# Patient Record
Sex: FEMALE | Race: WHITE | NOT HISPANIC OR LATINO | Employment: UNEMPLOYED | URBAN - METROPOLITAN AREA
[De-identification: names, ages, dates, MRNs, and addresses within clinical notes are randomized per-mention and may not be internally consistent; named-entity substitution may affect disease eponyms.]

---

## 2020-01-07 ENCOUNTER — OFFICE VISIT (OUTPATIENT)
Dept: OBGYN CLINIC | Facility: CLINIC | Age: 60
End: 2020-01-07
Payer: COMMERCIAL

## 2020-01-07 ENCOUNTER — APPOINTMENT (OUTPATIENT)
Dept: RADIOLOGY | Facility: AMBULARY SURGERY CENTER | Age: 60
End: 2020-01-07
Attending: ORTHOPAEDIC SURGERY
Payer: COMMERCIAL

## 2020-01-07 VITALS
HEART RATE: 80 BPM | HEIGHT: 66 IN | BODY MASS INDEX: 47.09 KG/M2 | WEIGHT: 293 LBS | SYSTOLIC BLOOD PRESSURE: 158 MMHG | DIASTOLIC BLOOD PRESSURE: 86 MMHG

## 2020-01-07 DIAGNOSIS — M19.172 POST-TRAUMATIC ARTHRITIS OF ANKLE, LEFT: Primary | ICD-10-CM

## 2020-01-07 DIAGNOSIS — M25.572 PAIN, JOINT, ANKLE AND FOOT, LEFT: ICD-10-CM

## 2020-01-07 DIAGNOSIS — M21.542 ANKLE VARUS, ACQUIRED, LEFT: ICD-10-CM

## 2020-01-07 PROCEDURE — 73610 X-RAY EXAM OF ANKLE: CPT

## 2020-01-07 PROCEDURE — 99203 OFFICE O/P NEW LOW 30 MIN: CPT | Performed by: ORTHOPAEDIC SURGERY

## 2020-01-07 RX ORDER — WARFARIN SODIUM 3 MG/1
6 TABLET ORAL DAILY
COMMUNITY
Start: 2019-12-30 | End: 2021-01-05

## 2020-01-07 NOTE — PROGRESS NOTES
KINJAL Olsen  Attending, Orthopaedic Surgery  Foot and [address] [facility] [address] Associates      ORTHOPAEDIC FOOT AND ANKLE CLINIC VISIT     Assessment:     Encounter Diagnoses   Name Primary?  Pain, joint, ankle and foot, left Yes    Post-traumatic arthritis of ankle, left     Ankle varus, acquired, left             Plan:   · The patient verbalized understanding of exam findings and treatment plan  We engaged in the shared decision-making process and treatment options were discussed at length with the patient  Surgical and conservative management discussed today along with risks and benefits  · She has end-stage post-traumatic ankle arthritis with hindfoot varus  · She is a current smoker and her BMI is 48  This puts her at tremendous risk for complication with any surgery  · She has been receiving injections br Dr Franklyn Martínez at [facility] and these are becoming less and less effective  · We ordered her an [place] brace to be worn at all times  · If she is able to quit smoking, she would be a candidate for ankle arthodesis if the bracing fails  · Her BMI precludes her from an ankle replacement  Return in about 3 months (around 4/7/2020)  History of Present Illness:   Chief Complaint:   Chief Complaint   Patient presents with    Left Ankle - Pain     Elise Holliday is a 64 y o  female who is referred by Dr Cindi Blackburn being seen for left ankle pain  Ankle pain has been present for multiple years, pain started after an ankle fracture that was surgically repaired x 20 years ago  Pain is localized at anterior ankle with minimal radiating and described as sharp and severe  Patient denies numbness, tingling or radicular pain  Denies history of neuropathy  Patient does smoke, does not have diabetes and does not take blood thinners  Patient denies family history of anesthesia complications and has not had any complications with anesthesia       Pain/symptom timing:  Worse during the day when active  Pain/symptom context:  Worse with activites and work  Pain/symptom modifying factors:  Rest makes better, activities make worse  Pain/symptom associated signs/symptoms: none    Prior treatment   · NSAIDsYes   · Injections Yes   · Bracing/Orthotics No    · Physical Therapy No     Orthopedic Surgical History:   Previous ankle ORIF 20 years ago    Past Medical, Surgical and Social History:  Past Medical History:  has a past medical history of Cancer ([facility] [address] )  Problem List: does not have a problem list on file  Past Surgical History:  has a past surgical history that includes Ankle surgery and Cardiac surgery  Family History: family history is not on file  Social History:  reports that she has been smoking  She has never used smokeless tobacco  Her alcohol and drug histories are not on file  Current Medications: has a current medication list which includes the following prescription(s): warfarin  Allergies: is allergic to tramadol  Review of Systems:  General- denies fever/chills  HEENT- denies hearing loss or sore throat  Eyes- denies eye pain or visual disturbances, denies red eyes  Respiratory- denies cough or SOB  Cardio- denies chest pain or palpitations  GI- denies abdominal pain  Endocrine- denies urinary frequency  Urinary- denies pain with urination  Musculoskeletal- Negative except noted above  Skin- denies rashes or wounds  Neurological- denies dizziness or headache  Psychiatric- denies anxiety or difficulty concentrating    Physical Exam:   /86 (BP Location: Right arm, Patient Position: Sitting, Cuff Size: Adult)   Pulse 80   Ht 5' 6" (1 676 m)   Wt 136 kg (300 lb)   BMI 48 42 kg/m²   General/Constitutional: No apparent distress: well-nourished and well developed    Eyes: normal ocular motion  Lymphatic: No appreciable lymphadenopathy  Respiratory: Non-labored breathing  Vascular: No edema, swelling or tenderness, except as noted in detailed exam   Integumentary: No impressive skin lesions present, except as noted in detailed exam   Neuro: No ataxia or tremors noted  Psych: Normal mood and affect, oriented to person, place and time  Appropriate affect  Musculoskeletal: Normal, except as noted in detailed exam and in HPI  Examination    Left    Gait Antalgic   Musculoskeletal Tender to palpation at anterior ankle    Skin Normal       Nails Normal    Range of Motion  0 degrees dorsiflexion, 0 degrees plantarflexion  Subtalar motion: minimal    Stability Stable    Muscle Strength 5/5 tibialis anterior  5/5 gastrocnemius-soleus  5/5 posterior tibialis  5/5 peroneal/eversion strength  5/5 EHL  5/5 FHL    Neurologic Normal    Sensation Intact to light touch throughout sural, saphenous, superficial peroneal, deep peroneal and medial/lateral plantar nerve distributions  [place] 5 07 filament (10g) testing deferred  Cardiovascular Brisk capillary refill < 2 seconds,intact DP and PT pulses    Special Tests None      Imaging Studies:   6 views of the left ankle were taken, reviewed and interpreted independently that demonstrate previous medial malleolar screw, end stage post-traumatic ankle arthritis with varus hindfoot  Reviewed by me personally  Franceen Rear Lachman, MD  Foot & Ankle Surgery   Department [address]      I personally performed the service  Franceen Rear Lachman, MD

## 2020-01-07 NOTE — LETTER
January 7, 2020     Referral [address] [facility] [id number]    Patient: Chante Lind   YOB: 1960   Date of Visit: 1/7/2020     Dear Dr Alison Ridley      Thank you for referring Ariel Estevez to me for evaluation  Below are the relevant portions of my assessment and plan of care  If you have questions, please do not hesitate to call me  I look forward to following Izzy Hendricks along with you  Sincerely,        Litzy Shepard MD        CC: Scarlett Wilder MD    Progress Notes:

## 2020-01-07 NOTE — PATIENT INSTRUCTIONS
Today, We recommended a brace/prosthesis for you  [facility] certified pedorthotists make orthotics but not braces or prosthesis  Below are the companies in the area that make these, Please call ahead for an appointment and to ensure the company accepts your insurance  Make sure to bring the prescription given to you in the office today to the company for the brace/prosthesis  [address] [facility] [address] Rizwan Adam  [address] [facility] [address] Josefina Basilio 1 Phone: [phone]  [place] Fax: [phone]  ·   [address]  [address]  [place], [address]  (By Appointment Only)  [facility], [address] [place] [address] Dr Oliver LINDER Phone: [phone]  [place] Fax: [address] Dol[address] d[address] Location  [address]   [address] [facility]  [phone] (fax)    [facility]  [address] [facility], [address] [place] [address],[address]  [place], [facility]  Jason Keita  [phone] (fax)    [facility]  [address] [facility], [address] [facility] [address]  [phone] (fax)    [facility] Location  [address] [address] [address]  [address] [facility]  [phone] (fax)    [facility]  [address] , [address], Kenyetta Balbuena 3  [phone] (fax)

## 2021-01-05 RX ORDER — WARFARIN SODIUM 3 MG/1
6 TABLET ORAL DAILY
COMMUNITY
Start: 2020-12-21 | End: 2021-06-24 | Stop reason: SDUPTHER

## 2021-01-05 RX ORDER — NYSTATIN 100000 [USP'U]/G
POWDER TOPICAL
COMMUNITY
Start: 2020-08-25 | End: 2021-01-08

## 2021-01-08 ENCOUNTER — OFFICE VISIT (OUTPATIENT)
Dept: FAMILY MEDICINE CLINIC | Facility: CLINIC | Age: 61
End: 2021-01-08
Payer: COMMERCIAL

## 2021-01-08 VITALS
BODY MASS INDEX: 47.09 KG/M2 | HEIGHT: 66 IN | RESPIRATION RATE: 18 BRPM | HEART RATE: 95 BPM | WEIGHT: 293 LBS | OXYGEN SATURATION: 95 % | TEMPERATURE: 97 F | DIASTOLIC BLOOD PRESSURE: 80 MMHG | SYSTOLIC BLOOD PRESSURE: 126 MMHG

## 2021-01-08 DIAGNOSIS — Z53.20 MAMMOGRAM DECLINED: ICD-10-CM

## 2021-01-08 DIAGNOSIS — E78.5 HYPERLIPIDEMIA, UNSPECIFIED HYPERLIPIDEMIA TYPE: ICD-10-CM

## 2021-01-08 DIAGNOSIS — Z11.59 NEED FOR HEPATITIS C SCREENING TEST: ICD-10-CM

## 2021-01-08 DIAGNOSIS — E03.9 HYPOTHYROIDISM, UNSPECIFIED TYPE: ICD-10-CM

## 2021-01-08 DIAGNOSIS — Z11.4 ENCOUNTER FOR SCREENING FOR HIV: ICD-10-CM

## 2021-01-08 DIAGNOSIS — Z00.00 ENCOUNTER FOR MEDICAL EXAMINATION TO ESTABLISH CARE: Primary | ICD-10-CM

## 2021-01-08 DIAGNOSIS — E66.01 MORBID OBESITY (HCC): ICD-10-CM

## 2021-01-08 DIAGNOSIS — Z95.2 HISTORY OF MECHANICAL AORTIC VALVE REPLACEMENT: ICD-10-CM

## 2021-01-08 DIAGNOSIS — Z28.21 INFLUENZA VACCINATION DECLINED BY PATIENT: ICD-10-CM

## 2021-01-08 DIAGNOSIS — Z72.0 TOBACCO USE: ICD-10-CM

## 2021-01-08 DIAGNOSIS — R73.9 HYPERGLYCEMIA: ICD-10-CM

## 2021-01-08 DIAGNOSIS — L03.113 CELLULITIS OF RIGHT UPPER EXTREMITY: ICD-10-CM

## 2021-01-08 LAB — SL AMB POCT HEMOGLOBIN AIC: 6.1 (ref ?–6.5)

## 2021-01-08 PROCEDURE — 83036 HEMOGLOBIN GLYCOSYLATED A1C: CPT | Performed by: INTERNAL MEDICINE

## 2021-01-08 PROCEDURE — 99214 OFFICE O/P EST MOD 30 MIN: CPT | Performed by: INTERNAL MEDICINE

## 2021-01-08 PROCEDURE — 3725F SCREEN DEPRESSION PERFORMED: CPT | Performed by: INTERNAL MEDICINE

## 2021-01-08 PROCEDURE — 36416 COLLJ CAPILLARY BLOOD SPEC: CPT | Performed by: INTERNAL MEDICINE

## 2021-01-08 PROCEDURE — 99204 OFFICE O/P NEW MOD 45 MIN: CPT | Performed by: INTERNAL MEDICINE

## 2021-01-08 RX ORDER — MIRTAZAPINE 45 MG/1
45 TABLET, FILM COATED ORAL
COMMUNITY
Start: 2020-12-21 | End: 2021-04-27 | Stop reason: HOSPADM

## 2021-01-08 RX ORDER — CLONAZEPAM 1 MG/1
1 TABLET ORAL DAILY PRN
COMMUNITY
Start: 2020-12-28 | End: 2021-04-27 | Stop reason: HOSPADM

## 2021-01-08 RX ORDER — DOXYCYCLINE HYCLATE 100 MG/1
100 CAPSULE ORAL EVERY 12 HOURS SCHEDULED
Qty: 14 CAPSULE | Refills: 0 | Status: SHIPPED | OUTPATIENT
Start: 2021-01-08 | End: 2021-01-15

## 2021-01-08 RX ORDER — DOXYCYCLINE HYCLATE 100 MG/1
CAPSULE ORAL
COMMUNITY
Start: 2020-11-10 | End: 2021-03-02

## 2021-01-08 RX ORDER — ZIPRASIDONE HYDROCHLORIDE 60 MG/1
60 CAPSULE ORAL DAILY
COMMUNITY
Start: 2020-12-21 | End: 2021-04-27 | Stop reason: HOSPADM

## 2021-01-08 RX ORDER — CHLORHEXIDINE GLUCONATE 4 G/100ML
1 SOLUTION TOPICAL DAILY PRN
Qty: 120 ML | Refills: 0 | Status: SHIPPED | OUTPATIENT
Start: 2021-01-08 | End: 2021-03-02

## 2021-01-08 NOTE — PROGRESS NOTES
BMI Counseling: Body mass index is 53 59 kg/m²  The BMI is above normal  Nutrition recommendations include reducing portion sizes, decreasing overall calorie intake, 3-5 servings of fruits/vegetables daily, reducing fast food intake, consuming healthier snacks and decreasing soda and/or juice intake  Exercise recommendations include exercising 3-5 times per week and joining a gym

## 2021-01-08 NOTE — PATIENT INSTRUCTIONS

## 2021-01-12 ENCOUNTER — TELEPHONE (OUTPATIENT)
Dept: FAMILY MEDICINE CLINIC | Facility: CLINIC | Age: 61
End: 2021-01-12

## 2021-01-12 NOTE — TELEPHONE ENCOUNTER
Just prescribed doxycycline on Friday and she googled and it and it said she should not take with her blood thinner or adjust her blood thinner? Patient ph # [phone]    Pharmacy - [facility] but =she wants to change that pharmacy    She is not happy with [facility] the way they treated her when she asked about being on her blood thinner  So she will let us know what pharmacy to use

## 2021-01-12 NOTE — TELEPHONE ENCOUNTER
As long as she's not on doxycycline really long it shouldn't be a problem-we can change to a different class of antibiotics but she is allergic to penicillins and we are trying to cover a cellulitis-we can just do some extra INR monitoring

## 2021-01-20 ENCOUNTER — TELEPHONE (OUTPATIENT)
Dept: FAMILY MEDICINE CLINIC | Facility: CLINIC | Age: 61
End: 2021-01-20

## 2021-01-20 NOTE — TELEPHONE ENCOUNTER
R hand is on pt is getting worse she states  PT finished antibiotic but did not get the OTC cream you mentioned to her due to not having 15$ available  She would like to know what else should she do

## 2021-01-25 ENCOUNTER — APPOINTMENT (OUTPATIENT)
Dept: LAB | Facility: CLINIC | Age: 61
End: 2021-01-25
Payer: COMMERCIAL

## 2021-01-25 ENCOUNTER — ANTICOAG VISIT (OUTPATIENT)
Dept: CARDIOLOGY CLINIC | Facility: CLINIC | Age: 61
End: 2021-01-25

## 2021-01-25 ENCOUNTER — OFFICE VISIT (OUTPATIENT)
Dept: CARDIOLOGY CLINIC | Facility: CLINIC | Age: 61
End: 2021-01-25
Payer: COMMERCIAL

## 2021-01-25 ENCOUNTER — TELEPHONE (OUTPATIENT)
Dept: CARDIOLOGY CLINIC | Facility: CLINIC | Age: 61
End: 2021-01-25

## 2021-01-25 ENCOUNTER — TRANSCRIBE ORDERS (OUTPATIENT)
Dept: LAB | Facility: CLINIC | Age: 61
End: 2021-01-25

## 2021-01-25 ENCOUNTER — TELEMEDICINE (OUTPATIENT)
Dept: FAMILY MEDICINE CLINIC | Facility: CLINIC | Age: 61
End: 2021-01-25
Payer: COMMERCIAL

## 2021-01-25 VITALS
DIASTOLIC BLOOD PRESSURE: 80 MMHG | WEIGHT: 293 LBS | BODY MASS INDEX: 47.09 KG/M2 | OXYGEN SATURATION: 93 % | SYSTOLIC BLOOD PRESSURE: 130 MMHG | HEIGHT: 66 IN | HEART RATE: 82 BPM

## 2021-01-25 VITALS — WEIGHT: 293 LBS | HEIGHT: 66 IN | BODY MASS INDEX: 47.09 KG/M2

## 2021-01-25 DIAGNOSIS — Z00.00 ENCOUNTER FOR MEDICAL EXAMINATION TO ESTABLISH CARE: ICD-10-CM

## 2021-01-25 DIAGNOSIS — Z95.2 STATUS POST MECHANICAL AORTIC VALVE REPLACEMENT: ICD-10-CM

## 2021-01-25 DIAGNOSIS — R00.2 PALPITATIONS: ICD-10-CM

## 2021-01-25 DIAGNOSIS — L02.92 BOIL: Primary | ICD-10-CM

## 2021-01-25 DIAGNOSIS — R06.02 SOB (SHORTNESS OF BREATH): ICD-10-CM

## 2021-01-25 DIAGNOSIS — Z11.4 ENCOUNTER FOR SCREENING FOR HIV: ICD-10-CM

## 2021-01-25 DIAGNOSIS — G47.33 OSA (OBSTRUCTIVE SLEEP APNEA): ICD-10-CM

## 2021-01-25 DIAGNOSIS — R00.0 TACHYCARDIA: ICD-10-CM

## 2021-01-25 DIAGNOSIS — R06.02 SOB (SHORTNESS OF BREATH): Primary | ICD-10-CM

## 2021-01-25 DIAGNOSIS — E78.00 HIGH CHOLESTEROL: Primary | ICD-10-CM

## 2021-01-25 DIAGNOSIS — E66.01 MORBID OBESITY WITH BMI OF 50.0-59.9, ADULT (HCC): ICD-10-CM

## 2021-01-25 DIAGNOSIS — E78.5 HYPERLIPIDEMIA, UNSPECIFIED HYPERLIPIDEMIA TYPE: ICD-10-CM

## 2021-01-25 DIAGNOSIS — E03.9 HYPOTHYROIDISM, UNSPECIFIED TYPE: ICD-10-CM

## 2021-01-25 DIAGNOSIS — Z95.2 HISTORY OF MECHANICAL AORTIC VALVE REPLACEMENT: ICD-10-CM

## 2021-01-25 LAB
ALBUMIN SERPL BCP-MCNC: 3.4 G/DL (ref 3.5–5)
ALP SERPL-CCNC: 118 U/L (ref 46–116)
ALT SERPL W P-5'-P-CCNC: 38 U/L (ref 12–78)
ANION GAP SERPL CALCULATED.3IONS-SCNC: 8 MMOL/L (ref 4–13)
AST SERPL W P-5'-P-CCNC: 45 U/L (ref 5–45)
BASOPHILS # BLD AUTO: 0.03 THOUSANDS/ΜL (ref 0–0.1)
BASOPHILS NFR BLD AUTO: 1 % (ref 0–1)
BILIRUB SERPL-MCNC: 0.89 MG/DL (ref 0.2–1)
BUN SERPL-MCNC: 14 MG/DL (ref 5–25)
CALCIUM ALBUM COR SERPL-MCNC: 9.2 MG/DL (ref 8.3–10.1)
CALCIUM SERPL-MCNC: 8.7 MG/DL (ref 8.3–10.1)
CHLORIDE SERPL-SCNC: 104 MMOL/L (ref 100–108)
CHOLEST SERPL-MCNC: 215 MG/DL (ref 50–200)
CO2 SERPL-SCNC: 29 MMOL/L (ref 21–32)
CREAT SERPL-MCNC: 0.69 MG/DL (ref 0.6–1.3)
EOSINOPHIL # BLD AUTO: 0.14 THOUSAND/ΜL (ref 0–0.61)
EOSINOPHIL NFR BLD AUTO: 4 % (ref 0–6)
ERYTHROCYTE [DISTWIDTH] IN BLOOD BY AUTOMATED COUNT: 12.5 % (ref 11.6–15.1)
GFR SERPL CREATININE-BSD FRML MDRD: 96 ML/MIN/1.73SQ M
GLUCOSE P FAST SERPL-MCNC: 163 MG/DL (ref 65–99)
HCT VFR BLD AUTO: 46.4 % (ref 34.8–46.1)
HDLC SERPL-MCNC: 59 MG/DL
HGB BLD-MCNC: 14.6 G/DL (ref 11.5–15.4)
IMM GRANULOCYTES # BLD AUTO: 0.01 THOUSAND/UL (ref 0–0.2)
IMM GRANULOCYTES NFR BLD AUTO: 0 % (ref 0–2)
INR PPP: 1.73 (ref 0.84–1.19)
LDLC SERPL CALC-MCNC: 116 MG/DL (ref 0–100)
LYMPHOCYTES # BLD AUTO: 1.05 THOUSANDS/ΜL (ref 0.6–4.47)
LYMPHOCYTES NFR BLD AUTO: 27 % (ref 14–44)
MCH RBC QN AUTO: 30.8 PG (ref 26.8–34.3)
MCHC RBC AUTO-ENTMCNC: 31.5 G/DL (ref 31.4–37.4)
MCV RBC AUTO: 98 FL (ref 82–98)
MONOCYTES # BLD AUTO: 0.31 THOUSAND/ΜL (ref 0.17–1.22)
MONOCYTES NFR BLD AUTO: 8 % (ref 4–12)
NEUTROPHILS # BLD AUTO: 2.4 THOUSANDS/ΜL (ref 1.85–7.62)
NEUTS SEG NFR BLD AUTO: 60 % (ref 43–75)
NRBC BLD AUTO-RTO: 0 /100 WBCS
NT-PROBNP SERPL-MCNC: 293 PG/ML
PLATELET # BLD AUTO: 155 THOUSANDS/UL (ref 149–390)
PMV BLD AUTO: 10.1 FL (ref 8.9–12.7)
POTASSIUM SERPL-SCNC: 4.3 MMOL/L (ref 3.5–5.3)
PROT SERPL-MCNC: 7.7 G/DL (ref 6.4–8.2)
PROTHROMBIN TIME: 20.3 SECONDS (ref 11.6–14.5)
RBC # BLD AUTO: 4.74 MILLION/UL (ref 3.81–5.12)
SODIUM SERPL-SCNC: 141 MMOL/L (ref 136–145)
TRIGL SERPL-MCNC: 198 MG/DL
TSH SERPL DL<=0.05 MIU/L-ACNC: 2.6 UIU/ML (ref 0.36–3.74)
WBC # BLD AUTO: 3.94 THOUSAND/UL (ref 4.31–10.16)

## 2021-01-25 PROCEDURE — 99213 OFFICE O/P EST LOW 20 MIN: CPT | Performed by: INTERNAL MEDICINE

## 2021-01-25 PROCEDURE — 83880 ASSAY OF NATRIURETIC PEPTIDE: CPT

## 2021-01-25 PROCEDURE — 36415 COLL VENOUS BLD VENIPUNCTURE: CPT

## 2021-01-25 PROCEDURE — 80061 LIPID PANEL: CPT

## 2021-01-25 PROCEDURE — 86803 HEPATITIS C AB TEST: CPT

## 2021-01-25 PROCEDURE — 99204 OFFICE O/P NEW MOD 45 MIN: CPT | Performed by: INTERNAL MEDICINE

## 2021-01-25 PROCEDURE — 85025 COMPLETE CBC W/AUTO DIFF WBC: CPT

## 2021-01-25 PROCEDURE — 93000 ELECTROCARDIOGRAM COMPLETE: CPT | Performed by: INTERNAL MEDICINE

## 2021-01-25 PROCEDURE — 3008F BODY MASS INDEX DOCD: CPT | Performed by: INTERNAL MEDICINE

## 2021-01-25 PROCEDURE — 84443 ASSAY THYROID STIM HORMONE: CPT | Performed by: INTERNAL MEDICINE

## 2021-01-25 PROCEDURE — 4004F PT TOBACCO SCREEN RCVD TLK: CPT | Performed by: INTERNAL MEDICINE

## 2021-01-25 PROCEDURE — 80053 COMPREHEN METABOLIC PANEL: CPT

## 2021-01-25 PROCEDURE — 85610 PROTHROMBIN TIME: CPT

## 2021-01-25 PROCEDURE — 87389 HIV-1 AG W/HIV-1&-2 AB AG IA: CPT

## 2021-01-25 RX ORDER — ATORVASTATIN CALCIUM 20 MG/1
20 TABLET, FILM COATED ORAL DAILY
Qty: 30 TABLET | Refills: 5 | Status: SHIPPED | OUTPATIENT
Start: 2021-01-25 | End: 2021-07-14

## 2021-01-25 RX ORDER — FUROSEMIDE 40 MG/1
40 TABLET ORAL DAILY
Qty: 10 TABLET | Refills: 0 | Status: SHIPPED | OUTPATIENT
Start: 2021-01-25 | End: 2021-01-26 | Stop reason: SDUPTHER

## 2021-01-25 NOTE — PROGRESS NOTES
[facility] [address]  [place] [address] [place] [address]  Phone#  [phone]  Fax#  [phone]                                               Cardiology Office [address] [facility], 64 y o  female  YOB: 1960  MRN: [id number] Encounter: [id number]      PCP - Nimco Queen MD    Assessment  1  Shortness of breath  2  S/p #23 On-X mechanical aortic valve replacement  · Echo - 10/9/17 ([facility] cardio note documentation) -  Well-seated mechanical aortic valve  3  Bicuspid aortic valve  4  h/o DVT  5  Morbid obesity, Body mass index is 53 94 kg/m²     6  H/o Ovarian cancer s/p hysterectomy/oop    Plan  Shortness of breath, palpitations  · Shortness of breath is predominantly at night when she lies down history of orthopnea or obesity hypoventilation  · Volume status is difficult to assess considering the body habitus   · She has a trace amount of edema, and no rales on clinical exam  · Check NT-Pro-BNP  · Check diagnostic sleep study  · Trial of lasix 40 mg daily for 5 days  · Check echo  · She is pending blood work today   · May need to consider further cardiac monitor if continues to have palpitations    S/p mechanical aortic valve replacement - 23mm On-X valve  · She has been on Coumadin for the same since her valve replacement, and was being previously monitored with [facility] and  · She recently established with PCP Dr Fab Vasquez, who initiated the Coumadin follow-up for her, but patient states that she would feel much more comfortable if it were monitored by Cardiology  · As a result, we are going to have the INR monitoring switch to the cardiology office with us  · No known h/o fib/stroke --> INR goal 2-3    Bicuspid aortic valve  · She herself reports having history of bicuspid valve, which was noted at the time of surgery in 2013 - unable to find records to confirm same  · Although the valve has already been replaced, she remains at risk of aortic aneurysm and this will need continued monitoring with echo/CT    Morbid obesity, Body mass index is 53 94 kg/m²  · Her eventual goal is to get gastric bypass surgery, but she has been finding it hard to lose any weight   · Encourage increase walking to include at least 30 minutes daily    ECG today -  Results for orders placed or performed in visit on 01/25/21   POCT ECG    Impression     Sinus rhythm, cannot rule out septal infarct, otherwise normal ECG        Orders Placed This Encounter   Procedures    NT-BNP PRO    TSH, 3rd generation with Free T4 reflex    Lipid Panel with Direct LDL reflex    Enrollment for AMB Anticoagulation Monitoring    POCT ECG    Echo complete with contrast if indicated    Diagnostic Sleep Study       Return in about 2 months (around 3/25/2021), or if symptoms worsen or fail to improve  History of Present Illness   51-year-old female, with morbid obesity, comes in as a new patient for establishment of care  She has a history of mechanical aortic valve replacement done in 3/2013 with Dr Bedoya at [facility], for reported bicuspid aortic valve  She did not have any significant CAD at this time, and did not need any bypass  He has been doing well since surgery and has not had any major problems  She does report having some issues with the surgical scar, and it being extended down and extending into left, and needing a plate repair, but has not had any problems with same recently  She subsequently followed up with Cardiology Dr Morin at [facility] and most recently with Dr Polo Yuen at [facility], and now wants to move care to [facility]  She has morbid obesity, and reports a weight gain of about 12 lb for the last 6 months  He states that she has been mostly staying indoors and avoiding any outside activity due to the ongoing pandemic, and has been eating about the same as before  She does report increasing shortness of breath over the last 6 months associated with orthopnea and PND    He additionally reports intermittent palpitations, predominantly occurring at night when she tries to go to sleep lying down  She describes it as an  Uncomfortable sensation      No complains of chest pain    Historical Information   Past Medical History:   Diagnosis Date    Anemia     Anxiety     Aortic valve disorder     Back pain     Chronic pain syndrome     Constipation     Degenerative joint disease involving multiple joints     Depression     DVT (deep venous thrombosis) (HCC)     Bilateral    Edema     Endometrial adenocarcinoma (HCC)     Fibromyalgia     Ganglion of right wrist     GERD (gastroesophageal reflux disease)     Heart murmur     Hematoma     History of mechanical aortic valve replacement     Hypothyroidism (acquired)     Low blood pressure     Malignant neoplasm of corpus uteri, except isthmus (HCC)     Mixed hyperlipidemia     Morbid obesity (HCC)     Obstructive sleep apnea syndrome     Pulmonary embolism (HCC)     Tobacco dependence syndrome     Transient cerebral ischemia     Urinary incontinence     Viral hepatitis C     Vitamin D deficiency      Past Surgical History:   Procedure Laterality Date    ANKLE SURGERY      CARDIAC SURGERY  2015    VALVE REPLACEMENT     SECTION      X3    CHOLECYSTECTOMY      COLONOSCOPY  2019    HYSTERECTOMY  2011    TOTAL     Family History   Problem Relation Age of Onset    Coronary artery disease Mother     Coronary artery disease Father      Current Outpatient Medications on File Prior to Visit   Medication Sig Dispense Refill    warfarin (Jantoven) 3 mg tablet Take 6 mg by mouth daily      chlorhexidine (HIBICLENS) 4 % external liquid Apply 1 application topically daily as needed for wound care (Patient not taking: Reported on 2021) 120 mL 0    clonazePAM (KlonoPIN) 1 mg tablet Take 1 mg by mouth daily as needed      doxycycline hyclate (VIBRAMYCIN) 100 mg capsule TAKE 1 CAPSULE  (100 MG TOTAL) BY MOUTH 2 (TWO) TIMES A DAY FOR 10 DAYS   mirtazapine (REMERON) 45 MG tablet Take 45 mg by mouth daily at bedtime      ziprasidone (GEODON) 60 mg capsule Take 60 mg by mouth daily       No current facility-administered medications on file prior to visit        Allergies   Allergen Reactions    Bactrim [Sulfamethoxazole-Trimethoprim]     Penicillins     Tramadol      Could not function - felt very ill      Social History     Socioeconomic History    Marital status:      Spouse name: None    Number of children: None    Years of education: 15    Highest education level: GED or equivalent   Occupational History    None   Social Needs    Financial resource strain: None    Food insecurity     Worry: None     Inability: None    Transportation needs     Medical: None     Non-medical: None   Tobacco Use    Smoking status: Current Every Day Smoker     Packs/day: 0 25     Years: 40 00     Pack years: 10 00     Types: Cigarettes    Smokeless tobacco: Never Used   Substance and Sexual Activity    Alcohol use: Yes     Frequency: Monthly or less    Drug use: Yes     Types: Marijuana    Sexual activity: None   Lifestyle    Physical activity     Days per week: None     Minutes per session: None    Stress: None   Relationships    Social connections     Talks on phone: None     Gets together: None     Attends Restorationist service: None     Active member of club or organization: None     Attends meetings of clubs or organizations: None     Relationship status: None    Intimate partner violence     Fear of current or ex partner: None     Emotionally abused: None     Physically abused: None     Forced sexual activity: None   Other Topics Concern    None   Social History Narrative    Do you currently or have you served in the [employer]: No    Were you activated, into active duty, as a member of the [employer] or as a Reservist: No    Occupation: unemployed    Education: 15 ged    Marital status:     Exercise level: None    Diet: Regular    General stress level: High    Has smoked since age: 17    Alcohol intake: None    Caffeine intake: Heavy    Chewing tobacco: none    Illicit drugs: none    Guns present in home: Yes    Seat belts used routinely: No    Sunscreen used routinely: No    Smoke alarm in home: Yes    Advance directive: Yes living will    Live alone or with others: with others    Are there stairs in your home: Yes    Pets: Yes 1 dog    Deaf or serious difficulty hearing: No    Blind or serious difficulty seeing: No    Difficulty concentrating, remembering or making decisions: No    Difficulty walking or climbing stairs: Yes    Difficulty dressing or bathing: Yes    Difficulty doing errands alone: Yes    can't put socks on        Review of Systems   All other systems reviewed and are negative  Vitals:  Vitals:    01/25/21 0952   BP: 130/80   BP Location: Left arm   Patient Position: Sitting   Cuff Size: Large   Pulse: 82   SpO2: 93%   Weight: (!) 152 kg (334 lb 3 2 oz)   Height: 5' 6" (1 676 m)     BMI - Body mass index is 53 94 kg/m²  Wt Readings from Last 7 Encounters:   01/25/21 (!) 152 kg (334 lb 3 2 oz)   01/08/21 (!) 151 kg (332 lb)   01/07/20 136 kg (300 lb)       Physical Exam  Vitals signs and nursing note reviewed  Constitutional:       General: She is not in acute distress  Appearance: Normal appearance  She is well-developed  She is obese  She is not ill-appearing  HENT:      Head: Normocephalic and atraumatic  Nose: No congestion  Eyes:      General: No scleral icterus  Conjunctiva/sclera: Conjunctivae normal    Neck:      Musculoskeletal: Neck supple  Vascular: No carotid bruit or JVD  Cardiovascular:      Rate and Rhythm: Normal rate and regular rhythm  Pulses: Normal pulses  Heart sounds: Normal heart sounds  No murmur  No friction rub  No gallop  Pulmonary:      Effort: Pulmonary effort is normal  No respiratory distress  Breath sounds: Normal breath sounds  No rales  Chest:      Chest wall: Deformity present  Comments: Healed sternal scar from prior surgery, extending lower and into left side  Abdominal:      General: Abdomen is protuberant  There is no distension  Palpations: Abdomen is soft  Tenderness: There is no abdominal tenderness  Musculoskeletal:         General: No swelling or tenderness  Right lower leg: Edema present  Left lower leg: Edema present  Comments: trace   Skin:     General: Skin is warm  Neurological:      General: No focal deficit present  Mental Status: She is alert and oriented to person, place, and time  Mental status is at baseline  Psychiatric:         Mood and Affect: Mood normal          Behavior: Behavior normal          Thought Content: Thought content normal          Labs:  CBC: No results found for: WBC, RBC, HGB, HCT, MCV, PLT, RDW    CMP: No results found for: NA, K, CL, CO2, ANIONGAP, BUN, CREATININE, EGFR, GLUCOSE, CALCIUM, AST, ALT, ALKPHOS, PROT, BILITOT    Magnesium:  No results found for: MG    Lipid Profile:   No results found for: CHOL, HDL, TRIG, LDLCALC    Thyroid Studies: No results found for: ZXA4HUDQEKEZ, T3FREE, FREET4, W9GQEUD, J2LLHZI    No components found for: HGA1C    No results found for: WUZ5    Imaging: No results found  Cardiac testing:   No results found for this or any previous visit  No results found for this or any previous visit  No results found for this or any previous visit  No results found for this or any previous visit

## 2021-01-25 NOTE — TELEPHONE ENCOUNTER
----- Message from Amauri Gtz MD sent at 1/25/2021 10:43 AM EST -----  New patient seen by me today at [facility] office  Has a mechanical aortic valve and is on Coumadin and needs to be established with our office  Previously was seeing [facility] cardiology  Please call patient to setup same

## 2021-01-25 NOTE — PROGRESS NOTES
Virtual Regular Visit      Assessment/Plan:    Problem List Items Addressed This Visit     None      Visit Diagnoses     Boil    -  Primary        She wants to try some black salv before doing a surgical referral-told her to call me if it gets much worse       Reason for visit is   Chief Complaint   Patient presents with    Virtual Regular Visit     Google Duo    Hand not any better     right hand    Virtual Regular Visit        Encounter provider Nimco Queen MD    Provider located at [address] [place] [address] [facility] [address] [phone]      Recent Visits  Date Type Provider Dept   01/20/21 Telephone Jr Carrasco MD  [address] recent visits within past 7 days and meeting all other requirements     Today's Visits  Date Type Provider Dept   01/25/21 Telemedicine Jr Carrasco MD [address] today's visits and meeting all other requirements     Future Appointments  No visits were found meeting these conditions  Showing future appointments within next 150 days and meeting all other requirements        The patient was identified by name and date of birth  Ally Valencia was informed that this is a telemedicine visit and that the visit is being conducted through [employer] and patient was informed that this is not a secure, HIPAA-compliant platform  She agrees to proceed     My office door was closed  No one else was in the room  She acknowledged consent and understanding of privacy and security of the video platform  The patient has agreed to participate and understands they can discontinue the visit at any time  Patient is aware this is a billable service  Subjective  Ally Valencia is a 64 y o  female with a right hand skin lesion         Hunter Pruett still has this boil like spot on her right hand even though she's been on 7 days of doxycycline- still bothers her says there might be something in it-she picks at it       Past Medical History:   Diagnosis Date    Anemia     Anxiety     Aortic valve disorder     Back pain     Chronic pain syndrome     Constipation     Degenerative joint disease involving multiple joints     Depression     DVT (deep venous thrombosis) (HCC)     Bilateral    Edema     Endometrial adenocarcinoma (HCC)     Fibromyalgia     Ganglion of right wrist     GERD (gastroesophageal reflux disease)     Heart murmur     Hematoma     History of mechanical aortic valve replacement     Hypothyroidism (acquired)     Low blood pressure     Malignant neoplasm of corpus uteri, except isthmus (HCC)     Mixed hyperlipidemia     Morbid obesity (HCC)     Obstructive sleep apnea syndrome     Pulmonary embolism (HCC)     Tobacco dependence syndrome     Transient cerebral ischemia     Urinary incontinence     Viral hepatitis C     Vitamin D deficiency        Past Surgical History:   Procedure Laterality Date    ANKLE SURGERY      CARDIAC SURGERY  2015    VALVE REPLACEMENT     SECTION      X3    CHOLECYSTECTOMY      COLONOSCOPY  2019    HYSTERECTOMY  2011    TOTAL       Current Outpatient Medications   Medication Sig Dispense Refill    chlorhexidine (HIBICLENS) 4 % external liquid Apply 1 application topically daily as needed for wound care (Patient not taking: Reported on 2021) 120 mL 0    clonazePAM (KlonoPIN) 1 mg tablet Take 1 mg by mouth daily as needed      doxycycline hyclate (VIBRAMYCIN) 100 mg capsule TAKE 1 CAPSULE  (100 MG TOTAL) BY MOUTH 2 (TWO) TIMES A DAY FOR 10 DAYS   furosemide (LASIX) 40 mg tablet Take 1 tablet (40 mg total) by mouth daily 10 tablet 0    mirtazapine (REMERON) 45 MG tablet Take 45 mg by mouth daily at bedtime      warfarin (Jantoven) 3 mg tablet Take 6 mg by mouth daily      ziprasidone (GEODON) 60 mg capsule Take 60 mg by mouth daily       No current facility-administered medications for this visit  Allergies   Allergen Reactions    Bactrim [Sulfamethoxazole-Trimethoprim]     Penicillins     Tramadol      Could not function - felt very ill        Review of Systems   Constitutional: Negative for fatigue and fever  Skin:        Right hand boil       Video Exam    Vitals:    01/25/21 1526   Weight: (!) 152 kg (334 lb)   Height: 5' 6" (1 676 m)       Physical Exam  Constitutional:       Appearance: Normal appearance  HENT:      Head: Normocephalic and atraumatic  Pulmonary:      Effort: Pulmonary effort is normal    Skin:     Comments: Boil on right hand looks less red to me   Neurological:      General: No focal deficit present  Mental Status: She is alert and oriented to person, place, and time  Psychiatric:         Mood and Affect: Mood normal          Behavior: Behavior normal          Thought Content: Thought content normal           I spent 20 minutes directly with the patient during this visit      [facility] [address] [facility] acknowledges that she has consented to an online visit or consultation  She understands that the online visit is based solely on information provided by her, and that, in the absence of a face-to-face physical evaluation by the physician, the diagnosis she receives is both limited and provisional in terms of accuracy and completeness  This is not intended to replace a full medical face-to-face evaluation by the physician  Modesta Pace understands and accepts these terms

## 2021-01-25 NOTE — TELEPHONE ENCOUNTER
According to her chart it states her target was 2 5 to 3 5  do we keep that or do the 2 to 3 target you have in there?

## 2021-01-26 DIAGNOSIS — R00.0 TACHYCARDIA: ICD-10-CM

## 2021-01-26 DIAGNOSIS — R06.02 SOB (SHORTNESS OF BREATH): ICD-10-CM

## 2021-01-26 DIAGNOSIS — R00.2 PALPITATIONS: ICD-10-CM

## 2021-01-26 DIAGNOSIS — E66.01 MORBID OBESITY WITH BMI OF 50.0-59.9, ADULT (HCC): ICD-10-CM

## 2021-01-26 LAB
HCV AB SER QL: ABNORMAL
HIV 1+2 AB+HIV1 P24 AG SERPL QL IA: NORMAL

## 2021-01-26 RX ORDER — FUROSEMIDE 40 MG/1
TABLET ORAL
Qty: 10 TABLET | Refills: 1 | Status: SHIPPED | OUTPATIENT
Start: 2021-01-26 | End: 2021-03-16 | Stop reason: SDUPTHER

## 2021-01-26 NOTE — TELEPHONE ENCOUNTER
----- Message from Erich Dillard MD sent at 1/25/2021 12:21 PM EST -----  Cholesterol levels mildly elevated - suggest initiation of atorvastatin 20 mg daily    NT-Pro-BNP mildly elevated suggestive of possible mild heart failure  Take lasix 40 mg daily for 5 days as prescribed, and then as needed  Call back if still short of breath in 1 week  I spoke with patient about lab results  Her PCP prescribed Atorvastatin 20mg, which she will start today  I gave her instructions on Furosemide dose, and advised her to call the office in 1 week

## 2021-02-24 ENCOUNTER — TRANSCRIBE ORDERS (OUTPATIENT)
Dept: ADMINISTRATIVE | Facility: HOSPITAL | Age: 61
End: 2021-02-24

## 2021-02-24 ENCOUNTER — TELEPHONE (OUTPATIENT)
Dept: FAMILY MEDICINE CLINIC | Facility: CLINIC | Age: 61
End: 2021-02-24

## 2021-02-24 ENCOUNTER — HOSPITAL ENCOUNTER (OUTPATIENT)
Dept: NON INVASIVE DIAGNOSTICS | Facility: HOSPITAL | Age: 61
Discharge: HOME | End: 2021-02-24
Payer: COMMERCIAL

## 2021-02-24 DIAGNOSIS — E66.01 MORBID OBESITY WITH BMI OF 50.0-59.9, ADULT (HCC): ICD-10-CM

## 2021-02-24 DIAGNOSIS — R00.0 TACHYCARDIA: ICD-10-CM

## 2021-02-24 DIAGNOSIS — R00.2 PALPITATIONS: ICD-10-CM

## 2021-02-24 DIAGNOSIS — Z95.2 STATUS POST MECHANICAL AORTIC VALVE REPLACEMENT: Primary | ICD-10-CM

## 2021-02-24 DIAGNOSIS — R06.02 SOB (SHORTNESS OF BREATH): ICD-10-CM

## 2021-02-24 DIAGNOSIS — Z95.2 H/O AORTIC VALVE REPLACEMENT: Primary | ICD-10-CM

## 2021-02-24 PROCEDURE — 93306 TTE W/DOPPLER COMPLETE: CPT | Performed by: INTERNAL MEDICINE

## 2021-02-24 PROCEDURE — 93306 TTE W/DOPPLER COMPLETE: CPT

## 2021-02-24 NOTE — TELEPHONE ENCOUNTER
Nancy Waldron is over at [facility] for her PT INR bloodwork and they need a new script put into epic      Thank you

## 2021-02-25 ENCOUNTER — ANTICOAG VISIT (OUTPATIENT)
Dept: CARDIOLOGY CLINIC | Facility: CLINIC | Age: 61
End: 2021-02-25

## 2021-02-25 ENCOUNTER — APPOINTMENT (OUTPATIENT)
Dept: LAB | Facility: HOSPITAL | Age: 61
End: 2021-02-25
Attending: INTERNAL MEDICINE
Payer: COMMERCIAL

## 2021-02-25 DIAGNOSIS — Z95.2 STATUS POST MECHANICAL AORTIC VALVE REPLACEMENT: ICD-10-CM

## 2021-02-25 LAB
INR PPP: 1.8 (ref 0.9–1.1)
PROTHROMBIN TIME: 19.8 SECONDS (ref 9.5–12.1)

## 2021-02-25 PROCEDURE — 36415 COLL VENOUS BLD VENIPUNCTURE: CPT | Performed by: INTERNAL MEDICINE

## 2021-02-25 PROCEDURE — 85610 PROTHROMBIN TIME: CPT | Performed by: INTERNAL MEDICINE

## 2021-02-26 ENCOUNTER — TELEPHONE (OUTPATIENT)
Dept: SLEEP CENTER | Facility: CLINIC | Age: 61
End: 2021-02-26

## 2021-02-26 NOTE — TELEPHONE ENCOUNTER
----- Message from Tyler Wilkins MD sent at 2/25/2021 10:52 AM EST -----  Approved  ----- Message -----  From: Shy Ramesh  Sent: 2/10/5659   9:00 AM EST  To: Sleep Medicine [facility] Departed Provider    This sleep study needs approval      If approved please sign and return to clerical pool  If denied please include reasons why  Also provide alternative testing if warranted  Please sign and return to clerical pool

## 2021-03-16 ENCOUNTER — HOSPITAL ENCOUNTER (EMERGENCY)
Facility: HOSPITAL | Age: 61
Discharge: HOME | End: 2021-03-16
Attending: EMERGENCY MEDICINE | Admitting: EMERGENCY MEDICINE
Payer: COMMERCIAL

## 2021-03-16 ENCOUNTER — APPOINTMENT (EMERGENCY)
Dept: RADIOLOGY | Facility: HOSPITAL | Age: 61
End: 2021-03-16
Payer: COMMERCIAL

## 2021-03-16 VITALS
WEIGHT: 293 LBS | SYSTOLIC BLOOD PRESSURE: 150 MMHG | DIASTOLIC BLOOD PRESSURE: 80 MMHG | BODY MASS INDEX: 52.78 KG/M2 | RESPIRATION RATE: 20 BRPM | HEART RATE: 88 BPM | TEMPERATURE: 98 F | OXYGEN SATURATION: 95 %

## 2021-03-16 DIAGNOSIS — R79.89 ELEVATED BRAIN NATRIURETIC PEPTIDE (BNP) LEVEL: ICD-10-CM

## 2021-03-16 DIAGNOSIS — R06.02 SOB (SHORTNESS OF BREATH): ICD-10-CM

## 2021-03-16 DIAGNOSIS — U07.1 COVID-19: Primary | ICD-10-CM

## 2021-03-16 DIAGNOSIS — R00.0 TACHYCARDIA: ICD-10-CM

## 2021-03-16 DIAGNOSIS — R00.2 PALPITATIONS: ICD-10-CM

## 2021-03-16 DIAGNOSIS — E66.01 MORBID OBESITY WITH BMI OF 50.0-59.9, ADULT (HCC): ICD-10-CM

## 2021-03-16 LAB
ALBUMIN SERPL BCP-MCNC: 3.4 G/DL (ref 3.4–4.8)
ALP SERPL-CCNC: 82.6 U/L (ref 35–140)
ALT SERPL W P-5'-P-CCNC: 22 U/L (ref 5–54)
ANION GAP SERPL CALCULATED.3IONS-SCNC: 6 MMOL/L (ref 4–13)
APTT PPP: 40 SECONDS (ref 23–31)
AST SERPL W P-5'-P-CCNC: 37 U/L (ref 15–41)
BASOPHILS # BLD AUTO: 0.01 THOUSANDS/ΜL (ref 0–0.1)
BASOPHILS NFR BLD AUTO: 0 % (ref 0–1)
BILIRUB SERPL-MCNC: 0.47 MG/DL (ref 0.3–1.2)
BNP SERPL-MCNC: 143.3 PG/ML (ref 1–100)
BUN SERPL-MCNC: 16 MG/DL (ref 6–20)
CALCIUM ALBUM COR SERPL-MCNC: 9.1 MG/DL (ref 8.3–10.1)
CALCIUM SERPL-MCNC: 8.6 MG/DL (ref 8.4–10.2)
CHLORIDE SERPL-SCNC: 104 MMOL/L (ref 96–108)
CO2 SERPL-SCNC: 28 MMOL/L (ref 22–33)
CREAT SERPL-MCNC: 0.59 MG/DL (ref 0.4–1.1)
EOSINOPHIL # BLD AUTO: 0.1 THOUSAND/ΜL (ref 0–0.61)
EOSINOPHIL NFR BLD AUTO: 4 % (ref 0–6)
ERYTHROCYTE [DISTWIDTH] IN BLOOD BY AUTOMATED COUNT: 12.8 % (ref 11.6–15.1)
FLUAV RNA RESP QL NAA+PROBE: NEGATIVE
FLUBV RNA RESP QL NAA+PROBE: NEGATIVE
GFR SERPL CREATININE-BSD FRML MDRD: 101 ML/MIN/1.73SQ M
GLUCOSE SERPL-MCNC: 331 MG/DL (ref 65–140)
HCT VFR BLD AUTO: 39.8 % (ref 34.8–46.1)
HGB BLD-MCNC: 13 G/DL (ref 11.5–15.4)
IMM GRANULOCYTES # BLD AUTO: 0 THOUSAND/UL (ref 0–0.2)
IMM GRANULOCYTES NFR BLD AUTO: 0 % (ref 0–2)
INR PPP: 2.64 (ref 0.9–1.1)
LYMPHOCYTES # BLD AUTO: 0.77 THOUSANDS/ΜL (ref 0.6–4.47)
LYMPHOCYTES NFR BLD AUTO: 30 % (ref 14–44)
MCH RBC QN AUTO: 30.4 PG (ref 26.8–34.3)
MCHC RBC AUTO-ENTMCNC: 32.7 G/DL (ref 31.4–37.4)
MCV RBC AUTO: 93 FL (ref 82–98)
MONOCYTES # BLD AUTO: 0.24 THOUSAND/ΜL (ref 0.17–1.22)
MONOCYTES NFR BLD AUTO: 9 % (ref 4–12)
NEUTROPHILS # BLD AUTO: 1.46 THOUSANDS/ΜL (ref 1.85–7.62)
NEUTS SEG NFR BLD AUTO: 57 % (ref 43–75)
PLATELET # BLD AUTO: 122 THOUSANDS/UL (ref 149–390)
PMV BLD AUTO: 10.6 FL (ref 8.9–12.7)
POTASSIUM SERPL-SCNC: 4 MMOL/L (ref 3.5–5)
PROT SERPL-MCNC: 6.6 G/DL (ref 6.4–8.3)
PROTHROMBIN TIME: 28.5 SECONDS (ref 9.5–12.1)
RBC # BLD AUTO: 4.28 MILLION/UL (ref 3.81–5.12)
RSV RNA RESP QL NAA+PROBE: NEGATIVE
SARS-COV-2 RNA RESP QL NAA+PROBE: POSITIVE
SODIUM SERPL-SCNC: 138 MMOL/L (ref 133–145)
TROPONIN I SERPL-MCNC: <0.03 NG/ML (ref 0–0.07)
WBC # BLD AUTO: 2.58 THOUSAND/UL (ref 4.31–10.16)

## 2021-03-16 PROCEDURE — 99285 EMERGENCY DEPT VISIT HI MDM: CPT | Performed by: EMERGENCY MEDICINE

## 2021-03-16 PROCEDURE — 93005 ELECTROCARDIOGRAM TRACING: CPT

## 2021-03-16 PROCEDURE — 85730 THROMBOPLASTIN TIME PARTIAL: CPT | Performed by: EMERGENCY MEDICINE

## 2021-03-16 PROCEDURE — 96374 THER/PROPH/DIAG INJ IV PUSH: CPT

## 2021-03-16 PROCEDURE — 0241U HB NFCT DS VIR RESP RNA 4 TRGT: CPT | Performed by: EMERGENCY MEDICINE

## 2021-03-16 PROCEDURE — 71045 X-RAY EXAM CHEST 1 VIEW: CPT

## 2021-03-16 PROCEDURE — 85025 COMPLETE CBC W/AUTO DIFF WBC: CPT | Performed by: EMERGENCY MEDICINE

## 2021-03-16 PROCEDURE — 36415 COLL VENOUS BLD VENIPUNCTURE: CPT | Performed by: EMERGENCY MEDICINE

## 2021-03-16 PROCEDURE — 84484 ASSAY OF TROPONIN QUANT: CPT | Performed by: EMERGENCY MEDICINE

## 2021-03-16 PROCEDURE — 80053 COMPREHEN METABOLIC PANEL: CPT | Performed by: EMERGENCY MEDICINE

## 2021-03-16 PROCEDURE — 83880 ASSAY OF NATRIURETIC PEPTIDE: CPT | Performed by: EMERGENCY MEDICINE

## 2021-03-16 PROCEDURE — 99285 EMERGENCY DEPT VISIT HI MDM: CPT

## 2021-03-16 PROCEDURE — 85610 PROTHROMBIN TIME: CPT | Performed by: EMERGENCY MEDICINE

## 2021-03-16 RX ORDER — MELATONIN
2000 DAILY
Qty: 20 TABLET | Refills: 0 | Status: SHIPPED | OUTPATIENT
Start: 2021-03-16 | End: 2021-09-14 | Stop reason: HOSPADM

## 2021-03-16 RX ORDER — KETOROLAC TROMETHAMINE 30 MG/ML
15 INJECTION, SOLUTION INTRAMUSCULAR; INTRAVENOUS ONCE
Status: COMPLETED | OUTPATIENT
Start: 2021-03-16 | End: 2021-03-16

## 2021-03-16 RX ORDER — MULTIVITAMIN
1 CAPSULE ORAL DAILY
Qty: 10 CAPSULE | Refills: 0 | Status: SHIPPED | OUTPATIENT
Start: 2021-03-16 | End: 2021-03-17

## 2021-03-16 RX ORDER — ACETAMINOPHEN 325 MG/1
975 TABLET ORAL ONCE
Status: COMPLETED | OUTPATIENT
Start: 2021-03-16 | End: 2021-03-16

## 2021-03-16 RX ORDER — MULTIVIT WITH MINERALS/LUTEIN
1000 TABLET ORAL 2 TIMES DAILY
Qty: 20 TABLET | Refills: 0 | Status: SHIPPED | OUTPATIENT
Start: 2021-03-16 | End: 2021-04-27 | Stop reason: HOSPADM

## 2021-03-16 RX ORDER — FUROSEMIDE 10 MG/ML
40 INJECTION INTRAMUSCULAR; INTRAVENOUS ONCE
Status: DISCONTINUED | OUTPATIENT
Start: 2021-03-16 | End: 2021-03-16

## 2021-03-16 RX ORDER — FUROSEMIDE 40 MG/1
TABLET ORAL
Qty: 10 TABLET | Refills: 0 | Status: SHIPPED | OUTPATIENT
Start: 2021-03-16 | End: 2021-06-02 | Stop reason: SDUPTHER

## 2021-03-16 RX ADMIN — KETOROLAC TROMETHAMINE 15 MG: 30 INJECTION, SOLUTION INTRAMUSCULAR at 14:51

## 2021-03-16 RX ADMIN — ACETAMINOPHEN 975 MG: 325 TABLET ORAL at 14:38

## 2021-03-16 NOTE — Clinical Note
Tamara Núñez was seen and treated in our emergency department on 3/16/2021  Diagnosis:     Kayla Dunham    She may return on this date: If you have any questions or concerns, please don't hesitate to call        Lenora Flores DO    ______________________________           _______________          _______________  Hospital Representative                              Date                                Time

## 2021-03-16 NOTE — ED PROVIDER NOTES
History  Chief Complaint   Patient presents with    Shortness of Breath     Pt reports SOB since yesterday, worse today  Pt reports hand numbness for a while, and pt is also dizzy      Patient is a 70-year-old female with history of aortic valve replacement on Coumadin, CHF, DVT, who presents for shortness of breath  Patient says that it started yesterday but got worse today  She says that her shortness of breath is worse with any type of movement and when she lays flat  According to chart review, the patient had a mildly elevated BNP and was started on Lasix for 5 days by her cardiologist   She has not been taking it since then  She had an echocardiogram done on 02/24 which showed grade 2 diastolic dysfunction  She denies any chest pains, fevers, chills  She admits to a chronic cough that is not worse than usual           Prior to Admission Medications   Prescriptions Last Dose Informant Patient Reported? Taking?   atorvastatin (LIPITOR) 20 mg tablet Past Month at Unknown time  No Yes   Sig: Take 1 tablet (20 mg total) by mouth daily   clonazePAM (KlonoPIN) 1 mg tablet Not Taking at Unknown time Self Yes No   Sig: Take 1 mg by mouth daily as needed   furosemide (LASIX) 40 mg tablet Not Taking at Unknown time  No No   Sig: Take 1 tablet daily for 5 days, then 1 tablet daily on an as needed basis  Patient not taking: Reported on 3/16/2021   furosemide (LASIX) 40 mg tablet   No No   Sig: Take 1 tablet daily for 5 days, then 1 tablet daily on an as needed basis     mirtazapine (REMERON) 45 MG tablet Not Taking at Unknown time Self Yes No   Sig: Take 45 mg by mouth daily at bedtime   warfarin (Jantoven) 3 mg tablet 3/15/2021 at Unknown time Self Yes Yes   Sig: Take 6 mg by mouth daily   ziprasidone (GEODON) 60 mg capsule Not Taking at Unknown time Self Yes No   Sig: Take 60 mg by mouth daily      Facility-Administered Medications: None       Past Medical History:   Diagnosis Date    Anemia     Anxiety     Aortic valve disorder     Back pain     Chronic pain syndrome     Constipation     Degenerative joint disease involving multiple joints     Depression     DVT (deep venous thrombosis) (HCC)     Bilateral    Edema     Endometrial adenocarcinoma (HCC)     Fibromyalgia     Ganglion of right wrist     GERD (gastroesophageal reflux disease)     Heart murmur     Hematoma     History of mechanical aortic valve replacement     Hypothyroidism (acquired)     Low blood pressure     Malignant neoplasm of corpus uteri, except isthmus (HCC)     Mixed hyperlipidemia     Morbid obesity (HCC)     Obstructive sleep apnea syndrome     Pulmonary embolism (HCC)     Tobacco dependence syndrome     Transient cerebral ischemia     Urinary incontinence     Viral hepatitis C     Vitamin D deficiency        Past Surgical History:   Procedure Laterality Date    ANKLE SURGERY      CARDIAC SURGERY  2015    VALVE REPLACEMENT     SECTION      X3    CHOLECYSTECTOMY      COLONOSCOPY  2019    HYSTERECTOMY  2011    TOTAL       Family History   Problem Relation Age of Onset    Coronary artery disease Mother     Coronary artery disease Father      I have reviewed and agree with the history as documented  E-Cigarette/Vaping    E-Cigarette Use Never User      E-Cigarette/Vaping Substances    Nicotine No     THC No     CBD No     Flavoring No     Other No     Unknown No      Social History     Tobacco Use    Smoking status: Current Every Day Smoker     Packs/day: 0 25     Years: 40 00     Pack years: 10 00     Types: Cigarettes    Smokeless tobacco: Never Used   Substance Use Topics    Alcohol use: Yes     Frequency: Monthly or less    Drug use: Yes     Types: Marijuana       Review of Systems   Constitutional: Negative for fever and unexpected weight change  HENT: Negative for congestion and sore throat  Eyes: Negative for pain  Respiratory: Positive for shortness of breath  Negative for cough and chest tightness  Cardiovascular: Negative for chest pain  Gastrointestinal: Negative for abdominal distention, abdominal pain, diarrhea and vomiting  Endocrine: Negative for polyuria  Genitourinary: Negative for dysuria, hematuria, pelvic pain and vaginal bleeding  Musculoskeletal: Negative for arthralgias and back pain  Skin: Negative for color change and rash  Neurological: Negative for syncope, weakness and headaches  Physical Exam  Physical Exam  Vitals signs and nursing note reviewed  Constitutional:       General: She is not in acute distress  Appearance: She is well-developed  She is obese  HENT:      Head: Normocephalic and atraumatic  Right Ear: External ear normal       Left Ear: External ear normal       Mouth/Throat:      Pharynx: No oropharyngeal exudate  Eyes:      Conjunctiva/sclera: Conjunctivae normal       Pupils: Pupils are equal, round, and reactive to light  Neck:      Musculoskeletal: Normal range of motion and neck supple  Cardiovascular:      Rate and Rhythm: Normal rate and regular rhythm  Heart sounds: Normal heart sounds  No murmur  No friction rub  No gallop  Pulmonary:      Effort: Pulmonary effort is normal  Tachypnea present  No respiratory distress  Breath sounds: Normal breath sounds  No wheezing or rales  Abdominal:      General: There is no distension  Palpations: Abdomen is soft  Tenderness: There is no abdominal tenderness  There is no guarding  Musculoskeletal: Normal range of motion  General: No tenderness or deformity  Lymphadenopathy:      Cervical: No cervical adenopathy  Skin:     General: Skin is warm and dry  Neurological:      Mental Status: She is alert and oriented to person, place, and time  Cranial Nerves: No cranial nerve deficit  Sensory: No sensory deficit  Motor: No abnormal muscle tone           Vital Signs  ED Triage Vitals [03/16/21 1414] Temperature Pulse Respirations Blood Pressure SpO2   98 °F (36 7 °C) 87 (!) 24 157/83 95 %      Temp Source Heart Rate Source Patient Position - Orthostatic VS BP Location FiO2 (%)   Oral Monitor Lying Right arm --      Pain Score       8           Vitals:    03/16/21 1414 03/16/21 1638   BP: 157/83 150/80   Pulse: 87 88   Patient Position - Orthostatic VS: Lying          Visual Acuity      ED Medications  Medications   ketorolac (TORADOL) injection 15 mg (15 mg Intravenous Given 3/16/21 1451)   acetaminophen (TYLENOL) tablet 975 mg (975 mg Oral Given 3/16/21 1438)       Diagnostic Studies  Results Reviewed     Procedure Component Value Units Date/Time    COVID19, Influenza A/B, RSV PCR, [facility] [[id number]]  (Abnormal) Collected: 03/16/21 1443    Lab Status: Final result Specimen: Nares from Nasopharyngeal Swab Updated: 03/16/21 1614     SARS-CoV-2 Positive     INFLUENZA A PCR Negative     INFLUENZA B PCR Negative     RSV PCR Negative    Narrative: This test has been authorized by FDA under an EUA (Emergency Use Assay) for use by authorized laboratories  Clinical caution and judgement should be used with the interpretation of these results with consideration of the clinical impression and other laboratory testing  Testing reported as "Positive" or "Negative" has been proven to be accurate according to standard laboratory validation requirements  All testing is performed with control materials showing appropriate reactivity at standard intervals      B-Type Natriuretic Peptide ([address]) [[id number]]  (Abnormal) Collected: 03/16/21 1452    Lab Status: Final result Specimen: Blood from Arm, Right Updated: 03/16/21 1531      3 pg/mL     Troponin I [[id number]]  (Normal) Collected: 03/16/21 1452    Lab Status: Final result Specimen: Blood from Arm, Right Updated: 03/16/21 1527     Troponin I <0 03 ng/mL     Comprehensive metabolic panel [[id number]]  (Abnormal) Collected: 03/16/21 1452    Lab Status: Final result Specimen: Blood from Arm, Right Updated: 03/16/21 1525     Sodium 138 mmol/L      Potassium 4 0 mmol/L      Chloride 104 mmol/L      CO2 28 mmol/L      ANION GAP 6 mmol/L      BUN 16 mg/dL      Creatinine 0 59 mg/dL      Glucose 331 mg/dL      Calcium 8 6 mg/dL      Corrected Calcium 9 1 mg/dL      AST 37 U/L      ALT 22 U/L      Alkaline Phosphatase 82 6 U/L      Total Protein 6 6 g/dL      Albumin 3 4 g/dL      Total Bilirubin 0 47 mg/dL      eGFR 101 ml/min/1 73sq m     Narrative:      [place] guidelines for Chronic Kidney Disease (CKD):     Stage 1 with normal or high GFR (GFR > 90 mL/min/1 73 square meters)    Stage 2 Mild CKD (GFR = 60-89 mL/min/1 73 square meters)    Stage 3A Moderate CKD (GFR = 45-59 mL/min/1 73 square meters)    Stage 3B Moderate CKD (GFR = 30-44 mL/min/1 73 square meters)    Stage 4 Severe CKD (GFR = 15-29 mL/min/1 73 square meters)    Stage 5 End Stage CKD (GFR <15 mL/min/1 73 square meters)  Note: GFR calculation is accurate only with a steady state creatinine    Protime-INR [[id number]]  (Abnormal) Collected: 03/16/21 1452    Lab Status: Final result Specimen: Blood from Arm, Right Updated: 03/16/21 1513     Protime 28 5 seconds      INR 2 64    Narrative:      INR Reference Ranges:  No Anticoagulant, Normal:           0 9-1 1  Standard Dose, Oral Anticoagulant:  2 0-3 0  High Dose, Oral Anticoagulant:      2 5-3 5    APTT [[id number]]  (Abnormal) Collected: 03/16/21 1452    Lab Status: Final result Specimen: Blood from Arm, Right Updated: 03/16/21 1513     PTT 40 seconds     CBC and differential [[id number]]  (Abnormal) Collected: 03/16/21 1452    Lab Status: Final result Specimen: Blood from Arm, Right Updated: 03/16/21 1459     WBC 2 58 Thousand/uL      RBC 4 28 Million/uL      Hemoglobin 13 0 g/dL      Hematocrit 39 8 %      MCV 93 fL      MCH 30 4 pg      MCHC 32 7 g/dL      RDW 12 8 %      MPV 10 6 fL      Platelets 022 Thousands/uL      Neutrophils Relative 57 %      Immat GRANS % 0 %      Lymphocytes Relative 30 %      Monocytes Relative 9 %      Eosinophils Relative 4 %      Basophils Relative 0 %      Neutrophils Absolute 1 46 Thousands/µL      Immature Grans Absolute 0 00 Thousand/uL      Lymphocytes Absolute 0 77 Thousands/µL      Monocytes Absolute 0 24 Thousand/µL      Eosinophils Absolute 0 10 Thousand/µL      Basophils Absolute 0 01 Thousands/µL                  XR chest 1 view portable   Final Result by Marilee Haji MD (03/16 8192)      No acute cardiopulmonary disease  Workstation performed: [id number]                    Procedures  Procedures         ED Course  ED Course as of Mar 16 1654   Tue Mar 16, 2021   1609 Spoke with patient's cardiologist Dr Wagner Barnes  He said to give the patient 40 IV Lasix and see if her symptoms improve  If her oxygen levels remained normal and she is feeling better, she can follow up with her PCP tomorrow and follow-up with his office outpatient  If she is still symptomatic, he said to admit for observation  1625 Patient's Covid test positive  Ambulatory pulse Ox 95 %  Patient says that she felt fine walking to the bathroom  MDM  Number of Diagnoses or Management Options  Diagnosis management comments: 80-year-old female with history of aortic valve replacement on Coumadin, CHF, DVT presenting for shortness of breath  Has been progressing since yesterday  Worse with exertion and when lying flat  Is not on Lasix at home  Denies chest pains, increased cough, fevers, chills  Vitals within normal limits except for mild tachypnea  Will obtain cardiac workup, BNP, coags  Will obtain chest x-ray        Disposition  Final diagnoses:   COVID-19   SOB (shortness of breath)   Elevated brain natriuretic peptide (BNP) level     Time reflects when diagnosis was documented in both MDM as applicable and the Disposition within this note     Time User Action Codes Description Comment    3/16/2021  4:31 PM Jaya Hinds Add [U07 1] COVID-19     3/16/2021  4:31 PM Jaya Hinds Add [R06 02] SOB (shortness of breath)     3/16/2021  4:31 PM Jaya Hinds Add [R79 89] Elevated brain natriuretic peptide (BNP) level     3/16/2021  4:32 PM Jaya Hinds Add [R00 0] Tachycardia     3/16/2021  4:32 PM Jaya Hinds Add [E66 01,  Z68 43] Morbid obesity with BMI of 50 0-59 9, adult ([facility] [address] )     3/16/2021  4:32 PM Jaya Hinds Add [R00 2] Palpitations       ED Disposition     ED Disposition Condition Date/Time Comment    Discharge Stable Tue Mar 16, 2021  4:31 PM Claudia Singha discharge to home/self care  Follow-up Information     Follow up With Specialties Details Why [address] [facility] [address], MD Internal Medicine, Pediatrics Schedule an appointment as soon as possible for a visit  For follow up of SOB, follow up of [facility] Mike Kervin [facility] [address]  [facility]      Lidia Rollins MD Cardiology, Multidisciplinary Schedule an appointment as soon as possible for a visit  For follow up of SOB [address] [facility]  [phone]            Discharge Medication List as of 3/16/2021  4:39 PM      START taking these medications    Details   Ascorbic Acid (vitamin C) 1000 MG tablet Take 1 tablet (1,000 mg total) by mouth 2 (two) times a day for 10 days, Starting Tue 3/16/2021, Until Fri 3/26/2021, Normal      cholecalciferol (VITAMIN D3) 1,000 units tablet Take 2 tablets (2,000 Units total) by mouth daily for 10 days, Starting Tue 3/16/2021, Until Fri 3/26/2021, Normal      Multiple Vitamin (multivitamin) capsule Take 1 capsule by mouth daily for 10 days, Starting Tue 3/16/2021, Until Fri 3/26/2021, Normal         CONTINUE these medications which have CHANGED    Details   furosemide (LASIX) 40 mg tablet Take 1 tablet daily for 5 days, then 1 tablet daily on an as needed basis  , Normal         CONTINUE these medications which have NOT CHANGED    Details   atorvastatin (LIPITOR) 20 mg tablet Take 1 tablet (20 mg total) by mouth daily, Starting Mon 1/25/2021, Normal      warfarin (Jantoven) 3 mg tablet Take 6 mg by mouth daily, Starting Mon 12/21/2020, Historical Med      clonazePAM (KlonoPIN) 1 mg tablet Take 1 mg by mouth daily as needed, Starting Mon 12/28/2020, Historical Med      mirtazapine (REMERON) 45 MG tablet Take 45 mg by mouth daily at bedtime, Starting Mon 12/21/2020, Historical Med      ziprasidone (GEODON) 60 mg capsule Take 60 mg by mouth daily, Starting Mon 12/21/2020, Historical Med           No discharge procedures on file      PDMP Review     None          ED Provider  Electronically Signed by           Silverio Mendoza DO  03/16/21 6113

## 2021-03-17 ENCOUNTER — TELEMEDICINE (OUTPATIENT)
Dept: FAMILY MEDICINE CLINIC | Facility: CLINIC | Age: 61
End: 2021-03-17
Payer: COMMERCIAL

## 2021-03-17 VITALS — BODY MASS INDEX: 47.09 KG/M2 | HEIGHT: 66 IN | WEIGHT: 293 LBS

## 2021-03-17 DIAGNOSIS — E66.01 MORBID OBESITY (HCC): ICD-10-CM

## 2021-03-17 DIAGNOSIS — U07.1 TELEHEALTH ENCOUNTER FOR CONFIRMED COVID-19: Primary | ICD-10-CM

## 2021-03-17 LAB
ATRIAL RATE: 71 BPM
P AXIS: 51 DEGREES
PR INTERVAL: 189 MS
QRS AXIS: -13 DEGREES
QRSD INTERVAL: 122 MS
QT INTERVAL: 422 MS
QTC INTERVAL: 462 MS
T WAVE AXIS: 116 DEGREES
VENTRICULAR RATE: 72 BPM

## 2021-03-17 PROCEDURE — 3008F BODY MASS INDEX DOCD: CPT | Performed by: INTERNAL MEDICINE

## 2021-03-17 PROCEDURE — 99214 OFFICE O/P EST MOD 30 MIN: CPT | Performed by: INTERNAL MEDICINE

## 2021-03-17 PROCEDURE — 93010 ELECTROCARDIOGRAM REPORT: CPT | Performed by: INTERNAL MEDICINE

## 2021-03-17 RX ORDER — ALBUTEROL SULFATE 90 UG/1
3 AEROSOL, METERED RESPIRATORY (INHALATION) ONCE AS NEEDED
Status: CANCELLED | OUTPATIENT
Start: 2021-03-18

## 2021-03-17 RX ORDER — SODIUM CHLORIDE 9 MG/ML
20 INJECTION, SOLUTION INTRAVENOUS ONCE
Status: CANCELLED | OUTPATIENT
Start: 2021-03-18

## 2021-03-17 RX ORDER — ASCORBIC ACID 500 MG
TABLET ORAL
COMMUNITY
Start: 2021-03-16 | End: 2021-04-27 | Stop reason: HOSPADM

## 2021-03-17 RX ORDER — ACETAMINOPHEN 325 MG/1
650 TABLET ORAL ONCE AS NEEDED
Status: CANCELLED | OUTPATIENT
Start: 2021-03-18

## 2021-03-17 RX ORDER — DIPHENOXYLATE HYDROCHLORIDE AND ATROPINE SULFATE 2.5; .025 MG/1; MG/1
TABLET ORAL DAILY
COMMUNITY
Start: 2021-03-16 | End: 2021-04-27 | Stop reason: HOSPADM

## 2021-03-17 NOTE — PROGRESS NOTES
COVID-19 Virtual Visit     Assessment/Plan:    Problem List Items Addressed This Visit        Other    Morbid obesity ([facility] )    Telehealth encounter for confirmed COVID-19 - Primary      Other Visit Diagnoses     BMI 50 0-59 9, adult ([facility] )             Disposition:     I recommended self-quarantine for 10 days and to watch for symptoms until 14 days after exposure  If patient were to develop symptoms, they should self isolate and call our office for further guidance  Patient is a candidate for Bamlanivimab  They were counseled in regards to risks, benefits, and side effects of this infusion  Possible side effects of bamlanivimab are: Allergic reactions   Allergic reactions can happen during and after infusion with bamlanivimab which include:    Fever, chills, nausea, headache, shortness of breath, low blood pressure, wheezing, swelling of your lips, face, or throat, rash including hives, itching, muscle aches, and dizziness  The side effects of getting any medicine by vein may include brief pain, bleeding, bruising of the skin, soreness, swelling, and possible infection at the infusion site  These are not all the possible side effects of bamlanivimab  Not a lot of people have been given bamlanivimab  Serious and unexpected side effects may happen  [employer] is still being studied so it is possible that all of the risks are not known at this time  Please note that this drug was approved under the Emergency Use Authorization of the FDA and has not gone through the full, formal FDA approval process    It is possible that bamlanivimab could interfere with your body's own ability to fight off a future infection of SARS-CoV-2  Similarly, bamlanivimab may reduce your bodys immune response to a vaccine for SARS-CoV-2  Specific studies have not been conducted to address these possible risks       Currently there is no data or safety or efficacy of COVID-19 vaccination in persons who received monoclonal antibodies as part of COVID-19 treatment  Treatment should be deferred for at least 90 days to avoid interference of the treatment with vaccine-induced immune responses (this is based on estimated half-life of therapies and evidence suggesting reinfection is uncommon within 90 days of initial infection)  The patient consents to proceed with bamlanivimab infusion  *[email or web address]-[email or web address]-factsheet-hcp  pdf    I have spent 20 minutes directly with the patient  Encounter provider John Valencia MD    Provider located at [address] [place] [id number] [phone]    Recent Visits  No visits were found meeting these conditions  Showing recent visits within past 7 days and meeting all other requirements     Today's Visits  Date Type Provider Dept   03/17/21 Levi Martinez MD [address] today's visits and meeting all other requirements     Future Appointments  No visits were found meeting these conditions  Showing future appointments within next 150 days and meeting all other requirements      This virtual check-in was done via [employer] Du[employer] and patient was informed that this is not a secure, HIPAA-compliant platform  She agrees to proceed  Patient agrees to participate in a virtual check in via telephone or video visit instead of presenting to the office to address urgent/immediate medical needs  Patient is aware this is a billable service  After connecting through [facility], the patient was identified by name and date of birth  Madelin Santa was informed that this was a telemedicine visit and that the exam was being conducted confidentially over secure lines  My office door was closed  No one else was in the room  Madelin Santa acknowledged consent and understanding of privacy and security of the telemedicine visit   I informed the patient that I have reviewed her record in [facility] [address] and presented the opportunity for her to ask any questions regarding the visit today  The patient agreed to participate  Subjective:   Jaya Healy is a 64 y o  female who is concerned about COVID-19  Patient's symptoms include fatigue, malaise, nasal congestion, shortness of breath, myalgias and headache  Patient denies fever, chills, anosmia, loss of taste, cough, nausea and vomiting       Exposure:   Contact with a person who is under investigation (PUI) for or who is positive for COVID-19 within the last 14 days?: No    Hospitalized recently for fever and/or lower respiratory symptoms?: No      Currently a healthcare worker that is involved in direct patient care?: No      Works in a special setting where the risk of COVID-19 transmission may be high? (this may include long-term care, correctional and senior living facilities; homeless shelters; assisted-living facilities and group homes ): No      Resident in a special setting where the risk of COVID-19 transmission may be high? (this may include long-term care, correctional and senior living facilities; homeless shelters; assisted-living facilities and group homes ): No      Lab Results   Component Value Date    SARSCOV2 Positive (A) 03/16/2021     Past Medical History:   Diagnosis Date    Anemia     Anxiety     Aortic valve disorder     Back pain     Chronic pain syndrome     Constipation     Degenerative joint disease involving multiple joints     Depression     DVT (deep venous thrombosis) (HCC)     Bilateral    Edema     Endometrial adenocarcinoma ([address] )     Fibromyalgia     Ganglion of right wrist     GERD (gastroesophageal reflux disease)     Heart murmur     Hematoma     History of mechanical aortic valve replacement     Hypothyroidism (acquired)     Laboratory confirmed diagnosis of COVID-19 03/16/2021    Low blood pressure     Malignant neoplasm of corpus uteri, except isthmus ([address] )     Mixed hyperlipidemia     Morbid obesity ([address] )     Obstructive sleep apnea syndrome     Pulmonary embolism (HCC)     Tobacco dependence syndrome     Transient cerebral ischemia     Urinary incontinence     Viral hepatitis C     Vitamin D deficiency      Past Surgical History:   Procedure Laterality Date    ANKLE SURGERY      CARDIAC SURGERY  2015    VALVE REPLACEMENT     SECTION      X3    CHOLECYSTECTOMY      COLONOSCOPY  2019    HYSTERECTOMY  2011    TOTAL     Current Outpatient Medications   Medication Sig Dispense Refill    Ascorbic Acid (vitamin C) 1000 MG tablet Take 1 tablet (1,000 mg total) by mouth 2 (two) times a day for 10 days 20 tablet 0    atorvastatin (LIPITOR) 20 mg tablet Take 1 tablet (20 mg total) by mouth daily 30 tablet 5    cholecalciferol (VITAMIN D3) 1,000 units tablet Take 2 tablets (2,000 Units total) by mouth daily for 10 days 20 tablet 0    clonazePAM (KlonoPIN) 1 mg tablet Take 1 mg by mouth daily as needed      furosemide (LASIX) 40 mg tablet Take 1 tablet daily for 5 days, then 1 tablet daily on an as needed basis  10 tablet 0    mirtazapine (REMERON) 45 MG tablet Take 45 mg by mouth daily at bedtime      warfarin (Jantoven) 3 mg tablet Take 6 mg by mouth daily      ziprasidone (GEODON) 60 mg capsule Take 60 mg by mouth daily      ascorbic acid (VITAMIN C) 500 MG tablet TAKE 2 TABLET (1,000 MG TOTAL) BY MOUTH 2 (TWO) TIMES A DAY FOR 10 DAYS      multivitamin (THERAGRAN) TABS Take by mouth daily       No current facility-administered medications for this visit  Allergies   Allergen Reactions    Bactrim [Sulfamethoxazole-Trimethoprim]     Penicillins     Tramadol      Could not function - felt very ill        Review of Systems   Constitutional: Positive for fatigue  Negative for chills and fever  HENT: Positive for congestion  Respiratory: Positive for shortness of breath  Negative for cough  Gastrointestinal: Negative for nausea and vomiting     Musculoskeletal: Positive for myalgias  Neurological: Positive for headaches  Objective:    Vitals:    03/17/21 0913   Weight: (!) 148 kg (327 lb)   Height: 5' 6" (1 676 m)       Physical Exam  Constitutional:       Appearance: She is obese  She is ill-appearing  HENT:      Head: Normocephalic  Right Ear: External ear normal       Left Ear: External ear normal    Eyes:      Pupils: Pupils are equal, round, and reactive to light  Pulmonary:      Effort: Pulmonary effort is normal    Musculoskeletal: Normal range of motion  Neurological:      Mental Status: She is alert  Psychiatric:         Mood and Affect: Mood normal          Thought Content: Thought content normal        VIRTUAL VISIT [facility] acknowledges that she has consented to an online visit or consultation  She understands that the online visit is based solely on information provided by her, and that, in the absence of a face-to-face physical evaluation by the physician, the diagnosis she receives is both limited and provisional in terms of accuracy and completeness  This is not intended to replace a full medical face-to-face evaluation by the physician  Lidia Wilson understands and accepts these terms

## 2021-03-18 ENCOUNTER — HOSPITAL ENCOUNTER (OUTPATIENT)
Dept: INFUSION CENTER | Facility: HOSPITAL | Age: 61
Discharge: HOME | End: 2021-03-18
Attending: INTERNAL MEDICINE
Payer: COMMERCIAL

## 2021-03-18 VITALS
OXYGEN SATURATION: 95 % | TEMPERATURE: 97 F | SYSTOLIC BLOOD PRESSURE: 132 MMHG | HEART RATE: 80 BPM | DIASTOLIC BLOOD PRESSURE: 61 MMHG | RESPIRATION RATE: 18 BRPM

## 2021-03-18 DIAGNOSIS — E66.01 MORBID OBESITY (HCC): Primary | ICD-10-CM

## 2021-03-18 DIAGNOSIS — U07.1 TELEHEALTH ENCOUNTER FOR CONFIRMED COVID-19: ICD-10-CM

## 2021-03-18 PROCEDURE — M0239 BAMLANIVIMAB-XXXX INFUSION: HCPCS | Performed by: INTERNAL MEDICINE

## 2021-03-18 RX ORDER — SODIUM CHLORIDE 9 MG/ML
20 INJECTION, SOLUTION INTRAVENOUS ONCE
Status: CANCELLED | OUTPATIENT
Start: 2021-03-18

## 2021-03-18 RX ORDER — ACETAMINOPHEN 325 MG/1
650 TABLET ORAL ONCE AS NEEDED
Status: DISCONTINUED | OUTPATIENT
Start: 2021-03-18 | End: 2021-03-21 | Stop reason: HOSPADM

## 2021-03-18 RX ORDER — ALBUTEROL SULFATE 90 UG/1
3 AEROSOL, METERED RESPIRATORY (INHALATION) ONCE AS NEEDED
Status: DISCONTINUED | OUTPATIENT
Start: 2021-03-18 | End: 2021-03-21 | Stop reason: HOSPADM

## 2021-03-18 RX ORDER — ACETAMINOPHEN 325 MG/1
650 TABLET ORAL ONCE AS NEEDED
Status: CANCELLED | OUTPATIENT
Start: 2021-03-18

## 2021-03-18 RX ORDER — SODIUM CHLORIDE 9 MG/ML
20 INJECTION, SOLUTION INTRAVENOUS ONCE
Status: COMPLETED | OUTPATIENT
Start: 2021-03-18 | End: 2021-03-18

## 2021-03-18 RX ORDER — ALBUTEROL SULFATE 90 UG/1
3 AEROSOL, METERED RESPIRATORY (INHALATION) ONCE AS NEEDED
Status: CANCELLED | OUTPATIENT
Start: 2021-03-18

## 2021-03-18 RX ADMIN — SODIUM CHLORIDE 20 ML/HR: 0.9 INJECTION, SOLUTION INTRAVENOUS at 08:51

## 2021-03-18 RX ADMIN — SODIUM CHLORIDE 700 MG: 9 INJECTION, SOLUTION INTRAVENOUS at 08:51

## 2021-03-18 NOTE — PROGRESS NOTES
Pt arrived on unit for Covid infusion; pt given EUA form and reviewed; pt gives verbal consent to proceed; IV inserted; VSS; will cont to monitor

## 2021-03-18 NOTE — PROGRESS NOTES
Pt tolerated infusion with no adv reactions; monitored for 60 mins; VSS; d/c instructions reviewed with pt who verb understanding; pt taken to entrance in wc

## 2021-03-19 ENCOUNTER — TELEPHONE (OUTPATIENT)
Dept: FAMILY MEDICINE CLINIC | Facility: CLINIC | Age: 61
End: 2021-03-19

## 2021-03-19 NOTE — TELEPHONE ENCOUNTER
Joel December, she was supposed to follow up virtually for getting the Bam infusion for covid and I can address uti then ---just virtual

## 2021-03-19 NOTE — TELEPHONE ENCOUNTER
Pt states she has a UTI and she would like something called in for her so her friend can bring it to her

## 2021-03-22 ENCOUNTER — TELEMEDICINE (OUTPATIENT)
Dept: CARDIOLOGY CLINIC | Facility: CLINIC | Age: 61
End: 2021-03-22
Payer: COMMERCIAL

## 2021-03-22 VITALS — HEIGHT: 66 IN | BODY MASS INDEX: 52.78 KG/M2

## 2021-03-22 DIAGNOSIS — I50.32 CHRONIC DIASTOLIC HEART FAILURE (HCC): Primary | ICD-10-CM

## 2021-03-22 DIAGNOSIS — E66.01 MORBID OBESITY (HCC): ICD-10-CM

## 2021-03-22 DIAGNOSIS — Z95.2 H/O MECHANICAL AORTIC VALVE REPLACEMENT: ICD-10-CM

## 2021-03-22 PROCEDURE — 99214 OFFICE O/P EST MOD 30 MIN: CPT | Performed by: INTERNAL MEDICINE

## 2021-03-22 NOTE — PROGRESS NOTES
Virtual Brief Visit    Assessment/Plan:  1  Chronic HFpEF LVEF 65%, NYHA class II - III stage C- [facility] does not have a scale,  She will purchase one in the near future  She is taking Lasix 40mg EOD and denies LE edema  She does not monitor her HR or BP at home  Maintain a 2 gram daily sodium diet and 1500 ml daily fluid restriction  Check daily weights  If you gained 3 pounds in one day, 5 pounds in one week, or experience worsening shortness of breath or increasing lower leg swelling  Please call the heart failure office at [phone]  [facility] with next INR  2  Sp #23 On-X Mechanical Aortic Valve replacement on Coumadin managed by [facility], Goal INR 2 5 - 3 5, INR done on 3/16/21 2 64  3  Morbid Obestiy BMI 52 78- [facility] will reschedule OP sleep study to R/O sleep apnea  Calorie control,  According to [facility] she will be undergoing gastric bypass in the [place]  I do not see any documentation in her chart to verify this statement    Problem List Items Addressed This Visit     None                Reason for visit is   Chief Complaint   Patient presents with    Virtual Brief Visit        Encounter provider JOSE Kinsey    Provider located at [place] [address]  [address] [place] [id number]    Recent Visits  Date Type Provider Dept   03/17/21 Telemedicine Dio Doll MD [facility] [address] recent visits within past 7 days and meeting all other requirements     Today's Visits  Date Type Provider Dept   03/22/21 [facility], [address] today's visits and meeting all other requirements     Future Appointments  No visits were found meeting these conditions  Showing future appointments within next 150 days and meeting all other requirements        After connecting through telephone, the patient was identified by name and date of birth   Harry Mancini was informed that this is a telemedicine visit and that the visit is being conducted through telephone  My office door was closed  No one else was in the room  She acknowledged consent and understanding of privacy and security of the platform  The patient has agreed to participate and understands she can discontinue the visit at any time  Patient is aware this is a billable service  Subjective    Lizzette Brown is a 64 y o  female who presented to [facility] Emergency room on 3/16/21 with complaints of shortness of breath, some hand numbness and dizziness  She was found to be COVID-19 +   NT pro BNP was elevated to 143 3  Shje was treated with IV Lasix 40mg  Ms Pena Flies was discharged home  Today's office visit was conducted via telephone  Mounikarlene Kanner admits to shortness of breath with exertion, non productive cough, fatigue, and 5 pillow orthopnea  She denies gela LE edema, CP, palpitations, lightheadedness or dizziness  Bolae Kanner is taking the Lasix dose EOD  HPI   Bicuspid AV  #23 On-X Mechanical Aortic Valve replacement on Coumadin   2/24/21 TTE showed LVEF 65%, grade 2 DD, trace MR, trace MR, mechanical AV with normal function  Trace Aortic valve regurgitations    No significant tita valvular leak   Hx of DVT on Coumadin  Morbid Obestiy BMI 52 78  Ovarian Cancer sp Hysterectomy/oop   Past Medical History:   Diagnosis Date    Anemia     Anxiety     Aortic valve disorder     Back pain     Chronic pain syndrome     Constipation     Degenerative joint disease involving multiple joints     Depression     DVT (deep venous thrombosis) (HCC)     Bilateral    Edema     Endometrial adenocarcinoma (HCC)     Fibromyalgia     Ganglion of right wrist     GERD (gastroesophageal reflux disease)     Heart murmur     Hematoma     History of mechanical aortic valve replacement     Hypothyroidism (acquired)     Laboratory confirmed diagnosis of COVID-19 03/16/2021    Low blood pressure     Malignant neoplasm of corpus uteri, except isthmus ([facility] )     Mixed hyperlipidemia     Morbid obesity ([facility] )     Obstructive sleep apnea syndrome     Pulmonary embolism (HCC)     Tobacco dependence syndrome     Transient cerebral ischemia     Urinary incontinence     Viral hepatitis C     Vitamin D deficiency        Past Surgical History:   Procedure Laterality Date    ANKLE SURGERY      CARDIAC SURGERY  2015    VALVE REPLACEMENT     SECTION      X3    CHOLECYSTECTOMY      COLONOSCOPY  2019    HYSTERECTOMY  2011    TOTAL       Current Outpatient Medications   Medication Sig Dispense Refill    Ascorbic Acid (vitamin C) 1000 MG tablet Take 1 tablet (1,000 mg total) by mouth 2 (two) times a day for 10 days 20 tablet 0    ascorbic acid (VITAMIN C) 500 MG tablet TAKE 2 TABLET (1,000 MG TOTAL) BY MOUTH 2 (TWO) TIMES A DAY FOR 10 DAYS      atorvastatin (LIPITOR) 20 mg tablet Take 1 tablet (20 mg total) by mouth daily 30 tablet 5    cholecalciferol (VITAMIN D3) 1,000 units tablet Take 2 tablets (2,000 Units total) by mouth daily for 10 days 20 tablet 0    clonazePAM (KlonoPIN) 1 mg tablet Take 1 mg by mouth daily as needed      furosemide (LASIX) 40 mg tablet Take 1 tablet daily for 5 days, then 1 tablet daily on an as needed basis  10 tablet 0    mirtazapine (REMERON) 45 MG tablet Take 45 mg by mouth daily at bedtime      multivitamin (THERAGRAN) TABS Take by mouth daily      warfarin (Jantoven) 3 mg tablet Take 6 mg by mouth daily      ziprasidone (GEODON) 60 mg capsule Take 60 mg by mouth daily       No current facility-administered medications for this visit  Allergies   Allergen Reactions    Bactrim [Sulfamethoxazole-Trimethoprim]     Penicillins     Tramadol      Could not function - felt very ill        Review of Systems   Constitutional: Positive for fatigue  Respiratory: Positive for cough  All other systems reviewed and are negative        There were no vitals filed for this visit       I spent 20 minutes directly with the patient during this visit    VIRTUAL VISIT [address] [facility] acknowledges that she has consented to an online visit or consultation  She understands that the online visit is based solely on information provided by her, and that, in the absence of a face-to-face physical evaluation by the physician, the diagnosis she receives is both limited and provisional in terms of accuracy and completeness  This is not intended to replace a full medical face-to-face evaluation by the physician  Candice Menjivar understands and accepts these terms

## 2021-03-23 ENCOUNTER — ANTICOAG VISIT (OUTPATIENT)
Dept: CARDIOLOGY CLINIC | Facility: CLINIC | Age: 61
End: 2021-03-23

## 2021-03-23 ENCOUNTER — TELEMEDICINE (OUTPATIENT)
Dept: FAMILY MEDICINE CLINIC | Facility: CLINIC | Age: 61
End: 2021-03-23
Payer: COMMERCIAL

## 2021-03-23 DIAGNOSIS — Z95.2 STATUS POST MECHANICAL AORTIC VALVE REPLACEMENT: ICD-10-CM

## 2021-03-23 DIAGNOSIS — R10.31 RIGHT LOWER QUADRANT ABDOMINAL PAIN: Primary | ICD-10-CM

## 2021-03-23 PROCEDURE — 4004F PT TOBACCO SCREEN RCVD TLK: CPT | Performed by: INTERNAL MEDICINE

## 2021-03-23 PROCEDURE — 99214 OFFICE O/P EST MOD 30 MIN: CPT | Performed by: INTERNAL MEDICINE

## 2021-03-23 RX ORDER — CIPROFLOXACIN 250 MG/1
250 TABLET, FILM COATED ORAL EVERY 12 HOURS SCHEDULED
Qty: 14 TABLET | Refills: 0 | Status: SHIPPED | OUTPATIENT
Start: 2021-03-23 | End: 2021-03-30

## 2021-03-23 NOTE — PROGRESS NOTES
Virtual Regular Visit      Assessment/Plan:  Crystal Simpson with right lower quadrant abdominal pain and flank pain-hx of recent covid and s/p bamlaminivimab infusion last week-  Problem List Items Addressed This Visit     None      Visit Diagnoses     Right lower quadrant abdominal pain    -  Primary    Not sure etiology, pt thinks uti, so will do u/a and culture to see and empirically do cipro-pt is on coumadin, so hematoma possible too-order CT abd/pelvis    Relevant Medications    ciprofloxacin (CIPRO) 250 mg tablet    Other Relevant Orders    Urinalysis with microscopic    Urine culture    CT abdomen pelvis wo contrast               Reason for visit is   Chief Complaint   Patient presents with    Virtual Regular Visit     Google Duo    Urinary Tract Infection    Virtual Regular Visit        Encounter provider Fred Castleman, MD    Provider located at [address] [place] [address] "[employer] [id number]  [phone]      Recent Visits  Date Type Provider Dept   03/19/21 Telephone Claudean Hartigan [facility] [place]    03/17/21 Floridalma Felipe MD [facility] [address] recent visits within past 7 days and meeting all other requirements     Today's Visits  Date Type Provider Dept   03/23/21 Telemedicine Namrata Khan MD [address] today's visits and meeting all other requirements     Future Appointments  No visits were found meeting these conditions  Showing future appointments within next 150 days and meeting all other requirements        The patient was identified by name and date of birth  Nayla Fuentes was informed that this is a telemedicine visit and that the visit is being conducted through [employer] and patient was informed that this is not a secure, HIPAA-compliant platform  She agrees to proceed     My office door was closed  No one else was in the room    She acknowledged consent and understanding of privacy and security of the video platform  The patient has agreed to participate and understands they can discontinue the visit at any time  Patient is aware this is a billable service  Subjective  Luana Perez is a 64 y o  female with right sided abdominal/flank pain         Kandis rGeenwood called in today because she's having right sided pain-has had for 7-10 days now-no fever, n/v/diarrhea/constipation/blood in her stools, no dysuria but urine smells foul-Lauren has a history of AVR and is on coumadin, obesity-also, of recent note, tested positive for covid and last week sent her for bam infusion, which helped her feel better significantly -she thinks she may have a uti-has hx of complete hysterectomy as well       Past Medical History:   Diagnosis Date    Anemia     Anxiety     Aortic valve disorder     Back pain     Chronic pain syndrome     Constipation     Degenerative joint disease involving multiple joints     Depression     DVT (deep venous thrombosis) (HCC)     Bilateral    Edema     Endometrial adenocarcinoma ([address]     Fibromyalgia     Ganglion of right wrist     GERD (gastroesophageal reflux disease)     Heart murmur     Hematoma     History of mechanical aortic valve replacement     Hypothyroidism (acquired)     Laboratory confirmed diagnosis of COVID-19 2021    Low blood pressure     Malignant neoplasm of corpus uteri, except isthmus (HCC)     Mixed hyperlipidemia     Morbid obesity ([address] )     Obstructive sleep apnea syndrome     Pulmonary embolism ([address] )     Tobacco dependence syndrome     Transient cerebral ischemia     Urinary incontinence     Viral hepatitis C     Vitamin D deficiency        Past Surgical History:   Procedure Laterality Date    ANKLE SURGERY      CARDIAC SURGERY  2015    VALVE REPLACEMENT     SECTION      X3    CHOLECYSTECTOMY      COLONOSCOPY  2019    HYSTERECTOMY  2011    TOTAL       Current Outpatient Medications   Medication Sig Dispense Refill    Ascorbic Acid (vitamin C) 1000 MG tablet Take 1 tablet (1,000 mg total) by mouth 2 (two) times a day for 10 days 20 tablet 0    ascorbic acid (VITAMIN C) 500 MG tablet TAKE 2 TABLET (1,000 MG TOTAL) BY MOUTH 2 (TWO) TIMES A DAY FOR 10 DAYS      atorvastatin (LIPITOR) 20 mg tablet Take 1 tablet (20 mg total) by mouth daily 30 tablet 5    cholecalciferol (VITAMIN D3) 1,000 units tablet Take 2 tablets (2,000 Units total) by mouth daily for 10 days 20 tablet 0    ciprofloxacin (CIPRO) 250 mg tablet Take 1 tablet (250 mg total) by mouth every 12 (twelve) hours for 7 days 14 tablet 0    clonazePAM (KlonoPIN) 1 mg tablet Take 1 mg by mouth daily as needed      furosemide (LASIX) 40 mg tablet Take 1 tablet daily for 5 days, then 1 tablet daily on an as needed basis  (Patient taking differently: 40 mg every other day Take 1 tablet daily for 5 days, then 1 tablet daily on an as needed basis  ) 10 tablet 0    mirtazapine (REMERON) 45 MG tablet Take 45 mg by mouth daily at bedtime      multivitamin (THERAGRAN) TABS Take by mouth daily      warfarin (Jantoven) 3 mg tablet Take 6 mg by mouth daily      ziprasidone (GEODON) 60 mg capsule Take 60 mg by mouth daily       No current facility-administered medications for this visit  Allergies   Allergen Reactions    Bactrim [Sulfamethoxazole-Trimethoprim]     Penicillins     Tramadol      Could not function - felt very ill        Review of Systems   Constitutional: Positive for fatigue  Negative for fever  Respiratory: Negative for cough and shortness of breath  Cardiovascular: Negative for chest pain  Gastrointestinal: Positive for abdominal pain  Negative for blood in stool, constipation, diarrhea, nausea and vomiting  Genitourinary: Positive for flank pain  Negative for dysuria, frequency, hematuria and urgency  Neurological: Negative for dizziness  Video Exam    There were no vitals filed for this visit      Physical Exam  Constitutional:       Appearance: She is obese  HENT:      Head: Normocephalic and atraumatic  Nose: Nose normal    Eyes:      General: No scleral icterus  Pupils: Pupils are equal, round, and reactive to light  Neck:      Musculoskeletal: Normal range of motion  Pulmonary:      Effort: Pulmonary effort is normal    Skin:     Coloration: Skin is not jaundiced  Neurological:      General: No focal deficit present  Mental Status: She is alert and oriented to person, place, and time  Psychiatric:         Mood and Affect: Mood normal          Behavior: Behavior normal          Thought Content: Thought content normal           I spent 25 minutes directly with the patient during this visit      [facility]  acknowledges that she has consented to an online visit or consultation  She understands that the online visit is based solely on information provided by her, and that, in the absence of a face-to-face physical evaluation by the physician, the diagnosis she receives is both limited and provisional in terms of accuracy and completeness  This is not intended to replace a full medical face-to-face evaluation by the physician  Rosalio Wall understands and accepts these terms

## 2021-03-26 ENCOUNTER — APPOINTMENT (OUTPATIENT)
Dept: LAB | Facility: HOSPITAL | Age: 61
End: 2021-03-26
Payer: COMMERCIAL

## 2021-03-26 ENCOUNTER — ANTICOAG VISIT (OUTPATIENT)
Dept: CARDIOLOGY CLINIC | Facility: CLINIC | Age: 61
End: 2021-03-26

## 2021-03-26 DIAGNOSIS — A59.9 TRICHOMONAS INFECTION: Primary | ICD-10-CM

## 2021-03-26 DIAGNOSIS — Z95.2 STATUS POST MECHANICAL AORTIC VALVE REPLACEMENT: ICD-10-CM

## 2021-03-26 DIAGNOSIS — R10.31 RIGHT LOWER QUADRANT ABDOMINAL PAIN: ICD-10-CM

## 2021-03-26 LAB
ANION GAP SERPL CALCULATED.3IONS-SCNC: 7 MMOL/L (ref 4–13)
BACTERIA UR QL AUTO: ABNORMAL /HPF
BILIRUB UR QL STRIP: NEGATIVE
BUN SERPL-MCNC: 16 MG/DL (ref 6–20)
CALCIUM SERPL-MCNC: 8.6 MG/DL (ref 8.4–10.2)
CHLORIDE SERPL-SCNC: 102 MMOL/L (ref 96–108)
CLARITY UR: ABNORMAL
CO2 SERPL-SCNC: 30 MMOL/L (ref 22–33)
COLOR UR: YELLOW
CREAT SERPL-MCNC: 0.61 MG/DL (ref 0.4–1.1)
GFR SERPL CREATININE-BSD FRML MDRD: 100 ML/MIN/1.73SQ M
GLUCOSE P FAST SERPL-MCNC: 226 MG/DL (ref 70–105)
GLUCOSE UR STRIP-MCNC: ABNORMAL MG/DL
HGB UR QL STRIP.AUTO: ABNORMAL
KETONES UR STRIP-MCNC: NEGATIVE MG/DL
LEUKOCYTE ESTERASE UR QL STRIP: ABNORMAL
NITRITE UR QL STRIP: NEGATIVE
NON-SQ EPI CELLS URNS QL MICRO: ABNORMAL /HPF
OTHER STN SPEC: ABNORMAL
PH UR STRIP.AUTO: 5.5 [PH]
POTASSIUM SERPL-SCNC: 4.1 MMOL/L (ref 3.5–5)
PROT UR STRIP-MCNC: NEGATIVE MG/DL
RBC #/AREA URNS AUTO: ABNORMAL /HPF
SODIUM SERPL-SCNC: 139 MMOL/L (ref 133–145)
SP GR UR STRIP.AUTO: >=1.03 (ref 1–1.03)
UROBILINOGEN UR QL STRIP.AUTO: 0.2 E.U./DL
WBC #/AREA URNS AUTO: ABNORMAL /HPF

## 2021-03-26 PROCEDURE — 87086 URINE CULTURE/COLONY COUNT: CPT

## 2021-03-26 PROCEDURE — 81001 URINALYSIS AUTO W/SCOPE: CPT | Performed by: INTERNAL MEDICINE

## 2021-03-26 PROCEDURE — 80048 BASIC METABOLIC PNL TOTAL CA: CPT | Performed by: NURSE PRACTITIONER

## 2021-03-26 PROCEDURE — 36415 COLL VENOUS BLD VENIPUNCTURE: CPT | Performed by: NURSE PRACTITIONER

## 2021-03-26 RX ORDER — METRONIDAZOLE 500 MG/1
500 TABLET ORAL EVERY 12 HOURS SCHEDULED
Qty: 14 TABLET | Refills: 0 | Status: SHIPPED | OUTPATIENT
Start: 2021-03-26 | End: 2021-04-02

## 2021-03-26 NOTE — PROGRESS NOTES
I called [facility] and told her about the Trichomonas in her urine and she says the last time she was active was over 8 months ago-not sure where it exactly came from-I am still waiting on urine culture results but with the u/a, told her to start the Cipro I prescribed yesterday-still waiting on the CT abdomen and pelvis that were ordered yesterday-will treat with Flagyl bid for 7-10 days and I advised her not to drink while on Flagyl -will also test urine for gonnorhea and chlamydia and do an HIV test per her request

## 2021-03-28 LAB — BACTERIA UR CULT: NORMAL

## 2021-04-05 ENCOUNTER — TELEPHONE (OUTPATIENT)
Dept: FAMILY MEDICINE CLINIC | Facility: CLINIC | Age: 61
End: 2021-04-05

## 2021-04-09 ENCOUNTER — APPOINTMENT (OUTPATIENT)
Dept: LAB | Facility: HOSPITAL | Age: 61
End: 2021-04-09
Payer: COMMERCIAL

## 2021-04-09 ENCOUNTER — ANTICOAG VISIT (OUTPATIENT)
Dept: CARDIOLOGY CLINIC | Facility: CLINIC | Age: 61
End: 2021-04-09

## 2021-04-09 DIAGNOSIS — A59.9 TRICHOMONAS INFECTION: ICD-10-CM

## 2021-04-09 DIAGNOSIS — Z95.2 STATUS POST MECHANICAL AORTIC VALVE REPLACEMENT: ICD-10-CM

## 2021-04-09 LAB
INR PPP: 3.09 (ref 0.9–1.1)
PROTHROMBIN TIME: 33.1 SECONDS (ref 9.5–12.1)

## 2021-04-09 PROCEDURE — 87389 HIV-1 AG W/HIV-1&-2 AB AG IA: CPT

## 2021-04-09 PROCEDURE — 36415 COLL VENOUS BLD VENIPUNCTURE: CPT

## 2021-04-09 PROCEDURE — 85610 PROTHROMBIN TIME: CPT

## 2021-04-09 RX ORDER — NYSTATIN 100000 [USP'U]/G
POWDER TOPICAL
COMMUNITY
Start: 2021-03-23 | End: 2021-09-29 | Stop reason: SDUPTHER

## 2021-04-10 LAB — HIV 1+2 AB+HIV1 P24 AG SERPL QL IA: NORMAL

## 2021-04-12 ENCOUNTER — OFFICE VISIT (OUTPATIENT)
Dept: FAMILY MEDICINE CLINIC | Facility: CLINIC | Age: 61
End: 2021-04-12
Payer: COMMERCIAL

## 2021-04-12 ENCOUNTER — APPOINTMENT (OUTPATIENT)
Dept: LAB | Facility: HOSPITAL | Age: 61
End: 2021-04-12
Attending: INTERNAL MEDICINE
Payer: COMMERCIAL

## 2021-04-12 ENCOUNTER — TRANSCRIBE ORDERS (OUTPATIENT)
Dept: ADMINISTRATIVE | Facility: HOSPITAL | Age: 61
End: 2021-04-12

## 2021-04-12 VITALS
DIASTOLIC BLOOD PRESSURE: 70 MMHG | WEIGHT: 293 LBS | HEIGHT: 66 IN | HEART RATE: 91 BPM | OXYGEN SATURATION: 96 % | TEMPERATURE: 97.1 F | BODY MASS INDEX: 47.09 KG/M2 | SYSTOLIC BLOOD PRESSURE: 142 MMHG | RESPIRATION RATE: 18 BRPM

## 2021-04-12 DIAGNOSIS — E78.5 HYPERLIPIDEMIA, UNSPECIFIED HYPERLIPIDEMIA TYPE: ICD-10-CM

## 2021-04-12 DIAGNOSIS — R73.9 HYPERGLYCEMIA: ICD-10-CM

## 2021-04-12 DIAGNOSIS — E11.69 DIABETES MELLITUS TYPE 2 IN OBESE (HCC): ICD-10-CM

## 2021-04-12 DIAGNOSIS — Z95.2 STATUS POST MECHANICAL AORTIC VALVE REPLACEMENT: ICD-10-CM

## 2021-04-12 DIAGNOSIS — E66.01 MORBID OBESITY (HCC): ICD-10-CM

## 2021-04-12 DIAGNOSIS — L02.92 BOIL: ICD-10-CM

## 2021-04-12 DIAGNOSIS — Z95.2 HISTORY OF MECHANICAL AORTIC VALVE REPLACEMENT: ICD-10-CM

## 2021-04-12 DIAGNOSIS — Z72.0 TOBACCO USE: ICD-10-CM

## 2021-04-12 DIAGNOSIS — U07.1 COVID-19: ICD-10-CM

## 2021-04-12 DIAGNOSIS — E66.9 DIABETES MELLITUS TYPE 2 IN OBESE (HCC): ICD-10-CM

## 2021-04-12 DIAGNOSIS — R20.0 NUMBNESS OF FINGERS OF BOTH HANDS: Primary | ICD-10-CM

## 2021-04-12 LAB — SL AMB POCT HEMOGLOBIN AIC: 7.5 (ref ?–6.5)

## 2021-04-12 PROCEDURE — 87591 N.GONORRHOEAE DNA AMP PROB: CPT

## 2021-04-12 PROCEDURE — 3008F BODY MASS INDEX DOCD: CPT | Performed by: INTERNAL MEDICINE

## 2021-04-12 PROCEDURE — 3051F HG A1C>EQUAL 7.0%<8.0%: CPT | Performed by: INTERNAL MEDICINE

## 2021-04-12 PROCEDURE — 4004F PT TOBACCO SCREEN RCVD TLK: CPT | Performed by: INTERNAL MEDICINE

## 2021-04-12 PROCEDURE — 83036 HEMOGLOBIN GLYCOSYLATED A1C: CPT | Performed by: INTERNAL MEDICINE

## 2021-04-12 PROCEDURE — 99215 OFFICE O/P EST HI 40 MIN: CPT | Performed by: INTERNAL MEDICINE

## 2021-04-12 PROCEDURE — 36416 COLLJ CAPILLARY BLOOD SPEC: CPT | Performed by: INTERNAL MEDICINE

## 2021-04-12 PROCEDURE — 87491 CHLMYD TRACH DNA AMP PROBE: CPT

## 2021-04-12 RX ORDER — CHLORHEXIDINE GLUCONATE 4 G/100ML
1 SOLUTION TOPICAL 2 TIMES DAILY
Qty: 120 ML | Refills: 3 | Status: SHIPPED | OUTPATIENT
Start: 2021-04-12 | End: 2021-04-27 | Stop reason: HOSPADM

## 2021-04-12 RX ORDER — CLINDAMYCIN HYDROCHLORIDE 300 MG/1
300 CAPSULE ORAL 3 TIMES DAILY
Qty: 30 CAPSULE | Refills: 0 | Status: SHIPPED | OUTPATIENT
Start: 2021-04-12 | End: 2021-04-22

## 2021-04-12 RX ORDER — NICOTINE 21 MG/24HR
1 PATCH, TRANSDERMAL 24 HOURS TRANSDERMAL EVERY 24 HOURS
Qty: 28 PATCH | Refills: 0 | Status: SHIPPED | OUTPATIENT
Start: 2021-04-12 | End: 2021-09-14 | Stop reason: HOSPADM

## 2021-04-12 RX ORDER — NICOTINE 21 MG/24HR
1 PATCH, TRANSDERMAL 24 HOURS TRANSDERMAL EVERY 24 HOURS
Qty: 28 PATCH | Refills: 0 | Status: SHIPPED | OUTPATIENT
Start: 2021-04-12 | End: 2021-04-27 | Stop reason: HOSPADM

## 2021-04-12 NOTE — ASSESSMENT & PLAN NOTE
Lab Results   Component Value Date    HGBA1C 7 5 (A) 04/12/2021   I told Nina Burden she needs to be on med for her diabetes-will start some metformin 500 mg daily and then bid provided she tolerates it-seems to have neuropathy in her feet to a degree

## 2021-04-12 NOTE — PROGRESS NOTES
Assessment/Plan:         Problem List Items Addressed This Visit        Endocrine    Diabetes mellitus type 2 in obese [facility])       Lab Results   Component Value Date    HGBA1C 7 5 (A) 04/12/2021   I told Elyse Franklin she needs to be on med for her diabetes-will start some metformin 500 mg daily and then bid provided she tolerates it-seems to have neuropathy in her feet to a degree         Relevant Medications    metFORMIN (GLUCOPHAGE) 500 mg tablet       Other    History of mechanical aortic valve replacement    Hyperglycemia    Relevant Orders    POCT hemoglobin A1c (Completed)    Hyperlipidemia    Tobacco use    Relevant Medications    nicotine (NICODERM CQ) 21 mg/24 hr TD 24 hr patch    nicotine (NICODERM CQ) 14 mg/24hr TD 24 hr patch    nicotine (NICODERM CQ) 7 mg/24hr TD 24 hr patch    Other Relevant Orders    CT lung screening program    Morbid obesity ([facility] )    Status post mechanical aortic valve replacement    COVID-19     Recovered but seems to still have a lot fatigue that may or may not be covid related           Other Visit Diagnoses     Numbness of fingers of both hands    -  Primary    Suggested wrist splint although neuropatthy is a possibility too    Relevant Orders    POCT hemoglobin A1c (Completed)    BMI 50 0-59 9, adult ([facility] )        Boil        Relevant Medications    clindamycin (CLEOCIN) 300 MG capsule    chlorhexidine (HIBICLENS) 4 % external liquid    Other Relevant Orders    Ambulatory referral to General Surgery            Subjective:      Patient ID: Samm Yuan is a 64 y o  female      Elyse Franklin here for a multitude of issues-hx of DM, morbid obesity, recent trichomonas infection of her urine, mechanical aortic valve on coumadin-complains of leg and foot pain and numbness, numbness of 3 fingers of her right hand-is a smoker and would like to try the patch-smokes 1 1/2 ppd-is amenable to the covid vaccine and got set up for that while here-also showed me some folliculitis like lesions on her belly and the one on her right hand that hurts a lot and feels "solid"      The following portions of the patient's history were reviewed and updated as appropriate:   Past Medical History:  She has a past medical history of Anemia, Anxiety, Aortic valve disorder, Back pain, Chronic pain syndrome, Constipation, Degenerative joint disease involving multiple joints, Depression, D[facility] (deep venous thrombosis) ([facility] ), Edema, Endometrial adenocarcinoma ([facility] ), Fibromyalgia, Ganglion of right wrist, GERD (gastroesophageal reflux disease), Heart murmur, Hematoma, History of mechanical aortic valve replacement, Hypothyroidism (acquired), Laboratory confirmed diagnosis of COVID-19 (2021), Low blood pressure, Malignant neoplasm of corpus uteri, except isthmus ([facility] ), Mixed hyperlipidemia, Morbid obesity ([facility] ), Obstructive sleep apnea syndrome, Pulmonary embolism ([facility] ), Tobacco dependence syndrome, Transient cerebral ischemia, Urinary incontinence, Viral hepatitis C, and Vitamin D deficiency  ,  _______________________________________________________________________  Medical Problems:  does not have any pertinent problems on file ,  _______________________________________________________________________  Past Surgical History:   has a past surgical history that includes Ankle surgery; Cardiac surgery (); Hysterectomy (2011);  section; Cholecystectomy; and Colonoscopy (2019)  ,  _______________________________________________________________________  Family History:  family history includes Coronary artery disease in her father and mother ,  _______________________________________________________________________  Social History:   reports that she has been smoking cigarettes  She has a 10 00 pack-year smoking history  She has never used smokeless tobacco  She reports current alcohol use  She reports current drug use   Drug: Marijuana  ,  _______________________________________________________________________  Allergies:  is allergic to bactrim [sulfamethoxazole-trimethoprim]; penicillins; and tramadol     _______________________________________________________________________  Current Outpatient Medications   Medication Sig Dispense Refill    warfarin ([place]) 3 mg tablet Take 6 mg by mouth daily      Ascorbic Acid (vitamin C) 1000 MG tablet Take 1 tablet (1,000 mg total) by mouth 2 (two) times a day for 10 days 20 tablet 0    ascorbic acid (VITAMIN C) 500 MG tablet TAKE 2 TABLET (1,000 MG TOTAL) BY MOUTH 2 (TWO) TIMES A DAY FOR 10 DAYS      atorvastatin (LIPITOR) 20 mg tablet Take 1 tablet (20 mg total) by mouth daily (Patient not taking: Reported on 4/12/2021) 30 tablet 5    chlorhexidine (HIBICLENS) 4 % external liquid Apply 1 application topically 2 (two) times a day 120 mL 3    cholecalciferol (VITAMIN D3) 1,000 units tablet Take 2 tablets (2,000 Units total) by mouth daily for 10 days (Patient not taking: Reported on 4/12/2021) 20 tablet 0    clindamycin (CLEOCIN) 300 MG capsule Take 1 capsule (300 mg total) by mouth 3 (three) times a day for 10 days 30 capsule 0    clonazePAM (KlonoPIN) 1 mg tablet Take 1 mg by mouth daily as needed      furosemide (LASIX) 40 mg tablet Take 1 tablet daily for 5 days, then 1 tablet daily on an as needed basis   (Patient not taking: Reported on 4/12/2021) 10 tablet 0    metFORMIN (GLUCOPHAGE) 500 mg tablet Take 1 tablet (500 mg total) by mouth 2 (two) times a day with meals 60 tablet 5    mirtazapine (REMERON) 45 MG tablet Take 45 mg by mouth daily at bedtime      multivitamin (THERAGRAN) TABS Take by mouth daily      nicotine (NICODERM CQ) 14 mg/24hr TD 24 hr patch Place 1 patch on the skin every 24 hours 28 patch 0    nicotine (NICODERM CQ) 21 mg/24 hr TD 24 hr patch Place 1 patch on the skin every 24 hours 28 patch 0    nicotine (NICODERM CQ) 7 mg/24hr TD 24 hr patch Place 1 patch on the skin every 24 hours 28 patch 0    nystatin powder APPLY TOPICALLY 2 (TWO) TIMES A DAY AS NEEDED FOR 14 DAYS      ziprasidone (GEODON) 60 mg capsule Take 60 mg by mouth daily       No current facility-administered medications for this visit       _______________________________________________________________________  Review of Systems   Constitutional: Positive for fatigue  HENT: Negative  Respiratory: Negative for choking, chest tightness and shortness of breath  Gastrointestinal: Negative for abdominal distention and diarrhea  Musculoskeletal: Positive for arthralgias and myalgias  Neurological: Positive for numbness  Hematological: Negative  Psychiatric/Behavioral: Negative  Objective:  Vitals:    04/12/21 1347   BP: 142/70   BP Location: Right arm   Patient Position: Sitting   Cuff Size: Large   Pulse: 91   Resp: 18   Temp: (!) 97 1 °F (36 2 °C)   TempSrc: Temporal   SpO2: 96%   Weight: (!) 156 kg (345 lb)   Height: 5' 6" (1 676 m)     Body mass index is 55 68 kg/m²  Physical Exam  Constitutional:       Appearance: She is obese  HENT:      Right Ear: External ear normal       Left Ear: External ear normal       Nose: Nose normal       Mouth/Throat:      Pharynx: Oropharynx is clear  Eyes:      Extraocular Movements: Extraocular movements intact  Pupils: Pupils are equal, round, and reactive to light  Neck:      Musculoskeletal: Normal range of motion  Cardiovascular:      Rate and Rhythm: Normal rate  Pulses: no weak pulses          Dorsalis pedis pulses are 2+ on the right side and 2+ on the left side  Posterior tibial pulses are 2+ on the right side and 2+ on the left side  Heart sounds: Murmur present  Pulmonary:      Effort: Pulmonary effort is normal    Abdominal:      General: Abdomen is flat  Tenderness: There is no abdominal tenderness  Musculoskeletal: Normal range of motion  Right lower leg: Edema present        Left lower leg: Edema present  Feet:      Right foot:      Skin integrity: No ulcer, skin breakdown, erythema, warmth, callus or dry skin  Left foot:      Skin integrity: No ulcer, skin breakdown, erythema, warmth, callus or dry skin  Skin:     General: Skin is warm  Comments: Right hand area spot that is tender   Neurological:      General: No focal deficit present  Mental Status: She is alert and oriented to person, place, and time  Psychiatric:         Mood and Affect: Mood normal          Behavior: Behavior normal          Thought Content: Thought content normal        Patient's shoes and socks removed  Right Foot/Ankle   Right Foot Inspection  Skin Exam: skin normal and skin intact no dry skin, no warmth, no callus, no erythema, no maceration, no abnormal color, no pre-ulcer, no ulcer and no callus                          Toe Exam: ROM and strength within normal limits and swelling  Sensory       Monofilament testing: diminished  Vascular  Capillary refills: < 3 seconds  The right DP pulse is 2+  The right PT pulse is 2+  Left Foot/Ankle  Left Foot Inspection  Skin Exam: skin normal and skin intactno dry skin, no warmth, no erythema, no maceration, normal color, no pre-ulcer, no ulcer and no callus                         Toe Exam: ROM and strength within normal limits and swelling                   Sensory       Monofilament: diminished  Vascular  Capillary refills: < 3 seconds  The left DP pulse is 2+  The left PT pulse is 2+  Assign Risk Category:  No deformity present; Loss of protective sensation;  No weak pulses       Risk: 2

## 2021-04-14 LAB
C TRACH DNA SPEC QL NAA+PROBE: NEGATIVE
N GONORRHOEA DNA SPEC QL NAA+PROBE: NEGATIVE

## 2021-04-21 ENCOUNTER — IMMUNIZATIONS (OUTPATIENT)
Dept: FAMILY MEDICINE CLINIC | Facility: HOSPITAL | Age: 61
End: 2021-04-21

## 2021-04-21 DIAGNOSIS — Z23 ENCOUNTER FOR IMMUNIZATION: Primary | ICD-10-CM

## 2021-04-21 PROCEDURE — 0001A SARS-COV-2 / COVID-19 MRNA VACCINE (PFIZER-BIONTECH) 30 MCG: CPT

## 2021-04-21 PROCEDURE — 91300 SARS-COV-2 / COVID-19 MRNA VACCINE (PFIZER-BIONTECH) 30 MCG: CPT

## 2021-04-23 ENCOUNTER — HOSPITAL ENCOUNTER (EMERGENCY)
Facility: HOSPITAL | Age: 61
End: 2021-04-24
Attending: EMERGENCY MEDICINE | Admitting: EMERGENCY MEDICINE
Payer: COMMERCIAL

## 2021-04-23 DIAGNOSIS — T50.901A OVERDOSE: ICD-10-CM

## 2021-04-23 DIAGNOSIS — R45.89 SUICIDAL BEHAVIOR: Primary | ICD-10-CM

## 2021-04-23 LAB
ALBUMIN SERPL BCP-MCNC: 3.8 G/DL (ref 3.4–4.8)
ALP SERPL-CCNC: 80.8 U/L (ref 35–140)
ALT SERPL W P-5'-P-CCNC: 23 U/L (ref 5–54)
AMMONIA PLAS-SCNC: 47 UMOL/L
AMPHETAMINES SERPL QL SCN: POSITIVE
ANION GAP SERPL CALCULATED.3IONS-SCNC: 5 MMOL/L (ref 4–13)
APAP SERPL-MCNC: <10 UG/ML (ref 10–20)
APTT PPP: 45 SECONDS (ref 23–31)
AST SERPL W P-5'-P-CCNC: 32 U/L (ref 15–41)
ATRIAL RATE: 95 BPM
BACTERIA UR QL AUTO: ABNORMAL /HPF
BARBITURATES UR QL: NEGATIVE
BASOPHILS # BLD AUTO: 0.02 THOUSANDS/ΜL (ref 0–0.1)
BASOPHILS NFR BLD AUTO: 1 % (ref 0–1)
BENZODIAZ UR QL: NEGATIVE
BILIRUB SERPL-MCNC: 0.55 MG/DL (ref 0.3–1.2)
BILIRUB UR QL STRIP: NEGATIVE
BUN SERPL-MCNC: 12 MG/DL (ref 6–20)
CALCIUM SERPL-MCNC: 9.1 MG/DL (ref 8.4–10.2)
CHLORIDE SERPL-SCNC: 102 MMOL/L (ref 96–108)
CLARITY UR: ABNORMAL
CO2 SERPL-SCNC: 30 MMOL/L (ref 22–33)
COCAINE UR QL: NEGATIVE
COLOR UR: YELLOW
CREAT SERPL-MCNC: 0.59 MG/DL (ref 0.4–1.1)
EOSINOPHIL # BLD AUTO: 0.14 THOUSAND/ΜL (ref 0–0.61)
EOSINOPHIL NFR BLD AUTO: 3 % (ref 0–6)
ERYTHROCYTE [DISTWIDTH] IN BLOOD BY AUTOMATED COUNT: 13.1 % (ref 11.6–15.1)
ETHANOL SERPL-MCNC: <10 MG/DL
GFR SERPL CREATININE-BSD FRML MDRD: 101 ML/MIN/1.73SQ M
GLUCOSE SERPL-MCNC: 164 MG/DL (ref 65–140)
GLUCOSE SERPL-MCNC: 193 MG/DL (ref 65–140)
GLUCOSE SERPL-MCNC: 230 MG/DL (ref 65–140)
GLUCOSE SERPL-MCNC: 235 MG/DL (ref 65–140)
GLUCOSE UR STRIP-MCNC: ABNORMAL MG/DL
HCT VFR BLD AUTO: 46.5 % (ref 34.8–46.1)
HGB BLD-MCNC: 14.9 G/DL (ref 11.5–15.4)
HGB UR QL STRIP.AUTO: ABNORMAL
IMM GRANULOCYTES # BLD AUTO: 0 THOUSAND/UL (ref 0–0.2)
IMM GRANULOCYTES NFR BLD AUTO: 0 % (ref 0–2)
INR PPP: 3.18 (ref 0.9–1.1)
KETONES UR STRIP-MCNC: NEGATIVE MG/DL
LACTATE SERPL-SCNC: 1.1 MMOL/L (ref 0–2)
LEUKOCYTE ESTERASE UR QL STRIP: ABNORMAL
LIPASE SERPL-CCNC: 50 U/L (ref 13–60)
LYMPHOCYTES # BLD AUTO: 1.07 THOUSANDS/ΜL (ref 0.6–4.47)
LYMPHOCYTES NFR BLD AUTO: 24 % (ref 14–44)
MCH RBC QN AUTO: 30.2 PG (ref 26.8–34.3)
MCHC RBC AUTO-ENTMCNC: 32 G/DL (ref 31.4–37.4)
MCV RBC AUTO: 94 FL (ref 82–98)
METHADONE UR QL: NEGATIVE
MONOCYTES # BLD AUTO: 0.44 THOUSAND/ΜL (ref 0.17–1.22)
MONOCYTES NFR BLD AUTO: 10 % (ref 4–12)
NEUTROPHILS # BLD AUTO: 2.74 THOUSANDS/ΜL (ref 1.85–7.62)
NEUTS SEG NFR BLD AUTO: 62 % (ref 43–75)
NITRITE UR QL STRIP: NEGATIVE
NON-SQ EPI CELLS URNS QL MICRO: ABNORMAL /HPF
OPIATES UR QL SCN: NEGATIVE
OXYCODONE+OXYMORPHONE UR QL SCN: NEGATIVE
P AXIS: 74 DEGREES
PCP UR QL: NEGATIVE
PH UR STRIP.AUTO: 5 [PH]
PLATELET # BLD AUTO: 161 THOUSANDS/UL (ref 149–390)
PMV BLD AUTO: 9.9 FL (ref 8.9–12.7)
POTASSIUM SERPL-SCNC: 3.9 MMOL/L (ref 3.5–5)
PR INTERVAL: 173 MS
PROT SERPL-MCNC: 7.5 G/DL (ref 6.4–8.3)
PROT UR STRIP-MCNC: NEGATIVE MG/DL
PROTHROMBIN TIME: 34 SECONDS (ref 9.5–12.1)
QRS AXIS: 36 DEGREES
QRSD INTERVAL: 99 MS
QT INTERVAL: 386 MS
QTC INTERVAL: 496 MS
RBC # BLD AUTO: 4.93 MILLION/UL (ref 3.81–5.12)
RBC #/AREA URNS AUTO: ABNORMAL /HPF
SALICYLATES SERPL-MCNC: <2.5 MG/DL (ref 6–30)
SARS-COV-2 RNA RESP QL NAA+PROBE: POSITIVE
SODIUM SERPL-SCNC: 137 MMOL/L (ref 133–145)
SP GR UR STRIP.AUTO: 1.02 (ref 1–1.03)
T WAVE AXIS: 118 DEGREES
THC UR QL: POSITIVE
TROPONIN I SERPL-MCNC: <0.03 NG/ML (ref 0–0.07)
TSH SERPL DL<=0.05 MIU/L-ACNC: 2.24 UIU/ML (ref 0.34–5.6)
UROBILINOGEN UR QL STRIP.AUTO: 0.2 E.U./DL
VENTRICULAR RATE: 99 BPM
WBC # BLD AUTO: 4.41 THOUSAND/UL (ref 4.31–10.16)
WBC #/AREA URNS AUTO: ABNORMAL /HPF

## 2021-04-23 PROCEDURE — 93005 ELECTROCARDIOGRAM TRACING: CPT

## 2021-04-23 PROCEDURE — 99285 EMERGENCY DEPT VISIT HI MDM: CPT

## 2021-04-23 PROCEDURE — 83605 ASSAY OF LACTIC ACID: CPT | Performed by: EMERGENCY MEDICINE

## 2021-04-23 PROCEDURE — 96361 HYDRATE IV INFUSION ADD-ON: CPT

## 2021-04-23 PROCEDURE — 84443 ASSAY THYROID STIM HORMONE: CPT | Performed by: EMERGENCY MEDICINE

## 2021-04-23 PROCEDURE — 36415 COLL VENOUS BLD VENIPUNCTURE: CPT | Performed by: EMERGENCY MEDICINE

## 2021-04-23 PROCEDURE — 80307 DRUG TEST PRSMV CHEM ANLYZR: CPT | Performed by: EMERGENCY MEDICINE

## 2021-04-23 PROCEDURE — 80053 COMPREHEN METABOLIC PANEL: CPT | Performed by: EMERGENCY MEDICINE

## 2021-04-23 PROCEDURE — 80179 DRUG ASSAY SALICYLATE: CPT | Performed by: EMERGENCY MEDICINE

## 2021-04-23 PROCEDURE — 96360 HYDRATION IV INFUSION INIT: CPT

## 2021-04-23 PROCEDURE — 82948 REAGENT STRIP/BLOOD GLUCOSE: CPT

## 2021-04-23 PROCEDURE — 85025 COMPLETE CBC W/AUTO DIFF WBC: CPT | Performed by: EMERGENCY MEDICINE

## 2021-04-23 PROCEDURE — 81001 URINALYSIS AUTO W/SCOPE: CPT | Performed by: EMERGENCY MEDICINE

## 2021-04-23 PROCEDURE — 80143 DRUG ASSAY ACETAMINOPHEN: CPT | Performed by: EMERGENCY MEDICINE

## 2021-04-23 PROCEDURE — 87635 SARS-COV-2 COVID-19 AMP PRB: CPT | Performed by: EMERGENCY MEDICINE

## 2021-04-23 PROCEDURE — 82077 ASSAY SPEC XCP UR&BREATH IA: CPT | Performed by: EMERGENCY MEDICINE

## 2021-04-23 PROCEDURE — 99285 EMERGENCY DEPT VISIT HI MDM: CPT | Performed by: EMERGENCY MEDICINE

## 2021-04-23 PROCEDURE — 85730 THROMBOPLASTIN TIME PARTIAL: CPT | Performed by: EMERGENCY MEDICINE

## 2021-04-23 PROCEDURE — 85610 PROTHROMBIN TIME: CPT | Performed by: EMERGENCY MEDICINE

## 2021-04-23 PROCEDURE — 82140 ASSAY OF AMMONIA: CPT | Performed by: EMERGENCY MEDICINE

## 2021-04-23 PROCEDURE — 99449 NTRPROF PH1/NTRNET/EHR 31/>: CPT | Performed by: EMERGENCY MEDICINE

## 2021-04-23 PROCEDURE — 84484 ASSAY OF TROPONIN QUANT: CPT | Performed by: EMERGENCY MEDICINE

## 2021-04-23 PROCEDURE — 83690 ASSAY OF LIPASE: CPT | Performed by: EMERGENCY MEDICINE

## 2021-04-23 PROCEDURE — 93010 ELECTROCARDIOGRAM REPORT: CPT | Performed by: INTERNAL MEDICINE

## 2021-04-23 RX ORDER — OLANZAPINE 2.5 MG/1
5 TABLET ORAL
Status: CANCELLED | OUTPATIENT
Start: 2021-04-23

## 2021-04-23 RX ORDER — AMOXICILLIN 250 MG
1 CAPSULE ORAL DAILY PRN
Status: CANCELLED | OUTPATIENT
Start: 2021-04-23

## 2021-04-23 RX ORDER — ACETAMINOPHEN 325 MG/1
650 TABLET ORAL EVERY 4 HOURS PRN
Status: CANCELLED | OUTPATIENT
Start: 2021-04-23

## 2021-04-23 RX ORDER — OLANZAPINE 2.5 MG/1
2.5 TABLET ORAL
Status: CANCELLED | OUTPATIENT
Start: 2021-04-23

## 2021-04-23 RX ORDER — HYDROXYZINE HYDROCHLORIDE 25 MG/1
25 TABLET, FILM COATED ORAL
Status: CANCELLED | OUTPATIENT
Start: 2021-04-23

## 2021-04-23 RX ORDER — LORAZEPAM 1 MG/1
1 TABLET ORAL
Status: CANCELLED | OUTPATIENT
Start: 2021-04-23

## 2021-04-23 RX ORDER — LORAZEPAM 2 MG/ML
1 INJECTION INTRAMUSCULAR
Status: CANCELLED | OUTPATIENT
Start: 2021-04-23

## 2021-04-23 RX ORDER — LORAZEPAM 0.5 MG/1
0.5 TABLET ORAL
Status: CANCELLED | OUTPATIENT
Start: 2021-04-23

## 2021-04-23 RX ORDER — LANOLIN ALCOHOL/MO/W.PET/CERES
3 CREAM (GRAM) TOPICAL
Status: CANCELLED | OUTPATIENT
Start: 2021-04-24

## 2021-04-23 RX ORDER — ACETAMINOPHEN 325 MG/1
975 TABLET ORAL EVERY 6 HOURS PRN
Status: CANCELLED | OUTPATIENT
Start: 2021-04-23

## 2021-04-23 RX ORDER — OLANZAPINE 10 MG/1
5 INJECTION, POWDER, LYOPHILIZED, FOR SOLUTION INTRAMUSCULAR
Status: CANCELLED | OUTPATIENT
Start: 2021-04-23

## 2021-04-23 RX ORDER — TRAZODONE HYDROCHLORIDE 50 MG/1
50 TABLET ORAL
Status: CANCELLED | OUTPATIENT
Start: 2021-04-23

## 2021-04-23 RX ADMIN — SODIUM CHLORIDE 1000 ML: 0.9 INJECTION, SOLUTION INTRAVENOUS at 17:19

## 2021-04-23 NOTE — CONSULTS
INTERPROFESSIONAL (PHONE) [facility]  Jt Leslie 64 y o  female MRN: [id number]  Unit/Bed#: ED 05 Encounter: [id number]      Reason for Consult / Principal Problem: overdose  Consults  04/23/21      ASSESSMENT:  64year old female presenting after intentional overdose of boyfriend's medicaitons  1  Pioglitazone overdose  2  Risperidone Overdose  3  Hyperglycemia secondary to DM  4  Hyperammonemia  5  Elevated INR in setting of warfarin use     RECOMMENDATIONS:    Pioglitazone is an oral agent used for the treatment of type 2 diabetes  It is a glitazone or thiazolidinedione(TZD), which decrease hepatic glucose output and increase sensitivity to insulin  The overdose data is limited, but it does not typically result in hypoglycemia, as it does not directly cause the release of insulin  In chronic use, heptatotoxicity has been a major concern for this class of medications, leading to the removal of troglitazone from the market  Pioglitazone has recently been implicated in increased bladder cancer rates and is currently under review  Risperidone is an atypical antipsychotic  The peak effect of action should be 1-2 hours post ingestion  In overdose it can cause hypotension and QTc interval prolongation  QTc prolongation can lead to torsades de points  Extrapyramidal side effects such as torticollis, jaw muscle spasm, oculogyric crisis, rigidity, bradykinesia and pill-rolling tremor can occur even at therapeutic doses  Treatment is supportive  There is not a specific antidote  Hypotension usually responds to IV fluids  If hypotension is refractory to IVF would recommend levophed as a vasopressor agent  Typically, if any effect is going to be seen with either of these medications, it would manifest within 6 hours        We would also recommend obtaining a lactic acid given her own current prescription for metformin, and although the patient claims to have only taken her boyfriend's medication, we cannot assume that this is accurate  Metformin is an oral hypoglycemic agent used in the treatment of diabetes  Therapeutically it decreases gluconeogenesis, fatty acid metabolism, as well as intestinal absorption of glucose  It also increases peripheral glucose uptake and utilization  Metformin does not stimulate insulin release, and is therefore only rarely associated with hypoglycemia  Metformin associated metabolic acidosis with hyperlactatemia is a condition that refers to the ability of metformin to cause a hyperlactatemia  Metformin is renally cleared, therefore, people with a decreased GFR can have elevated blood levels of metformin  Baseline daily metabolic production of lactate is typically cleared by the liver  Metformin inhibits clearance of hepatic lactate, and thus can cause a type B lactic acidosis  In patients with normal renal function, this rarely occurs  Treatment for mild cases is mainly supportive with fluid hydration  If the acidosis is severe enough, sodium bicarbonate and dialysis may be indicated  Patient should also have daily INR over the next 3-4 days at her psychiatric facility or on admission  Summary of recommendations:  - Supportive care as needed  - Obtain lactic acid level   --- If lactate is elevated, will need to repeat to ensure no rise  - 6 hour post ingestion observation period  - Repeat fingerstick at 6 hours    Patient can be cleared from a toxicologic standpoint if labs (lactic acid, glucose) are stable/WNL; mentation is at baseline; VS WNL; and patient is ambulatory  For further questions, please contact the medical  on call via [employer] or throughl the [employer] or [employer]  Please see additional teaching note below:    Hx and PE limited by the dynamics of a phone consultation   I have not personally interviewed or evaluated the patient, but only advised based on the information provided to me  Primary provider is responsible for all clinical decisions  Pertinent history, physical exam and clinical findings and course discussed: Jocelynn Mathis is a 64y o  year old female who presents with intentional overdose of reportedly, her boyfriend's pioglitazone and risperidone (unknown quantities) just prior to arrival in the emergency department  PMH significant for DVT on warfarin, PE, depression, hep C  Patient denies taking any of her own medications  Patient arrives to the ED with normal VS and no specific findings on physical exam       Review of systems and physical exam not performed by me      Historical Information   Past Medical History:   Diagnosis Date    Anemia     Anxiety     Aortic valve disorder     Back pain     Chronic pain syndrome     Constipation     Degenerative joint disease involving multiple joints     Depression     DVT (deep venous thrombosis) (HCC)     Bilateral    Edema     Endometrial adenocarcinoma (HCC)     Fibromyalgia     Ganglion of right wrist     GERD (gastroesophageal reflux disease)     Heart murmur     Hematoma     History of mechanical aortic valve replacement     Hypothyroidism (acquired)     Laboratory confirmed diagnosis of COVID-19 2021    Low blood pressure     Malignant neoplasm of corpus uteri, except isthmus (HCC)     Mixed hyperlipidemia     Morbid obesity (HCC)     Obstructive sleep apnea syndrome     Pulmonary embolism (HCC)     Tobacco dependence syndrome     Transient cerebral ischemia     Urinary incontinence     Viral hepatitis C     Vitamin D deficiency      Past Surgical History:   Procedure Laterality Date    ANKLE SURGERY      CARDIAC SURGERY  2015    VALVE REPLACEMENT     SECTION      X3    CHOLECYSTECTOMY      COLONOSCOPY  2019    HYSTERECTOMY  2011    TOTAL     Social History   Social History     Substance and Sexual Activity   Alcohol Use Yes    Frequency: Monthly or less    Drinks per session: 10 or more     Social History     Substance and Sexual Activity   Drug Use Yes    Types: Marijuana     Social History     Tobacco Use   Smoking Status Current Some Day Smoker    Packs/day: 0 25    Years: 40 00    Pack years: 10 00    Types: Cigarettes   Smokeless Tobacco Never Used     Family History   Problem Relation Age of Onset    Coronary artery disease Mother     Coronary artery disease Father         Prior to Admission medications    Medication Sig Start Date End Date Taking? Authorizing Provider   Ascorbic Acid (vitamin C) 1000 MG tablet Take 1 tablet (1,000 mg total) by mouth 2 (two) times a day for 10 days 3/16/21 3/26/21  Jorge Terry DO   ascorbic acid (VITAMIN C) 500 MG tablet TAKE 2 TABLET (1,000 MG TOTAL) BY MOUTH 2 (TWO) TIMES A DAY FOR 10 DAYS 3/16/21   Historical Provider, MD   atorvastatin (LIPITOR) 20 mg tablet Take 1 tablet (20 mg total) by mouth daily  Patient not taking: Reported on 4/12/2021 1/25/21   Anival Javier MD   chlorhexidine (HIBICLENS) 4 % external liquid Apply 1 application topically 2 (two) times a day  Patient not taking: Reported on 4/23/2021 4/12/21   Anival Javier MD   cholecalciferol (VITAMIN D3) 1,000 units tablet Take 2 tablets (2,000 Units total) by mouth daily for 10 days  Patient not taking: Reported on 4/12/2021 3/16/21 3/26/21  Jorge Terry DO   clindamycin (CLEOCIN) 300 MG capsule Take 1 capsule (300 mg total) by mouth 3 (three) times a day for 10 days 4/12/21 4/22/21  Anival Javier MD   clonazePAM (KlonoPIN) 1 mg tablet Take 1 mg by mouth daily as needed 12/28/20   Historical Provider, MD   furosemide (LASIX) 40 mg tablet Take 1 tablet daily for 5 days, then 1 tablet daily on an as needed basis    Patient not taking: Reported on 4/12/2021 3/16/21   Jorge Terry DO   metFORMIN (GLUCOPHAGE) 500 mg tablet Take 1 tablet (500 mg total) by mouth 2 (two) times a day with meals  Patient not taking: Reported on 4/23/2021 4/12/21 Kalli Underwood MD   mirtazapine (REMERON) 45 MG tablet Take 45 mg by mouth daily at bedtime 12/21/20   Historical Provider, MD   multivitamin [facility]) TABS Take by mouth daily 3/16/21   Historical Provider, MD   nicotine (NICODERM CQ) 14 mg/24hr TD 24 hr patch Place 1 patch on the skin every 24 hours  Patient not taking: Reported on 4/23/2021 4/12/21   Kalli Underwood MD   nicotine (NICODERM CQ) 21 mg/24 hr TD 24 hr patch Place 1 patch on the skin every 24 hours  Patient not taking: Reported on 4/23/2021 4/12/21   Kalli Underwood MD   nicotine (NICODERM CQ) 7 mg/24hr TD 24 hr patch Place 1 patch on the skin every 24 hours  Patient not taking: Reported on 4/23/2021 4/12/21   Kalli Underwood MD   nystatin powder APPLY TOPICALLY 2 (TWO) TIMES A DAY AS NEEDED FOR 14 DAYS 3/23/21   Historical Provider, MD   warfarin Dorthula Hurst) 3 mg tablet Take 6 mg by mouth daily 12/21/20   Historical Provider, MD   ziprasidone (GEODON) 60 mg capsule Take 60 mg by mouth daily 12/21/20   Historical Provider, MD       Current Facility-Administered Medications   Medication Dose Route Frequency    sodium chloride 0 9 % bolus 1,000 mL  1,000 mL Intravenous Once       Allergies   Allergen Reactions    Bactrim [Sulfamethoxazole-Trimethoprim]     Penicillins     Tramadol      Could not function - felt very ill        Objective     No intake or output data in the 24 hours ending 04/23/21 1800    Invasive Devices:   Peripheral IV 04/23/21 Right Antecubital (Active)   Site Assessment WDL 04/23/21 1716   Dressing Type Transparent 04/23/21 1716   Line Status Blood return noted 04/23/21 1716   Dressing Status Intact;Dry;Clean 04/23/21 1716       Vitals   Vitals:    04/23/21 1634   BP: 146/83   Pulse: 94   Resp: 22   Temp: 98 °F (36 7 °C)         EKG, Pathology, and/or Other Studies: I have personally reviewed pertinent reports  Vent   rate 99 BPM ND interval 173 ms QRS duration 99 ms QT/QTc 386/496 ms no fermín or std no terminal r    Lab Results: I have personally reviewed pertinent reports  Labs:    Results from last 7 days   Lab Units 04/23/21  1719   WBC Thousand/uL 4 41   HEMOGLOBIN g/dL 14 9   HEMATOCRIT % 46 5*   PLATELETS Thousands/uL 161   NEUTROS PCT % 62   LYMPHS PCT % 24   MONOS PCT % 10      Results from last 7 days   Lab Units 04/23/21  1719   SODIUM mmol/L 137   POTASSIUM mmol/L 3 9   CHLORIDE mmol/L 102   CO2 mmol/L 30   BUN mg/dL 12   CREATININE mg/dL 0 59   CALCIUM mg/dL 9 1   ALK PHOS U/L 80 8   ALT U/L 23   AST U/L 32      Results from last 7 days   Lab Units 04/23/21  1719   INR  3 18*   PTT seconds 45*         0   Lab Value Date/Time    TROPONINI <0 03 04/23/2021 1719    TROPONINI <0 03 03/16/2021 1452         Results from last 7 days   Lab Units 04/23/21  1719   ACETAMINOPHEN LVL ug/mL <10*   ETHANOL LVL mg/dL <51   SALICYLATE LVL mg/dL <9 0*     Invalid input(s): EXTPREGUR      Imaging Studies: I have personally reviewed pertinent reports  Counseling / Coordination of Care  Total time spent today 35 minutes  This was a phone consultation

## 2021-04-23 NOTE — ED NOTES
[place] ([facility]) sandwich provided per request  1:1 remains at bedside        Adenike Adams RN  04/23/21 8566

## 2021-04-23 NOTE — ED PROVIDER NOTES
History  Chief Complaint   Patient presents with    Psychiatric Evaluation     Patient presents from her psychiatrists office for suicidal ideation, states she began taking a friend's prescription pills (unknown which ones) then decided to stop after taking 8 of them  States she "thinks about killing myself but I'm not really going to do it"     HPI    65 yo F hx of aortic valve replacement on coumadin, states she only takes coumadin from her meds, presents for si  SI HI: si, no hi  Plan: no plan, though patient did just take someone else's pioglitazone and risperidone, 8 pills total  Therapist told patient to go to er  Any particular triggers?: boyfriend and family issues  Hallucinations:  no   Guns at home:  no   drugs:  meth  Alcohol: intermittent   previous hospitalizations: no   previous suicide attempt:  When she was younger   What psych meds does patient take: geodon  Any changes to those meds: no  Taking psych meds regularly: no  Would like to sign self in?: yes    Any Medical complaints? no      Prior to Admission Medications   Prescriptions Last Dose Informant Patient Reported? Taking?    Ascorbic Acid (vitamin C) 1000 MG tablet  Self No No   Sig: Take 1 tablet (1,000 mg total) by mouth 2 (two) times a day for 10 days   ascorbic acid (VITAMIN C) 500 MG tablet Not Taking at Unknown time Self Yes No   Sig: TAKE 2 TABLET (1,000 MG TOTAL) BY MOUTH 2 (TWO) TIMES A DAY FOR 10 DAYS   atorvastatin (LIPITOR) 20 mg tablet Not Taking at Unknown time Self No No   Sig: Take 1 tablet (20 mg total) by mouth daily   Patient not taking: Reported on 4/12/2021   chlorhexidine (HIBICLENS) 4 % external liquid Not Taking at Unknown time  No No   Sig: Apply 1 application topically 2 (two) times a day   Patient not taking: Reported on 4/23/2021   cholecalciferol (VITAMIN D3) 1,000 units tablet  Self No No   Sig: Take 2 tablets (2,000 Units total) by mouth daily for 10 days   Patient not taking: Reported on 4/12/2021 clindamycin (CLEOCIN) 300 MG capsule   No No   Sig: Take 1 capsule (300 mg total) by mouth 3 (three) times a day for 10 days   clonazePAM (KlonoPIN) 1 mg tablet Not Taking at Unknown time Self Yes No   Sig: Take 1 mg by mouth daily as needed   furosemide (LASIX) 40 mg tablet Not Taking at Unknown time Self No No   Sig: Take 1 tablet daily for 5 days, then 1 tablet daily on an as needed basis     Patient not taking: Reported on 4/12/2021   metFORMIN (GLUCOPHAGE) 500 mg tablet Not Taking at Unknown time  No No   Sig: Take 1 tablet (500 mg total) by mouth 2 (two) times a day with meals   Patient not taking: Reported on 4/23/2021   mirtazapine (REMERON) 45 MG tablet Not Taking at Unknown time Self Yes No   Sig: Take 45 mg by mouth daily at bedtime   multivitamin (THERAGRAN) TABS Not Taking at Unknown time Self Yes No   Sig: Take by mouth daily   nicotine (NICODERM CQ) 14 mg/24hr TD 24 hr patch Not Taking at Unknown time  No No   Sig: Place 1 patch on the skin every 24 hours   Patient not taking: Reported on 4/23/2021   nicotine (NICODERM CQ) 21 mg/24 hr TD 24 hr patch Not Taking at Unknown time  No No   Sig: Place 1 patch on the skin every 24 hours   Patient not taking: Reported on 4/23/2021   nicotine (NICODERM CQ) 7 mg/24hr TD 24 hr patch Not Taking at Unknown time  No No   Sig: Place 1 patch on the skin every 24 hours   Patient not taking: Reported on 4/23/2021   nystatin powder Not Taking at Unknown time  Yes No   Sig: APPLY TOPICALLY 2 (TWO) TIMES A DAY AS NEEDED FOR 14 DAYS   warfarin (Jantoven) 3 mg tablet Not Taking at Unknown time Self Yes No   Sig: Take 6 mg by mouth daily   ziprasidone (GEODON) 60 mg capsule Not Taking at Unknown time Self Yes No   Sig: Take 60 mg by mouth daily      Facility-Administered Medications: None       Past Medical History:   Diagnosis Date    Anemia     Anxiety     Aortic valve disorder     Back pain     Chronic pain syndrome     Constipation     Degenerative joint disease involving multiple joints     Depression     DVT (deep venous thrombosis) (HCC)     Bilateral    Edema     Endometrial adenocarcinoma (HCC)     Fibromyalgia     Ganglion of right wrist     GERD (gastroesophageal reflux disease)     Heart murmur     Hematoma     History of mechanical aortic valve replacement     Hypothyroidism (acquired)     Laboratory confirmed diagnosis of COVID-19 2021    Low blood pressure     Malignant neoplasm of corpus uteri, except isthmus (HCC)     Mixed hyperlipidemia     Morbid obesity (HCC)     Obstructive sleep apnea syndrome     Pulmonary embolism (HCC)     Tobacco dependence syndrome     Transient cerebral ischemia     Urinary incontinence     Viral hepatitis C     Vitamin D deficiency        Past Surgical History:   Procedure Laterality Date    ANKLE SURGERY      CARDIAC SURGERY  2015    VALVE REPLACEMENT     SECTION      X3    CHOLECYSTECTOMY      COLONOSCOPY  2019    HYSTERECTOMY  2011    TOTAL       Family History   Problem Relation Age of Onset    Coronary artery disease Mother     Coronary artery disease Father      I have reviewed and agree with the history as documented  E-Cigarette/Vaping    E-Cigarette Use Never User      E-Cigarette/Vaping Substances    Nicotine No     THC No     CBD No     Flavoring No     Other No     Unknown No      Social History     Tobacco Use    Smoking status: Current Some Day Smoker     Packs/day: 0 25     Years: 40 00     Pack years: 10 00     Types: Cigarettes    Smokeless tobacco: Never Used   Substance Use Topics    Alcohol use: Yes     Frequency: Monthly or less     Drinks per session: 10 or more    Drug use: Yes     Types: Marijuana       Review of Systems   Constitutional: Negative for chills, fatigue and fever  HENT: Negative for sore throat  Eyes: Negative for redness and visual disturbance  Respiratory: Negative for cough and shortness of breath  Cardiovascular: Negative for chest pain  Gastrointestinal: Negative for abdominal pain, diarrhea and nausea  Genitourinary: Negative for difficulty urinating, dysuria and pelvic pain  Musculoskeletal: Negative for back pain  Skin: Negative for rash  Neurological: Negative for syncope, weakness and headaches  Psychiatric/Behavioral: Positive for decreased concentration, dysphoric mood, self-injury and suicidal ideas  Negative for hallucinations  All other systems reviewed and are negative  Physical Exam  Physical Exam  Vitals signs and nursing note reviewed  Constitutional:       General: She is not in acute distress  Appearance: She is obese  HENT:      Head: Normocephalic and atraumatic  Eyes:      Conjunctiva/sclera: Conjunctivae normal    Neck:      Musculoskeletal: Normal range of motion  Cardiovascular:      Rate and Rhythm: Normal rate and regular rhythm  Heart sounds: Normal heart sounds  Pulmonary:      Effort: Pulmonary effort is normal  No respiratory distress  Breath sounds: Normal breath sounds  Abdominal:      General: Bowel sounds are normal       Palpations: Abdomen is soft  Tenderness: There is no abdominal tenderness  Musculoskeletal: Normal range of motion  Skin:     General: Skin is warm and dry  Findings: No rash  Neurological:      Mental Status: She is alert and oriented to person, place, and time  Cranial Nerves: No cranial nerve deficit  Sensory: No sensory deficit  Motor: No abnormal muscle tone  Coordination: Coordination normal    Psychiatric:         Thought Content: Thought content includes suicidal ideation  Thought content does not include homicidal ideation  Thought content includes suicidal plan  Thought content does not include homicidal plan           Vital Signs  ED Triage Vitals   Temperature Pulse Respirations Blood Pressure SpO2   04/23/21 1634 04/23/21 1634 04/23/21 1634 04/23/21 1634 04/23/21 1634   98 °F (36 7 °C) 94 22 146/83 97 %      Temp Source Heart Rate Source Patient Position - Orthostatic VS BP Location FiO2 (%)   04/23/21 1951 04/23/21 1951 04/23/21 1951 04/23/21 1951 --   Oral Monitor Lying Right arm       Pain Score       --                  Vitals:    04/23/21 1634 04/23/21 1951   BP: 146/83 140/80   Pulse: 94 87   Patient Position - Orthostatic VS:  Lying         Visual Acuity      ED Medications  Medications   sodium chloride 0 9 % bolus 1,000 mL (0 mL Intravenous Stopped 4/23/21 1953)       Diagnostic Studies  Results Reviewed     Procedure Component Value Units Date/Time    Fingerstick Glucose (POCT) [[id number]]  (Abnormal) Collected: 04/23/21 2152    Lab Status: Final result Updated: 04/23/21 2153     POC Glucose 164 mg/dl     Urine Microscopic [[id number]]  (Abnormal) Collected: 04/23/21 1959    Lab Status: Final result Specimen: Urine, Clean Catch Updated: 04/23/21 2035     RBC, UA 1-2 /hpf      WBC, UA 20-30 /hpf      Epithelial Cells Moderate /hpf      Bacteria, UA Moderate /hpf     Rapid drug screen, urine [[id number]]  (Abnormal) Collected: 04/23/21 1959    Lab Status: Final result Specimen: Urine, Clean Catch Updated: 04/23/21 2027     Amph/Meth UR Positive     Barbiturate Ur Negative     Benzodiazepine Urine Negative     Cocaine Urine Negative     Methadone Urine Negative     Opiate Urine Negative     PCP Ur Negative     THC Urine Positive     Oxycodone Urine Negative    Narrative:      Presumptive report  If requested, specimen will be sent to reference lab for confirmation  FOR MEDICAL PURPOSES ONLY  IF CONFIRMATION NEEDED PLEASE CONTACT THE LAB WITHIN 5 DAYS      Drug Screen Cutoff Levels:  AMPHETAMINE/METHAMPHETAMINES  1000 ng/mL  BARBITURATES     200 ng/mL  BENZODIAZEPINES     200 ng/mL  COCAINE      300 ng/mL  METHADONE      300 ng/mL  OPIATES      300 ng/mL  PHENCYCLIDINE     25 ng/mL  THC       50 ng/mL  OXYCODONE      100 ng/mL    UA (URINE) with reflex to Scope [[id number]]  (Abnormal) Collected: 04/23/21 1959    Lab Status: Final result Specimen: Urine, Clean Catch Updated: 04/23/21 2010     Color, UA Yellow     Clarity, UA Slightly Cloudy     Specific Gravity, UA 1 025     pH, UA 5 0     Leukocytes, UA 2+     Nitrite, UA Negative     Protein, UA Negative mg/dl      Glucose, UA 1+ mg/dl      Ketones, UA Negative mg/dl      Urobilinogen, UA 0 2 E U /dl      Bilirubin, UA Negative     Blood, UA Trace-Intact    Lactic acid, plasma [[id number]]  (Normal) Collected: 04/23/21 1832    Lab Status: Final result Specimen: Blood from Arm, Right Updated: 04/23/21 1852     LACTIC ACID 1 1 mmol/L     Narrative:      Result may be elevated if tourniquet was used during collection  Novel Coronavirus (Covid-19),PCR [facility] - 2 Hour Stat [[id number]]  (Abnormal) Collected: 04/23/21 1727    Lab Status: Final result Specimen: Nares from Nasopharyngeal Swab Updated: 04/23/21 1829     SARS-CoV-2 Positive    Narrative: The specimen collection materials, transport medium, and/or testing methodology utilized in the production of these test results have been proven to be reliable in a limited validation with an abbreviated program under the Emergency Utilization Authorization provided by the FDA  Testing reported as "Presumptive positive" will be confirmed with secondary testing to ensure result accuracy  Clinical caution and judgement should be used with the interpretation of these results with consideration of the clinical impression and other laboratory testing  Testing reported as "Positive" or "Negative" has been proven to be accurate according to standard laboratory validation requirements  All testing is performed with control materials showing appropriate reactivity at standard intervals        Lactic acid, plasma [[id number]]     Lab Status: No result Specimen: Blood     TSH, 3rd generation with Free T4 reflex [[id number]]  (Normal) Collected: 04/23/21 1719    Lab Status: Final result Specimen: Blood from Arm, Right Updated: 04/23/21 1801     TSH 3RD GENERATON 2 242 uIU/mL     Narrative:      Patients undergoing fluorescein dye angiography may retain small amounts of fluorescein in the body for 48-72 hours post procedure  Samples containing fluorescein can produce falsely depressed TSH values  If the patient had this procedure,a specimen should be resubmitted post fluorescein clearance        Fingerstick Glucose (POCT) [[id number]]  (Abnormal) Collected: 04/23/21 1756    Lab Status: Final result Updated: 04/23/21 1759     POC Glucose 193 mg/dl     Troponin I [[id number]]  (Normal) Collected: 04/23/21 1719    Lab Status: Final result Specimen: Blood from Arm, Right Updated: 04/23/21 1748     Troponin I <0 03 ng/mL     Comprehensive metabolic panel [[id number]]  (Abnormal) Collected: 04/23/21 1719    Lab Status: Final result Specimen: Blood from Arm, Right Updated: 04/23/21 1747     Sodium 137 mmol/L      Potassium 3 9 mmol/L      Chloride 102 mmol/L      CO2 30 mmol/L      ANION GAP 5 mmol/L      BUN 12 mg/dL      Creatinine 0 59 mg/dL      Glucose 230 mg/dL      Calcium 9 1 mg/dL      AST 32 U/L      ALT 23 U/L      Alkaline Phosphatase 80 8 U/L      Total Protein 7 5 g/dL      Albumin 3 8 g/dL      Total Bilirubin 0 55 mg/dL      eGFR 101 ml/min/1 73sq m     Narrative:      David guidelines for Chronic Kidney Disease (CKD):     Stage 1 with normal or high GFR (GFR > 90 mL/min/1 73 square meters)    Stage 2 Mild CKD (GFR = 60-89 mL/min/1 73 square meters)    Stage 3A Moderate CKD (GFR = 45-59 mL/min/1 73 square meters)    Stage 3B Moderate CKD (GFR = 30-44 mL/min/1 73 square meters)    Stage 4 Severe CKD (GFR = 15-29 mL/min/1 73 square meters)    Stage 5 End Stage CKD (GFR <15 mL/min/1 73 square meters)  Note: GFR calculation is accurate only with a steady state creatinine    Lipase [[id number]]  (Normal) Collected: 04/23/21 1719    Lab Status: Final result Specimen: Blood from Arm, Right Updated: 04/23/21 1747     Lipase 50 u/L     Salicylate level [[id number]]  (Abnormal) Collected: 04/23/21 1719    Lab Status: Final result Specimen: Blood from Arm, Right Updated: 98/56/79 4890     Salicylate Lvl <5 2 mg/dL     Ethanol [[id number]]  (Normal) Collected: 04/23/21 1719    Lab Status: Final result Specimen: Blood from Arm, Right Updated: 04/23/21 1745     Ethanol Lvl <10 mg/dL     Ammonia [[id number]]  (Abnormal) Collected: 04/23/21 1719    Lab Status: Final result Specimen: Blood from Arm, Right Updated: 04/23/21 1745     Ammonia 47 00 umol/L     Acetaminophen level-If concentration is detectable, please discuss with medical  on call   [[id number]]  (Abnormal) Collected: 04/23/21 1719    Lab Status: Final result Specimen: Blood from Arm, Right Updated: 04/23/21 1745     Acetaminophen Level <10 ug/mL     Protime-INR [[id number]]  (Abnormal) Collected: 04/23/21 1719    Lab Status: Final result Specimen: Blood from Arm, Right Updated: 04/23/21 1745     Protime 34 0 seconds      INR 3 18    Narrative:      INR Reference Ranges:  No Anticoagulant, Normal:           0 9-1 1  Standard Dose, Oral Anticoagulant:  2 0-3 0  High Dose, Oral Anticoagulant:      2 5-3 5    APTT [[id number]]  (Abnormal) Collected: 04/23/21 1719    Lab Status: Final result Specimen: Blood from Arm, Right Updated: 04/23/21 1745     PTT 45 seconds     CBC and differential [[id number]]  (Abnormal) Collected: 04/23/21 1719    Lab Status: Final result Specimen: Blood from Arm, Right Updated: 04/23/21 1725     WBC 4 41 Thousand/uL      RBC 4 93 Million/uL      Hemoglobin 14 9 g/dL      Hematocrit 46 5 %      MCV 94 fL      MCH 30 2 pg      MCHC 32 0 g/dL      RDW 13 1 %      MPV 9 9 fL      Platelets 107 Thousands/uL      Neutrophils Relative 62 %      Immat GRANS % 0 %      Lymphocytes Relative 24 %      Monocytes Relative 10 %      Eosinophils Relative 3 %      Basophils Relative 1 %      Neutrophils Absolute 2 74 Thousands/µL      Immature Grans Absolute 0 00 Thousand/uL      Lymphocytes Absolute 1 07 Thousands/µL      Monocytes Absolute 0 44 Thousand/µL      Eosinophils Absolute 0 14 Thousand/µL      Basophils Absolute 0 02 Thousands/µL     Fingerstick Glucose (POCT) [[id number]]  (Abnormal) Collected: 04/23/21 1633    Lab Status: Final result Updated: 04/23/21 1634     POC Glucose 235 mg/dl                  No orders to display              Procedures  Procedures         ED Course  ED Course as of Apr 24 0715   Fri Apr 23, 2021   1637 EKG: qtc: 496, qrs 36      1649 302 from [place] faxed over per [employer] crisis, will uphold, needs medical clearance first      1727 POC Glucose(!): [facility], observe for 6 hours, repeat fingerstick in 6 hours to ensure stability (though pioglitazone does not cause hypoglycemia)  Patient can then be medically cleared  Check lactate for possible metformin overdose  Reach out to tox if elevated lactic acid  Sat Apr 24, 2021   0701 Medically cleared  Accepted at [facility] heart, 201  MDM    SI  Needs medical clearance  See ed course  Spoke to tox  Will 302 if no 201  Signed out to Dr Nevlile Ramos 6:05pm 4/23/21  Disposition  Final diagnoses:   Suicidal behavior   Overdose     Time reflects when diagnosis was documented in both MDM as applicable and the Disposition within this note     Time User Action Codes Description Comment    4/23/2021 10:54 PM Gena Solis Add [R43 89] Suicidal behavior     4/23/2021 10:54 PM Priti Perez Rankin Overdose       ED Disposition     ED Disposition Condition Date/Time Comment    Transfer to [facility] Apr 24, 2021 12:17 AM Denise Isabel should be transferred out to [facility]  and has been medically cleared           MD Documentation      Most Recent Value   Patient Condition  The patient has been stabilized such that within reasonable medical probability, no material deterioration of the patient condition or the condition of the unborn child(sonia) is likely to result from the transfer   Reason for Transfer  Level of Care needed not available at this facility, No bed available at level of patient's needs   Benefits of Transfer  Other benefits (Include comment)_______________________ [facility]]   Risks of Transfer  Other: (Include comment)__________________________ Charlette Salazar   Accepting Physician  Dr Norma Armstrong [facility]    (Name & Tel number)  Drexel Frankel [phone]   Transported by (Company and Unit #)  [facility]   Sending MD Dr Monique Pace, DO   Provider Certification  The patient is stable for psychiatric transfer because they are medically stable, and is protected from harming him/herself or others during transport, General risk, such as traffic hazards, adverse weather conditions, rough terrain or turbulence, possible failure of equipment (including vehicle or aircraft), or consequences of actions of persons outside the control of the transport personnel      RN Documentation      [address] Name, Tanya Vela    (Name & Tel number)  Drexel Frankel [phone]   Medications Reviewed with Next Provider of Service  Yes   Transport Mode  Ambulance   Transported by [employer] and Unit #)  [facility]   Level of Care  Basic life support   Copies of Medical Records Sent  Transfer form   Patient Belongings Disposition  Sent with patient   Transfer Date  04/24/21      Follow-up Information    None         Patient's Medications   Discharge Prescriptions    No medications on file     No discharge procedures on file      PDMP Review     None          ED Provider  Electronically Signed by           Dayanara Suazo MD  04/24/21 8003

## 2021-04-23 NOTE — ED NOTES
Call from [facility] with [facility], she reported a 302 was petitioned by the patient's daughter, was delegated, and will be faxed to [facility]  Patient reportedly ingested her boyfriend's medications  Had a hx of inpatient admissions, per [facility]

## 2021-04-24 ENCOUNTER — HOSPITAL ENCOUNTER (INPATIENT)
Facility: HOSPITAL | Age: 61
LOS: 3 days | Discharge: HOME | DRG: 753 | End: 2021-04-27
Attending: PSYCHIATRY & NEUROLOGY | Admitting: PSYCHIATRY & NEUROLOGY
Payer: COMMERCIAL

## 2021-04-24 VITALS
HEART RATE: 68 BPM | RESPIRATION RATE: 18 BRPM | TEMPERATURE: 98.1 F | SYSTOLIC BLOOD PRESSURE: 117 MMHG | DIASTOLIC BLOOD PRESSURE: 68 MMHG | OXYGEN SATURATION: 98 %

## 2021-04-24 DIAGNOSIS — U07.1 COVID-19: ICD-10-CM

## 2021-04-24 DIAGNOSIS — R45.89 SUICIDAL BEHAVIOR: ICD-10-CM

## 2021-04-24 DIAGNOSIS — F31.9 BIPOLAR DEPRESSION (HCC): Primary | ICD-10-CM

## 2021-04-24 PROBLEM — I50.30 HEART FAILURE WITH PRESERVED EJECTION FRACTION, BORDERLINE, CLASS II (HCC): Status: ACTIVE | Noted: 2021-04-24

## 2021-04-24 PROBLEM — F33.9 RECURRENT MAJOR DEPRESSIVE DISORDER (HCC): Status: ACTIVE | Noted: 2021-04-24

## 2021-04-24 PROBLEM — Z95.828 GREENFIELD FILTER IN PLACE: Status: ACTIVE | Noted: 2018-11-28

## 2021-04-24 PROBLEM — R79.89 ELEVATED TESTOSTERONE LEVEL IN FEMALE: Status: ACTIVE | Noted: 2019-04-22

## 2021-04-24 PROBLEM — Z00.8 MEDICAL CLEARANCE FOR PSYCHIATRIC ADMISSION: Status: ACTIVE | Noted: 2021-04-24

## 2021-04-24 PROBLEM — Z79.01 ANTICOAGULATED ON COUMADIN: Status: ACTIVE | Noted: 2018-01-29

## 2021-04-24 PROBLEM — R76.8 HEPATITIS C ANTIBODY TEST POSITIVE: Status: ACTIVE | Noted: 2018-01-29

## 2021-04-24 PROBLEM — E66.01 MORBID OBESITY WITH BMI OF 50.0-59.9, ADULT (HCC): Status: ACTIVE | Noted: 2018-08-15

## 2021-04-24 PROBLEM — Z95.2 S/P AORTIC VALVE REPLACEMENT WITH PROSTHETIC VALVE: Status: ACTIVE | Noted: 2018-01-29

## 2021-04-24 PROBLEM — L68.0 HIRSUTISM: Status: ACTIVE | Noted: 2019-04-22

## 2021-04-24 PROBLEM — F17.210 TOBACCO DEPENDENCE DUE TO CIGARETTES: Status: ACTIVE | Noted: 2021-01-08

## 2021-04-24 PROBLEM — F33.3 MDD (MAJOR DEPRESSIVE DISORDER), RECURRENT, SEVERE, WITH PSYCHOSIS (HCC): Status: ACTIVE | Noted: 2021-04-24

## 2021-04-24 PROBLEM — Z85.43 HISTORY OF OVARIAN CANCER: Status: ACTIVE | Noted: 2018-01-29

## 2021-04-24 LAB
BASOPHILS # BLD AUTO: 0 THOUSANDS/ΜL (ref 0–0.1)
BASOPHILS NFR BLD AUTO: 1 % (ref 0–1)
EOSINOPHIL # BLD AUTO: 0.1 THOUSAND/ΜL (ref 0–0.4)
EOSINOPHIL NFR BLD AUTO: 3 % (ref 0–6)
ERYTHROCYTE [DISTWIDTH] IN BLOOD BY AUTOMATED COUNT: 14.1 %
HCT VFR BLD AUTO: 42.9 % (ref 36–46)
HGB BLD-MCNC: 14.3 G/DL (ref 12–16)
LYMPHOCYTES # BLD AUTO: 0.8 THOUSANDS/ΜL (ref 0.5–4)
LYMPHOCYTES NFR BLD AUTO: 20 % (ref 25–45)
MCH RBC QN AUTO: 31.3 PG (ref 26–34)
MCHC RBC AUTO-ENTMCNC: 33.3 G/DL (ref 31–36)
MCV RBC AUTO: 94 FL (ref 80–100)
MONOCYTES # BLD AUTO: 0.4 THOUSAND/ΜL (ref 0.2–0.9)
MONOCYTES NFR BLD AUTO: 9 % (ref 1–10)
NEUTROPHILS # BLD AUTO: 2.8 THOUSANDS/ΜL (ref 1.8–7.8)
NEUTS SEG NFR BLD AUTO: 68 % (ref 45–65)
PLATELET # BLD AUTO: 149 THOUSANDS/UL (ref 150–450)
PMV BLD AUTO: 8.7 FL (ref 8.9–12.7)
RBC # BLD AUTO: 4.56 MILLION/UL (ref 4–5.2)
WBC # BLD AUTO: 4.2 THOUSAND/UL (ref 4.5–11)

## 2021-04-24 PROCEDURE — 85025 COMPLETE CBC W/AUTO DIFF WBC: CPT | Performed by: PSYCHIATRY & NEUROLOGY

## 2021-04-24 PROCEDURE — 99221 1ST HOSP IP/OBS SF/LOW 40: CPT | Performed by: PSYCHIATRY & NEUROLOGY

## 2021-04-24 PROCEDURE — 99252 IP/OBS CONSLTJ NEW/EST SF 35: CPT | Performed by: PHYSICIAN ASSISTANT

## 2021-04-24 RX ORDER — OLANZAPINE 10 MG/1
5 INJECTION, POWDER, LYOPHILIZED, FOR SOLUTION INTRAMUSCULAR
Status: DISCONTINUED | OUTPATIENT
Start: 2021-04-24 | End: 2021-04-27 | Stop reason: HOSPADM

## 2021-04-24 RX ORDER — WARFARIN SODIUM 6 MG/1
6 TABLET ORAL DAILY
Status: DISCONTINUED | OUTPATIENT
Start: 2021-04-24 | End: 2021-04-27 | Stop reason: HOSPADM

## 2021-04-24 RX ORDER — ZINC SULFATE 50(220)MG
220 CAPSULE ORAL DAILY
Status: DISCONTINUED | OUTPATIENT
Start: 2021-04-25 | End: 2021-04-27 | Stop reason: HOSPADM

## 2021-04-24 RX ORDER — OLANZAPINE 5 MG/1
5 TABLET ORAL
Status: DISCONTINUED | OUTPATIENT
Start: 2021-04-24 | End: 2021-04-27 | Stop reason: HOSPADM

## 2021-04-24 RX ORDER — LORAZEPAM 2 MG/ML
1 INJECTION INTRAMUSCULAR
Status: DISCONTINUED | OUTPATIENT
Start: 2021-04-24 | End: 2021-04-27 | Stop reason: HOSPADM

## 2021-04-24 RX ORDER — AMOXICILLIN 250 MG
1 CAPSULE ORAL DAILY PRN
Status: DISCONTINUED | OUTPATIENT
Start: 2021-04-24 | End: 2021-04-27 | Stop reason: HOSPADM

## 2021-04-24 RX ORDER — LORAZEPAM 1 MG/1
1 TABLET ORAL
Status: DISCONTINUED | OUTPATIENT
Start: 2021-04-24 | End: 2021-04-27 | Stop reason: HOSPADM

## 2021-04-24 RX ORDER — HYDROXYZINE HYDROCHLORIDE 25 MG/1
25 TABLET, FILM COATED ORAL
Status: DISCONTINUED | OUTPATIENT
Start: 2021-04-24 | End: 2021-04-27 | Stop reason: HOSPADM

## 2021-04-24 RX ORDER — NICOTINE 21 MG/24HR
14 PATCH, TRANSDERMAL 24 HOURS TRANSDERMAL DAILY
Status: DISCONTINUED | OUTPATIENT
Start: 2021-04-25 | End: 2021-04-27 | Stop reason: HOSPADM

## 2021-04-24 RX ORDER — ACETAMINOPHEN 325 MG/1
650 TABLET ORAL EVERY 4 HOURS PRN
Status: DISCONTINUED | OUTPATIENT
Start: 2021-04-24 | End: 2021-04-27 | Stop reason: HOSPADM

## 2021-04-24 RX ORDER — MELATONIN
2000 DAILY
Status: DISCONTINUED | OUTPATIENT
Start: 2021-04-25 | End: 2021-04-27 | Stop reason: HOSPADM

## 2021-04-24 RX ORDER — ASCORBIC ACID 500 MG
1000 TABLET ORAL DAILY
Status: DISCONTINUED | OUTPATIENT
Start: 2021-04-25 | End: 2021-04-27 | Stop reason: HOSPADM

## 2021-04-24 RX ORDER — OLANZAPINE 2.5 MG/1
2.5 TABLET ORAL
Status: DISCONTINUED | OUTPATIENT
Start: 2021-04-24 | End: 2021-04-27 | Stop reason: HOSPADM

## 2021-04-24 RX ORDER — QUETIAPINE FUMARATE 50 MG/1
50 TABLET, FILM COATED ORAL
Status: DISCONTINUED | OUTPATIENT
Start: 2021-04-24 | End: 2021-04-26

## 2021-04-24 RX ORDER — LANOLIN ALCOHOL/MO/W.PET/CERES
3 CREAM (GRAM) TOPICAL
Status: DISCONTINUED | OUTPATIENT
Start: 2021-04-24 | End: 2021-04-27 | Stop reason: HOSPADM

## 2021-04-24 RX ORDER — LORAZEPAM 0.5 MG/1
0.5 TABLET ORAL
Status: DISCONTINUED | OUTPATIENT
Start: 2021-04-24 | End: 2021-04-27 | Stop reason: HOSPADM

## 2021-04-24 RX ORDER — TRAZODONE HYDROCHLORIDE 50 MG/1
50 TABLET ORAL
Status: DISCONTINUED | OUTPATIENT
Start: 2021-04-24 | End: 2021-04-27 | Stop reason: HOSPADM

## 2021-04-24 RX ORDER — ACETAMINOPHEN 325 MG/1
975 TABLET ORAL EVERY 6 HOURS PRN
Status: DISCONTINUED | OUTPATIENT
Start: 2021-04-24 | End: 2021-04-27 | Stop reason: HOSPADM

## 2021-04-24 RX ADMIN — MELATONIN TAB 3 MG 3 MG: 3 TAB at 21:07

## 2021-04-24 RX ADMIN — QUETIAPINE FUMARATE 50 MG: 50 TABLET ORAL at 21:07

## 2021-04-24 RX ADMIN — WARFARIN SODIUM 6 MG: 6 TABLET ORAL at 14:54

## 2021-04-24 NOTE — ASSESSMENT & PLAN NOTE
Patient seen and examined today, medically clear for admission to [facility]  Currently there are no acute medical needs; consult if acute medical needs arise

## 2021-04-24 NOTE — ED NOTES
Patient sleeping on stretcher with even and unlabored respirations at this time        Shaunna Moore RN  04/24/21 4740

## 2021-04-24 NOTE — ASSESSMENT & PLAN NOTE
Wt Readings from Last 3 Encounters:   04/24/21 (!) 151 kg (332 lb 14 3 oz)   04/12/21 (!) 156 kg (345 lb)   03/17/21 (!) 148 kg (327 lb)     · ECHO 2/24/2021: LVEF 65%; "Features were consistent with a pseudonormal left ventricular filling pattern, with concomitant abnormal relaxation and increased filling pressure (grade 2 diastolic dysfunction)  "  · [facility] cardiology outpatient- recommend 2 g sodium diet, fluid restriction of 1500 mL daily, check daily weights  · Patient states that she takes Lasix 40 mg PRN for peripheral swelling- estimates about weekly administration  · Last use approx  1 week ago; currently denies current need, no edema noted on exam, patient states at baseline swelling    · Order lasix 40 mg daily PRN peripheral edema  · Continue cardiology plan as above  · Diet:  2 g sodium, fluid restriction 1500 mL daily  · Check daily weights  · Continue to monitor patient for signs/symptoms congestive heart failure  · Patient currently at baseline peripheral swelling   · Consider compression stockings

## 2021-04-24 NOTE — ASSESSMENT & PLAN NOTE
· INR on 04/23/21 at 3 19  · Patient follows with cardiology outpatient  · Per telemedicine visit on 03/22/2021- recommend continued Coumadin management with "goal INR of 2 5-3 5"  · Continue warfarin 6mg QD  · Recheck INR on 04/26/21

## 2021-04-24 NOTE — ASSESSMENT & PLAN NOTE
· Patient presented to [facility] on 04/23/2021  · Suicidal ideation  · Overdose attempt with boyfriends pioglitazone and risperidone  · Toxicology consulted during ED eval, recommended supportive care and close monitoring as well as r/o lactic acidosis from potential undisclosed metformin OD  · Lactic 1 1  · Management per psychiatry

## 2021-04-24 NOTE — ED NOTES
Patient is accepted at [address]  Patient is accepted by Dr Andrews Gandhi per [facility] is arranged with [facility]  Transportation is scheduled for TBD   Patient may go to the floor after 0800 4/24/2021        Nurse report is to be called to [phone]  prior to patient transfer

## 2021-04-24 NOTE — ED NOTES
302 was received from Armando Garrett at [facility], [facility]  Pt was read her rights  Rights given to patient  She verbalized understanding  Patient is not yet medically cleared

## 2021-04-24 NOTE — NURSING NOTE
Patient is a 201 voluntary admission coming to this unit from [place] ED  Patient was tearful on arrival, she is currently calm and cooperative  Patient is being admitted for suicidal action of taking large dose of her boyfriend's medication  Patient reported amounts of medication being between 4 and 8 pills of risperidone and pioglitazone  She is inconsistent in her reporting but coherent  She denies all psychiatric symptoms currently other than being, "anxious to leave"  She minimizes SA and states that she knew what she was doing and would've taken a higher dose if she intended to kill herself  She characterizes her family and therapist as overreacting  Patient is reported to have poor sleep which she attributes to being in the hospital, she denies having poor sleep before coming to the hospital  She reports that she has not been taking her medication other than Jantoven  She reports seeing a psychiatrist regularly and attempting suicide as a teenager  Her speech is rapid and pressured which she states is normal for her  Her speech is also somewhat mumbled which she attributes to not having her dentures at the hospital  She is well oriented and coherent, primarily concerned with leaving and secondarily concerned with obtaining her belongings
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
## 2021-04-24 NOTE — CMS CERTIFICATION NOTE
Certification: Based upon physical, mental and social evaluations, I certify that inpatient psychiatric services are medically necessary for this patient for a duration of 3 midnights for the treatment of MDD  Available alternative community resources do not meet the patient's mental health care needs  I further attest that an established written individualized plan of care has been implemented and is outlined in the patient's medical records

## 2021-04-24 NOTE — ASSESSMENT & PLAN NOTE
Patient was diagnosed COVID + on 4/23/2021- likely residual positive from recent diagnosis in March 2021  Patient initially diagnosed 03/16/2021- presented to [facility] ED for shortness of breath  Patient subsequently treated Bamlanivimab infusion  Patient is currently asymptomatic with O2 sats at 93% on RA  Currently following the mild pathway    Labs:  ABO: A +   Troponin: <0 03   CK: 36  CRP: 9 6  Ferritin: pending  D-Dimer: 0 45  Meds  Vitamin D3 at 2,000 IU PO daily x 7 days  Vitamin C at 1g PO Daily x 7 days  Zinc at 220mg PO Daily x 7 days  Followed by Multi-vitamin for 30 days  Anticoagulation with: patient currently on warfarin

## 2021-04-24 NOTE — EMTALA/ACUTE CARE TRANSFER
Formerly [facility], [facility] EMERGENCY DEPARTMENT  [address] [facility] [address]  Dept: [phone]      EMTALA TRANSFER CONSENT    NAME Raven Hood DOB 1960                              MRN [id number]    I have been informed of my rights regarding examination, treatment, and transfer   by Dr Leydi Shelby DO    Benefits: Other benefits (Include comment)_______________________(Inpatient Psychiatry)    Risks: Other: (Include comment)__________________________(Behavioral Health Patient)      Consent for Transfer:  I acknowledge that my medical condition has been evaluated and explained to me by the emergency department physician or other qualified medical person and/or my attending physician, who has recommended that I be transferred to the service of  Accepting Physician: Dr Wojciech Michele at [address], [address] : [address] [place]  The above potential benefits of such transfer, the potential risks associated with such transfer, and the probable risks of not being transferred have been explained to me, and I fully understand them  The doctor has explained that, in my case, the benefits of transfer outweigh the risks  I agree to be transferred  I authorize the performance of emergency medical procedures and treatments upon me in both transit and upon arrival at the receiving facility  Additionally, I authorize the release of any and all medical records to the receiving facility and request they be transported with me, if possible  I understand that the safest mode of transportation during a medical emergency is an ambulance and that the Hospital advocates the use of this mode of transport  Risks of traveling to the receiving facility by car, including absence of medical control, life sustaining equipment, such as oxygen, and medical personnel has been explained to me and I fully understand them      ([address])  [  ] I consent to the stated transfer and to be transported by ambulance/helicopter  [  ]  I consent to the stated transfer, but refuse transportation by ambulance and accept full responsibility for my transportation by car  I understand the risks of non-ambulance transfers and I exonerate the Hospital and its staff from any deterioration in my condition that results from this refusal     X___________________________________________    DATE  21  TIME________  Signature of patient or legally responsible individual signing on patient behalf           RELATIONSHIP TO PATIENT_________________________          Provider Certification    NAME Addison Olivia                                        BAS[place] 1960                              MRN [id number]    A medical screening exam was performed on the above named patient  Based on the examination:    Condition Necessitating Transfer The primary encounter diagnosis was Suicidal behavior  A diagnosis of Overdose was also pertinent to this visit      Patient Condition: The patient has been stabilized such that within reasonable medical probability, no material deterioration of the patient condition or the condition of the unborn child(sonia) is likely to result from the transfer    Reason for Transfer: Level of Care needed not available at this facility, No bed available at level of patient's needs    Transfer Requirements: [address]   · Space available and qualified personnel available for treatment as acknowledged by Ceferino Tamez [phone]  · Agreed to accept transfer and to provide appropriate medical treatment as acknowledged by       Dr Cheryl Rubio  · Appropriate medical records of the examination and treatment of the patient are provided at the time of transfer   [address] _______  · Transfer will be performed by qualified personnel from [facility]  and appropriate transfer equipment as required, including the use of necessary and appropriate life support measures  Provider Certification: I have examined the patient and explained the following risks and benefits of being transferred/refusing transfer to the patient/family:  The patient is stable for psychiatric transfer because they are medically stable, and is protected from harming him/herself or others during transport, General risk, such as traffic hazards, adverse weather conditions, rough terrain or turbulence, possible failure of equipment (including vehicle or aircraft), or consequences of actions of persons outside the control of the transport personnel      Based on these reasonable risks and benefits to the patient and/or the unborn child(sonia), and based upon the information available at the time of the patients examination, I certify that the medical benefits reasonably to be expected from the provision of appropriate medical treatments at another medical facility outweigh the increasing risks, if any, to the individuals medical condition, and in the case of labor to the unborn child, from effecting the transfer      X____________________________________________ DATE 04/24/21        TIME_______      ORIGINAL - SEND TO MEDICAL RECORDS   COPY - SEND WITH PATIENT DURING TRANSFER

## 2021-04-24 NOTE — TREATMENT PLAN
TREATMENT PLAN REVIEW - [address] [facility] 64 y o  1960 female MRN: [id number]    [address] Room / Bed: Arlene Graves [facility]- Encounter: [id number]          Admit Date/Time:  4/24/2021  8:57 AM    Treatment Team: Attending Provider: Ramona Crespo MD; Registered Nurse: Shila Ramirez RN    Diagnosis: Principal Problem:    MDD (major depressive disorder), recurrent, severe, with psychosis ([facility] )      Patient Strengths/Assets: cooperative    Patient Barriers/Limitations: difficulty adapting, limited support system, noncompliant with medication, patient is on an involuntary commitment    Short Term Goals: decrease in depressive symptoms, decrease in suicidal thoughts, ability to stay safe on the unit, improvement in insight, acceptance of psychiatric medications    Long Term Goals: improvement in depression, free of suicidal thoughts, improved insight, acceptance of need for psychiatric medications    Progress Towards Goals: starting psychiatric medications as prescribed    Recommended Treatment: medication management, patient medication education, group therapy, milieu therapy, continued Behavioral Health psychiatric evaluation/assessment process    Treatment Frequency: daily medication monitoring, group and milieu therapy daily, monitoring through interdisciplinary rounds, monitoring through weekly patient care conferences    Expected Discharge Date:   In 5 to 7 days     Discharge Plan: referrals as indicated    Treatment Plan Created/Updated By: Lucila Starr MD

## 2021-04-25 PROBLEM — Z86.711 HISTORY OF PULMONARY EMBOLUS (PE): Status: ACTIVE | Noted: 2021-04-25

## 2021-04-25 PROBLEM — Z86.718 PERSONAL HISTORY OF DVT (DEEP VEIN THROMBOSIS): Status: ACTIVE | Noted: 2021-04-25

## 2021-04-25 LAB
ABO GROUP BLD: NORMAL
CK SERPL-CCNC: 36 U/L (ref 30–135)
CRP SERPL QL: 9.6 MG/L
D DIMER PPP FEU-MCNC: 0.45 UG/ML FEU
FERRITIN SERPL-MCNC: 111 NG/ML (ref 8–388)
RH BLD: POSITIVE

## 2021-04-25 PROCEDURE — 82550 ASSAY OF CK (CPK): CPT | Performed by: PHYSICIAN ASSISTANT

## 2021-04-25 PROCEDURE — 82728 ASSAY OF FERRITIN: CPT | Performed by: PHYSICIAN ASSISTANT

## 2021-04-25 PROCEDURE — 99232 SBSQ HOSP IP/OBS MODERATE 35: CPT | Performed by: NURSE PRACTITIONER

## 2021-04-25 PROCEDURE — 86900 BLOOD TYPING SEROLOGIC ABO: CPT | Performed by: PHYSICIAN ASSISTANT

## 2021-04-25 PROCEDURE — 85379 FIBRIN DEGRADATION QUANT: CPT | Performed by: PHYSICIAN ASSISTANT

## 2021-04-25 PROCEDURE — 86901 BLOOD TYPING SEROLOGIC RH(D): CPT | Performed by: PHYSICIAN ASSISTANT

## 2021-04-25 PROCEDURE — 86140 C-REACTIVE PROTEIN: CPT | Performed by: PHYSICIAN ASSISTANT

## 2021-04-25 RX ORDER — ATORVASTATIN CALCIUM 20 MG/1
20 TABLET, FILM COATED ORAL
Status: DISCONTINUED | OUTPATIENT
Start: 2021-04-25 | End: 2021-04-27 | Stop reason: HOSPADM

## 2021-04-25 RX ORDER — FUROSEMIDE 40 MG/1
40 TABLET ORAL DAILY PRN
Status: DISCONTINUED | OUTPATIENT
Start: 2021-04-25 | End: 2021-04-27 | Stop reason: HOSPADM

## 2021-04-25 RX ADMIN — Medication 14 MG: at 08:45

## 2021-04-25 RX ADMIN — Medication 2000 UNITS: at 08:45

## 2021-04-25 RX ADMIN — QUETIAPINE FUMARATE 50 MG: 50 TABLET ORAL at 21:14

## 2021-04-25 RX ADMIN — ATORVASTATIN CALCIUM 20 MG: 20 TABLET, FILM COATED ORAL at 17:06

## 2021-04-25 RX ADMIN — WARFARIN SODIUM 6 MG: 6 TABLET ORAL at 08:46

## 2021-04-25 RX ADMIN — MELATONIN TAB 3 MG 3 MG: 3 TAB at 21:14

## 2021-04-25 RX ADMIN — OXYCODONE HYDROCHLORIDE AND ACETAMINOPHEN 1000 MG: 500 TABLET ORAL at 08:45

## 2021-04-25 RX ADMIN — ZINC SULFATE 220 MG (50 MG) CAPSULE 220 MG: CAPSULE at 08:45

## 2021-04-25 NOTE — PROGRESS NOTES
Progress Note - [address] [facility] 64 y o  female MRN: [id number]  Unit/Bed#: [facility] 923-72 Encounter: [id number]    Assessment/Plan   Principal Problem:    Bipolar depression ([address] )  Active Problems:    History of mechanical aortic valve replacement    Tobacco dependence due to cigarettes    COVID-19    Diabetes mellitus type 2 in obese [facility])    Medical clearance for psychiatric admission    Heart failure with preserved ejection fraction, borderline, class III (HCC)      Behavior over the last 24 hours:  unchanged  Sleep: decreased, complaints that the bed is uncomfortable  Appetite: normal  Medication side effects: No  ROS: no complaints, no acute changes noted  Nitza Valerio seen on the inpatient unit for follow up and case discussed with treatment team this am   She is lying in bed for interview, states she is tired because the "bed here is uncomfortable", did not sleep well last night, but has no trouble sleeping at home  She states she has no depression, no anxiety, and denies auditory and visual hallucinations  She reports that she got mad, took a couple pills, but knew they "would not hurt" her  She states that she wants d/c back home  She states her daughter lives with her and their relationship is good  She denies any SI or HI at this time  Reports that she was supposed to be taking psychiatric medications at home, but had not been taking them  States she will be medication compliant upon d/c   72 hour was signed at 3:57pm on 4/24  She does not want to rescind the notice at this time      Mental Status Evaluation:  Appearance:  disheveled   Behavior:  cooperative   Speech:  normal pitch and normal volume   Mood:  euthymic   Affect:  mood-congruent   Thought Process:  goal directed and linear   Thought Content:  normal   Perceptual Disturbances: None   Risk Potential: Suicidal Ideations none  Homicidal Ideations none  Potential for Aggression No   Sensorium:  person, place, time/date and situation   Memory:  recent and remote memory grossly intact   Consciousness:  alert and awake    Attention: attention span and concentration were age appropriate   Insight:  limited   Judgment: limited   Gait/Station: Not observed, patient laying in bed  Motor Activity: no abnormal movements     Progress Toward Goals: Progressing    Recommended Treatment: Continue with group therapy, milieu therapy and occupational therapy  Risks, benefits and possible side effects of Medications:   Risks, benefits, and possible side effects of medications explained to patient and patient verbalizes understanding  Medications: all current active meds have been reviewed and continue current psychiatric medications  Labs: I have personally reviewed all pertinent laboratory/tests results     Most Recent Labs:   Lab Results   Component Value Date    WBC 4 20 (L) 04/24/2021    RBC 4 56 04/24/2021    HGB 14 3 04/24/2021    HCT 42 9 04/24/2021     (L) 04/24/2021    RDW 14 1 04/24/2021    NEUTROABS 2 80 04/24/2021    SODIUM 137 04/23/2021    K 3 9 04/23/2021     04/23/2021    CO2 30 04/23/2021    BUN 12 04/23/2021    CREATININE 0 59 04/23/2021    GLUC 230 (H) 04/23/2021    GLUF 226 (H) 03/26/2021    CALCIUM 9 1 04/23/2021    AST 32 04/23/2021    ALT 23 04/23/2021    ALKPHOS 80 8 04/23/2021    TP 7 5 04/23/2021    ALB 3 8 04/23/2021    TBILI 0 55 04/23/2021    CHOLESTEROL 215 (H) 01/25/2021    HDL 59 01/25/2021    TRIG 198 (H) 01/25/2021    LDLCALC 116 (H) 01/25/2021    AMMONIA 47 00 (H) 04/23/2021    [id number] 2 242 04/23/2021    HGBA1C 7 5 (A) 04/12/2021     09/24/2020

## 2021-04-25 NOTE — ASSESSMENT & PLAN NOTE
· Chart review reveals history of hypothyroidism with past prescription of 25 mcg levothyroxine  · Patient denies history of thyroid issues, states she never took levothyroxine  · TSH 4/23/2021: 2 242  · No thyromegaly on exam  · F/u PCP

## 2021-04-25 NOTE — ASSESSMENT & PLAN NOTE
· Lipid panel 1/25/2021: cholesterol 215, , HDL 59,   · Per chart review- cardiology initiated treatment with atorvastatin 20 mg daily  · Patient state she is not currently taking, denies issues with cholesterol  · Continue atorvastatin 20 mg daily  · Encourage f/u with outpatient cardiology to reconcile meds

## 2021-04-25 NOTE — NURSING NOTE
Pt calm, cooperative, adjusting to the unit this evening  Out on the unit but stays on the periphery  Denies SI currently  Compliant with hs medications and is asleep in bed

## 2021-04-25 NOTE — ASSESSMENT & PLAN NOTE
· Previously 2 ppd smoker x 43 yrs, recently trying to cut back- goes through 1 pack over 3 days   · Expresses interest in quitting- encourage cessation, f/u with PCP for ongoing management   · Continue Nicoderm patch daily

## 2021-04-25 NOTE — PLAN OF CARE
Problem: SELF HARM/SUICIDALITY  Goal: Will have no self-injury during hospital stay  Description: INTERVENTIONS:  - Q 15 MINUTES: Routine safety checks  - Q WAKING SHIFT & PRN: Assess risk to determine if routine checks are adequate to maintain patient safety  - Encourage patient to participate actively in care by formulating a plan to combat response to suicidal ideation, identify supports and resources  Outcome: Progressing     Problem: DEPRESSION  Goal: Will be euthymic at discharge  Description: INTERVENTIONS:  - Administer medication as ordered  - Provide emotional support via 1:1 interaction with staff  - Encourage involvement in milieu/groups/activities  - Monitor for social isolation  Outcome: Progressing     Problem: ANXIETY  Goal: Will report anxiety at manageable levels  Description: INTERVENTIONS:  - Administer medication as ordered  - Teach and encourage coping skills  - Provide emotional support  - Assess patient/family for anxiety and ability to cope  Outcome: Progressing

## 2021-04-25 NOTE — ASSESSMENT & PLAN NOTE
Lab Results   Component Value Date    HGBA1C 7 5 (A) 04/12/2021       Recent Labs     04/23/21  1633 04/23/21  1756 04/23/21  2152   POCGLU 235* 193* 164*       Blood Sugar Average: Last 72 hrs:     · Patient recently seen by PCP on 04/12/2021- metformin 500 mg daily initiated at this visit  · Patient reported numbness of fingers of both hands at this visit, possibility of neuropathy considered and metformin initiated for control of diabetes  · Patient states that she does not take metformin  · Monitors BS at home once-twice weekly- reports well-controlled, avg 150s-170s  · Diabetic diet, encourage exercise  · Encourage follow-up with PCP to reconcile medications

## 2021-04-25 NOTE — CONSULTS
[address] 1960, 64 y o  female MRN: [id number]  Unit/Bed#: [facility] 374-70 Encounter: [id number]  Primary Care Provider: Jerald Reynolds MD   Date and time admitted to hospital: 4/24/2021  8:57 AM    Inpatient consult for Medical Clearance for [address] patient  Consult performed by: Shilo Mcwilliams PA-C  Consult ordered by: Cheryl Rubio MD        Medical clearance for psychiatric admission  Assessment & Plan  Patient seen and examined today, medically clear for admission to [facility]  Currently there are no acute medical needs; consult if acute medical needs arise    * Bipolar depression [facility])  Assessment & Plan  · Patient presented to [facility] ED on 04/23/2021  · Suicidal ideation  · Overdose attempt with boyfriends pioglitazone and risperidone  · Toxicology consulted during ED eval, recommended supportive care and close monitoring as well as r/o lactic acidosis from potential undisclosed metformin OD  · Lactic 1 1  · Management per psychiatry    COVID-19  Assessment & Plan  Patient was diagnosed COVID + on 4/23/2021- likely residual positive from recent diagnosis in March 2021  Patient initially diagnosed 03/16/2021- presented to [facility] ED for shortness of breath  Patient subsequently treated Bamlanivimab infusion  Patient is currently asymptomatic with O2 sats at 93% on RA  Currently following the mild pathway    Labs:  ABO: A +   Troponin: <0 03   CK: 36  CRP: 9 6  Ferritin: pending  D-Dimer: 0 45  Meds  Vitamin D3 at 2,000 IU PO daily x 7 days  Vitamin C at 1g PO Daily x 7 days  Zinc at 220mg PO Daily x 7 days  Followed by Multi-vitamin for 30 days  Anticoagulation with: patient currently on warfarin       Diabetes mellitus type 2 in obese [facility])  Assessment & Plan  Lab Results   Component Value Date    HGBA1C 7 5 (A) 04/12/2021       Recent Labs     04/23/21  1633 04/23/21  1756 04/23/21  2152   POCGLU 235* 193* 164*       Blood Sugar Average: Last 72 hrs:     · Patient recently seen by PCP on 04/12/2021- metformin 500 mg daily initiated at this visit  · Patient reported numbness of fingers of both hands at this visit, possibility of neuropathy considered and metformin initiated for control of diabetes  · Patient states that she does not take metformin  · Monitors BS at home once-twice weekly- reports well-controlled, avg 150s-170s  · Diabetic diet, encourage exercise  · Encourage follow-up with PCP to reconcile medications    History of mechanical aortic valve replacement  Assessment & Plan  · INR on 04/23/21 at 3 19  · Patient follows with cardiology outpatient  · Per telemedicine visit on 03/22/2021- recommend continued Coumadin management with "goal INR of 2 5-3 5"  · Continue warfarin 6mg QD  · Recheck INR on 04/26/21    Heart failure with preserved ejection fraction, borderline, class III (HCC)  Assessment & Plan  Wt Readings from Last 3 Encounters:   04/24/21 (!) 151 kg (332 lb 14 3 oz)   04/12/21 (!) 156 kg (345 lb)   03/17/21 (!) 148 kg (327 lb)     · ECHO 2/24/2021: LVEF 65%; "Features were consistent with a pseudonormal left ventricular filling pattern, with concomitant abnormal relaxation and increased filling pressure (grade 2 diastolic dysfunction)  "  · [address] [facility] cardiology outpatient- recommend 2 g sodium diet, fluid restriction of 1500 mL daily, check daily weights  · Patient states that she takes Lasix 40 mg PRN for peripheral swelling- estimates about weekly administration  · Last use approx  1 week ago; currently denies current need, no edema noted on exam, patient states at baseline swelling    · Order lasix 40 mg daily PRN peripheral edema  · Continue cardiology plan as above  · Diet:  2 g sodium, fluid restriction 1500 mL daily  · Check daily weights  · Continue to monitor patient for signs/symptoms congestive heart failure  · Patient currently at baseline peripheral swelling   · Consider compression stockings        History of pulmonary embolus (PE)  Assessment & Plan  · Patient reports history of multiple PEs  · Unable to remember exact date of occurrence   · Currently on [facility] with warfarin, continue    Personal history of DVT (deep vein thrombosis)  Assessment & Plan  · Uncertain of exact date of occurrence  · Reports hx of IVC filter  · Unable to obtain collateral information in chart confirming this upon chart review   · Currently on [facility] with warfarin, continue      Tobacco dependence due to cigarettes  Assessment & Plan  · Previously 2 ppd smoker x 43 yrs, recently trying to cut back- goes through 1 pack over 3 days   · Expresses interest in quitting- encourage cessation, f/u with PCP for ongoing management   · Continue Nicoderm patch daily    Hyperlipidemia  Assessment & Plan  · Lipid panel 1/25/2021: cholesterol 215, , HDL 59,   · Per chart review- cardiology initiated treatment with atorvastatin 20 mg daily  · Patient state she is not currently taking, denies issues with cholesterol  · Continue atorvastatin 20 mg daily  · Encourage f/u with outpatient cardiology to reconcile meds    Hypothyroidism  Assessment & Plan  · Chart review reveals history of hypothyroidism with past prescription of 25 mcg levothyroxine  · Patient denies history of thyroid issues, states she never took levothyroxine  · TSH 4/23/2021: 2 242  · No thyromegaly on exam  · F/u PCP    ECT Clearance:   History of recent seizure or stroke:  no   History of pheochromocytoma:  no   History of active bleeding (Intracranial hemorrhage, aneurysm or AVM):  no   History of metallic implants in the head or neck:  Yes- hx of IVC, unclear if still present    History of increased intracranial pressure with mass effect:  no   Does the patient have a current arrhythmia?  no      Based on above criteria, Patient is not medically cleared for ECT should it be recommended  Suggest cardiology clearance should ECT be recommended      Counseling / Coordination of Care Time: 45 minutes  Greater than 50% of total time spent on patient counseling and coordination of care  Collaboration of Care: Were Recommendations Directly Discussed with Primary Treatment Team? - Yes     History of Present Illness: Please note- patient is a questionable historian, denies documented medical history/ meds    James Mcduffie is a 64 y o  female with PMH significant aortic valve replacement with current anticoagulation via [facility], history of PE/DVT, type 2 diabetes, obesity, recent COVID positive status who is originally admitted to the psychiatry service due to SIs and intentional  We are consulted for medical clearance for admission to [facility] and treatment of underlying psychiatric illness  Originally presented to [facility] ED on 4/23/2021 for eval of SIs and intentional OD with Pioglitazone and risperidone  Toxicology was consulted during ED eval      Patient notes history of aortic valve replacement with current anticoagulation via Coumadin  Also notes history of DVT/multiple PEs  Upon question about heart failure diagnosis, patient denies this diagnosis, stating that her only main heart condition is her aortic valve replacement  Chart review reveals that patient follows with cardiology outpatient, they diagnosed this patient with heart failure with preserved ejection fraction and grade 2 diastolic dysfunction; recommend sodium restricted diet and fluid restriction of 1500 mL  Patient does report that she takes 40 mg of Lasix as needed for fluid retention/leg swelling  She states that she takes this medication approximately once weekly, currently denies the need for this medication, stating that her leg swelling is currently at baseline  Patient currently denies symptoms of shortness of breath, orthopnea, PND, abdominal distension/fullness  Chart review reveals PCP visit 4/12/2021 at which metformin was initially prescribed   Patient states she does not take metformin  She monitors blood sugar once or twice weekly, states sugars are well-controlled, avg 150s-170s  Patient notes she was initially COVID positive in March 2021, subsequently treated with Bam infusion  Currently denies COVID related symptoms including fatigue, fever, SOB, chest pain, loss of sense of taste/smell  Upon further questioning of patient's medical history, who patient denies history of hypertension, hyperlipidemia, hypothyroidism  Patient states that she has never had an issue with her thyroid, denies ever taking levothyroxine  Per chart review, history of hypothyroidism noted, prior treatment with levothyroxine 25 mcg noted in 2019, however patient not taking  Patient also states that she does not have cholesterol issues, denies taking medications for her cholesterol, specifically denies taking Lipitor  Chart review reveals HLD, outpatient cardiology had initiated statin in Jan 2021  Reports tobacco dependence- smokes 2 ppd x 43 yrs, recently cutting back to 1 pack over 3 days  Desires to quit  Reports rare alcohol consumption  Notes frequent marijuana use, states that she is in process of acquiring medical marijuana card through PCP  Review of Systems:    Review of Systems   Constitutional: Negative for appetite change, chills and fever  HENT: Negative for congestion, ear pain, rhinorrhea, sneezing and sore throat  Eyes: Negative for pain and visual disturbance  Respiratory: Negative for cough and shortness of breath  Cardiovascular: Positive for leg swelling (patient reports baseline swelling currently )  Negative for chest pain and palpitations  Gastrointestinal: Negative for abdominal distention, abdominal pain, constipation, diarrhea and vomiting  Genitourinary: Negative for difficulty urinating, dysuria and hematuria  Musculoskeletal: Negative for arthralgias and back pain  Skin: Negative for color change and rash     Neurological: Positive for numbness (2nd and 3rd digit of right hand)  Negative for dizziness, seizures, syncope, weakness, light-headedness and headaches  Psychiatric/Behavioral: The patient is nervous/anxious  All other systems reviewed and are negative  Past Medical and Surgical History:     Past Medical History:   Diagnosis Date    Anemia     Anxiety     Aortic valve disorder     Back pain     Chronic pain syndrome     Constipation     Degenerative joint disease involving multiple joints     Depression     DVT (deep venous thrombosis) (HCC)     Bilateral    Edema     Endometrial adenocarcinoma (HCC)     Fibromyalgia     Ganglion of right wrist     GERD (gastroesophageal reflux disease)     Heart murmur     Hematoma     History of mechanical aortic valve replacement     Hypothyroidism (acquired)     Laboratory confirmed diagnosis of COVID-19 2021    Low blood pressure     Malignant neoplasm of corpus uteri, except isthmus (HCC)     Mixed hyperlipidemia     Morbid obesity (HCC)     Obstructive sleep apnea syndrome     Pulmonary embolism (HCC)     Tobacco dependence syndrome     Transient cerebral ischemia     Urinary incontinence     Viral hepatitis C     Vitamin D deficiency        Past Surgical History:   Procedure Laterality Date    ANKLE SURGERY      CARDIAC SURGERY  2015    VALVE REPLACEMENT     SECTION      X3    CHOLECYSTECTOMY      COLONOSCOPY  2019    HYSTERECTOMY  2011    TOTAL       Meds/Allergies:    PTA meds:   Prior to Admission Medications   Prescriptions Last Dose Informant Patient Reported? Taking?    Ascorbic Acid (vitamin C) 1000 MG tablet  Self No No   Sig: Take 1 tablet (1,000 mg total) by mouth 2 (two) times a day for 10 days   ascorbic acid (VITAMIN C) 500 MG tablet Not Taking at Unknown time Self Yes No   Sig: TAKE 2 TABLET (1,000 MG TOTAL) BY MOUTH 2 (TWO) TIMES A DAY FOR 10 DAYS   atorvastatin (LIPITOR) 20 mg tablet Not Taking at Unknown time Self No No Sig: Take 1 tablet (20 mg total) by mouth daily   Patient not taking: Reported on 4/12/2021   chlorhexidine (HIBICLENS) 4 % external liquid Not Taking at Unknown time  No No   Sig: Apply 1 application topically 2 (two) times a day   Patient not taking: Reported on 4/23/2021   cholecalciferol (VITAMIN D3) 1,000 units tablet  Self No No   Sig: Take 2 tablets (2,000 Units total) by mouth daily for 10 days   Patient not taking: Reported on 4/12/2021   clonazePAM (KlonoPIN) 1 mg tablet Not Taking at Unknown time Self Yes No   Sig: Take 1 mg by mouth daily as needed   furosemide (LASIX) 40 mg tablet Not Taking at Unknown time Self No No   Sig: Take 1 tablet daily for 5 days, then 1 tablet daily on an as needed basis     Patient not taking: Reported on 4/12/2021   metFORMIN (GLUCOPHAGE) 500 mg tablet Not Taking at Unknown time  No No   Sig: Take 1 tablet (500 mg total) by mouth 2 (two) times a day with meals   Patient not taking: Reported on 4/23/2021   mirtazapine (REMERON) 45 MG tablet Not Taking at Unknown time Self Yes No   Sig: Take 45 mg by mouth daily at bedtime   multivitamin (THERAGRAN) TABS Not Taking at Unknown time Self Yes No   Sig: Take by mouth daily   nicotine (NICODERM CQ) 14 mg/24hr TD 24 hr patch Not Taking at Unknown time  No No   Sig: Place 1 patch on the skin every 24 hours   Patient not taking: Reported on 4/23/2021   nicotine (NICODERM CQ) 21 mg/24 hr TD 24 hr patch Not Taking at Unknown time  No No   Sig: Place 1 patch on the skin every 24 hours   Patient not taking: Reported on 4/23/2021   nicotine (NICODERM CQ) 7 mg/24hr TD 24 hr patch Not Taking at Unknown time  No No   Sig: Place 1 patch on the skin every 24 hours   Patient not taking: Reported on 4/23/2021   nystatin powder Not Taking at Unknown time  Yes No   Sig: APPLY TOPICALLY 2 (TWO) TIMES A DAY AS NEEDED FOR 14 DAYS   warfarin (Jantoven) 3 mg tablet Past Week at Unknown time Self Yes Yes   Sig: Take 6 mg by mouth daily   ziprasidone (GEODON) 60 mg capsule Not Taking at Unknown time Self Yes No   Sig: Take 60 mg by mouth daily      Facility-Administered Medications: None       Allergies: Allergies   Allergen Reactions    Bactrim [Sulfamethoxazole-Trimethoprim] Other (See Comments)     Patient is unaware of why this allergy is recorded, denies knowledge of it    Penicillins Other (See Comments)     Patient is unaware of why this allergy is recorded, denies knowledge of it    Tramadol      Could not function - felt very ill        Social History:     Marital Status:     Substance Use History:   Social History     Substance and Sexual Activity   Alcohol Use Yes    Frequency: Monthly or less    Drinks per session: 10 or more     Social History     Tobacco Use   Smoking Status Current Some Day Smoker    Packs/day: 0 25    Years: 40 00    Pack years: 10 00    Types: Cigarettes   Smokeless Tobacco Never Used     Social History     Substance and Sexual Activity   Drug Use Yes    Types: Marijuana       Family History:    Family History   Problem Relation Age of Onset    Coronary artery disease Mother     Coronary artery disease Father        Physical Exam:     Vitals:   Blood Pressure: 120/59 (04/25/21 0706)  Pulse: 78 (04/25/21 0706)  Temperature: 97 5 °F (36 4 °C) (04/25/21 0706)  Temp Source: Temporal (04/25/21 0706)  Respirations: 20 (04/25/21 0706)  Height: 5' 4" (162 6 cm) (04/24/21 1100)  Weight - Scale: (!) 151 kg (332 lb 14 3 oz) (04/24/21 1100)  SpO2: 91 % (04/25/21 0706)    Physical Exam  Vitals signs reviewed  Constitutional:       General: She is not in acute distress  Appearance: Normal appearance  She is obese  She is not ill-appearing or diaphoretic  HENT:      Head: Normocephalic and atraumatic  Nose: Nose normal  No rhinorrhea  Mouth/Throat:      Mouth: Mucous membranes are moist       Pharynx: Oropharynx is clear  Eyes:      General: No scleral icterus       Extraocular Movements: Extraocular movements intact  Conjunctiva/sclera: Conjunctivae normal    Neck:      Musculoskeletal: Normal range of motion  Cardiovascular:      Rate and Rhythm: Normal rate and regular rhythm  Pulses: Normal pulses  Heart sounds: Normal heart sounds  No murmur  No friction rub  No gallop  Pulmonary:      Effort: Pulmonary effort is normal  No respiratory distress  Breath sounds: No wheezing, rhonchi or rales  Abdominal:      General: Abdomen is flat  Bowel sounds are normal  There is no distension  Palpations: Abdomen is soft  Tenderness: There is no abdominal tenderness  There is no guarding  Musculoskeletal: Normal range of motion  General: Swelling (patient notes this is baseline swelling for her) present  Right lower leg: No edema  Left lower leg: No edema  Skin:     General: Skin is warm and dry  Neurological:      General: No focal deficit present  Mental Status: She is alert and oriented to person, place, and time  Mental status is at baseline  Sensory: Sensory deficit present  Comments: Patient reports numbness of Right 2nd/3rd digits   Psychiatric:         Attention and Perception: Attention normal          Mood and Affect: Mood is anxious  Speech: Speech is rapid and pressured  Behavior: Behavior is cooperative  Judgment: Judgment is impulsive and inappropriate  Additional Data:     Lab Results: I have personally reviewed pertinent reports        Results from last 7 days   Lab Units 04/24/21  0944   WBC Thousand/uL 4 20*   HEMOGLOBIN g/dL 14 3   HEMATOCRIT % 42 9   PLATELETS Thousands/uL 149*   NEUTROS PCT % 68*   LYMPHS PCT % 20*   MONOS PCT % 9   EOS PCT % 3     Results from last 7 days   Lab Units 04/23/21  1719   SODIUM mmol/L 137   POTASSIUM mmol/L 3 9   CHLORIDE mmol/L 102   CO2 mmol/L 30   BUN mg/dL 12   CREATININE mg/dL 0 59   ANION GAP mmol/L 5   CALCIUM mg/dL 9 1   ALBUMIN g/dL 3 8   TOTAL BILIRUBIN mg/dL 0 55   ALK PHOS U/L 80 8   ALT U/L 23   AST U/L 32   GLUCOSE RANDOM mg/dL 230*     Results from last 7 days   Lab Units 04/23/21  1719   INR  3 18*     Results from last 7 days   Lab Units 04/23/21  1719   TROPONIN I ng/mL <0 03     Lab Results   Component Value Date/Time    HGBA1C 7 5 (A) 04/12/2021 02:07 PM    HGBA1C 6 1 01/08/2021 02:01 PM    HGBA1C 6 2 (H) 09/24/2020 10:07 AM    HGBA1C 5 4 01/31/2020 09:11 AM     Results from last 7 days   Lab Units 04/23/21  2152 04/23/21  1756 04/23/21  1633   POC GLUCOSE mg/dl 164* 193* 235*       EKG, Pathology, and Other Studies Reviewed on Admission:   · EKG (4/23/2021): Sinus rhythm, motion artifact, nonspecific ST wave changes  QTc- 496   · EKG 03/17/2021:  Sinus rhythm, nonspecific intraventricular conduction delay  Abnormal T-waves, consider ischemia  QTc 462  · ECHO 73/42/5450: "Systolic function was normal  Ejection fraction was estimated to be 65 %  Features were consistent with a pseudonormal left ventricular filling pattern, with concomitant abnormal relaxation and increased filling pressure (grade 2 diastolic dysfunction) "    ** Please Note: This note has been constructed using a voice recognition system   **

## 2021-04-25 NOTE — NURSING NOTE
Patient is calm and cooperative  She has been in her bed for much of the day  She denies symptoms and does not report any needs

## 2021-04-25 NOTE — ASSESSMENT & PLAN NOTE
· Uncertain of exact date of occurrence  · Reports hx of IVC filter  · Unable to obtain collateral information in chart confirming this upon chart review   · Currently on [facility] with warfarin, continue

## 2021-04-25 NOTE — ASSESSMENT & PLAN NOTE
· Patient reports history of multiple PEs  · Unable to remember exact date of occurrence   · Currently on [facility] with warfarin, continue

## 2021-04-26 LAB
INR PPP: 2.18 (ref 0.84–1.19)
PROTHROMBIN TIME: 24.3 SECONDS (ref 11.6–14.5)

## 2021-04-26 PROCEDURE — 85610 PROTHROMBIN TIME: CPT | Performed by: PHYSICIAN ASSISTANT

## 2021-04-26 PROCEDURE — 99232 SBSQ HOSP IP/OBS MODERATE 35: CPT | Performed by: PSYCHIATRY & NEUROLOGY

## 2021-04-26 RX ORDER — TRAZODONE HYDROCHLORIDE 50 MG/1
50 TABLET ORAL
Status: DISCONTINUED | OUTPATIENT
Start: 2021-04-26 | End: 2021-04-27 | Stop reason: HOSPADM

## 2021-04-26 RX ADMIN — ACETAMINOPHEN 650 MG: 325 TABLET, FILM COATED ORAL at 11:24

## 2021-04-26 RX ADMIN — Medication 2000 UNITS: at 08:50

## 2021-04-26 RX ADMIN — OXYCODONE HYDROCHLORIDE AND ACETAMINOPHEN 1000 MG: 500 TABLET ORAL at 08:51

## 2021-04-26 RX ADMIN — Medication 14 MG: at 08:50

## 2021-04-26 RX ADMIN — WARFARIN SODIUM 6 MG: 6 TABLET ORAL at 08:58

## 2021-04-26 RX ADMIN — TRAZODONE HYDROCHLORIDE 50 MG: 50 TABLET ORAL at 21:03

## 2021-04-26 RX ADMIN — ZINC SULFATE 220 MG (50 MG) CAPSULE 220 MG: CAPSULE at 08:50

## 2021-04-26 RX ADMIN — MELATONIN TAB 3 MG 3 MG: 3 TAB at 21:03

## 2021-04-26 RX ADMIN — ATORVASTATIN CALCIUM 20 MG: 20 TABLET, FILM COATED ORAL at 17:14

## 2021-04-26 RX ADMIN — ACETAMINOPHEN 975 MG: 325 TABLET ORAL at 18:47

## 2021-04-26 NOTE — CASE MANAGEMENT
Psychosocial Assessment 1:1 completed with pt in small tv room  Pt was calm, cooperative, pleasant  No ambulatory device  Admission / Details: 201 admission after suicidal gesture (ingesting her boyfriend's meds)     County: [facility] Status: 201  Insurance: [employer]  Rx coverage: yes  Marital Status: , currently in relationship with boyfrienMohamud lazaro  Children: 3 kids  Family: 1 sister, 5 grandchildren  Residence: Private residence  Can return home: Yes  Lives with: Daughter (age 27)  Level of Ed: HS  Work History: Unemployed   Income/Source:  $800/month from [employer]: Does not drive -- gets rides from family and friends  Legal Issues: Denies; remote hx of incarceration for 2-3 months for conspiracy charges  Remote hx of probation for 1 5 years  POA/guardian/rep-payee: pt's sister is her rep-payee; no POA or guardian  Pharmacy: [facility]   Tx HX: Hx of bipolar d/o; IPBH tx over 30 years ago; hx of SA when she was young; denies abuse/trauma  Trauma HX: Denies  D&A HX: drinks socially, smokes marijuana  Medical: PMH of hypothyroidism, hyperlipidemia, DM type 2, hx of DVT and PE, hx of heart failure and mechanical aortic valve replacement   Tobacco: Smokes cigarettes but is currently on the patch and plans on not smoking anymore after d/c   HX: None  Access to firearms: Denies   UDS Results: + for amp/meth and marijuana  PCP: Nakul Chen  Psych: [facility]  Therapist: Mervin Christian at [facility]  ICM/ACT: None  Stressors: Argument with daughter; pt reports that her family does not like her boyfriend  Coping Skills: playing phone games  ROIS Signed: Daughter, boyfriend, PCP, [facility]  Treatment Plan Signed: Yes  IMM Signed: N/A  Upcoming Appointments: OP therapy appt on 4/27 at 2:15pm    Pt signed 72 hr notice on 4/24 and is anticipating d/c tomorrow  Pt reports that she was never suicidal and would never harm herself   She was upset after having an argument with her daughter and she made an impulsive gesture  Pt reports that she new which pills she was taking and knew that they were not dangerous  She was emotional and acting impulsively

## 2021-04-26 NOTE — DISCHARGE INSTR - OTHER ORDERS
You are being discharged to: [facility], [address] [facility] [place]     Triggers you have identified during your hospitalization that led to your admission include: argument with daughter  Coping skills you have identified during your hospitalization include: playing phone games  If you are unable to deal with your distressed mood alone, please contact your outpatient provider at [facility]  If that is not effective and you continue to have suicidal ideation, a distressed mood, or feel like you're in crisis, please contact 911 and go to the nearest emergency center  [facility] Crisis Intervention: [address] [facility] Suicide Prevention Lifeline:  [address] [place] [address] Rd , [address] on Mental Illness ([place]) HELPLINE: 845.544.6141/Website: www keith org  *Substance Abuse and [address] [facility]) [employer], which is a confidential, free, 24-hour-a-day, 365-day-a-year, information service for individuals and family members facing mental health and/or substance use disorders  This service provides referrals to local treatment facilities, support groups, and community-based organizations  Callers can also order free publications and other information  Call 3-167.777.9262/Website: www [facility] gov  *[employer] 2-1-1: This is a toll free, confidential, 24-hour-a-day service which connects you to a community  in your area who can help you find services and resources that are available to you locally and provide critical services that can improve and save lives  Call: 80  /Website: https://Personeta/    What you need to know aboutcoronavirus disease 2019 (COVID-19)     What is coronavirus disease 2019 (COVID-19)? Coronavirus disease 2019 (COVID-19) is a respiratory illness that can spread from person to person  The virus that causes COVID-19 is a novel coronavirus that was first identified during an investigation into an outbreak in [place], [place]    Can people in [place]  get COVID-19? Yes  COVID-19 is spreading from person to person in parts of the [place]  Risk of infection with COVID-19 is higher for people who are close contacts of someone known to have COVID-19, for example healthcare workers, or household members  Other people at higher risk for infection are those who live in or have recently been in an area with ongoing spread of COVID-19  Learn more about places with ongoing spread at   [facility]  html#geographic  Have there been cases of COVID-19 in the [place] ?   Yes  The first case of COVID-19 in the [place] was reported on January 21, 2020  The current count of cases of COVID-19 in the [place] is available on [employer] at [facility]  How does COVID-19 spread? The virus that causes COVID-19 probably emerged from an animal source, but is now spreading from person to person  The virus is thought to spread mainly between people who are in close contact with one another (within about 6 feet) through respiratory droplets produced when an infected person coughs or sneezes  It also may be possible that a person can get COVID-19 by touching a surface or object that has the virus on it and then touching their own mouth, nose, or possibly their eyes, but this is not thought to be the main way the virus spreads  Learn what is known about the spread of newly emerged coronaviruses at [facility]  What are the symptoms of COVID-19? Patients with COVID-19 have had mild to severe respiratory illness with symptoms of   fever   cough   shortness of breath  What are severe complications from this virus? Some patients have pneumonia in both lungs, multi-organ failure and in some cases death  How can I help protect myself?    People can help protect themselves from respiratory illness with everyday preventive actions  Avoid close contact with people who are sick  Avoid touching your eyes, nose, and mouth withunwashed hands  Wash your hands often with soap and water for at least 20 seconds  Use an alcohol-based hand  that contains at least 60% alcohol if soap and water are not available  If you are sick, to keep from spreading respiratory illness to others, you should   Stay home when you are sick  Cover your cough or sneeze with a tissue, then throw the tissue in the trash  Clean and disinfect frequently touched objectsand surfaces  What should I do if I recently traveled from an area with ongoing spread of COVID-19? If you have traveled from an affected area, there may be restrictions on your movements for up to 2 weeks  If you develop symptoms during that period (fever, cough, trouble breathing), seek medical advice  Call the office of your health care provider before you go, and tell them about your travel and your symptoms  They will give you instructions on how to get care without exposing other people to your illness  While sick, avoid contact with people, don't go out and delay any travel to reduce the possibility of spreading illness to others  Is there a treatment? There is no specific antiviral treatment for COVID-19  People with COVID-19 can seek medical care to helprelieve symptoms  For more information: www cdc gov/TVOKI77AI 746341-M 03/03/2020       What to do if you are sick withcoronavirus disease 2019 (COVID-19)     If you are sick with COVID-19 or suspect you are infected with the virus that causes COVID-19, follow the steps below to help prevent the disease from spreading to people in your home and community  Stay home except to get medical care   You should restrict activities outside your home, except for getting medical care  Do not go to work, school, or public areas  Avoid using public transportation, ride-sharing, or taxis    Separate yourself from other people and animals inyour home  People: As much as possible, you should stay in a specific room and away from other people in your home  Also, you should use a separate bathroom, if available  Animals: Do not handle pets or other animals while sick  See COVID-19 and Animals for more information  Call ahead before visiting your doctor   If you have a medical appointment, call the healthcare provider and tell them that you have or may have COVID-19  This will help the healthcare provider's office take steps to keep other people from getting infected or exposed  Wear a facemask  You should wear a facemask when you are around other people (e g , sharing a room or vehicle) or pets and before you enter a healthcare provider's office  If you are not able to wear a facemask (for example, because it causes trouble breathing), then people who live with you should not stay in the same room with you, or they should wear a facemask if they enteryour room  Cover your coughs and sneezes   Cover your mouth and nose with a tissue when you cough or sneeze  Throw used tissues in a lined trash can; immediately wash your hands with soap and water for at least 20 seconds or clean your hands with an alcohol-based hand  that contains at least 60 to 95% alcohol, covering all surfaces of your hands and rubbing them together until they feel dry  Soap and water should be used preferentially if hands are visibly dirty  Avoid sharing personal household items   You should not share dishes, drinking glasses, cups, eating utensils, towels, or bedding with other people or pets in your home  After using these items, they should be washed thoroughly with soap and water  Clean your hands often  Wash your hands often with soap and water for at least 20 seconds   If soap and water are not available, clean your hands with an alcohol-based hand  that contains at least 60% alcohol, covering all surfaces of your hands and rubbing them together until they feel dry  Soap and water should be used preferentially if hands are visibly dirty  Avoid touching your eyes, nose, and mouth with unwashed hands  Clean all "high-touch" surfaces every day  High touch surfaces include counters, tabletops, doorknobs, bathroom fixtures, toilets, phones, keyboards, tablets, and bedside tables  Also, clean any surfaces that may have blood, stool, or body fluids on them  Use a household cleaning spray or wipe, according to the label instructions  Labels contain instructions for safe and effective use of the cleaning product including precautions you should take when applying the product, such as wearing gloves and making sure you have good ventilation during use of the product  Monitor your symptoms  Seek prompt medical attention if your illness is worsening (e g , difficulty breathing)  Before seeking care, call your healthcare provider and tell them that you have, or are being evaluated for, COVID-19  Put on a facemask before you enter the facility  These steps will help the healthcare provider's office to keep other people in the office or waiting room from getting infectedor exposed  Ask your healthcare provider to call the local or [facility] health department  Persons who are placed under active monitoring or facilitated self-monitoring should follow instructions provided by their local health department or occupational health professionals, as appropriate  If you have a medical emergency and need to call 911, notify the dispatch personnel that you have, or are being evaluated for COVID-19  If possible, put on a facemask before emergency medical services arrive  Discontinuing home isolation  Patients with confirmed COVID-19 should remain under home isolation precautions until the risk of secondary transmission to others is thought to be low   The decision to discontinue home isolation precautions should be made on a case-by-case basis, in consultation with healthcare providers and state and local health departments  For more information: www cdc gov/VUTED85IL 988135-G 02/24/2020       Stay home when you are sick,except to get medical care  Wash your hands often with soap and water for at least 20 seconds  Cover your cough or sneeze with a tissue, then throw the tissue in the trash  Clean and disinfect frequently touched objects and surfaces  Avoid touching your eyes, nose, and mouth  STOP THE SPREAD OF GERMS  For more information: www cdc gov/COVID19 Avoid close contact with people who are sick  Help prevent the spread of respiratory diseases like COVID-19

## 2021-04-26 NOTE — PLAN OF CARE
Problem: SELF HARM/SUICIDALITY  Goal: Will have no self-injury during hospital stay  Description: INTERVENTIONS:  - Q 15 MINUTES: Routine safety checks  - Q WAKING SHIFT & PRN: Assess risk to determine if routine checks are adequate to maintain patient safety  - Encourage patient to participate actively in care by formulating a plan to combat response to suicidal ideation, identify supports and resources  Outcome: Progressing     Problem: DEPRESSION  Goal: Will be euthymic at discharge  Description: INTERVENTIONS:  - Administer medication as ordered  - Provide emotional support via 1:1 interaction with staff  - Encourage involvement in milieu/groups/activities  - Monitor for social isolation  Outcome: Progressing     Problem: ANXIETY  Goal: Will report anxiety at manageable levels  Description: INTERVENTIONS:  - Administer medication as ordered  - Teach and encourage coping skills  - Provide emotional support  - Assess patient/family for anxiety and ability to cope  Outcome: Progressing     Problem: DISCHARGE PLANNING  Goal: Discharge to home or other facility with appropriate resources  Description: INTERVENTIONS:  - Identify barriers to discharge w/patient and caregiver  - Arrange for needed discharge resources and transportation as appropriate  - Identify discharge learning needs (meds, wound care, etc )  - Arrange for interpretive services to assist at discharge as needed  - Refer to Case Management Department for coordinating discharge planning if the patient needs post-hospital services based on physician/advanced practitioner order or complex needs related to functional status, cognitive ability, or social support system  Outcome: Progressing

## 2021-04-26 NOTE — TREATMENT TEAM
04/26/21 1500   Service   Service SLIM   Provider Name Carson GARCIA   Multi-disciplinary Rounds   Pending Pathology and Pertinent Tests Most recent lab data reviewed  (reported INR 2 18)

## 2021-04-26 NOTE — ED NOTES
Insurance Authorization for admission:   Phone call placed to [facility]  Phone number: [phone]  Spoke to Joveledvin Jackson  3 days approved  Level of care: Inpatient  Review on 4/26/21  Authorization # [id number]    UR to contact Luis Billy at ext [phone] on 4/26/21 for concurrent review  It was requested UR obtain additional information regarding Patient's drug use and argument leading to the OD

## 2021-04-26 NOTE — NURSING NOTE
Pt calm, cooperative, social on the unit  Denies all symptoms currently and states "I am leaving tomorrow, I am all good"  Compliant with hs medications and is asleep in bed

## 2021-04-26 NOTE — CASE MANAGEMENT
04/26/21 0846   Team Meeting   Meeting Type Daily Rounds   Initial Conference Date 04/26/21   Team Members Present   Team Members Present Physician;Nurse;; ;Occupational Therapist   Physician Team Member Dr Berenice Guerra; [address]  Team Member [facility] Management Team Member Blanca Olson   Social Work Team Member Hunter   OT Team Member Gilda Man   Patient/Family Present   Patient Present No   Patient's Family Present No     Not a 30-day readmission; 201 admission due to SA by OD on boyfriend's meds; signed 72 hr notice on 4/24 -- refuses to rescind, irritable at times, refusing food at times

## 2021-04-26 NOTE — PROGRESS NOTES
04/26/21 1204   Team Meeting   Meeting Type Tx Team Meeting   Initial Conference Date 04/26/21   Team Members Present   Team Members Present Physician;Nurse;   Physician Team Member Dr Heike Velarde Team Member [facility] Management Team Member Katelin   Patient/Family Present   Patient Present Yes   Patient's Family Present No     Treatment goals include: building effective coping skills, refraining from self harm/suicidality, decreasing depression/anxiety, discharge planning

## 2021-04-26 NOTE — CASE MANAGEMENT
[facility] transport request made for d/c tomorrow -- 11am     Spoke with pt's boyfriend, Sonny Chonger [phone] -- he agrees with pt's d/c tomorrow and does not have any concerns with pt returning home with OP services  He is aware that pt will have [facility] transportation home tomorrow  Left  for pt's daughter, Sandra Jacobs [phone], to give d/c notification  [address] Mariscal to make bernarda ept has upcoming OP appts   Nex therapy appt is tomorrow at 2:15pm via phone; next med management appt is May 19th at 1:45pm

## 2021-04-26 NOTE — NURSING NOTE
Pt constricted but pleasant and med-compliant with good appetite and steady gait  VSS  Pt requested/given tylenol po prn for neck pain with some positive effect  Attended group  Denied SI  Monitored for safety and support

## 2021-04-26 NOTE — PROGRESS NOTES
Progress Note - [address] [facility] 64 y o  female MRN: [id number]  Unit/Bed#: Sydney Aguilar 174-45 Encounter: [id number]    The patient was seen for continuing care and reviewed with treatment team   Staff reports the patient has been irritable at times but overall has been calm and cooperative  She remains preoccupied with discharge  She signed a 72 hour notice on 04/24 at 3:57 p m  and has not been agreeable to rescinding it  We will discharge her tomorrow  On approach she is cooperative and although she would prefer to leave today she is agreeable to discharge tomorrow  She denies depression or anxiety  She states she had a fight with her daughter on the day of admission and made a statement that her daughter should find a job to learn how to support herself in case something happens to her  Her daughter took that to mean that she was contemplating suicide but she says she really just meant if something accidental happens to her or when she dies in the future  Patient's therapist called to check in during this fight and ended up calling 911  So the patient impulsively took several pills because she said if the police were already coming she may as well give them a real reason to come  She expressed remorse about being so impulsive because she has 5 grandchildren to live for  She denies any suicidal thoughts currently  Appetite has been good but she does not like the food here  She is having trouble sleeping due to her roommate being up at night  She already has an outpatient therapist who she talks to weekly on Tuesdays at 2:15 p m  And then if she is having a bad week the therapist also calls on Friday      [facility]    Vital signs in last 24 hours:  Temp:  [96 3 °F (35 7 °C)-98 3 °F (36 8 °C)] 96 3 °F (35 7 °C)  HR:  [67-78] 67  Resp:  [18-20] 18  BP: (102-107)/(54-63) 102/54    Mental Status Evaluation:    Appearance disheveled and Good eye contact   Behavior calm and cooperative   Mood euthymic   Speech Normal rate and volume   Affect constricted   Thought Processes Goal directed and coherent   Thought Content Does not verbalize delusional material   Perceptual Disturbances Denies hallucinations and does not appear to be responding to internal stimuli   Risk Potential Suicidal/Homicidal Ideation - No evidence of suicidal or homicidal ideation and Patient does not verbalize suicidal or homicidal ideation  Risk of Violence - No evidence of risk for violence found on assessment  Risk of Self Mutilation - No evidence of risk for self mutilation found on assessment   Sensorium oriented to person, place and time/date   Cognition/Memory recent and remote memory grossly intact   Consciousness alert and awake   Attention/Concentration attention span and concentration are age appropriate   Insight limited   Judgement limited   Muscle Strength and Gait/Station normal muscle strength and normal muscle tone, normal gait/station and normal balance   Motor Activity no abnormal movements       Progress Toward Goals:  Progressing      Recommended Treatment:  Seroquel 50 mg HS was started upon admission but is not helping her sleep and there are no psychotic symptoms to treat  Will discontinue and try trazodone tonight  Continue with pharmacotherapy, group therapy, milieu therapy and occupational therapy    The patient will be maintained on the following medications:  Current Facility-Administered Medications   Medication Dose Route Frequency Provider Last Rate    acetaminophen  650 mg Oral Q4H PRN Debora Georges MD      acetaminophen  650 mg Oral Q4H PRN Debora Georges MD      acetaminophen  975 mg Oral Q6H PRN Debora Georges MD      ascorbic acid  1,000 mg Oral Daily Ivanamaciej Mccabe PA-C      atorvastatin  20 mg Oral Daily With [employer] KathieciVISHAL liao      cholecalciferol  2,000 Units Oral Daily Ivanamaciej Mccabe PA-C      furosemide  40 mg Oral Daily PRN Ivana Mccabe PA-C      hydrOXYzine HCL  25 mg Oral Q6H PRN Max 4/day Sary Gonzalez MD      LORazepam  1 mg Intramuscular Q6H PRN Max 3/day Sary Gonzalez MD      LORazepam  0 5 mg Oral Q6H PRN Max 4/day Sary Gonzalez MD      LORazepam  1 mg Oral Q6H PRN Max 3/day Sary Gonzalez MD      melatonin  3 mg Oral HS Sary Gonzalez MD      nicotine  14 mg Transdermal Daily KRISTEN XieC      OLANZapine  5 mg Intramuscular Q3H PRN Max 3/day Sary Gonzalez MD      OLANZapine  2 5 mg Oral Q4H PRN Max 6/day Sary Gonzalez MD      OLANZapine  5 mg Oral Q4H PRN Max 3/day Sary Gonzalez MD      OLANZapine  5 mg Oral Q3H PRN Max 3/day Sary Gonzalez MD      QUEtiapine  50 mg Oral HS Arsenio Fox MD      senna-docusate sodium  1 tablet Oral Daily PRN Sary Gonzalez MD      traZODone  50 mg Oral HS PRN Sary Gonzalez MD      warfarin  6 mg Oral Daily Arsenio Fox MD      zinc sulfate  220 mg Oral Daily Ivana Mccabe PA-C         [facility] ([facility]

## 2021-04-26 NOTE — DISCHARGE INSTR - APPOINTMENTS
Gerri Mack RN, our [employer], will be calling you after your discharge, on the phone number that you provided  She will be available as an additional support, if needed  If you wish to speak with her, you may contact Swati Andino at [phone]

## 2021-04-27 VITALS
WEIGHT: 293 LBS | RESPIRATION RATE: 18 BRPM | BODY MASS INDEX: 50.02 KG/M2 | TEMPERATURE: 97.1 F | HEIGHT: 64 IN | OXYGEN SATURATION: 98 % | DIASTOLIC BLOOD PRESSURE: 68 MMHG | SYSTOLIC BLOOD PRESSURE: 154 MMHG | HEART RATE: 82 BPM

## 2021-04-27 PROCEDURE — 99238 HOSP IP/OBS DSCHRG MGMT 30/<: CPT | Performed by: PSYCHIATRY & NEUROLOGY

## 2021-04-27 RX ORDER — TRAZODONE HYDROCHLORIDE 50 MG/1
50 TABLET ORAL
Qty: 15 TABLET | Refills: 0 | Status: SHIPPED | OUTPATIENT
Start: 2021-04-27 | End: 2021-07-15

## 2021-04-27 RX ADMIN — Medication 2000 UNITS: at 08:56

## 2021-04-27 RX ADMIN — ZINC SULFATE 220 MG (50 MG) CAPSULE 220 MG: CAPSULE at 08:56

## 2021-04-27 RX ADMIN — Medication 14 MG: at 08:59

## 2021-04-27 RX ADMIN — ACETAMINOPHEN 975 MG: 325 TABLET ORAL at 08:57

## 2021-04-27 RX ADMIN — OXYCODONE HYDROCHLORIDE AND ACETAMINOPHEN 1000 MG: 500 TABLET ORAL at 08:56

## 2021-04-27 RX ADMIN — WARFARIN SODIUM 6 MG: 6 TABLET ORAL at 08:58
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
## 2021-04-27 NOTE — CASE MANAGEMENT
04/27/21 0838   Team Meeting   Meeting Type Daily Rounds   Initial Conference Date 04/27/21   Team Members Present   Team Members Present Physician;Nurse;;   Physician Team Member Dr Ronald Patel; [address]  Team Member [facility] Management Team Member Corrina Cruz   Social Work Team Member Sarah Sole   Patient/Family Present   Patient Present No   Patient's Family Present No     LCD = 4/26, discharge home today at 11am, denies s/s, calm, cooperative, pleasant, med compliant, slept well, attended groups

## 2021-04-27 NOTE — NURSING NOTE
Pt discharged from the unit today  Reviewed discharge instructions, medications which were escribed and aftercare plan  No questions verbalized and in agreement with discharge  Pt denies si/hi and reported no depression and anxiety  All belongings returned  Pt stated, "it was all a mistake that I was here  I am much better"  Pt accompanied to the lobby by PCA where she was picked up by  and transported home

## 2021-04-27 NOTE — PLAN OF CARE
Problem: DISCHARGE PLANNING  Goal: Discharge to home or other facility with appropriate resources  Description: INTERVENTIONS:  - Identify barriers to discharge w/patient and caregiver  - Arrange for needed discharge resources and transportation as appropriate  - Identify discharge learning needs (meds, wound care, etc )  - Arrange for interpretive services to assist at discharge as needed  - Refer to Case Management Department for coordinating discharge planning if the patient needs post-hospital services based on physician/advanced practitioner order or complex needs related to functional status, cognitive ability, or social support system  Outcome: Completed     Discharge planning discussed with pt and pt's boyfriend  VM's left for pt's daughter  OP psych and therapy appts scheduled  Lyft transportation arranged -- 11am  in [facility]  Med scripts should be sent to preferred pharmacy ([place])

## 2021-04-27 NOTE — PLAN OF CARE
Problem: SELF HARM/SUICIDALITY  Goal: Will have no self-injury during hospital stay  Description: INTERVENTIONS:  - Q 15 MINUTES: Routine safety checks  - Q WAKING SHIFT & PRN: Assess risk to determine if routine checks are adequate to maintain patient safety  - Encourage patient to participate actively in care by formulating a plan to combat response to suicidal ideation, identify supports and resources  Outcome: Adequate for Discharge     Problem: DEPRESSION  Goal: Will be euthymic at discharge  Description: INTERVENTIONS:  - Administer medication as ordered  - Provide emotional support via 1:1 interaction with staff  - Encourage involvement in milieu/groups/activities  - Monitor for social isolation  Outcome: Adequate for Discharge     Problem: ANXIETY  Goal: Will report anxiety at manageable levels  Description: INTERVENTIONS:  - Administer medication as ordered  - Teach and encourage coping skills  - Provide emotional support  - Assess patient/family for anxiety and ability to cope  Outcome: Adequate for Discharge     Problem: Ineffective Coping  Goal: Participates in unit activities  Description: Interventions:  - Provide therapeutic environment   - Provide required programming   - Redirect inappropriate behaviors   Outcome: Adequate for Discharge

## 2021-04-27 NOTE — NURSING NOTE
Patient is alert, and visible in the unit  Per Tylenol 975 mg tab administered for generalized pain and was effective  She is happy and ready to be go home in the morning  Med and meal compliant  Denies depression , anxiety, suicidal ideation  She is able to make her  needs known

## 2021-05-03 ENCOUNTER — ANTICOAG VISIT (OUTPATIENT)
Dept: CARDIOLOGY CLINIC | Facility: CLINIC | Age: 61
End: 2021-05-03

## 2021-05-03 DIAGNOSIS — Z95.2 STATUS POST MECHANICAL AORTIC VALVE REPLACEMENT: ICD-10-CM

## 2021-05-12 ENCOUNTER — IMMUNIZATIONS (OUTPATIENT)
Dept: FAMILY MEDICINE CLINIC | Facility: HOSPITAL | Age: 61
End: 2021-05-12

## 2021-05-12 DIAGNOSIS — Z23 ENCOUNTER FOR IMMUNIZATION: Primary | ICD-10-CM

## 2021-05-12 PROCEDURE — 91300 SARS-COV-2 / COVID-19 MRNA VACCINE (PFIZER-BIONTECH) 30 MCG: CPT

## 2021-05-12 PROCEDURE — 0002A SARS-COV-2 / COVID-19 MRNA VACCINE (PFIZER-BIONTECH) 30 MCG: CPT

## 2021-05-18 DIAGNOSIS — F31.9 BIPOLAR DEPRESSION (HCC): ICD-10-CM

## 2021-05-18 RX ORDER — TRAZODONE HYDROCHLORIDE 50 MG/1
50 TABLET ORAL
Qty: 15 TABLET | Refills: 0 | OUTPATIENT
Start: 2021-05-18

## 2021-05-19 ENCOUNTER — DOCUMENTATION (OUTPATIENT)
Dept: FAMILY MEDICINE CLINIC | Facility: CLINIC | Age: 61
End: 2021-05-19

## 2021-05-19 NOTE — PROGRESS NOTES
Can you code pts office visit from 1/8/2021 to a physical please? Because that is what is was and it is coming up that she needs a physical when she already had one

## 2021-05-25 ENCOUNTER — APPOINTMENT (OUTPATIENT)
Dept: LAB | Facility: HOSPITAL | Age: 61
End: 2021-05-25
Payer: COMMERCIAL

## 2021-05-25 ENCOUNTER — ANTICOAG VISIT (OUTPATIENT)
Dept: CARDIOLOGY CLINIC | Facility: CLINIC | Age: 61
End: 2021-05-25

## 2021-05-25 DIAGNOSIS — Z95.2 STATUS POST MECHANICAL AORTIC VALVE REPLACEMENT: ICD-10-CM

## 2021-05-25 RX ORDER — ZIPRASIDONE HYDROCHLORIDE 60 MG/1
60 CAPSULE ORAL DAILY
COMMUNITY
Start: 2021-05-19 | End: 2021-07-15

## 2021-05-25 RX ORDER — MIRTAZAPINE 45 MG/1
45 TABLET, FILM COATED ORAL
COMMUNITY
Start: 2021-05-19 | End: 2021-07-15

## 2021-06-02 DIAGNOSIS — E66.01 MORBID OBESITY WITH BMI OF 50.0-59.9, ADULT (HCC): ICD-10-CM

## 2021-06-02 DIAGNOSIS — R06.02 SOB (SHORTNESS OF BREATH): ICD-10-CM

## 2021-06-02 DIAGNOSIS — R00.0 TACHYCARDIA: ICD-10-CM

## 2021-06-02 DIAGNOSIS — R00.2 PALPITATIONS: ICD-10-CM

## 2021-06-02 RX ORDER — FUROSEMIDE 40 MG/1
TABLET ORAL
Qty: 10 TABLET | Refills: 0 | Status: SHIPPED | OUTPATIENT
Start: 2021-06-02 | End: 2021-09-30 | Stop reason: SDUPTHER

## 2021-06-02 NOTE — TELEPHONE ENCOUNTER
Patient called about swelling in her legs wanting a refill on furosemide  Has an appointment on 6/3/21 to discuss

## 2021-06-23 ENCOUNTER — TELEPHONE (OUTPATIENT)
Dept: FAMILY MEDICINE CLINIC | Facility: CLINIC | Age: 61
End: 2021-06-23

## 2021-06-23 NOTE — TELEPHONE ENCOUNTER
Patient needs a refill on warfarin     [facility]    Also wants to know if she can get something for diarrhea- tried OTC but not helping- only other symptom was she was nauseous the other day and thought she was going to vomit but didn't

## 2021-06-24 ENCOUNTER — APPOINTMENT (EMERGENCY)
Dept: CT IMAGING | Facility: HOSPITAL | Age: 61
End: 2021-06-24
Payer: COMMERCIAL

## 2021-06-24 ENCOUNTER — TELEMEDICINE (OUTPATIENT)
Dept: FAMILY MEDICINE CLINIC | Facility: CLINIC | Age: 61
End: 2021-06-24
Payer: COMMERCIAL

## 2021-06-24 ENCOUNTER — HOSPITAL ENCOUNTER (EMERGENCY)
Facility: HOSPITAL | Age: 61
Discharge: HOME | End: 2021-06-24
Attending: INTERNAL MEDICINE
Payer: COMMERCIAL

## 2021-06-24 VITALS
OXYGEN SATURATION: 97 % | RESPIRATION RATE: 16 BRPM | DIASTOLIC BLOOD PRESSURE: 72 MMHG | SYSTOLIC BLOOD PRESSURE: 131 MMHG | TEMPERATURE: 98.1 F | HEART RATE: 70 BPM

## 2021-06-24 DIAGNOSIS — Z95.2 HISTORY OF MECHANICAL AORTIC VALVE REPLACEMENT: ICD-10-CM

## 2021-06-24 DIAGNOSIS — Z86.718 PERSONAL HISTORY OF DVT (DEEP VEIN THROMBOSIS): Primary | ICD-10-CM

## 2021-06-24 DIAGNOSIS — R19.7 DIARRHEA: Primary | ICD-10-CM

## 2021-06-24 DIAGNOSIS — R19.7 DIARRHEA, UNSPECIFIED TYPE: Primary | ICD-10-CM

## 2021-06-24 LAB
ALBUMIN SERPL BCP-MCNC: 3.4 G/DL (ref 3.4–4.8)
ALP SERPL-CCNC: 71.7 U/L (ref 35–140)
ALT SERPL W P-5'-P-CCNC: 22 U/L (ref 5–54)
ANION GAP SERPL CALCULATED.3IONS-SCNC: 7 MMOL/L (ref 4–13)
AST SERPL W P-5'-P-CCNC: 32 U/L (ref 15–41)
BASOPHILS # BLD AUTO: 0.01 THOUSANDS/ΜL (ref 0–0.1)
BASOPHILS NFR BLD AUTO: 0 % (ref 0–1)
BILIRUB SERPL-MCNC: 0.66 MG/DL (ref 0.3–1.2)
BUN SERPL-MCNC: 14 MG/DL (ref 6–20)
CALCIUM ALBUM COR SERPL-MCNC: 9.2 MG/DL (ref 8.3–10.1)
CALCIUM SERPL-MCNC: 8.7 MG/DL (ref 8.4–10.2)
CHLORIDE SERPL-SCNC: 102 MMOL/L (ref 96–108)
CO2 SERPL-SCNC: 28 MMOL/L (ref 22–33)
CREAT SERPL-MCNC: 0.53 MG/DL (ref 0.4–1.1)
EOSINOPHIL # BLD AUTO: 0.09 THOUSAND/ΜL (ref 0–0.61)
EOSINOPHIL NFR BLD AUTO: 3 % (ref 0–6)
ERYTHROCYTE [DISTWIDTH] IN BLOOD BY AUTOMATED COUNT: 13.2 % (ref 11.6–15.1)
GFR SERPL CREATININE-BSD FRML MDRD: 105 ML/MIN/1.73SQ M
GLUCOSE SERPL-MCNC: 356 MG/DL (ref 65–140)
HCT VFR BLD AUTO: 41.6 % (ref 34.8–46.1)
HGB BLD-MCNC: 13.4 G/DL (ref 11.5–15.4)
IMM GRANULOCYTES # BLD AUTO: 0 THOUSAND/UL (ref 0–0.2)
IMM GRANULOCYTES NFR BLD AUTO: 0 % (ref 0–2)
INR PPP: 1.65 (ref 0.9–1.1)
LIPASE SERPL-CCNC: 71 U/L (ref 13–60)
LYMPHOCYTES # BLD AUTO: 0.82 THOUSANDS/ΜL (ref 0.6–4.47)
LYMPHOCYTES NFR BLD AUTO: 24 % (ref 14–44)
MAGNESIUM SERPL-MCNC: 1.7 MG/DL (ref 1.6–2.6)
MCH RBC QN AUTO: 30.9 PG (ref 26.8–34.3)
MCHC RBC AUTO-ENTMCNC: 32.2 G/DL (ref 31.4–37.4)
MCV RBC AUTO: 96 FL (ref 82–98)
MONOCYTES # BLD AUTO: 0.28 THOUSAND/ΜL (ref 0.17–1.22)
MONOCYTES NFR BLD AUTO: 8 % (ref 4–12)
NEUTROPHILS # BLD AUTO: 2.21 THOUSANDS/ΜL (ref 1.85–7.62)
NEUTS SEG NFR BLD AUTO: 65 % (ref 43–75)
PLATELET # BLD AUTO: 122 THOUSANDS/UL (ref 149–390)
PMV BLD AUTO: 10.7 FL (ref 8.9–12.7)
POTASSIUM SERPL-SCNC: 3.8 MMOL/L (ref 3.5–5)
PROT SERPL-MCNC: 6.5 G/DL (ref 6.4–8.3)
PROTHROMBIN TIME: 18.2 SECONDS (ref 9.5–12.1)
RBC # BLD AUTO: 4.34 MILLION/UL (ref 3.81–5.12)
SODIUM SERPL-SCNC: 137 MMOL/L (ref 133–145)
WBC # BLD AUTO: 3.41 THOUSAND/UL (ref 4.31–10.16)

## 2021-06-24 PROCEDURE — 83735 ASSAY OF MAGNESIUM: CPT | Performed by: INTERNAL MEDICINE

## 2021-06-24 PROCEDURE — 85610 PROTHROMBIN TIME: CPT | Performed by: INTERNAL MEDICINE

## 2021-06-24 PROCEDURE — 96361 HYDRATE IV INFUSION ADD-ON: CPT

## 2021-06-24 PROCEDURE — 4004F PT TOBACCO SCREEN RCVD TLK: CPT | Performed by: INTERNAL MEDICINE

## 2021-06-24 PROCEDURE — 74177 CT ABD & PELVIS W/CONTRAST: CPT

## 2021-06-24 PROCEDURE — G1004 CDSM NDSC: HCPCS

## 2021-06-24 PROCEDURE — 99284 EMERGENCY DEPT VISIT MOD MDM: CPT | Performed by: PHYSICIAN ASSISTANT

## 2021-06-24 PROCEDURE — 96372 THER/PROPH/DIAG INJ SC/IM: CPT

## 2021-06-24 PROCEDURE — 96365 THER/PROPH/DIAG IV INF INIT: CPT

## 2021-06-24 PROCEDURE — 99214 OFFICE O/P EST MOD 30 MIN: CPT | Performed by: INTERNAL MEDICINE

## 2021-06-24 PROCEDURE — 83690 ASSAY OF LIPASE: CPT | Performed by: INTERNAL MEDICINE

## 2021-06-24 PROCEDURE — 99284 EMERGENCY DEPT VISIT MOD MDM: CPT

## 2021-06-24 PROCEDURE — 85025 COMPLETE CBC W/AUTO DIFF WBC: CPT | Performed by: INTERNAL MEDICINE

## 2021-06-24 PROCEDURE — 36415 COLL VENOUS BLD VENIPUNCTURE: CPT

## 2021-06-24 PROCEDURE — 80053 COMPREHEN METABOLIC PANEL: CPT | Performed by: INTERNAL MEDICINE

## 2021-06-24 RX ORDER — WARFARIN SODIUM 3 MG/1
6 TABLET ORAL DAILY
Qty: 60 TABLET | Refills: 5 | Status: SHIPPED | OUTPATIENT
Start: 2021-06-24 | End: 2021-08-04

## 2021-06-24 RX ORDER — LEVOFLOXACIN 5 MG/ML
750 INJECTION, SOLUTION INTRAVENOUS ONCE
Status: DISCONTINUED | OUTPATIENT
Start: 2021-06-24 | End: 2021-06-24

## 2021-06-24 RX ORDER — METRONIDAZOLE 500 MG/1
500 TABLET ORAL EVERY 12 HOURS SCHEDULED
Qty: 14 TABLET | Refills: 0 | Status: SHIPPED | OUTPATIENT
Start: 2021-06-24 | End: 2021-07-01

## 2021-06-24 RX ORDER — DIPHENOXYLATE HYDROCHLORIDE AND ATROPINE SULFATE 2.5; .025 MG/1; MG/1
1 TABLET ORAL 4 TIMES DAILY PRN
Qty: 30 TABLET | Refills: 0 | Status: SHIPPED | OUTPATIENT
Start: 2021-06-24 | End: 2021-07-04

## 2021-06-24 RX ADMIN — METRONIDAZOLE 500 MG: 500 SOLUTION INTRAVENOUS at 16:44

## 2021-06-24 RX ADMIN — SODIUM CHLORIDE 1000 ML: 0.9 INJECTION, SOLUTION INTRAVENOUS at 15:14

## 2021-06-24 RX ADMIN — INSULIN HUMAN 7 UNITS: 100 INJECTION, SOLUTION PARENTERAL at 16:40

## 2021-06-24 RX ADMIN — IOHEXOL 100 ML: 350 INJECTION, SOLUTION INTRAVENOUS at 16:04

## 2021-06-24 NOTE — ASSESSMENT & PLAN NOTE
Many different possibilities here-viral, C diff, diverticulitis, malabsorption, diabetes associated-in light of how she has been feeling I suggested she go over to the ER for IVF with potassium, labs, imaging, and possibly GI consult, will hopefully send stool culture as well

## 2021-06-24 NOTE — PROGRESS NOTES
Virtual Brief Visit    Assessment/Plan:    Problem List Items Addressed This Visit        Other    Diarrhea - Primary     Many different possibilities here-viral, C diff, diverticulitis, malabsorption, diabetes associated-in light of how she has been feeling I suggested she go over to the ER for IVF with potassium, labs, imaging, and possibly GI consult, will hopefully send stool culture as well                     Reason for visit is   Chief Complaint   Patient presents with    Virtual Regular Visit    Diarrhea    Nausea        Encounter provider Yvette Cristobal MD    Provider located at [address] [place] [id number] [phone]    Recent Visits  Date Type Provider Dept   06/23/21 Telephone [address] [place] [address] recent visits within past 7 days and meeting all other requirements  Today's Visits  Date Type Provider Dept   06/24/21 Telemedicine Leobardo Swann MD [facility] [address] today's visits and meeting all other requirements  Future Appointments  No visits were found meeting these conditions  Showing future appointments within next 150 days and meeting all other requirements       After connecting through telephone, the patient was identified by name and date of birth  Samm Yuan was informed that this is a telemedicine visit and that the visit is being conducted through telephone  My office door was closed  No one else was in the room  She acknowledged consent and understanding of privacy and security of the platform  The patient has agreed to participate and understands she can discontinue the visit at any time  Patient is aware this is a billable service  Subjective    Samm Yuan is a 64 y o  female with diarrhea for 15 days    Elyse Caprice with a hx of obesity, DM II, HL, tobacco use, mechanical AVR on coumadin-spoke on the phone with her regarding the diarrhea she's had for 15 days-denies blood in her stool, but says she had a fever last night-nausea but no vomiting-says she feels weak and shaky and has pain in her legs-took a whole package of probiotics but says it didn't help-denies eating anything different, new meds or antibiotic use recently       Past Medical History:   Diagnosis Date    Anemia     Anxiety     Aortic valve disorder     Back pain     Chronic pain syndrome     Constipation     Degenerative joint disease involving multiple joints     Depression     DVT (deep venous thrombosis) (HCC)     Bilateral    Edema     Endometrial adenocarcinoma (HCC)     Fibromyalgia     Ganglion of right wrist     GERD (gastroesophageal reflux disease)     Heart murmur     Hematoma     History of mechanical aortic valve replacement     Hypothyroidism (acquired)     Laboratory confirmed diagnosis of COVID-19 2021    Low blood pressure     Malignant neoplasm of corpus uteri, except isthmus (HCC)     Mixed hyperlipidemia     Morbid obesity (HCC)     Obstructive sleep apnea syndrome     Pulmonary embolism (HCC)     Tobacco dependence syndrome     Transient cerebral ischemia     Urinary incontinence     Viral hepatitis C     Vitamin D deficiency        Past Surgical History:   Procedure Laterality Date    ANKLE SURGERY      CARDIAC SURGERY  2015    VALVE REPLACEMENT     SECTION      X3    CHOLECYSTECTOMY      COLONOSCOPY  2019    HYSTERECTOMY  2011    TOTAL       Current Outpatient Medications   Medication Sig Dispense Refill    furosemide (LASIX) 40 mg tablet Take 1 tablet daily for 5 days, then 1 tablet daily on an as needed basis   10 tablet 0    nystatin powder APPLY TOPICALLY 2 (TWO) TIMES A DAY AS NEEDED FOR 14 DAYS      warfarin (Jantoven) 3 mg tablet Take 2 tablets (6 mg total) by mouth daily 60 tablet 5    ascorbic acid (VITAMIN C) 1000 MG tablet Take 1 tablet (1,000 mg total) by mouth daily for 5 doses (Patient not taking: Reported on 6/24/2021) 5 tablet 0    atorvastatin (LIPITOR) 20 mg tablet Take 1 tablet (20 mg total) by mouth daily (Patient not taking: Reported on 4/12/2021) 30 tablet 5    cholecalciferol (VITAMIN D3) 1,000 units tablet Take 2 tablets (2,000 Units total) by mouth daily for 10 days (Patient not taking: Reported on 4/12/2021) 20 tablet 0    metFORMIN (GLUCOPHAGE) 500 mg tablet       mirtazapine (REMERON) 45 MG tablet Take 45 mg by mouth daily at bedtime      nicotine (NICODERM CQ) 14 mg/24hr TD 24 hr patch Place 1 patch on the skin every 24 hours (Patient not taking: Reported on 4/23/2021) 28 patch 0    traZODone (DESYREL) 50 mg tablet Take 1 tablet (50 mg total) by mouth daily at bedtime as needed for sleep (Patient not taking: Reported on 6/24/2021) 15 tablet 0    ziprasidone (GEODON) 60 mg capsule Take 60 mg by mouth daily Take with food       No current facility-administered medications for this visit  Allergies   Allergen Reactions    Bactrim [Sulfamethoxazole-Trimethoprim] Other (See Comments)     Patient is unaware of why this allergy is recorded, denies knowledge of it    Penicillins Other (See Comments)     Patient is unaware of why this allergy is recorded, denies knowledge of it    Tramadol      Could not function - felt very ill        Review of Systems   Constitutional: Positive for fatigue and fever  Respiratory: Negative for shortness of breath  Cardiovascular: Negative for chest pain and leg swelling  Gastrointestinal: Positive for diarrhea and nausea  Negative for vomiting  Musculoskeletal: Positive for myalgias  Neurological: Positive for weakness  There were no vitals filed for this visit  I spent 20 minutes directly with the patient during this visit    [facility] acknowledges that she has consented to an online visit or consultation   She understands that the online visit is based solely on information provided by her, and that, in the absence of a face-to-face physical evaluation by the physician, the diagnosis she receives is both limited and provisional in terms of accuracy and completeness  This is not intended to replace a full medical face-to-face evaluation by the physician  Jose Aguilar understands and accepts these terms

## 2021-06-24 NOTE — PROGRESS NOTES
Virtual Brief Visit    Assessment/Plan:    Problem List Items Addressed This Visit        Other    Diarrhea - Primary     Unclear in etiology to me-could be viral, C diff, diabetes associated diarrhea, with how weak she's feeling and leg cramps/pain I advised her to ask her sister to take her over to the ER-I told her she likely needs IVF and IV K and that she needs labs, a stool study, and may need GI consult as well as imaging-she is amenable to the plan                     Reason for visit is   Chief Complaint   Patient presents with    Virtual Brief Visit        Encounter provider Chrissy eBrnal MD    Provider located at [address] [place] [address] [facility] [id number] [phone]    Recent Visits  Date Type Provider Dept   06/23/21 Telephone 514 [facility] recent visits within past 7 days and meeting all other requirements  Today's Visits  Date Type Provider Dept   06/24/21 Anabel Vu MD [facility] 26 today's visits and meeting all other requirements  Future Appointments  No visits were found meeting these conditions  Showing future appointments within next 150 days and meeting all other requirements       After connecting through telephone, the patient was identified by name and date of birth  Miriam Stafford was informed that this is a telemedicine visit and that the visit is being conducted through telephone  My office door was closed  No one else was in the room  She acknowledged consent and understanding of privacy and security of the platform  The patient has agreed to participate and understands she can discontinue the visit at any time  Patient is aware this is a billable service  Subjective    Miriam Stafford is a 64 y o  female with diarrhea for past 15 days    Merline Root is a 65 yo female with a hx of DM II, obesity, bipolar depression, hx of mechanical AVR on Coumadin,GERD-called in today on the telephone with 15 days of diarrhea  Says her stool is very runny, no blood in it per say, has to go to the bathroom many times per day, and felt like she had a fever last night  Has some nausea but no vomiting  Says she feels very weak, her legs hurt and are cramping and she took a whole box of probiotics but it didn't help  Slight abdominal pain    Says she isn't on any new meds and that she hasn't eaten anything unusual   Has not been on an antibiotic in the recent past       Past Medical History:   Diagnosis Date    Anemia     Anxiety     Aortic valve disorder     Back pain     Chronic pain syndrome     Constipation     Degenerative joint disease involving multiple joints     Depression     DVT (deep venous thrombosis) (HCC)     Bilateral    Edema     Endometrial adenocarcinoma (HCC)     Fibromyalgia     Ganglion of right wrist     GERD (gastroesophageal reflux disease)     Heart murmur     Hematoma     History of mechanical aortic valve replacement     Hypothyroidism (acquired)     Laboratory confirmed diagnosis of COVID-19 2021    Low blood pressure     Malignant neoplasm of corpus uteri, except isthmus (HCC)     Mixed hyperlipidemia     Morbid obesity (HCC)     Obstructive sleep apnea syndrome     Pulmonary embolism (HCC)     Tobacco dependence syndrome     Transient cerebral ischemia     Urinary incontinence     Viral hepatitis C     Vitamin D deficiency        Past Surgical History:   Procedure Laterality Date    ANKLE SURGERY      CARDIAC SURGERY  2015    VALVE REPLACEMENT     SECTION      X3    CHOLECYSTECTOMY      COLONOSCOPY  2019    HYSTERECTOMY  2011    TOTAL       Current Outpatient Medications   Medication Sig Dispense Refill    ascorbic acid (VITAMIN C) 1000 MG tablet Take 1 tablet (1,000 mg total) by mouth daily for 5 doses (Patient not taking: Reported on 2021) 5 tablet 0    atorvastatin (LIPITOR) 20 mg tablet Take 1 tablet (20 mg total) by mouth daily (Patient not taking: Reported on 4/12/2021) 30 tablet 5    cholecalciferol (VITAMIN D3) 1,000 units tablet Take 2 tablets (2,000 Units total) by mouth daily for 10 days (Patient not taking: Reported on 4/12/2021) 20 tablet 0    furosemide (LASIX) 40 mg tablet Take 1 tablet daily for 5 days, then 1 tablet daily on an as needed basis  10 tablet 0    metFORMIN (GLUCOPHAGE) 500 mg tablet       mirtazapine (REMERON) 45 MG tablet Take 45 mg by mouth daily at bedtime      nicotine (NICODERM CQ) 14 mg/24hr TD 24 hr patch Place 1 patch on the skin every 24 hours (Patient not taking: Reported on 4/23/2021) 28 patch 0    nystatin powder APPLY TOPICALLY 2 (TWO) TIMES A DAY AS NEEDED FOR 14 DAYS      traZODone (DESYREL) 50 mg tablet Take 1 tablet (50 mg total) by mouth daily at bedtime as needed for sleep (Patient not taking: Reported on 6/24/2021) 15 tablet 0    warfarin (Jantoven) 3 mg tablet Take 2 tablets (6 mg total) by mouth daily 60 tablet 5    ziprasidone (GEODON) 60 mg capsule Take 60 mg by mouth daily Take with food       No current facility-administered medications for this visit  Allergies   Allergen Reactions    Bactrim [Sulfamethoxazole-Trimethoprim] Other (See Comments)     Patient is unaware of why this allergy is recorded, denies knowledge of it    Penicillins Other (See Comments)     Patient is unaware of why this allergy is recorded, denies knowledge of it    Tramadol      Could not function - felt very ill        Review of Systems   Constitutional: Positive for fatigue and fever  Respiratory: Negative for cough and shortness of breath  Cardiovascular: Negative for chest pain  Gastrointestinal: Positive for diarrhea and nausea  Negative for abdominal pain, blood in stool, constipation and vomiting  Musculoskeletal: Positive for myalgias  Neurological: Positive for weakness         There were no vitals filed for this visit       I spent 20 minutes directly with the patient during this visit    VIRTUAL VISIT 8080 Briseyda Mcgee acknowledges that she has consented to an online visit or consultation  She understands that the online visit is based solely on information provided by her, and that, in the absence of a face-to-face physical evaluation by the physician, the diagnosis she receives is both limited and provisional in terms of accuracy and completeness  This is not intended to replace a full medical face-to-face evaluation by the physician  Sandro Lawrence understands and accepts these terms

## 2021-06-24 NOTE — ASSESSMENT & PLAN NOTE
Unclear in etiology to me-could be viral, C diff, diabetes associated diarrhea, with how weak she's feeling and leg cramps/pain I advised her to ask her sister to take her over to the ER-I told her she likely needs IVF and IV K and that she needs labs, a stool study, and may need GI consult as well as imaging-she is amenable to the plan

## 2021-06-24 NOTE — ED PROVIDER NOTES
History  Chief Complaint   Patient presents with    Diarrhea     diarrhea x 15 days, generalized weakness  sent by PCP for labs     61-year-old female comes in today complaining of diarrhea for 15 days  She reports profusely watery diarrhea greater than 10 episodes daily  She complains of crampy abdominal pain while she is having the bowel movements, however does not have any active pain now  She has nausea sometimes, but none now  She complains of bilateral thigh cramping  She had a video visit with her PCP who recommended that she come to the hospital for evaluation  She reports that she has been eating and drinking, however every time she eats or drinks something, she immediately has a bowel movement  She denies any blood or mucus in her stool  She denies any suspicious food intake  She reports that she took antibiotics about 2 months ago  Denies any recent travel  She reports that she has not been taking her metformin  She does have a prescription waiting for her at the pharmacy the          Prior to Admission Medications   Prescriptions Last Dose Informant Patient Reported? Taking?   ascorbic acid (VITAMIN C) 1000 MG tablet   No No   Sig: Take 1 tablet (1,000 mg total) by mouth daily for 5 doses   Patient not taking: Reported on 6/24/2021   atorvastatin (LIPITOR) 20 mg tablet Not Taking at Unknown time Self No No   Sig: Take 1 tablet (20 mg total) by mouth daily   Patient not taking: Reported on 4/12/2021   cholecalciferol (VITAMIN D3) 1,000 units tablet  Self No No   Sig: Take 2 tablets (2,000 Units total) by mouth daily for 10 days   Patient not taking: Reported on 4/12/2021   furosemide (LASIX) 40 mg tablet Past Month at Unknown time  No Yes   Sig: Take 1 tablet daily for 5 days, then 1 tablet daily on an as needed basis     metFORMIN (GLUCOPHAGE) 500 mg tablet Not Taking at Unknown time  Yes No   Patient not taking: Reported on 6/24/2021   mirtazapine (REMERON) 45 MG tablet Past Month at Unknown time  Yes Yes   Sig: Take 45 mg by mouth daily at bedtime   nicotine (NICODERM CQ) 14 mg/24hr TD 24 hr patch   No No   Sig: Place 1 patch on the skin every 24 hours   Patient not taking: Reported on 2021   nystatin powder   Yes No   Sig: APPLY TOPICALLY 2 (TWO) TIMES A DAY AS NEEDED FOR 14 DAYS   traZODone (DESYREL) 50 mg tablet Not Taking at Unknown time  No No   Sig: Take 1 tablet (50 mg total) by mouth daily at bedtime as needed for sleep   Patient not taking: Reported on 2021   warfarin (Jantoven) 3 mg tablet 2021 at Unknown time  No Yes   Sig: Take 2 tablets (6 mg total) by mouth daily   Patient taking differently: Take 6 mg by mouth daily 2 5 tablets on Monday   ziprasidone (GEODON) 60 mg capsule Past Week at Unknown time  Yes Yes   Sig: Take 60 mg by mouth daily Take with food      Facility-Administered Medications: None       Past Medical History:   Diagnosis Date    Anemia     Anxiety     Aortic valve disorder     Back pain     Chronic pain syndrome     Constipation     Degenerative joint disease involving multiple joints     Depression     DVT (deep venous thrombosis) (HCC)     Bilateral    Edema     Endometrial adenocarcinoma (HCC)     Fibromyalgia     Ganglion of right wrist     GERD (gastroesophageal reflux disease)     Heart murmur     Hematoma     History of mechanical aortic valve replacement     Hypothyroidism (acquired)     Laboratory confirmed diagnosis of COVID-19 2021    Low blood pressure     Malignant neoplasm of corpus uteri, except isthmus (HCC)     Mixed hyperlipidemia     Morbid obesity (HCC)     Obstructive sleep apnea syndrome     Pulmonary embolism (HCC)     Tobacco dependence syndrome     Transient cerebral ischemia     Urinary incontinence     Viral hepatitis C     Vitamin D deficiency        Past Surgical History:   Procedure Laterality Date    ANKLE SURGERY      CARDIAC SURGERY  2015    VALVE REPLACEMENT     SECTION X3    CHOLECYSTECTOMY      COLONOSCOPY  04/01/2019    HYSTERECTOMY  04/26/2011    TOTAL       Family History   Problem Relation Age of Onset    Coronary artery disease Mother     Coronary artery disease Father      I have reviewed and agree with the history as documented  E-Cigarette/Vaping    E-Cigarette Use Never User      E-Cigarette/Vaping Substances    Nicotine No     THC No     CBD No     Flavoring No     Other No     Unknown No      Social History     Tobacco Use    Smoking status: Current Some Day Smoker     Packs/day: 0 25     Years: 40 00     Pack years: 10 00     Types: Cigarettes    Smokeless tobacco: Never Used   Vaping Use    Vaping Use: Never used   Substance Use Topics    Alcohol use: Yes    Drug use: Yes     Types: Marijuana       Review of Systems   Constitutional: Positive for fatigue  Negative for fever  HENT: Negative for ear pain, rhinorrhea and sore throat  Eyes: Negative for visual disturbance  Respiratory: Negative for cough and shortness of breath  Cardiovascular: Negative for chest pain  Gastrointestinal: Positive for abdominal pain, diarrhea and nausea  Negative for vomiting  Musculoskeletal: Positive for myalgias  Negative for back pain  Skin: Negative for rash  Neurological: Negative for headaches  Psychiatric/Behavioral: Negative for behavioral problems  Physical Exam  Physical Exam  Vitals and nursing note reviewed  Constitutional:       General: She is not in acute distress  Appearance: Normal appearance  She is morbidly obese  She is not ill-appearing or toxic-appearing  HENT:      Head: Normocephalic and atraumatic  Eyes:      Extraocular Movements: Extraocular movements intact  Cardiovascular:      Rate and Rhythm: Normal rate and regular rhythm  Pulmonary:      Effort: Pulmonary effort is normal  No respiratory distress  Breath sounds: Normal breath sounds  Abdominal:      General: Abdomen is flat   Bowel sounds are increased  Palpations: Abdomen is soft  Tenderness: There is no abdominal tenderness  Musculoskeletal:         General: Normal range of motion  Cervical back: Normal range of motion  Skin:     General: Skin is warm and dry  Neurological:      General: No focal deficit present  Mental Status: She is alert     Psychiatric:         Mood and Affect: Mood normal          Behavior: Behavior normal          Vital Signs  ED Triage Vitals   Temperature Pulse Respirations Blood Pressure SpO2   06/24/21 1432 06/24/21 1432 06/24/21 1432 06/24/21 1432 06/24/21 1432   98 1 °F (36 7 °C) 80 18 166/73 97 %      Temp Source Heart Rate Source Patient Position - Orthostatic VS BP Location FiO2 (%)   06/24/21 1432 06/24/21 1432 06/24/21 1432 06/24/21 1432 --   Oral Monitor Sitting Left arm       Pain Score       06/24/21 1722       No Pain           Vitals:    06/24/21 1432 06/24/21 1722   BP: 166/73 131/72   Pulse: 80 70   Patient Position - Orthostatic VS: Sitting Lying         Visual Acuity      ED Medications  Medications   sodium chloride 0 9 % bolus 1,000 mL (0 mL Intravenous Stopped 6/24/21 1723)   iohexol (OMNIPAQUE) 350 MG/ML injection (SINGLE-DOSE) 100 mL (100 mL Intravenous Given 6/24/21 1604)   insulin regular (HumuLIN R,NovoLIN R) injection 7 Units (7 Units Subcutaneous Given 6/24/21 1640)   metroNIDAZOLE (FLAGYL) IVPB (premix) 500 mg 100 mL (0 mg Intravenous Stopped 6/24/21 1759)       Diagnostic Studies  Results Reviewed     Procedure Component Value Units Date/Time    Comprehensive metabolic panel [[id number]]  (Abnormal) Collected: 06/24/21 1511    Lab Status: Final result Specimen: Blood from Arm, Left Updated: 06/24/21 1541     Sodium 137 mmol/L      Potassium 3 8 mmol/L      Chloride 102 mmol/L      CO2 28 mmol/L      ANION GAP 7 mmol/L      BUN 14 mg/dL      Creatinine 0 53 mg/dL      Glucose 356 mg/dL      Calcium 8 7 mg/dL      Corrected Calcium 9 2 mg/dL      AST 32 U/L      ALT 22 U/L Alkaline Phosphatase 71 7 U/L      Total Protein 6 5 g/dL      Albumin 3 4 g/dL      Total Bilirubin 0 66 mg/dL      eGFR 105 ml/min/1 73sq m     Narrative:      National Kidney Disease Foundation guidelines for Chronic Kidney Disease (CKD):     Stage 1 with normal or high GFR (GFR > 90 mL/min/1 73 square meters)    Stage 2 Mild CKD (GFR = 60-89 mL/min/1 73 square meters)    Stage 3A Moderate CKD (GFR = 45-59 mL/min/1 73 square meters)    Stage 3B Moderate CKD (GFR = 30-44 mL/min/1 73 square meters)    Stage 4 Severe CKD (GFR = 15-29 mL/min/1 73 square meters)    Stage 5 End Stage CKD (GFR <15 mL/min/1 73 square meters)  Note: GFR calculation is accurate only with a steady state creatinine    Lipase [[id number]]  (Abnormal) Collected: 06/24/21 1511    Lab Status: Final result Specimen: Blood from Arm, Left Updated: 06/24/21 1541     Lipase 71 u/L     Magnesium [[id number]]  (Normal) Collected: 06/24/21 1511    Lab Status: Final result Specimen: Blood from Arm, Left Updated: 06/24/21 1541     Magnesium 1 7 mg/dL     Protime-INR [[id number]]  (Abnormal) Collected: 06/24/21 1513    Lab Status: Final result Specimen: Blood from Arm, Left Updated: 06/24/21 1534     Protime 18 2 seconds      INR 1 65    Narrative:      INR Reference Ranges:  No Anticoagulant, Normal:           0 9-1 1  Standard Dose, Oral Anticoagulant:  2 0-3 0  High Dose, Oral Anticoagulant:      2 5-3 5    CBC and differential [[id number]]  (Abnormal) Collected: 06/24/21 1511    Lab Status: Final result Specimen: Blood from Arm, Left Updated: 06/24/21 1527     WBC 3 41 Thousand/uL      RBC 4 34 Million/uL      Hemoglobin 13 4 g/dL      Hematocrit 41 6 %      MCV 96 fL      MCH 30 9 pg      MCHC 32 2 g/dL      RDW 13 2 %      MPV 10 7 fL      Platelets 234 Thousands/uL      Neutrophils Relative 65 %      Immat GRANS % 0 %      Lymphocytes Relative 24 %      Monocytes Relative 8 %      Eosinophils Relative 3 %      Basophils Relative 0 % Neutrophils Absolute 2 21 Thousands/µL      Immature Grans Absolute 0 00 Thousand/uL      Lymphocytes Absolute 0 82 Thousands/µL      Monocytes Absolute 0 28 Thousand/µL      Eosinophils Absolute 0 09 Thousand/µL      Basophils Absolute 0 01 Thousands/µL     Stool Enteric Bacterial Panel by PCR [[id number]]     Lab Status: No result Specimen: Stool     Fecal leukocytes [[id number]]     Lab Status: No result Specimen: Stool     Clostridium difficile toxin by PCR with EIA [[id number]]     Lab Status: No result Specimen: Stool from Per Rectum                  CT abdomen pelvis with contrast   Final Result by Wander Ashby MD (06/24 1635)      Mild hepatomegaly with hepatic steatosis and lobulated contour suggesting cirrhosis  Workstation performed: [id number]                    Procedures  Procedures         ED Course  ED Course as of Jun 24 2056   Thu Jun 24, 2021   1540 Same as 3 months ago   Platelet Count(!): 416                                           MDM  Number of Diagnoses or Management Options  Diarrhea  Diagnosis management comments: Case was discussed with Dr Wu Fairly prior to patient discharge  Patient was unable to provide a stool sample in her nearly 4 hours of being in the emergency department despite eating and drinking  Patient was sent with outpatient prescriptions for stool studies  Patient was given prescription for Lomotil and Flagyl to cover for infectious colitis including C diff  Although upon reviewing her chart it does not look like she has had antibiotics since 3/19/21 and was prescribed Cipro at that time  There were no acute findings on her CT  She was aware of her cirrhotic liver  I encouraged the patient to take her metformin as her most recent A1c was 7 5 and it previously was 6 2  Portions of the record may have been created with voice recognition software   Occasional wrong word or "sound a like" substitutions may have occurred due to the inherent limitations of voice recognition software  Read the chart carefully and recognize, using context, where substitutions have occurred  Disposition  Final diagnoses:   Diarrhea     Time reflects when diagnosis was documented in both MDM as applicable and the Disposition within this note     Time User Action Codes Description Comment    6/24/2021  5:34 PM [address] [facility] [R19 7] Diarrhea       ED Disposition     ED Disposition Condition Date/Time Comment    Discharge Stable Thu Jun 24, 2021  5:34 PM Samm Yuan discharge to home/self care  Follow-up Information     Follow up With Specialties Details Why [address] [facility] [address], [place] Internal Medicine, Pediatrics Call in 1 day  [address]  [address] [facility] [address]  [phone]            Discharge Medication List as of 6/24/2021  5:41 PM      START taking these medications    Details   diphenoxylate-atropine (LOMOTIL) 2 5-0 025 mg per tablet Take 1 tablet by mouth 4 (four) times a day as needed for diarrhea for up to 10 days, Starting Thu 6/24/2021, Until Sun 7/4/2021 at 2359, Normal      metroNIDAZOLE (FLAGYL) 500 mg tablet Take 1 tablet (500 mg total) by mouth every 12 (twelve) hours for 7 days, Starting Thu 6/24/2021, Until Thu 7/1/2021, Normal         CONTINUE these medications which have NOT CHANGED    Details   furosemide (LASIX) 40 mg tablet Take 1 tablet daily for 5 days, then 1 tablet daily on an as needed basis  , Normal      mirtazapine (REMERON) 45 MG tablet Take 45 mg by mouth daily at bedtime, Starting Wed 5/19/2021, Historical Med      warfarin (Jantoven) 3 mg tablet Take 2 tablets (6 mg total) by mouth daily, Starting Thu 6/24/2021, Until Sat 7/24/2021, Normal      ziprasidone (GEODON) 60 mg capsule Take 60 mg by mouth daily Take with food, Starting Wed 5/19/2021, Historical Med      ascorbic acid (VITAMIN C) 1000 MG tablet Take 1 tablet (1,000 mg total) by mouth daily for 5 doses, Starting Tue 4/27/2021, Until Sun 5/2/2021, Normal      atorvastatin (LIPITOR) 20 mg tablet Take 1 tablet (20 mg total) by mouth daily, Starting Mon 1/25/2021, Normal      cholecalciferol (VITAMIN D3) 1,000 units tablet Take 2 tablets (2,000 Units total) by mouth daily for 10 days, Starting Tue 3/16/2021, Until Fri 3/26/2021, Normal      metFORMIN (GLUCOPHAGE) 500 mg tablet Starting Tue 5/18/2021, Historical Med      nicotine (NICODERM CQ) 14 mg/24hr TD 24 hr patch Place 1 patch on the skin every 24 hours, Starting Mon 4/12/2021, Normal      nystatin powder APPLY TOPICALLY 2 (TWO) TIMES A DAY AS NEEDED FOR 14 DAYS, Historical Med      traZODone (DESYREL) 50 mg tablet Take 1 tablet (50 mg total) by mouth daily at bedtime as needed for sleep, Starting Tue 4/27/2021, Normal           Outpatient Discharge Orders   Stool Enteric Bacterial Panel by PCR   Standing Status: Future Standing Exp  Date: 06/24/22     Clostridium difficile toxin by PCR with EIA   Standing Status: Future Standing Exp  Date: 06/24/22     FECAL LEUKOCYTES   Standing Status: Future Standing Exp   Date: 06/24/22       PDMP Review     None          ED Provider  Electronically Signed by           Reza Roman PA-C  06/24/21 2057

## 2021-07-09 ENCOUNTER — HOSPITAL ENCOUNTER (EMERGENCY)
Facility: HOSPITAL | Age: 61
Discharge: HOME | End: 2021-07-09
Payer: COMMERCIAL

## 2021-07-09 VITALS
WEIGHT: 293 LBS | TEMPERATURE: 97.8 F | DIASTOLIC BLOOD PRESSURE: 60 MMHG | HEART RATE: 75 BPM | OXYGEN SATURATION: 95 % | RESPIRATION RATE: 18 BRPM | BODY MASS INDEX: 50.02 KG/M2 | SYSTOLIC BLOOD PRESSURE: 129 MMHG | HEIGHT: 64 IN

## 2021-07-09 DIAGNOSIS — R19.7 DIARRHEA, UNSPECIFIED TYPE: Primary | ICD-10-CM

## 2021-07-09 DIAGNOSIS — E11.65 HYPERGLYCEMIA DUE TO DIABETES MELLITUS (HCC): ICD-10-CM

## 2021-07-09 LAB
ALBUMIN SERPL BCP-MCNC: 3.5 G/DL (ref 3.4–4.8)
ALP SERPL-CCNC: 68.9 U/L (ref 35–140)
ALT SERPL W P-5'-P-CCNC: 18 U/L (ref 5–54)
ANION GAP SERPL CALCULATED.3IONS-SCNC: 7 MMOL/L (ref 4–13)
AST SERPL W P-5'-P-CCNC: 31 U/L (ref 15–41)
BASOPHILS # BLD AUTO: 0.01 THOUSANDS/ΜL (ref 0–0.1)
BASOPHILS NFR BLD AUTO: 0 % (ref 0–1)
BILIRUB SERPL-MCNC: 0.87 MG/DL (ref 0.3–1.2)
BILIRUB UR QL STRIP: NEGATIVE
BUN SERPL-MCNC: 13 MG/DL (ref 6–20)
CALCIUM SERPL-MCNC: 8.5 MG/DL (ref 8.4–10.2)
CHLORIDE SERPL-SCNC: 100 MMOL/L (ref 96–108)
CLARITY UR: CLEAR
CO2 SERPL-SCNC: 31 MMOL/L (ref 22–33)
COLOR UR: YELLOW
CREAT SERPL-MCNC: 0.57 MG/DL (ref 0.4–1.1)
EOSINOPHIL # BLD AUTO: 0.09 THOUSAND/ΜL (ref 0–0.61)
EOSINOPHIL NFR BLD AUTO: 3 % (ref 0–6)
ERYTHROCYTE [DISTWIDTH] IN BLOOD BY AUTOMATED COUNT: 13.1 % (ref 11.6–15.1)
GFR SERPL CREATININE-BSD FRML MDRD: 103 ML/MIN/1.73SQ M
GLUCOSE SERPL-MCNC: 338 MG/DL (ref 65–140)
GLUCOSE UR STRIP-MCNC: ABNORMAL MG/DL
HCT VFR BLD AUTO: 41.2 % (ref 34.8–46.1)
HGB BLD-MCNC: 13.2 G/DL (ref 11.5–15.4)
HGB UR QL STRIP.AUTO: NEGATIVE
IMM GRANULOCYTES # BLD AUTO: 0 THOUSAND/UL (ref 0–0.2)
IMM GRANULOCYTES NFR BLD AUTO: 0 % (ref 0–2)
KETONES UR STRIP-MCNC: NEGATIVE MG/DL
LEUKOCYTE ESTERASE UR QL STRIP: NEGATIVE
LIPASE SERPL-CCNC: 58 U/L (ref 13–60)
LYMPHOCYTES # BLD AUTO: 0.66 THOUSANDS/ΜL (ref 0.6–4.47)
LYMPHOCYTES NFR BLD AUTO: 23 % (ref 14–44)
MCH RBC QN AUTO: 31 PG (ref 26.8–34.3)
MCHC RBC AUTO-ENTMCNC: 32 G/DL (ref 31.4–37.4)
MCV RBC AUTO: 97 FL (ref 82–98)
MONOCYTES # BLD AUTO: 0.17 THOUSAND/ΜL (ref 0.17–1.22)
MONOCYTES NFR BLD AUTO: 6 % (ref 4–12)
NEUTROPHILS # BLD AUTO: 1.97 THOUSANDS/ΜL (ref 1.85–7.62)
NEUTS SEG NFR BLD AUTO: 68 % (ref 43–75)
NITRITE UR QL STRIP: NEGATIVE
PH UR STRIP.AUTO: 7 [PH]
PLATELET # BLD AUTO: 133 THOUSANDS/UL (ref 149–390)
PMV BLD AUTO: 10.9 FL (ref 8.9–12.7)
POTASSIUM SERPL-SCNC: 3.6 MMOL/L (ref 3.5–5)
PROT SERPL-MCNC: 6.8 G/DL (ref 6.4–8.3)
PROT UR STRIP-MCNC: NEGATIVE MG/DL
RBC # BLD AUTO: 4.26 MILLION/UL (ref 3.81–5.12)
SODIUM SERPL-SCNC: 138 MMOL/L (ref 133–145)
SP GR UR STRIP.AUTO: 1.01 (ref 1–1.03)
TROPONIN I SERPL-MCNC: <0.03 NG/ML (ref 0–0.07)
UROBILINOGEN UR QL STRIP.AUTO: >=8 E.U./DL
WBC # BLD AUTO: 2.9 THOUSAND/UL (ref 4.31–10.16)

## 2021-07-09 PROCEDURE — 87505 NFCT AGENT DETECTION GI: CPT

## 2021-07-09 PROCEDURE — 83690 ASSAY OF LIPASE: CPT

## 2021-07-09 PROCEDURE — 96361 HYDRATE IV INFUSION ADD-ON: CPT

## 2021-07-09 PROCEDURE — 96374 THER/PROPH/DIAG INJ IV PUSH: CPT

## 2021-07-09 PROCEDURE — 89055 LEUKOCYTE ASSESSMENT FECAL: CPT

## 2021-07-09 PROCEDURE — 85025 COMPLETE CBC W/AUTO DIFF WBC: CPT

## 2021-07-09 PROCEDURE — 99284 EMERGENCY DEPT VISIT MOD MDM: CPT

## 2021-07-09 PROCEDURE — 96375 TX/PRO/DX INJ NEW DRUG ADDON: CPT

## 2021-07-09 PROCEDURE — 80053 COMPREHEN METABOLIC PANEL: CPT

## 2021-07-09 PROCEDURE — 99285 EMERGENCY DEPT VISIT HI MDM: CPT

## 2021-07-09 PROCEDURE — 81003 URINALYSIS AUTO W/O SCOPE: CPT

## 2021-07-09 PROCEDURE — 84484 ASSAY OF TROPONIN QUANT: CPT

## 2021-07-09 PROCEDURE — 36415 COLL VENOUS BLD VENIPUNCTURE: CPT

## 2021-07-09 RX ORDER — DICYCLOMINE HCL 20 MG
20 TABLET ORAL 2 TIMES DAILY
Qty: 20 TABLET | Refills: 0 | Status: SHIPPED | OUTPATIENT
Start: 2021-07-09 | End: 2021-07-09 | Stop reason: SDUPTHER

## 2021-07-09 RX ORDER — SODIUM CHLORIDE 9 MG/ML
1000 INJECTION, SOLUTION INTRAVENOUS CONTINUOUS
Status: DISCONTINUED | OUTPATIENT
Start: 2021-07-09 | End: 2021-07-09 | Stop reason: HOSPADM

## 2021-07-09 RX ORDER — DICYCLOMINE HCL 20 MG
20 TABLET ORAL 2 TIMES DAILY
Qty: 20 TABLET | Refills: 0 | Status: SHIPPED | OUTPATIENT
Start: 2021-07-09 | End: 2021-08-10 | Stop reason: SDUPTHER

## 2021-07-09 RX ORDER — SODIUM CHLORIDE, SODIUM LACTATE, POTASSIUM CHLORIDE, CALCIUM CHLORIDE 600; 310; 30; 20 MG/100ML; MG/100ML; MG/100ML; MG/100ML
500 INJECTION, SOLUTION INTRAVENOUS ONCE
Status: COMPLETED | OUTPATIENT
Start: 2021-07-09 | End: 2021-07-09

## 2021-07-09 RX ORDER — ONDANSETRON 2 MG/ML
4 INJECTION INTRAMUSCULAR; INTRAVENOUS ONCE
Status: COMPLETED | OUTPATIENT
Start: 2021-07-09 | End: 2021-07-09

## 2021-07-09 RX ORDER — DICYCLOMINE HCL 20 MG
20 TABLET ORAL ONCE
Status: COMPLETED | OUTPATIENT
Start: 2021-07-09 | End: 2021-07-09

## 2021-07-09 RX ADMIN — SODIUM CHLORIDE, SODIUM LACTATE, POTASSIUM CHLORIDE, AND CALCIUM CHLORIDE 500 ML: .6; .31; .03; .02 INJECTION, SOLUTION INTRAVENOUS at 15:32

## 2021-07-09 RX ADMIN — ONDANSETRON 4 MG: 2 INJECTION INTRAMUSCULAR; INTRAVENOUS at 15:34

## 2021-07-09 RX ADMIN — DICYCLOMINE HYDROCHLORIDE 20 MG: 20 TABLET ORAL at 15:38

## 2021-07-09 RX ADMIN — MORPHINE SULFATE 2 MG: 2 INJECTION, SOLUTION INTRAMUSCULAR; INTRAVENOUS at 15:34

## 2021-07-09 RX ADMIN — SODIUM CHLORIDE 1000 ML/HR: 0.9 INJECTION, SOLUTION INTRAVENOUS at 14:02

## 2021-07-09 NOTE — DISCHARGE INSTRUCTIONS
Continue current medications and use Bentyl as needed for cramps  Recommend Januvia be added for management of glucose and rx given for this

## 2021-07-09 NOTE — ED NOTES
C o 9/10 pain lower back and legs    Dr Lo Vitale made aware     Bard Joseph, RN  07/09/21 2926
Patient stated she needed to use the bathroom but could not walk to rest room, bedside commode brought to bedside     Jesus Prather  07/09/21 7247
HEADACHE

## 2021-07-09 NOTE — ED PROVIDER NOTES
History  Chief Complaint   Patient presents with    Diarrhea     diarrhea for 1 month and hasn't peed since yesterday and had a fever she states  "And now my legs really hurt bad"     61-year-old female history of morbid obesity pulmonary embolism elevated cholesterol adult onset diabetes presents secondary to diarrhea for the past 3-4 weeks  Patient states she gets has multiple episodes of loose bowel daily  Patient denies any blood in her stools  Patient denies any nausea vomiting  Patient denies vomiting up any blood  Patient is awake and alert in mild distress  Patient states that she was seen and evaluated for the same thing approximately 2 weeks ago here was given prescription for medication for abdominal cramps given a prescription to bring back stool specimens which she never did  Patient however did bring back stool specimens with her today which she produced prior to arrival   Patient is awake alert mild distress secondary to her symptoms  Prior to Admission Medications   Prescriptions Last Dose Informant Patient Reported? Taking?   ascorbic acid (VITAMIN C) 1000 MG tablet   No No   Sig: Take 1 tablet (1,000 mg total) by mouth daily for 5 doses   Patient not taking: Reported on 6/24/2021   atorvastatin (LIPITOR) 20 mg tablet  Self No No   Sig: Take 1 tablet (20 mg total) by mouth daily   Patient not taking: Reported on 4/12/2021   cholecalciferol (VITAMIN D3) 1,000 units tablet  Self No No   Sig: Take 2 tablets (2,000 Units total) by mouth daily for 10 days   Patient not taking: Reported on 4/12/2021   furosemide (LASIX) 40 mg tablet   No No   Sig: Take 1 tablet daily for 5 days, then 1 tablet daily on an as needed basis     metFORMIN (GLUCOPHAGE) 500 mg tablet 7/8/2021 at Unknown time  Yes Yes   mirtazapine (REMERON) 45 MG tablet   Yes No   Sig: Take 45 mg by mouth daily at bedtime   nicotine (NICODERM CQ) 14 mg/24hr TD 24 hr patch   No No   Sig: Place 1 patch on the skin every 24 hours Patient not taking: Reported on 2021   nystatin powder   Yes No   Sig: APPLY TOPICALLY 2 (TWO) TIMES A DAY AS NEEDED FOR 14 DAYS   traZODone (DESYREL) 50 mg tablet   No No   Sig: Take 1 tablet (50 mg total) by mouth daily at bedtime as needed for sleep   Patient not taking: Reported on 2021   warfarin (Jantoven) 3 mg tablet   No No   Sig: Take 2 tablets (6 mg total) by mouth daily   Patient taking differently: Take 6 mg by mouth daily 2 5 tablets on Monday   ziprasidone (GEODON) 60 mg capsule 2021 at Unknown time  Yes Yes   Sig: Take 60 mg by mouth daily Take with food      Facility-Administered Medications: None       Past Medical History:   Diagnosis Date    Anemia     Anxiety     Aortic valve disorder     Back pain     Chronic pain syndrome     Constipation     Degenerative joint disease involving multiple joints     Depression     DVT (deep venous thrombosis) (HCC)     Bilateral    Edema     Endometrial adenocarcinoma (HCC)     Fibromyalgia     Ganglion of right wrist     GERD (gastroesophageal reflux disease)     Heart murmur     Hematoma     History of mechanical aortic valve replacement     Hypothyroidism (acquired)     Laboratory confirmed diagnosis of COVID-19 2021    Low blood pressure     Malignant neoplasm of corpus uteri, except isthmus (HCC)     Mixed hyperlipidemia     Morbid obesity (HCC)     Obstructive sleep apnea syndrome     Pulmonary embolism (HCC)     Tobacco dependence syndrome     Transient cerebral ischemia     Urinary incontinence     Viral hepatitis C     Vitamin D deficiency        Past Surgical History:   Procedure Laterality Date    ANKLE SURGERY      CARDIAC SURGERY  2015    VALVE REPLACEMENT     SECTION      X3    CHOLECYSTECTOMY      COLONOSCOPY  2019    HYSTERECTOMY  2011    TOTAL       Family History   Problem Relation Age of Onset    Coronary artery disease Mother     Coronary artery disease Father I have reviewed and agree with the history as documented  E-Cigarette/Vaping    E-Cigarette Use Never User      E-Cigarette/Vaping Substances    Nicotine No     THC No     CBD No     Flavoring No     Other No     Unknown No      Social History     Tobacco Use    Smoking status: Current Some Day Smoker     Packs/day: 0 50     Years: 40 00     Pack years: 20 00     Types: Cigarettes    Smokeless tobacco: Never Used   Vaping Use    Vaping Use: Never used   Substance Use Topics    Alcohol use: Yes     Comment: once in awhile    Drug use: Yes     Types: Marijuana       Review of Systems   Constitutional: Negative for chills and fever  HENT: Negative for congestion  Eyes: Negative for visual disturbance  Respiratory: Negative for shortness of breath  Cardiovascular: Negative for chest pain  Gastrointestinal: Positive for abdominal pain and diarrhea  Endocrine: Negative for cold intolerance  Genitourinary: Negative for frequency  Musculoskeletal: Negative for gait problem  Skin: Negative for rash  Neurological: Positive for weakness  Negative for dizziness  Psychiatric/Behavioral: Negative for behavioral problems and confusion  Physical Exam  Physical Exam  Vitals and nursing note reviewed  Constitutional:       Appearance: She is well-developed  HENT:      Head: Normocephalic and atraumatic  Right Ear: Tympanic membrane normal       Left Ear: Tympanic membrane normal       Mouth/Throat:      Mouth: Mucous membranes are dry  Eyes:      Conjunctiva/sclera: Conjunctivae normal       Pupils: Pupils are equal, round, and reactive to light  Cardiovascular:      Rate and Rhythm: Normal rate and regular rhythm  Heart sounds: Normal heart sounds  Pulmonary:      Effort: Pulmonary effort is normal       Breath sounds: Normal breath sounds  Abdominal:      General: Bowel sounds are normal       Palpations: Abdomen is soft        Comments: Obese no localizing tenderness nondistended   Musculoskeletal:         General: Normal range of motion  Cervical back: Normal range of motion and neck supple  Skin:     General: Skin is warm and dry  Capillary Refill: Capillary refill takes less than 2 seconds  Neurological:      Mental Status: She is alert and oriented to person, place, and time     Psychiatric:         Behavior: Behavior normal          Vital Signs  ED Triage Vitals   Temperature Pulse Respirations Blood Pressure SpO2   07/09/21 1323 07/09/21 1323 07/09/21 1323 07/09/21 1323 07/09/21 1323   97 8 °F (36 6 °C) 78 20 150/73 94 %      Temp Source Heart Rate Source Patient Position - Orthostatic VS BP Location FiO2 (%)   07/09/21 1323 07/09/21 1500 07/09/21 1323 07/09/21 1323 --   Oral Monitor Lying Left arm       Pain Score       07/09/21 1323       Worst Possible Pain           Vitals:    07/09/21 1323 07/09/21 1500   BP: 150/73 129/60   Pulse: 78 75   Patient Position - Orthostatic VS: Lying Lying         Visual Acuity      ED Medications  Medications   sodium chloride 0 9 % infusion (0 mL/hr Intravenous Stopped 7/9/21 1539)   lactated ringers infusion 500 mL (500 mL Intravenous New Bag 7/9/21 1532)   morphine injection 2 mg (2 mg Intravenous Given 7/9/21 1534)   ondansetron (ZOFRAN) injection 4 mg (4 mg Intravenous Given 7/9/21 1534)   dicyclomine (BENTYL) tablet 20 mg (20 mg Oral Given 7/9/21 1538)       Diagnostic Studies  Results Reviewed     Procedure Component Value Units Date/Time    UA w Reflex to Microscopic w Reflex to Culture [[id number]]  (Abnormal) Collected: 07/09/21 1449    Lab Status: Final result Specimen: Urine, Clean Catch Updated: 07/09/21 1453     Color, UA Yellow     Clarity, UA Clear     Specific Gravity, UA 1 015     pH, UA 7 0     Leukocytes, UA Negative     Nitrite, UA Negative     Protein, UA Negative mg/dl      Glucose, UA 3+ mg/dl      Ketones, UA Negative mg/dl      Urobilinogen, UA >=8 0 E U /dl      Bilirubin, UA Negative Blood, UA Negative    Troponin I [[id number]]  (Normal) Collected: 07/09/21 1359    Lab Status: Final result Specimen: Blood from Arm, Right Updated: 07/09/21 1432     Troponin I <0 03 ng/mL     Comprehensive metabolic panel [[id number]]  (Abnormal) Collected: 07/09/21 1359    Lab Status: Final result Specimen: Blood from Arm, Right Updated: 07/09/21 1429     Sodium 138 mmol/L      Potassium 3 6 mmol/L      Chloride 100 mmol/L      CO2 31 mmol/L      ANION GAP 7 mmol/L      BUN 13 mg/dL      Creatinine 0 57 mg/dL      Glucose 338 mg/dL      Calcium 8 5 mg/dL      AST 31 U/L      ALT 18 U/L      Alkaline Phosphatase 68 9 U/L      Total Protein 6 8 g/dL      Albumin 3 5 g/dL      Total Bilirubin 0 87 mg/dL      eGFR 103 ml/min/1 73sq m     Narrative:      [place] guidelines for Chronic Kidney Disease (CKD):     Stage 1 with normal or high GFR (GFR > 90 mL/min/1 73 square meters)    Stage 2 Mild CKD (GFR = 60-89 mL/min/1 73 square meters)    Stage 3A Moderate CKD (GFR = 45-59 mL/min/1 73 square meters)    Stage 3B Moderate CKD (GFR = 30-44 mL/min/1 73 square meters)    Stage 4 Severe CKD (GFR = 15-29 mL/min/1 73 square meters)    Stage 5 End Stage CKD (GFR <15 mL/min/1 73 square meters)  Note: GFR calculation is accurate only with a steady state creatinine    Lipase [[id number]]  (Normal) Collected: 07/09/21 1359    Lab Status: Final result Specimen: Blood from Arm, Right Updated: 07/09/21 1429     Lipase 58 u/L     Stool Enteric Bacterial Panel by PCR [[id number]] Collected: 07/09/21 1408    Lab Status: In process Specimen: Stool from Rectum Updated: 07/09/21 1412    Fecal leukocytes [[id number]] Collected: 07/09/21 1408    Lab Status:  In process Specimen: Stool from Rectum Updated: 07/09/21 1412    CBC and differential [[id number]]  (Abnormal) Collected: 07/09/21 1359    Lab Status: Final result Specimen: Blood from Arm, Right Updated: 07/09/21 1407     WBC 2 90 Thousand/uL      RBC 4 26 Million/uL      Hemoglobin 13 2 g/dL      Hematocrit 41 2 %      MCV 97 fL      MCH 31 0 pg      MCHC 32 0 g/dL      RDW 13 1 %      MPV 10 9 fL      Platelets 269 Thousands/uL      Neutrophils Relative 68 %      Immat GRANS % 0 %      Lymphocytes Relative 23 %      Monocytes Relative 6 %      Eosinophils Relative 3 %      Basophils Relative 0 %      Neutrophils Absolute 1 97 Thousands/µL      Immature Grans Absolute 0 00 Thousand/uL      Lymphocytes Absolute 0 66 Thousands/µL      Monocytes Absolute 0 17 Thousand/µL      Eosinophils Absolute 0 09 Thousand/µL      Basophils Absolute 0 01 Thousands/µL                  No orders to display              Procedures  Procedures         ED Course                                           MDM  Number of Diagnoses or Management Options  Diagnosis management comments: Patient was monitored emergency department labs demonstrated really no significant findings other than she was hyperglycemic  Patient states she does take metformin twice a day  She is not take any other medications with this however  She is awake and alert complaining of nonspecific diffuse extremity pain  Her electrolytes demonstrated no significant findings normal potassium levels  Patient did produce stool specimens and these were sent off for further evaluation  Impression is diarrhea uncertain etiology which has become chronic in nature  Recommend follow-up with primary care physician this week for re-evaluation likely need a referral to a gastroenterologist within the next week or so  Patient be placed on Bentyl as needed for abdominal cramps breath recommended continued stay hydrated with balanced fluids        Disposition  Final diagnoses:   Diarrhea, unspecified type   Hyperglycemia due to diabetes mellitus ([facility] [address] )     Time reflects when diagnosis was documented in both MDM as applicable and the Disposition within this note     Time User Action Codes Description Comment    7/9/2021  4:34 PM Radha Wang Add [R19 7] Diarrhea, unspecified type     7/9/2021  4:34 PM Radha Wang Add [E11 65] Hyperglycemia due to diabetes mellitus [facility])       ED Disposition     ED Disposition Condition Date/Time Comment    Discharge Stable Fri Jul 9, 2021  4:34 PM Charisma Da Silva discharge to home/self care  Follow-up Information     Follow up With Specialties Details Why Contact Marysol Hong MD Internal Medicine, Pediatrics Schedule an appointment as soon as possible for a visit in 1 week  [address]  [phone] [phone]  [phone]            Patient's Medications   Discharge Prescriptions    DICYCLOMINE (BENTYL) 20 MG TABLET    Take 1 tablet (20 mg total) by mouth 2 (two) times a day       Start Date: 7/9/2021  End Date: --       Order Dose: 20 mg       Quantity: 20 tablet    Refills: 0    SITAGLIPTIN (JANUVIA) 50 MG TABLET    Take 1 tablet (50 mg total) by mouth daily       Start Date: 7/9/2021  End Date: --       Order Dose: 50 mg       Quantity: 30 tablet    Refills: 0     No discharge procedures on file      PDMP Review     None          ED Provider  Electronically Signed by           Yana Thompson MD  07/09/21 2022

## 2021-07-10 ENCOUNTER — APPOINTMENT (EMERGENCY)
Dept: CT IMAGING | Facility: HOSPITAL | Age: 61
End: 2021-07-10
Payer: COMMERCIAL

## 2021-07-10 ENCOUNTER — HOSPITAL ENCOUNTER (OUTPATIENT)
Facility: HOSPITAL | Age: 61
Setting detail: OBSERVATION
Discharge: HOME | End: 2021-07-11
Admitting: INTERNAL MEDICINE
Payer: COMMERCIAL

## 2021-07-10 ENCOUNTER — APPOINTMENT (EMERGENCY)
Dept: RADIOLOGY | Facility: HOSPITAL | Age: 61
End: 2021-07-10
Payer: COMMERCIAL

## 2021-07-10 DIAGNOSIS — M54.9 BACK PAIN: ICD-10-CM

## 2021-07-10 DIAGNOSIS — R06.00 DYSPNEA, UNSPECIFIED TYPE: Primary | ICD-10-CM

## 2021-07-10 DIAGNOSIS — F31.9 BIPOLAR DEPRESSION (HCC): ICD-10-CM

## 2021-07-10 DIAGNOSIS — Z95.2 STATUS POST MECHANICAL AORTIC VALVE REPLACEMENT: ICD-10-CM

## 2021-07-10 LAB
ALBUMIN SERPL BCP-MCNC: 3.5 G/DL (ref 3.4–4.8)
ALP SERPL-CCNC: 72.6 U/L (ref 35–140)
ALT SERPL W P-5'-P-CCNC: 19 U/L (ref 5–54)
ANION GAP SERPL CALCULATED.3IONS-SCNC: 7 MMOL/L (ref 4–13)
AST SERPL W P-5'-P-CCNC: 29 U/L (ref 15–41)
BASOPHILS # BLD AUTO: 0.01 THOUSANDS/ΜL (ref 0–0.1)
BASOPHILS NFR BLD AUTO: 0 % (ref 0–1)
BILIRUB SERPL-MCNC: 0.65 MG/DL (ref 0.3–1.2)
BUN SERPL-MCNC: 13 MG/DL (ref 6–20)
CALCIUM SERPL-MCNC: 8.6 MG/DL (ref 8.4–10.2)
CAMPYLOBACTER DNA SPEC NAA+PROBE: NORMAL
CHLORIDE SERPL-SCNC: 102 MMOL/L (ref 96–108)
CO2 SERPL-SCNC: 29 MMOL/L (ref 22–33)
CREAT SERPL-MCNC: 0.64 MG/DL (ref 0.4–1.1)
D DIMER PPP FEU-MCNC: 0.63 MG/L FEU (ref 0.19–0.49)
EOSINOPHIL # BLD AUTO: 0.09 THOUSAND/ΜL (ref 0–0.61)
EOSINOPHIL NFR BLD AUTO: 3 % (ref 0–6)
ERYTHROCYTE [DISTWIDTH] IN BLOOD BY AUTOMATED COUNT: 13.2 % (ref 11.6–15.1)
GFR SERPL CREATININE-BSD FRML MDRD: 99 ML/MIN/1.73SQ M
GLUCOSE SERPL-MCNC: 118 MG/DL (ref 65–140)
GLUCOSE SERPL-MCNC: 149 MG/DL (ref 65–140)
GLUCOSE SERPL-MCNC: 210 MG/DL (ref 65–140)
GLUCOSE SERPL-MCNC: 294 MG/DL (ref 65–140)
HCT VFR BLD AUTO: 39.8 % (ref 34.8–46.1)
HGB BLD-MCNC: 12.8 G/DL (ref 11.5–15.4)
IMM GRANULOCYTES # BLD AUTO: 0 THOUSAND/UL (ref 0–0.2)
IMM GRANULOCYTES NFR BLD AUTO: 0 % (ref 0–2)
INR PPP: 1.91 (ref 0.9–1.1)
LIPASE SERPL-CCNC: 54 U/L (ref 13–60)
LYMPHOCYTES # BLD AUTO: 0.62 THOUSANDS/ΜL (ref 0.6–4.47)
LYMPHOCYTES NFR BLD AUTO: 18 % (ref 14–44)
MCH RBC QN AUTO: 31.4 PG (ref 26.8–34.3)
MCHC RBC AUTO-ENTMCNC: 32.2 G/DL (ref 31.4–37.4)
MCV RBC AUTO: 98 FL (ref 82–98)
MONOCYTES # BLD AUTO: 0.25 THOUSAND/ΜL (ref 0.17–1.22)
MONOCYTES NFR BLD AUTO: 7 % (ref 4–12)
NEUTROPHILS # BLD AUTO: 2.48 THOUSANDS/ΜL (ref 1.85–7.62)
NEUTS SEG NFR BLD AUTO: 72 % (ref 43–75)
PLATELET # BLD AUTO: 130 THOUSANDS/UL (ref 149–390)
PMV BLD AUTO: 10.4 FL (ref 8.9–12.7)
POTASSIUM SERPL-SCNC: 4 MMOL/L (ref 3.5–5)
PROT SERPL-MCNC: 6.8 G/DL (ref 6.4–8.3)
PROTHROMBIN TIME: 20.9 SECONDS (ref 9.5–12.1)
RBC # BLD AUTO: 4.08 MILLION/UL (ref 3.81–5.12)
SALMONELLA DNA SPEC QL NAA+PROBE: NORMAL
SHIGA TOXIN STX GENE SPEC NAA+PROBE: NORMAL
SHIGELLA DNA SPEC QL NAA+PROBE: NORMAL
SODIUM SERPL-SCNC: 138 MMOL/L (ref 133–145)
TROPONIN I SERPL-MCNC: <0.03 NG/ML (ref 0–0.07)
WBC # BLD AUTO: 3.45 THOUSAND/UL (ref 4.31–10.16)
WBC SPEC QL GRAM STN: ABNORMAL

## 2021-07-10 PROCEDURE — 36415 COLL VENOUS BLD VENIPUNCTURE: CPT

## 2021-07-10 PROCEDURE — 85025 COMPLETE CBC W/AUTO DIFF WBC: CPT

## 2021-07-10 PROCEDURE — 96374 THER/PROPH/DIAG INJ IV PUSH: CPT

## 2021-07-10 PROCEDURE — 85610 PROTHROMBIN TIME: CPT

## 2021-07-10 PROCEDURE — G1004 CDSM NDSC: HCPCS

## 2021-07-10 PROCEDURE — 96375 TX/PRO/DX INJ NEW DRUG ADDON: CPT

## 2021-07-10 PROCEDURE — 99220 PR INITIAL OBSERVATION CARE/DAY 70 MINUTES: CPT | Performed by: INTERNAL MEDICINE

## 2021-07-10 PROCEDURE — 84484 ASSAY OF TROPONIN QUANT: CPT | Performed by: INTERNAL MEDICINE

## 2021-07-10 PROCEDURE — 96361 HYDRATE IV INFUSION ADD-ON: CPT

## 2021-07-10 PROCEDURE — 99285 EMERGENCY DEPT VISIT HI MDM: CPT

## 2021-07-10 PROCEDURE — 93005 ELECTROCARDIOGRAM TRACING: CPT

## 2021-07-10 PROCEDURE — 71275 CT ANGIOGRAPHY CHEST: CPT

## 2021-07-10 PROCEDURE — 84484 ASSAY OF TROPONIN QUANT: CPT

## 2021-07-10 PROCEDURE — 80053 COMPREHEN METABOLIC PANEL: CPT

## 2021-07-10 PROCEDURE — 85379 FIBRIN DEGRADATION QUANT: CPT

## 2021-07-10 PROCEDURE — 71045 X-RAY EXAM CHEST 1 VIEW: CPT

## 2021-07-10 PROCEDURE — 83690 ASSAY OF LIPASE: CPT

## 2021-07-10 PROCEDURE — 82948 REAGENT STRIP/BLOOD GLUCOSE: CPT

## 2021-07-10 RX ORDER — SODIUM CHLORIDE 9 MG/ML
500 INJECTION, SOLUTION INTRAVENOUS ONCE
Status: COMPLETED | OUTPATIENT
Start: 2021-07-10 | End: 2021-07-10

## 2021-07-10 RX ORDER — MIRTAZAPINE 15 MG/1
45 TABLET, FILM COATED ORAL
Status: DISCONTINUED | OUTPATIENT
Start: 2021-07-10 | End: 2021-07-10

## 2021-07-10 RX ORDER — FUROSEMIDE 40 MG/1
40 TABLET ORAL DAILY
Status: DISCONTINUED | OUTPATIENT
Start: 2021-07-10 | End: 2021-07-11 | Stop reason: HOSPADM

## 2021-07-10 RX ORDER — HYDROMORPHONE HCL/PF 1 MG/ML
0.5 SYRINGE (ML) INJECTION ONCE
Status: COMPLETED | OUTPATIENT
Start: 2021-07-10 | End: 2021-07-10

## 2021-07-10 RX ORDER — OXYCODONE HYDROCHLORIDE AND ACETAMINOPHEN 5; 325 MG/1; MG/1
1 TABLET ORAL EVERY 6 HOURS PRN
Status: DISCONTINUED | OUTPATIENT
Start: 2021-07-10 | End: 2021-07-11 | Stop reason: HOSPADM

## 2021-07-10 RX ORDER — NYSTATIN 100000 [USP'U]/G
POWDER TOPICAL 2 TIMES DAILY
Status: DISCONTINUED | OUTPATIENT
Start: 2021-07-10 | End: 2021-07-11 | Stop reason: HOSPADM

## 2021-07-10 RX ORDER — ONDANSETRON 2 MG/ML
4 INJECTION INTRAMUSCULAR; INTRAVENOUS ONCE
Status: COMPLETED | OUTPATIENT
Start: 2021-07-10 | End: 2021-07-10

## 2021-07-10 RX ORDER — ZIPRASIDONE HYDROCHLORIDE 20 MG/1
60 CAPSULE ORAL DAILY
Status: DISCONTINUED | OUTPATIENT
Start: 2021-07-10 | End: 2021-07-10

## 2021-07-10 RX ORDER — ATORVASTATIN CALCIUM 20 MG/1
20 TABLET, FILM COATED ORAL DAILY
Status: DISCONTINUED | OUTPATIENT
Start: 2021-07-10 | End: 2021-07-11 | Stop reason: HOSPADM

## 2021-07-10 RX ORDER — TRAZODONE HYDROCHLORIDE 50 MG/1
50 TABLET ORAL
Status: DISCONTINUED | OUTPATIENT
Start: 2021-07-10 | End: 2021-07-11 | Stop reason: HOSPADM

## 2021-07-10 RX ADMIN — IOHEXOL 100 ML: 350 INJECTION, SOLUTION INTRAVENOUS at 12:50

## 2021-07-10 RX ADMIN — INSULIN LISPRO 2 UNITS: 100 INJECTION, SOLUTION INTRAVENOUS; SUBCUTANEOUS at 15:17

## 2021-07-10 RX ADMIN — FUROSEMIDE 40 MG: 40 TABLET ORAL at 14:32

## 2021-07-10 RX ADMIN — NYSTATIN: 100000 POWDER TOPICAL at 18:39

## 2021-07-10 RX ADMIN — ONDANSETRON 4 MG: 2 INJECTION INTRAMUSCULAR; INTRAVENOUS at 11:18

## 2021-07-10 RX ADMIN — TRAZODONE HYDROCHLORIDE 50 MG: 50 TABLET ORAL at 23:49

## 2021-07-10 RX ADMIN — WARFARIN SODIUM 6 MG: 5 TABLET ORAL at 20:31

## 2021-07-10 RX ADMIN — OXYCODONE HYDROCHLORIDE AND ACETAMINOPHEN 1 TABLET: 5; 325 TABLET ORAL at 23:49

## 2021-07-10 RX ADMIN — OXYCODONE HYDROCHLORIDE AND ACETAMINOPHEN 1 TABLET: 5; 325 TABLET ORAL at 15:57

## 2021-07-10 RX ADMIN — HYDROMORPHONE HYDROCHLORIDE 0.5 MG: 1 INJECTION, SOLUTION INTRAMUSCULAR; INTRAVENOUS; SUBCUTANEOUS at 11:20

## 2021-07-10 RX ADMIN — ATORVASTATIN CALCIUM 20 MG: 20 TABLET, FILM COATED ORAL at 14:32

## 2021-07-10 RX ADMIN — SODIUM CHLORIDE 500 ML/HR: 0.9 INJECTION, SOLUTION INTRAVENOUS at 11:18

## 2021-07-10 NOTE — H&P
[address] 1960, 64 y o  female MRN: [id number]  Unit/Bed#: -01 Encounter: [id number]  Primary Care Provider: Kela Fleischer, MD   Date and time admitted to hospital: 7/10/2021 10:45 AM    * Back pain  Assessment & Plan  Patient has severe back pain in the right side close to the costovertebral angle  She complains of pain in her legs  She says she has had weakness in her legs as well  Likely related to fibromyalgia  Continue morphine p r n  Patient likely short of breath because of anxiety  Currently she is saturating well on room air and is calm  CTA was done and was negative  D-dimer slightly elevated 0 63        History of mechanical aortic valve replacement  Assessment & Plan  History of aortic valve replacement  Takes warfarin 6 mg daily  Monitor INR    Diabetes mellitus type 2 in obese [facility])  Assessment & Plan  Lab Results   Component Value Date    HGBA1C 7 5 (A) 04/12/2021       No results for input(s): POCGLU in the last 72 hours  Home oral medications discontinued  On sliding scale insulin  Hemoglobin A1c in April was 7 5    Blood Sugar Average: Last 72 hrs: Morbid obesity with BMI of 50 0-59 9, adult [facility])  Assessment & Plan  Patient counseled on weight loss    Bipolar depression [facility])  Assessment & Plan  Psychiatry consulted  Patient is not taking psych medications    VTE Prophylaxis: Warfarin (Coumadin)  / sequential compression device   Code Status: DNR/DNI  POLST: There is no POLST form on file for this patient (pre-hospital)  Discussion with family:  None    Anticipated Length of Stay:  Patient will be admitted on an Observation basis with an anticipated length of stay of  less than 2 midnights  Justification for Hospital Stay:  Severe back pain with shortness of breath    Total Time for Visit, including Counseling / Coordination of Care: 60 minutes    Greater than 50% of this total time spent on direct patient counseling and coordination of care  Chief Complaint:   Back pain    History of Present Illness:    Súal Betancourt is a 64 y o  female   Past medical history adult onset diabetes, fibromyalgia, chronic pain syndrome, anxiety, GERD, DVT on Coumadin, history of mechanical aortic valve replacement, bipolar depression presented due to low back pain and shortness of breath  Patient was evaluated in the ED 1 day prior to admission complaining of chronic diarrhea for for over a month  She low longer has diarrhea  Currently she states she has severe back pain on the right side  She complains that her legs are not working and that she has shooting pain in her legs  Due to the pain she is anxious and has difficulty breathing  Breathing worsens with exertion  Oxygen saturation is % on room air  She has had difficulty walking for the past couple of days  Labs show INR 1 9, D-dimer 0 63 glucose elevated 294    CTA shows no pulmonary embolism, cirrhotic morphology of the liver with probable steatosis    Review of Systems:    Review of Systems   Constitutional: Negative  Eyes: Negative  Respiratory: Negative  Cardiovascular: Negative  Gastrointestinal: Negative  Endocrine: Negative  Genitourinary: Negative  Musculoskeletal: Negative  Allergic/Immunologic: Negative  Neurological: Negative  Psychiatric/Behavioral: Negative          Past Medical and Surgical History:     Past Medical History:   Diagnosis Date    Anemia     Anxiety     Aortic valve disorder     Back pain     Chronic pain syndrome     Constipation     Degenerative joint disease involving multiple joints     Depression     DVT (deep venous thrombosis) (HCC)     Bilateral    Edema     Endometrial adenocarcinoma (HCC)     Fibromyalgia     Ganglion of right wrist     GERD (gastroesophageal reflux disease)     Heart murmur     Hematoma     History of mechanical aortic valve replacement     Hypothyroidism (acquired)     Laboratory confirmed diagnosis of COVID-19 2021    Low blood pressure     Malignant neoplasm of corpus uteri, except isthmus (HCC)     Mixed hyperlipidemia     Morbid obesity (HCC)     Obstructive sleep apnea syndrome     Pulmonary embolism (HCC)     Tobacco dependence syndrome     Transient cerebral ischemia     Urinary incontinence     Viral hepatitis C     Vitamin D deficiency        Past Surgical History:   Procedure Laterality Date    ANKLE SURGERY      CARDIAC SURGERY  2015    VALVE REPLACEMENT     SECTION      X3    CHOLECYSTECTOMY      COLONOSCOPY  2019    HYSTERECTOMY  2011    TOTAL       Meds/Allergies:    Prior to Admission medications    Medication Sig Start Date End Date Taking? Authorizing Provider   dicyclomine (BENTYL) 20 mg tablet Take 1 tablet (20 mg total) by mouth 2 (two) times a day 21  Yes Arabella Vasquez MD   furosemide (LASIX) 40 mg tablet Take 1 tablet daily for 5 days, then 1 tablet daily on an as needed basis   21  Yes Veto Tapia MD   metFORMIN (GLUCOPHAGE) 500 mg tablet  21  Yes Historical Provider, MD   nystatin powder APPLY TOPICALLY 2 (TWO) TIMES A DAY AS NEEDED FOR 14 DAYS 3/23/21  Yes Historical Provider, MD   ascorbic acid (VITAMIN C) 1000 MG tablet Take 1 tablet (1,000 mg total) by mouth daily for 5 doses  Patient not taking: Reported on 2021  JOSE Robert   atorvastatin (LIPITOR) 20 mg tablet Take 1 tablet (20 mg total) by mouth daily  Patient not taking: Reported on 2021   Veto Tapia MD   cholecalciferol (VITAMIN D3) 1,000 units tablet Take 2 tablets (2,000 Units total) by mouth daily for 10 days  Patient not taking: Reported on 2021 3/16/21 3/26/21  Taqueria Flatness, DO   mirtazapine (REMERON) 45 MG tablet Take 45 mg by mouth daily at bedtime  Patient not taking: Reported on 7/10/2021 5/19/21   Historical Provider, MD   nicotine (NICODERM CQ) 14 mg/24hr TD 24 hr patch Place 1 patch on the skin every 24 hours  Patient not taking: Reported on 4/23/2021 4/12/21   Maria Isabel Castaneda MD   sitaGLIPtin (JANUVIA) 50 mg tablet Take 1 tablet (50 mg total) by mouth daily  Patient not taking: Reported on 7/10/2021 7/9/21   Bryce Blount MD   traZODone (DESYREL) 50 mg tablet Take 1 tablet (50 mg total) by mouth daily at bedtime as needed for sleep  Patient not taking: Reported on 6/24/2021 4/27/21   Kittie Peabody, CRNP   warfarin Kaleigh Summerssmith) 3 mg tablet Take 2 tablets (6 mg total) by mouth daily  Patient taking differently: Take 6 mg by mouth daily 2 5 tablets on Monday 6/24/21 7/24/21  Maria Isabel Castaneda MD   ziprasidone (GEODON) 60 mg capsule Take 60 mg by mouth daily Take with food  Patient not taking: Reported on 7/10/2021 5/19/21   Historical Provider, MD     I have reviewed home medications with patient personally  Allergies:    Allergies   Allergen Reactions    Bactrim [Sulfamethoxazole-Trimethoprim] Other (See Comments)     Patient is unaware of why this allergy is recorded, denies knowledge of it    Penicillins Other (See Comments)     Patient is unaware of why this allergy is recorded, denies knowledge of it    Tramadol      Could not function - felt very ill        Social History:     Marital Status:    Occupation: NA  Patient Pre-hospital Living Situation: At home  Patient Pre-hospital Level of Mobility: Ambulates well  Patient Pre-hospital Diet Restrictions: Diabetic diet  Substance Use History:   Social History     Substance and Sexual Activity   Alcohol Use Yes    Comment: once in awhile     Social History     Tobacco Use   Smoking Status Current Some Day Smoker    Packs/day: 0 50    Years: 40 00    Pack years: 20 00    Types: Cigarettes   Smokeless Tobacco Never Used     Social History     Substance and Sexual Activity   Drug Use Yes    Types: Marijuana       Family History:    non-contributory    Physical Exam:     Vitals:   Blood Pressure: 162/84 (07/10/21 1336)  Pulse: 73 (07/10/21 1336)  Temperature: (!) 97 4 °F (36 3 °C) (07/10/21 1336)  Temp Source: Tympanic (07/10/21 1336)  Respirations: 20 (07/10/21 1336)  Height: 5' 6" (167 6 cm) (07/10/21 1336)  Weight - Scale: (!) 162 kg (357 lb 2 3 oz) (07/10/21 1336)  SpO2: 96 % (07/10/21 1336)    Physical Exam  Constitutional:       Appearance: She is obese  HENT:      Head: Normocephalic and atraumatic  Mouth/Throat:      Mouth: Mucous membranes are moist    Eyes:      Pupils: Pupils are equal, round, and reactive to light  Cardiovascular:      Rate and Rhythm: Normal rate and regular rhythm  Pulses: Normal pulses  Pulmonary:      Comments: Mild wheeze  Saturating well on room air  Abdominal:      General: Abdomen is flat  Bowel sounds are normal       Palpations: Abdomen is soft  Musculoskeletal:         General: Normal range of motion  Cervical back: Normal range of motion  Right lower leg: No edema  Left lower leg: No edema  Skin:     General: Skin is warm  Neurological:      Mental Status: She is alert and oriented to person, place, and time  Mental status is at baseline  Psychiatric:      Comments: Anxious             Additional Data:     Lab Results: I have personally reviewed pertinent reports        Results from last 7 days   Lab Units 07/10/21  1117   WBC Thousand/uL 3 45*   HEMOGLOBIN g/dL 12 8   HEMATOCRIT % 39 8   PLATELETS Thousands/uL 130*   NEUTROS PCT % 72   LYMPHS PCT % 18   MONOS PCT % 7   EOS PCT % 3     Results from last 7 days   Lab Units 07/10/21  1117   SODIUM mmol/L 138   POTASSIUM mmol/L 4 0   CHLORIDE mmol/L 102   CO2 mmol/L 29   BUN mg/dL 13   CREATININE mg/dL 0 64   ANION GAP mmol/L 7   CALCIUM mg/dL 8 6   ALBUMIN g/dL 3 5   TOTAL BILIRUBIN mg/dL 0 65   ALK PHOS U/L 72 6   ALT U/L 19   AST U/L 29   GLUCOSE RANDOM mg/dL 294*     Results from last 7 days   Lab Units 07/10/21  1117   INR  1 91*                   Imaging: I have personally reviewed pertinent reports  CTA ED chest PE Study   Final Result by Hannah Alvarado MD (07/10 9553)      No pulmonary embolism  No active pulmonary disease  Cirrhotic morphology of the liver with probable steatosis  Workstation performed: [id number]         XR chest 1 view portable   ED Interpretation by Fior Tucker MD (07/10 6811)   CXR, NO acute infiltrate or effusion          EKG, Pathology, and Other Studies Reviewed on Admission:   · EKG:  Normal sinus rhythm    Allscripts / Epic Records Reviewed: Yes     ** Please Note: This note has been constructed using a voice recognition system   **

## 2021-07-10 NOTE — ASSESSMENT & PLAN NOTE
Patient has severe back pain in the right side close to the costovertebral angle  She complains of pain in her legs  She says she has had weakness in her legs as well  Likely related to fibromyalgia  Continue morphine p r n    Patient likely short of breath because of anxiety  Currently she is saturating well on room air and is calm  CTA was done and was negative  D-dimer slightly elevated 0 63

## 2021-07-10 NOTE — ED PROVIDER NOTES
History  Chief Complaint   Patient presents with    Back Pain     Right lower backl pain, work up with it this morning, reports pain takes her breath away and pain spasms  62-year-old female known history of adult onset diabetes fibromyalgia chronic pain syndrome anxiety disorder GERD DVT on Coumadin brought in this morning secondary to difficulty breathing secondary to low back pain  Patient was evaluated here in this emergency department yesterday complaining of chronic diarrhea for over a month lab work demonstrated no significant findings like she lytes were all within normal limits yesterday patient was hydrated given medication for pain control patient was discharged to follow-up with her primary care doctor yesterday  Patient states she had some inability a comfortable night long secondary to ongoing pain  Patient is told her did sister that she could not catch her breath this morning because the pain was so bad her sister brought her to the emergency department  Patient arrives clutching the low right low back area stating she is having severe pain and spasm  Patient states the pain so bad she can not breathe  On arrival however pulse ox is at % on room air patient was very anxious and upset  She was able to weightbear however secondary to the pain took her some time to get from the wheelchair to the bed  Patient denies any trauma since yesterday  Patient denies any vomiting patient denies any chest pain patient is is a smoker but she denies any significant wheezing today  Prior to Admission Medications   Prescriptions Last Dose Informant Patient Reported?  Taking?   ascorbic acid (VITAMIN C) 1000 MG tablet   No No   Sig: Take 1 tablet (1,000 mg total) by mouth daily for 5 doses   Patient not taking: Reported on 6/24/2021   atorvastatin (LIPITOR) 20 mg tablet  Self No No   Sig: Take 1 tablet (20 mg total) by mouth daily   Patient not taking: Reported on 4/12/2021   cholecalciferol (VITAMIN D3) 1,000 units tablet  Self No No   Sig: Take 2 tablets (2,000 Units total) by mouth daily for 10 days   Patient not taking: Reported on 4/12/2021   dicyclomine (BENTYL) 20 mg tablet   No No   Sig: Take 1 tablet (20 mg total) by mouth 2 (two) times a day   furosemide (LASIX) 40 mg tablet   No No   Sig: Take 1 tablet daily for 5 days, then 1 tablet daily on an as needed basis     metFORMIN (GLUCOPHAGE) 500 mg tablet   Yes No   mirtazapine (REMERON) 45 MG tablet   Yes No   Sig: Take 45 mg by mouth daily at bedtime   nicotine (NICODERM CQ) 14 mg/24hr TD 24 hr patch   No No   Sig: Place 1 patch on the skin every 24 hours   Patient not taking: Reported on 4/23/2021   nystatin powder   Yes No   Sig: APPLY TOPICALLY 2 (TWO) TIMES A DAY AS NEEDED FOR 14 DAYS   sitaGLIPtin (JANUVIA) 50 mg tablet   No No   Sig: Take 1 tablet (50 mg total) by mouth daily   traZODone (DESYREL) 50 mg tablet   No No   Sig: Take 1 tablet (50 mg total) by mouth daily at bedtime as needed for sleep   Patient not taking: Reported on 6/24/2021   warfarin (Jantoven) 3 mg tablet   No No   Sig: Take 2 tablets (6 mg total) by mouth daily   Patient taking differently: Take 6 mg by mouth daily 2 5 tablets on Monday   ziprasidone (GEODON) 60 mg capsule   Yes No   Sig: Take 60 mg by mouth daily Take with food      Facility-Administered Medications: None       Past Medical History:   Diagnosis Date    Anemia     Anxiety     Aortic valve disorder     Back pain     Chronic pain syndrome     Constipation     Degenerative joint disease involving multiple joints     Depression     DVT (deep venous thrombosis) (HCC)     Bilateral    Edema     Endometrial adenocarcinoma (HCC)     Fibromyalgia     Ganglion of right wrist     GERD (gastroesophageal reflux disease)     Heart murmur     Hematoma     History of mechanical aortic valve replacement     Hypothyroidism (acquired)     Laboratory confirmed diagnosis of COVID-19 03/16/2021    Low blood pressure     Malignant neoplasm of corpus uteri, except isthmus (HCC)     Mixed hyperlipidemia     Morbid obesity (HCC)     Obstructive sleep apnea syndrome     Pulmonary embolism (HCC)     Tobacco dependence syndrome     Transient cerebral ischemia     Urinary incontinence     Viral hepatitis C     Vitamin D deficiency        Past Surgical History:   Procedure Laterality Date    ANKLE SURGERY      CARDIAC SURGERY  2015    VALVE REPLACEMENT     SECTION      X3    CHOLECYSTECTOMY      COLONOSCOPY  2019    HYSTERECTOMY  2011    TOTAL       Family History   Problem Relation Age of Onset    Coronary artery disease Mother     Coronary artery disease Father      I have reviewed and agree with the history as documented  E-Cigarette/Vaping    E-Cigarette Use Never User      E-Cigarette/Vaping Substances    Nicotine No     THC No     CBD No     Flavoring No     Other No     Unknown No      Social History     Tobacco Use    Smoking status: Current Some Day Smoker     Packs/day: 0 50     Years: 40 00     Pack years: 20 00     Types: Cigarettes    Smokeless tobacco: Never Used   Vaping Use    Vaping Use: Never used   Substance Use Topics    Alcohol use: Yes     Comment: once in awhile    Drug use: Yes     Types: Marijuana       Review of Systems   Constitutional: Negative for chills and fever  HENT: Negative for congestion  Eyes: Negative for visual disturbance  Respiratory: Negative for shortness of breath  Cardiovascular: Negative for chest pain  Gastrointestinal: Positive for diarrhea  Negative for abdominal pain  Endocrine: Negative for cold intolerance  Genitourinary: Negative for frequency  Musculoskeletal: Positive for back pain and myalgias  Negative for gait problem  Skin: Negative for rash  Neurological: Positive for weakness  Negative for dizziness  Psychiatric/Behavioral: Negative for behavioral problems and confusion         Physical Exam  Physical Exam  Vitals and nursing note reviewed  Constitutional:       Appearance: She is well-developed  HENT:      Head: Normocephalic and atraumatic  Nose: Nose normal       Mouth/Throat:      Mouth: Mucous membranes are dry  Eyes:      Conjunctiva/sclera: Conjunctivae normal       Pupils: Pupils are equal, round, and reactive to light  Cardiovascular:      Rate and Rhythm: Normal rate and regular rhythm  Heart sounds: Normal heart sounds  Pulmonary:      Effort: Pulmonary effort is normal       Breath sounds: Normal breath sounds  Comments: Patient with few scattered expiratory wheezes  Abdominal:      General: Bowel sounds are normal       Palpations: Abdomen is soft  Musculoskeletal:         General: Normal range of motion  Cervical back: Normal range of motion and neck supple  Comments: Patient with paraspinous tenderness lumbar mostly left side   Skin:     General: Skin is warm and dry  Capillary Refill: Capillary refill takes less than 2 seconds  Neurological:      Mental Status: She is alert and oriented to person, place, and time     Psychiatric:         Behavior: Behavior normal          Vital Signs  ED Triage Vitals   Temperature Pulse Respirations Blood Pressure SpO2   07/10/21 1047 07/10/21 1047 07/10/21 1047 07/10/21 1047 07/10/21 1047   98 °F (36 7 °C) 75 22 169/58 98 %      Temp Source Heart Rate Source Patient Position - Orthostatic VS BP Location FiO2 (%)   07/10/21 1047 07/10/21 1047 -- 07/10/21 1047 --   Oral Monitor  Left arm       Pain Score       07/10/21 1118       6           Vitals:    07/10/21 1047 07/10/21 1142   BP: 169/58 149/62   Pulse: 75 69         Visual Acuity      ED Medications  Medications   sodium chloride 0 9 % infusion (500 mL/hr Intravenous New Bag 7/10/21 1118)   HYDROmorphone (DILAUDID) injection 0 5 mg (0 5 mg Intravenous Given 7/10/21 1120)   ondansetron (ZOFRAN) injection 4 mg (4 mg Intravenous Given 7/10/21 1118) iohexol (OMNIPAQUE) 350 MG/ML injection (SINGLE-DOSE) 100 mL (100 mL Intravenous Given 7/10/21 1250)       Diagnostic Studies  Results Reviewed     Procedure Component Value Units Date/Time    Protime-INR [[id number]]  (Abnormal) Collected: 07/10/21 1117    Lab Status: Final result Specimen: Blood from Arm, Left Updated: 07/10/21 1240     Protime 20 9 seconds      INR 1 91    Narrative:      INR Reference Ranges:  No Anticoagulant, Normal:           0 9-1 1  Standard Dose, Oral Anticoagulant:  2 0-3 0  High Dose, Oral Anticoagulant:      2 5-3 5    D-dimer, quantitative [[id number]]  (Abnormal) Collected: 07/10/21 1117    Lab Status: Final result Specimen: Blood from Arm, Left Updated: 07/10/21 1146     D-Dimer, Quant  0 63 mg/L FEU     Troponin I [[id number]]  (Normal) Collected: 07/10/21 1117    Lab Status: Final result Specimen: Blood from Arm, Left Updated: 07/10/21 1144     Troponin I <0 03 ng/mL     Comprehensive metabolic panel [[id number]]  (Abnormal) Collected: 07/10/21 1117    Lab Status: Final result Specimen: Blood from Arm, Left Updated: 07/10/21 1142     Sodium 138 mmol/L      Potassium 4 0 mmol/L      Chloride 102 mmol/L      CO2 29 mmol/L      ANION GAP 7 mmol/L      BUN 13 mg/dL      Creatinine 0 64 mg/dL      Glucose 294 mg/dL      Calcium 8 6 mg/dL      AST 29 U/L      ALT 19 U/L      Alkaline Phosphatase 72 6 U/L      Total Protein 6 8 g/dL      Albumin 3 5 g/dL      Total Bilirubin 0 65 mg/dL      eGFR 99 ml/min/1 73sq m     Narrative:      David guidelines for Chronic Kidney Disease (CKD):     Stage 1 with normal or high GFR (GFR > 90 mL/min/1 73 square meters)    Stage 2 Mild CKD (GFR = 60-89 mL/min/1 73 square meters)    Stage 3A Moderate CKD (GFR = 45-59 mL/min/1 73 square meters)    Stage 3B Moderate CKD (GFR = 30-44 mL/min/1 73 square meters)    Stage 4 Severe CKD (GFR = 15-29 mL/min/1 73 square meters)    Stage 5 End Stage CKD (GFR <15 mL/min/1 73 square meters)  Note: GFR calculation is accurate only with a steady state creatinine    Lipase [[id number]]  (Normal) Collected: 07/10/21 1117    Lab Status: Final result Specimen: Blood from Arm, Left Updated: 07/10/21 1142     Lipase 54 u/L     CBC and differential [[id number]]  (Abnormal) Collected: 07/10/21 1117    Lab Status: Final result Specimen: Blood Updated: 07/10/21 1129     WBC 3 45 Thousand/uL      RBC 4 08 Million/uL      Hemoglobin 12 8 g/dL      Hematocrit 39 8 %      MCV 98 fL      MCH 31 4 pg      MCHC 32 2 g/dL      RDW 13 2 %      MPV 10 4 fL      Platelets 081 Thousands/uL      Neutrophils Relative 72 %      Immat GRANS % 0 %      Lymphocytes Relative 18 %      Monocytes Relative 7 %      Eosinophils Relative 3 %      Basophils Relative 0 %      Neutrophils Absolute 2 48 Thousands/µL      Immature Grans Absolute 0 00 Thousand/uL      Lymphocytes Absolute 0 62 Thousands/µL      Monocytes Absolute 0 25 Thousand/µL      Eosinophils Absolute 0 09 Thousand/µL      Basophils Absolute 0 01 Thousands/µL                  XR chest 1 view portable   ED Interpretation by Cristina Mora MD (07/10 1147)   CXR, NO acute infiltrate or effusion      CTA ED chest PE Study    (Results Pending)              Procedures  Procedures         ED Course                                           MDM  Number of Diagnoses or Management Options  Dyspnea, unspecified type  Diagnosis management comments: Patient was monitored in the emergency department she did respond to 2 L nasal cannula oxygen IV was established normal saline 500 cc boluses given patient's lab work demonstrated no significant findings troponin not elevated D-dimer slightly elevated for this and her symptoms patient had a CTA of the chest on EKG demonstrated no acute findings chest x-ray demonstrated no acute findings    Plan will be to admit for observation cardiac workup secondary to his significant dyspnea on exertion patient with multiple cardiac risk factors      Disposition  Final diagnoses:   Dyspnea, unspecified type     Time reflects when diagnosis was documented in both MDM as applicable and the Disposition within this note     Time User Action Codes Description Comment    7/10/2021 12:52 PM Gema Noel Add [R06 00] Dyspnea, unspecified type       ED Disposition     ED Disposition Condition Date/Time Comment    Admit Stable Sat Jul 10, 2021 12:52 PM Case was discussed with Hospitalist and the patient's admission status was agreed to be Admission Status: observation status to the service of Dr Abelardo Fernández  Follow-up Information    None         Patient's Medications   Discharge Prescriptions    No medications on file     No discharge procedures on file      PDMP Review     None          ED Provider  Electronically Signed by           Francis Dempsey MD  07/10/21 6344

## 2021-07-10 NOTE — ASSESSMENT & PLAN NOTE
Lab Results   Component Value Date    HGBA1C 7 5 (A) 04/12/2021       No results for input(s): POCGLU in the last 72 hours     Home oral medications discontinued  On sliding scale insulin  Hemoglobin A1c in April was 7 5    Blood Sugar Average: Last 72 hrs:

## 2021-07-11 VITALS
DIASTOLIC BLOOD PRESSURE: 69 MMHG | RESPIRATION RATE: 19 BRPM | OXYGEN SATURATION: 92 % | SYSTOLIC BLOOD PRESSURE: 118 MMHG | TEMPERATURE: 98.4 F | WEIGHT: 293 LBS | BODY MASS INDEX: 47.09 KG/M2 | HEIGHT: 66 IN | HEART RATE: 71 BPM

## 2021-07-11 LAB
ANION GAP SERPL CALCULATED.3IONS-SCNC: 5 MMOL/L (ref 4–13)
BUN SERPL-MCNC: 14 MG/DL (ref 6–20)
CALCIUM SERPL-MCNC: 8.4 MG/DL (ref 8.4–10.2)
CHLORIDE SERPL-SCNC: 102 MMOL/L (ref 96–108)
CO2 SERPL-SCNC: 33 MMOL/L (ref 22–33)
CREAT SERPL-MCNC: 0.62 MG/DL (ref 0.4–1.1)
ERYTHROCYTE [DISTWIDTH] IN BLOOD BY AUTOMATED COUNT: 13.3 % (ref 11.6–15.1)
GFR SERPL CREATININE-BSD FRML MDRD: 100 ML/MIN/1.73SQ M
GLUCOSE P FAST SERPL-MCNC: 149 MG/DL (ref 70–105)
GLUCOSE SERPL-MCNC: 149 MG/DL (ref 65–140)
GLUCOSE SERPL-MCNC: 149 MG/DL (ref 65–140)
GLUCOSE SERPL-MCNC: 176 MG/DL (ref 65–140)
HCT VFR BLD AUTO: 40.4 % (ref 34.8–46.1)
HGB BLD-MCNC: 13 G/DL (ref 11.5–15.4)
INR PPP: 1.88 (ref 0.9–1.1)
MCH RBC QN AUTO: 31.4 PG (ref 26.8–34.3)
MCHC RBC AUTO-ENTMCNC: 32.2 G/DL (ref 31.4–37.4)
MCV RBC AUTO: 98 FL (ref 82–98)
PLATELET # BLD AUTO: 132 THOUSANDS/UL (ref 149–390)
PMV BLD AUTO: 10.9 FL (ref 8.9–12.7)
POTASSIUM SERPL-SCNC: 3.6 MMOL/L (ref 3.5–5)
PROTHROMBIN TIME: 20.6 SECONDS (ref 9.5–12.1)
RBC # BLD AUTO: 4.14 MILLION/UL (ref 3.81–5.12)
SODIUM SERPL-SCNC: 140 MMOL/L (ref 133–145)
TSH SERPL DL<=0.05 MIU/L-ACNC: 3.1 UIU/ML (ref 0.34–5.6)
WBC # BLD AUTO: 3.07 THOUSAND/UL (ref 4.31–10.16)

## 2021-07-11 PROCEDURE — 82948 REAGENT STRIP/BLOOD GLUCOSE: CPT

## 2021-07-11 PROCEDURE — 99217 PR OBSERVATION CARE DISCHARGE MANAGEMENT: CPT | Performed by: INTERNAL MEDICINE

## 2021-07-11 PROCEDURE — 85610 PROTHROMBIN TIME: CPT | Performed by: INTERNAL MEDICINE

## 2021-07-11 PROCEDURE — 80048 BASIC METABOLIC PNL TOTAL CA: CPT | Performed by: INTERNAL MEDICINE

## 2021-07-11 PROCEDURE — 85027 COMPLETE CBC AUTOMATED: CPT | Performed by: INTERNAL MEDICINE

## 2021-07-11 PROCEDURE — 84443 ASSAY THYROID STIM HORMONE: CPT | Performed by: INTERNAL MEDICINE

## 2021-07-11 RX ORDER — MIRTAZAPINE 15 MG/1
45 TABLET, FILM COATED ORAL
Status: DISCONTINUED | OUTPATIENT
Start: 2021-07-11 | End: 2021-07-11 | Stop reason: HOSPADM

## 2021-07-11 RX ORDER — ZIPRASIDONE HYDROCHLORIDE 20 MG/1
20 CAPSULE ORAL 2 TIMES DAILY WITH MEALS
Status: DISCONTINUED | OUTPATIENT
Start: 2021-07-11 | End: 2021-07-11 | Stop reason: HOSPADM

## 2021-07-11 RX ORDER — ACETAMINOPHEN AND CODEINE PHOSPHATE 300; 30 MG/1; MG/1
1 TABLET ORAL EVERY 6 HOURS PRN
Qty: 30 TABLET | Refills: 0 | Status: SHIPPED | OUTPATIENT
Start: 2021-07-11 | End: 2021-07-26

## 2021-07-11 RX ADMIN — ATORVASTATIN CALCIUM 20 MG: 20 TABLET, FILM COATED ORAL at 09:34

## 2021-07-11 RX ADMIN — NYSTATIN: 100000 POWDER TOPICAL at 09:52

## 2021-07-11 RX ADMIN — OXYCODONE HYDROCHLORIDE AND ACETAMINOPHEN 1 TABLET: 5; 325 TABLET ORAL at 09:41

## 2021-07-11 RX ADMIN — INSULIN LISPRO 1 UNITS: 100 INJECTION, SOLUTION INTRAVENOUS; SUBCUTANEOUS at 12:02

## 2021-07-11 NOTE — NURSING NOTE
Patient rounding done patient sound asleep not in distress ,pain med and sleeping med held until requested

## 2021-07-11 NOTE — ASSESSMENT & PLAN NOTE
Patient has severe back pain in the right side close to the costovertebral angle  She complains of pain in her legs  She says she has had weakness in her legs as well  Likely related to fibromyalgia  Continue morphine p r n  Patient likely short of breath because of anxiety  Currently she is saturating well on room air and is calm  CTA was done and was negative  D-dimer slightly elevated 0 63  Troponins were trended which are negative  EKG shows no acute ST-T changes

## 2021-07-11 NOTE — ASSESSMENT & PLAN NOTE
Lab Results   Component Value Date    HGBA1C 7 5 (A) 04/12/2021       Recent Labs     07/10/21  1606 07/10/21  2046 07/11/21  0715 07/11/21  1125   POCGLU 210* 118 149* 176*      Home oral medications discontinued  On sliding scale insulin  Hemoglobin A1c in April was 7 5    Blood Sugar Average: Last 72 hrs:  (P) 160 4

## 2021-07-11 NOTE — CONSULTS
Consultation - Shanna Delvalle 64 y o  female MRN: [id number]    Unit/Bed#: -01 Encounter: [id number]      Identifying Data:  64years old white female is admitted at [facility] on July 10, 2021 with complaining of back pain and shortness of breath psychiatric consultation is asked for bipolar depression  Patient examined spoke with the nurse history physical medications labs reviewed and noted  Patient's drug and alcohol level was not done this time but patient admits smoking marijuana off and on but denies doing any drugs patient's last drug screen on April 23, 2021 shows that patient's drug screen was positive with amphetamine and THC patient does have a history of drug abuse but she denies heroin abuse she denies IV drug abuse she admits smoking marijuana off and on at present time  Patient is a poor historian and poor compliance with her medications when I asked her whether she is taking her psychotropic medications regularly she reports that''I forget''after the discussion I offered her to order her medications Remeron and Geodon she agreed to take them while she is here  Patient is advised to comply with her medication and comply with her follow-ups treatment with her psychiatrist and weekly therapy with the therapist at [facility] this is the clinic that she follow-up for her psychiatric treatment  Social history patient is living with the daughter she has 3 grownup children she smokes cigarette she drinks alcohol occasionally but she has a history of alcohol abuse with at least 1 rehab many years ago she never drove  Patient is smoking pot and history of abusing other drugs as described above but she denies IV drug abuse denies heroin abuse she had 9th grade education and obtained GED currently she is disabled  Allergy penicillin Bactrim tramadol    Diagnosis bipolar disorder alcohol and drug abuse as described above anxiety noncompliance of the medications insomnia      Anemia aortic wall disorder back pain chronic pain syndrome constipation DJD DVT endometrial adenocarcinoma fibromyalgia ganglion of right wrist GERD heart murmur history of mechanical aortic wall replacement hypothyroidism uterine cancer High cholesterol morbid obesity sleep apnea history of pulmonary embolism TIA viral hepatitis C vitamin-D deficiency ankle surgery cardiac surgery valve replacement  x3 cholecystectomy colonoscopy total hysterectomy      Chief Complaints:  Bipolar disorder and drug abuse    Family History:  Dad  Family History   Problem Relation Age of Onset    Coronary artery disease Mother     Coronary artery disease Father        Legal History: In  she was in [facility] 1 time for selling drugs currently she is not on probation    Mental Status Exam:  64years old white female is alert awake talkative oriented x3 cooperative but poor historian memory intact ryan labile easily irritated can be demanding and needy somatic complaints poor sleep with multiple medical problems  Patient denies auditory or visual hallucinations  Patient denies suicidal or homicidal ideation  Guarded paranoid no delusion elucidated poor concentration  Insight and judgments are adequate  History of Present Illness     HPI: Gilma Taylor is a 64y o  year old female who presents with back pain and shortness of breath    Consults      Historical Information   Past Psychiatric History:  Gives a long psych history of bipolar depression with at least 4 psychiatric admissions and 3 suicide attempts in the past many years ago recently she has no suicide attempts and she does not feel suicidal   Patient has been tried on different psychotropic medications over the years currently she goes to local mental health clinic [address] [place] [address] to see a psychiatrist and therapist and she is taking her medications her home medication list shows that patient is on Remeron 45 mg HS Geodon 60 mg daily and trazodone 50 mg HS p r n   But she has been noncompliance with the treatment when I asked her she said''I forget''patient gives an extensive history of alcohol drug abuse with at least 1 rehab in the past she never drove she had no DUI she denies IV drug abuse he denies heroin abuse but she has tried different drugs and currently she reports smoking pot  She has a history of being in skilled nursing 1 time for selling drugs in  currently she is not on probation    Past Medical History:   Diagnosis Date    Anemia     Anxiety     Aortic valve disorder     Back pain     Chronic pain syndrome     Constipation     Degenerative joint disease involving multiple joints     Depression     DVT (deep venous thrombosis) (HCC)     Bilateral    Edema     Endometrial adenocarcinoma (HCC)     Fibromyalgia     Ganglion of right wrist     GERD (gastroesophageal reflux disease)     Heart murmur     Hematoma     History of mechanical aortic valve replacement     Hypothyroidism (acquired)     Laboratory confirmed diagnosis of COVID-19 2021    Low blood pressure     Malignant neoplasm of corpus uteri, except isthmus (HCC)     Mixed hyperlipidemia     Morbid obesity (HCC)     Obstructive sleep apnea syndrome     Pulmonary embolism (HCC)     Tobacco dependence syndrome     Transient cerebral ischemia     Urinary incontinence     Viral hepatitis C     Vitamin D deficiency      Past Surgical History:   Procedure Laterality Date    ANKLE SURGERY      CARDIAC SURGERY  2015    VALVE REPLACEMENT     SECTION      X3    CHOLECYSTECTOMY      COLONOSCOPY  2019    HYSTERECTOMY  2011    TOTAL     Social History   Social History     Substance and Sexual Activity   Alcohol Use Yes    Comment: once in awhile     Social History     Substance and Sexual Activity   Drug Use Yes    Types: Marijuana     Social History     Tobacco Use   Smoking Status Current Some Day Smoker    Packs/day: 0 50    Years: 40 00    Pack years: 20 00  Types: Cigarettes   Smokeless Tobacco Never Used       Meds/Allergies   current meds:   Current Facility-Administered Medications   Medication Dose Route Frequency    atorvastatin (LIPITOR) tablet 20 mg  20 mg Oral Daily    furosemide (LASIX) tablet 40 mg  40 mg Oral Daily    insulin lispro (HumaLOG) 100 units/mL subcutaneous injection 1-5 Units  1-5 Units Subcutaneous TID AC    insulin lispro (HumaLOG) 100 units/mL subcutaneous injection 1-5 Units  1-5 Units Subcutaneous HS    nystatin (MYCOSTATIN) powder   Topical BID    oxyCODONE-acetaminophen (PERCOCET) 5-325 mg per tablet 1 tablet  1 tablet Oral Q6H PRN    traZODone (DESYREL) tablet 50 mg  50 mg Oral HS PRN    warfarin (COUMADIN) tablet 6 mg  6 mg Oral Daily    and PTA meds:    Medications Prior to Admission   Medication    dicyclomine (BENTYL) 20 mg tablet    furosemide (LASIX) 40 mg tablet    metFORMIN (GLUCOPHAGE) 500 mg tablet    nystatin powder    ascorbic acid (VITAMIN C) 1000 MG tablet    atorvastatin (LIPITOR) 20 mg tablet    cholecalciferol (VITAMIN D3) 1,000 units tablet    mirtazapine (REMERON) 45 MG tablet    nicotine (NICODERM CQ) 14 mg/24hr TD 24 hr patch    sitaGLIPtin (JANUVIA) 50 mg tablet    traZODone (DESYREL) 50 mg tablet    warfarin (Jantoven) 3 mg tablet    ziprasidone (GEODON) 60 mg capsule     Allergies   Allergen Reactions    Bactrim [Sulfamethoxazole-Trimethoprim] Other (See Comments)     Patient is unaware of why this allergy is recorded, denies knowledge of it    Penicillins Other (See Comments)     Patient is unaware of why this allergy is recorded, denies knowledge of it    Tramadol      Could not function - felt very ill        Objective   Vitals: Blood pressure 107/71, pulse 72, temperature 98 4 °F (36 9 °C), temperature source Tympanic, resp  rate 20, height 5' 6" (1 676 m), weight (!) 162 kg (357 lb 2 3 oz), SpO2 92 %        Routine Lab Results:   Admission on 07/10/2021   Component Date Value Ref Range Status    WBC 07/10/2021 3 45* 4 31 - 10 16 Thousand/uL Final    RBC 07/10/2021 4 08  3 81 - 5 12 Million/uL Final    Hemoglobin 07/10/2021 12 8  11 5 - 15 4 g/dL Final    Hematocrit 07/10/2021 39 8  34 8 - 46 1 % Final    MCV 07/10/2021 98  82 - 98 fL Final    MCH 07/10/2021 31 4  26 8 - 34 3 pg Final    MCHC 07/10/2021 32 2  31 4 - 37 4 g/dL Final    RDW 07/10/2021 13 2  11 6 - 15 1 % Final    MPV 07/10/2021 10 4  8 9 - 12 7 fL Final    Platelets 55/20/2746 130* 149 - 390 Thousands/uL Final    Neutrophils Relative 07/10/2021 72  43 - 75 % Final    Immat GRANS % 07/10/2021 0  0 - 2 % Final    Lymphocytes Relative 07/10/2021 18  14 - 44 % Final    Monocytes Relative 07/10/2021 7  4 - 12 % Final    Eosinophils Relative 07/10/2021 3  0 - 6 % Final    Basophils Relative 07/10/2021 0  0 - 1 % Final    Neutrophils Absolute 07/10/2021 2 48  1 85 - 7 62 Thousands/µL Final    Immature Grans Absolute 07/10/2021 0 00  0 00 - 0 20 Thousand/uL Final    Lymphocytes Absolute 07/10/2021 0 62  0 60 - 4 47 Thousands/µL Final    Monocytes Absolute 07/10/2021 0 25  0 17 - 1 22 Thousand/µL Final    Eosinophils Absolute 07/10/2021 0 09  0 00 - 0 61 Thousand/µL Final    Basophils Absolute 07/10/2021 0 01  0 00 - 0 10 Thousands/µL Final    Sodium 07/10/2021 138  133 - 145 mmol/L Final    Potassium 07/10/2021 4 0  3 5 - 5 0 mmol/L Final    Chloride 07/10/2021 102  96 - 108 mmol/L Final    CO2 07/10/2021 29  22 - 33 mmol/L Final    ANION GAP 07/10/2021 7  4 - 13 mmol/L Final    BUN 07/10/2021 13  6 - 20 mg/dL Final    Creatinine 07/10/2021 0 64  0 40 - 1 10 mg/dL Final    Standardized to IDMS reference method    Glucose 07/10/2021 294* 65 - 140 mg/dL Final    If the patient is fasting, the ADA then defines impaired fasting glucose as > 100 mg/dL and diabetes as > or equal to 123 mg/dL    Specimen collection should occur prior to Sulfasalazine administration due to the potential for falsely depressed results  Specimen collection should occur prior to Sulfapyridine administration due to the potential for falsely elevated results   Calcium 07/10/2021 8 6  8 4 - 10 2 mg/dL Final    AST 07/10/2021 29  15 - 41 U/L Final    Specimen collection should occur prior to Sulfasalazine administration due to the potential for falsely depressed results   ALT 07/10/2021 19  5 - 54 U/L Final    Specimen collection should occur prior to Sulfasalazine administration due to the potential for falsely depressed results   Alkaline Phosphatase 07/10/2021 72 6  35 - 140 U/L Final    Total Protein 07/10/2021 6 8  6 4 - 8 3 g/dL Final    Albumin 07/10/2021 3 5  3 4 - 4 8 g/dL Final    Total Bilirubin 07/10/2021 0 65  0 30 - 1 20 mg/dL Final    eGFR 07/10/2021 99  ml/min/1 73sq m Final    D-Dimer, Quant  07/10/2021 0 63* 0 19 - 0 49 mg/L FEU Final    Reference and upper limits to exclude DVT and PE are the same  Do not use to exclude if clinical symptoms are present   Lipase 07/10/2021 54  13 - 60 u/L Final    Troponin I 07/10/2021 <0 03  0 00 - 0 07 ng/mL Final    [employer] Chemistry analyzer 99% cutoff is > 0 03ng/mL    o cTnl 99% cutoff is useful only when applied to patients in the clinical setting of myocardial ischemia  o cTnl 99% cutoff should be interpreted in the context of clinical history, ECG findings and possibly cardiac imaging to establish correct diagnosis  o cTnl 99% cutoff may be suggestive but clearly not indicative of a coronary event without the clinical setting of myocardial ischemia      Protime 07/10/2021 20 9* 9 5 - 12 1 seconds Final    INR 07/10/2021 1 91* 0 90 - 1 10 Final    Troponin I 07/10/2021 <0 03  0 00 - 0 07 ng/mL Final    [employer] Chemistry analyzer 99% cutoff is > 0 03ng/mL    o cTnl 99% cutoff is useful only when applied to patients in the clinical setting of myocardial ischemia  o cTnl 99% cutoff should be interpreted in the context of clinical history, ECG findings and possibly cardiac imaging to establish correct diagnosis  o cTnl 99% cutoff may be suggestive but clearly not indicative of a coronary event without the clinical setting of myocardial ischemia   POC Glucose 07/10/2021 149* 65 - 140 mg/dl Final    Troponin I 07/10/2021 <0 03  0 00 - 0 07 ng/mL Final    "[employer]'s Chemistry analyzer 99% cutoff is > 0 03ng/mL    o cTnl 99% cutoff is useful only when applied to patients in the clinical setting of myocardial ischemia  o cTnl 99% cutoff should be interpreted in the context of clinical history, ECG findings and possibly cardiac imaging to establish correct diagnosis  o cTnl 99% cutoff may be suggestive but clearly not indicative of a coronary event without the clinical setting of myocardial ischemia   POC Glucose 07/10/2021 210* 65 - 140 mg/dl Final    POC Glucose 07/10/2021 118  65 - 140 mg/dl Final    Sodium 07/11/2021 140  133 - 145 mmol/L Final    Potassium 07/11/2021 3 6  3 5 - 5 0 mmol/L Final    Chloride 07/11/2021 102  96 - 108 mmol/L Final    CO2 07/11/2021 33  22 - 33 mmol/L Final    ANION GAP 07/11/2021 5  4 - 13 mmol/L Final    BUN 07/11/2021 14  6 - 20 mg/dL Final    Creatinine 07/11/2021 0 62  0 40 - 1 10 mg/dL Final    Standardized to IDMS reference method    Glucose 07/11/2021 149* 65 - 140 mg/dL Final    If the patient is fasting, the ADA then defines impaired fasting glucose as > 100 mg/dL and diabetes as > or equal to 123 mg/dL  Specimen collection should occur prior to Sulfasalazine administration due to the potential for falsely depressed results  Specimen collection should occur prior to Sulfapyridine administration due to the potential for falsely elevated results   Glucose, Fasting 07/11/2021 149* 70 - 105 mg/dL Final    Specimen collection should occur prior to Sulfasalazine administration due to the potential for falsely depressed results   Specimen collection should occur prior to Sulfapyridine administration due to the potential for falsely elevated results   Calcium 07/11/2021 8 4  8 4 - 10 2 mg/dL Final    eGFR 07/11/2021 100  ml/min/1 73sq m Final    WBC 07/11/2021 3 07* 4 31 - 10 16 Thousand/uL Final    RBC 07/11/2021 4 14  3 81 - 5 12 Million/uL Final    Hemoglobin 07/11/2021 13 0  11 5 - 15 4 g/dL Final    Hematocrit 07/11/2021 40 4  34 8 - 46 1 % Final    MCV 07/11/2021 98  82 - 98 fL Final    MCH 07/11/2021 31 4  26 8 - 34 3 pg Final    MCHC 07/11/2021 32 2  31 4 - 37 4 g/dL Final    RDW 07/11/2021 13 3  11 6 - 15 1 % Final    Platelets 50/53/6034 132* 149 - 390 Thousands/uL Final    MPV 07/11/2021 10 9  8 9 - 12 7 fL Final    TSH 3RD GENERATON 07/11/2021 3 105  0 340 - 5 600 uIU/mL Final    The recommended reference ranges for TSH during pregnancy are as follows:   First trimester   0 05-3 7   Second trimester  0 31-4 35   Third trimester   0 41-5 18    Note: Normal ranges may not apply to patients who are transgender, non-binary, or whose legal sex, sex at birth, and gender identity differ   Protime 07/11/2021 20 6* 9 5 - 12 1 seconds Final    INR 07/11/2021 1 88* 0 90 - 1 10 Final    POC Glucose 07/11/2021 149* 65 - 140 mg/dl Final         Diagnosis:  Bipolar disorder mixed type  Anxiety  Insomnia  Drugs and alcohol abuse  Noncompliance of the treatment medications    Plan:  Geodon 60 mg daily  Remeron 45 mg HS  Continue trazodone 50 mg HS p r n  Patient agreed to take her psychotropic medications when she is in the hospital   Psychotherapy  Psychiatric follow-up at local mental health clinic [address] [place] [address] with her psychiatrist and therapist after the discharge  Discussed with the medical attending and nurse  I will follow up during the hospital stay      Mina Esposito MD

## 2021-07-11 NOTE — DISCHARGE SUMMARY
Matti U  68   Discharge- Lona Chino 1960, 64 y o  female MRN: [id number]  Unit/Bed#: -01 Encounter: [id number]  Primary Care Provider: Mayo Zavala MD   Date and time admitted to hospital: 7/10/2021 10:45 AM    * Back pain  Assessment & Plan  Patient has severe back pain in the right side close to the costovertebral angle  She complains of pain in her legs  She says she has had weakness in her legs as well  Likely related to fibromyalgia  Continue morphine p r n  Patient likely short of breath because of anxiety  Currently she is saturating well on room air and is calm  CTA was done and was negative  D-dimer slightly elevated 0 63  Troponins were trended which are negative  EKG shows no acute ST-T changes          Bipolar depression ([facility] [address] )  Assessment & Plan  Psychiatry consulted  Patient is not taking psych medications    Morbid obesity with BMI of 50 0-59 9, adult [facility])  Assessment & Plan  Patient counseled on weight loss    Diabetes mellitus type 2 in obese [facility])  Assessment & Plan  Lab Results   Component Value Date    HGBA1C 7 5 (A) 04/12/2021       Recent Labs     07/10/21  1606 07/10/21  2046 07/11/21  0715 07/11/21  1125   POCGLU 210* 118 149* 176*      Home oral medications discontinued  On sliding scale insulin  Hemoglobin A1c in April was 7 5    Blood Sugar Average: Last 72 hrs:  (P) 160 4    History of mechanical aortic valve replacement  Assessment & Plan  History of aortic valve replacement  Takes warfarin 6 mg daily  Monitor INR            Medical Problems     Resolved Problems  Date Reviewed: 7/10/2021    None                Admission Date:   Admission Orders (From admission, onward)     Ordered        07/10/21 1253  Place in Observation  Once                     Admitting Diagnosis: Back pain [M54 9]  Dyspnea, unspecified type [R06 00]    HPI: Lona Chino is a 64 y o  female   Past medical history adult onset diabetes, fibromyalgia, chronic pain syndrome, anxiety, GERD, DVT on Coumadin, history of mechanical aortic valve replacement, bipolar depression presented due to low back pain and shortness of breath  Patient was evaluated in the ED 1 day prior to admission complaining of chronic diarrhea for for over a month  She low longer has diarrhea  Currently she states she has severe back pain on the right side  She complains that her legs are not working and that she has shooting pain in her legs  Due to the pain she is anxious and has difficulty breathing  Breathing worsens with exertion  Oxygen saturation is % on room air  She has had difficulty walking for the past couple of days      Labs show INR 1 9, D-dimer 0 63 glucose elevated 294     CTA shows no pulmonary embolism, cirrhotic morphology of the liver with probable steatosis       Procedures Performed: No orders of the defined types were placed in this encounter  Summary of Hospital Course:   Patient has multiple comorbidities with morbid obesity BMI more than 57, fibromyalgia, history of DVT  and history of mechanical aortic valve replacement on Coumadin presented increasing pain and shortness of breath on exertion  Patient also stated that back pain has been increasing shooting up to the shoulder and also to the chest wall  Patient stated that she had pain with deep breathing and movement  Patient denies of any fever chills or cough  Patient a CT of the chest showed no evidence of PE and she is continue to take on Coumadin  Patient had EKG which showed no acute ST-T changes and troponins were trended which were negative  Patient has been having ambulatory issues due to back pain but however has not had any difficulty to walk to the bathroom or in the hallway  Patient currently denies of any chest pain    Patient has tried Tylenol with codeine which has helped in the past and will give a script for pain medication and needs follow-up with pain management for back pain and physical therapy  Patient also advised about weeks weight loss and lifestyle modification and patient stated that she wants to have bariatric surgery and she is following as outpatient  HEENT-PERRLA, moist oral mucosa  Neck-supple, no JVD elevation   Respiratory-equal air entry bilaterally, no rales or rhonchi  Cardiovascular system-S1, S2 heard, no murmur or gallops or rubs  Abdomen-soft, nontender, no guarding or rigidity, bowel sounds heard  Extremities-no pedal edema  Peripheral pulses palpable  Musculoskeletal-no contractures  Central nervous system-no acute focal neurological deficit ,no sensory or motor deficit noted  Skin-no rash noted          Significant Findings, Care, Treatment and Services Provided:  CT of the chest showed no evidence of PE    Complications: nil    Condition at Discharge: good         Discharge instructions/Information to patient and family:   See after visit summary for information provided to patient and family  Provisions for Follow-Up Care:  See after visit summary for information related to follow-up care and any pertinent home health orders  PCP: Alka Maloney MD    Disposition: Home    Planned Readmission: No    Discharge Statement   I spent 45 minutes discharging the patient  This time was spent on the day of discharge  I had direct contact with the patient on the day of discharge  Additional documentation is required if more than 30 minutes were spent on discharge  Discharge Medications:  See after visit summary for reconciled discharge medications provided to patient and family

## 2021-07-12 ENCOUNTER — TELEPHONE (OUTPATIENT)
Dept: FAMILY MEDICINE CLINIC | Facility: CLINIC | Age: 61
End: 2021-07-12

## 2021-07-12 ENCOUNTER — ANTICOAG VISIT (OUTPATIENT)
Dept: CARDIOLOGY CLINIC | Facility: CLINIC | Age: 61
End: 2021-07-12

## 2021-07-12 DIAGNOSIS — R19.7 DIARRHEA, UNSPECIFIED TYPE: Primary | ICD-10-CM

## 2021-07-12 DIAGNOSIS — R19.8 GI PROBLEM: ICD-10-CM

## 2021-07-12 DIAGNOSIS — K59.00 CONSTIPATION, UNSPECIFIED CONSTIPATION TYPE: ICD-10-CM

## 2021-07-12 DIAGNOSIS — Z95.2 STATUS POST MECHANICAL AORTIC VALVE REPLACEMENT: Primary | ICD-10-CM

## 2021-07-12 LAB
ATRIAL RATE: 75 BPM
P AXIS: 53 DEGREES
PR INTERVAL: 167 MS
QRS AXIS: 43 DEGREES
QRSD INTERVAL: 104 MS
QT INTERVAL: 420 MS
QTC INTERVAL: 466 MS
T WAVE AXIS: 95 DEGREES
VENTRICULAR RATE: 74 BPM

## 2021-07-12 PROCEDURE — 93010 ELECTROCARDIOGRAM REPORT: CPT | Performed by: INTERNAL MEDICINE

## 2021-07-12 NOTE — TELEPHONE ENCOUNTER
Patient has an appointment scheduled with you on the 20th    She was in the hospital   She would like you to call her, she had diarrhea for over a month, it has stopped and she is now constipated

## 2021-07-12 NOTE — TELEPHONE ENCOUNTER
Sounds like irritable bowel to me or something of that nature-if she wants to see GI we can refer her

## 2021-07-12 NOTE — TELEPHONE ENCOUNTER
Pt has an appt with you back in June regarding this reason as well  I put in referral to GI at this point

## 2021-07-14 DIAGNOSIS — E11.9 TYPE 2 DIABETES MELLITUS WITHOUT COMPLICATION, WITHOUT LONG-TERM CURRENT USE OF INSULIN (HCC): ICD-10-CM

## 2021-07-14 DIAGNOSIS — E66.9 DIABETES MELLITUS TYPE 2 IN OBESE (HCC): Primary | ICD-10-CM

## 2021-07-14 DIAGNOSIS — E11.69 DIABETES MELLITUS TYPE 2 IN OBESE (HCC): Primary | ICD-10-CM

## 2021-07-14 DIAGNOSIS — E78.00 HIGH CHOLESTEROL: ICD-10-CM

## 2021-07-14 RX ORDER — ATORVASTATIN CALCIUM 20 MG/1
20 TABLET, FILM COATED ORAL DAILY
Qty: 30 TABLET | Refills: 5 | Status: SHIPPED | OUTPATIENT
Start: 2021-07-14 | End: 2021-07-15

## 2021-07-14 NOTE — TELEPHONE ENCOUNTER
Patient wants to know if you would like her to continue the Januvia that they put her on in the hospital   If so she will need a prior auth

## 2021-07-14 NOTE — TELEPHONE ENCOUNTER
Well, she's already on metformin-her A1c done recently was 7 5%-I would make sure she's taking the metformin and make sure it's maximized before adding another med not covered by insurance-a lot of it is diet too

## 2021-07-15 ENCOUNTER — TRANSITIONAL CARE MANAGEMENT (OUTPATIENT)
Dept: FAMILY MEDICINE CLINIC | Facility: CLINIC | Age: 61
End: 2021-07-15

## 2021-07-15 ENCOUNTER — TELEPHONE (OUTPATIENT)
Dept: FAMILY MEDICINE CLINIC | Facility: CLINIC | Age: 61
End: 2021-07-15

## 2021-07-15 ENCOUNTER — OFFICE VISIT (OUTPATIENT)
Dept: FAMILY MEDICINE CLINIC | Facility: CLINIC | Age: 61
End: 2021-07-15
Payer: COMMERCIAL

## 2021-07-15 VITALS
RESPIRATION RATE: 16 BRPM | HEART RATE: 76 BPM | TEMPERATURE: 97.3 F | HEIGHT: 66 IN | BODY MASS INDEX: 47.09 KG/M2 | OXYGEN SATURATION: 96 % | SYSTOLIC BLOOD PRESSURE: 150 MMHG | DIASTOLIC BLOOD PRESSURE: 78 MMHG | WEIGHT: 293 LBS

## 2021-07-15 DIAGNOSIS — M47.814 OSTEOARTHRITIS OF THORACIC SPINE, UNSPECIFIED SPINAL OSTEOARTHRITIS COMPLICATION STATUS: ICD-10-CM

## 2021-07-15 DIAGNOSIS — G89.29 CHRONIC BILATERAL LOW BACK PAIN WITH SCIATICA, SCIATICA LATERALITY UNSPECIFIED: ICD-10-CM

## 2021-07-15 DIAGNOSIS — M79.7 FIBROMYALGIA: ICD-10-CM

## 2021-07-15 DIAGNOSIS — Z95.2 S/P AORTIC VALVE REPLACEMENT WITH PROSTHETIC VALVE: ICD-10-CM

## 2021-07-15 DIAGNOSIS — E11.69 DIABETES MELLITUS TYPE 2 IN OBESE (HCC): ICD-10-CM

## 2021-07-15 DIAGNOSIS — E78.00 HIGH CHOLESTEROL: ICD-10-CM

## 2021-07-15 DIAGNOSIS — F17.210 TOBACCO DEPENDENCE DUE TO CIGARETTES: ICD-10-CM

## 2021-07-15 DIAGNOSIS — E66.01 MORBID OBESITY (HCC): ICD-10-CM

## 2021-07-15 DIAGNOSIS — E11.9 TYPE 2 DIABETES MELLITUS WITHOUT COMPLICATION, WITHOUT LONG-TERM CURRENT USE OF INSULIN (HCC): Primary | ICD-10-CM

## 2021-07-15 DIAGNOSIS — Z76.89 ENCOUNTER FOR SUPPORT AND COORDINATION OF TRANSITION OF CARE: Primary | ICD-10-CM

## 2021-07-15 DIAGNOSIS — E66.9 DIABETES MELLITUS TYPE 2 IN OBESE (HCC): ICD-10-CM

## 2021-07-15 DIAGNOSIS — E66.9 DIABETES MELLITUS TYPE 2 IN OBESE (HCC): Primary | ICD-10-CM

## 2021-07-15 DIAGNOSIS — R19.7 DIARRHEA, UNSPECIFIED TYPE: ICD-10-CM

## 2021-07-15 DIAGNOSIS — M54.40 CHRONIC BILATERAL LOW BACK PAIN WITH SCIATICA, SCIATICA LATERALITY UNSPECIFIED: ICD-10-CM

## 2021-07-15 DIAGNOSIS — Z95.2 HISTORY OF MECHANICAL AORTIC VALVE REPLACEMENT: ICD-10-CM

## 2021-07-15 DIAGNOSIS — E11.69 DIABETES MELLITUS TYPE 2 IN OBESE (HCC): Primary | ICD-10-CM

## 2021-07-15 PROBLEM — R03.0 ELEVATED BLOOD PRESSURE READING: Status: ACTIVE | Noted: 2021-07-15

## 2021-07-15 LAB — SL AMB POCT HEMOGLOBIN AIC: 8.3 (ref ?–6.5)

## 2021-07-15 PROCEDURE — 99496 TRANSJ CARE MGMT HIGH F2F 7D: CPT | Performed by: INTERNAL MEDICINE

## 2021-07-15 PROCEDURE — 83036 HEMOGLOBIN GLYCOSYLATED A1C: CPT | Performed by: INTERNAL MEDICINE

## 2021-07-15 PROCEDURE — 3008F BODY MASS INDEX DOCD: CPT | Performed by: INTERNAL MEDICINE

## 2021-07-15 PROCEDURE — 3052F HG A1C>EQUAL 8.0%<EQUAL 9.0%: CPT | Performed by: INTERNAL MEDICINE

## 2021-07-15 RX ORDER — METFORMIN HYDROCHLORIDE 1000 MG/1
TABLET, FILM COATED, EXTENDED RELEASE ORAL
Qty: 30 TABLET | Refills: 3 | Status: SHIPPED | OUTPATIENT
Start: 2021-07-15 | End: 2021-07-20

## 2021-07-15 RX ORDER — BLOOD SUGAR DIAGNOSTIC
STRIP MISCELLANEOUS
Qty: 100 EACH | Refills: 5 | Status: SHIPPED | OUTPATIENT
Start: 2021-07-15 | End: 2021-09-29 | Stop reason: SDUPTHER

## 2021-07-15 RX ORDER — ATORVASTATIN CALCIUM 40 MG/1
40 TABLET, FILM COATED ORAL DAILY
Qty: 30 TABLET | Refills: 3 | Status: SHIPPED | OUTPATIENT
Start: 2021-07-15 | End: 2021-09-14 | Stop reason: HOSPADM

## 2021-07-15 RX ORDER — CYCLOBENZAPRINE HCL 10 MG
10 TABLET ORAL 3 TIMES DAILY PRN
Qty: 90 TABLET | Refills: 3 | Status: SHIPPED | OUTPATIENT
Start: 2021-07-15 | End: 2021-08-10 | Stop reason: SDUPTHER

## 2021-07-15 RX ORDER — BLOOD-GLUCOSE METER
EACH MISCELLANEOUS
Qty: 1 KIT | Refills: 1 | Status: SHIPPED | OUTPATIENT
Start: 2021-07-15 | End: 2022-03-07 | Stop reason: SDUPTHER

## 2021-07-15 RX ORDER — ISOPROPYL ALCOHOL 0.7 ML/ML
SWAB TOPICAL
Qty: 100 EACH | Refills: 6 | Status: SHIPPED | OUTPATIENT
Start: 2021-07-15 | End: 2022-02-02 | Stop reason: SDUPTHER

## 2021-07-15 RX ORDER — DULOXETIN HYDROCHLORIDE 30 MG/1
30 CAPSULE, DELAYED RELEASE ORAL DAILY
Qty: 30 CAPSULE | Refills: 5 | Status: SHIPPED | OUTPATIENT
Start: 2021-07-15 | End: 2021-08-10 | Stop reason: SDUPTHER

## 2021-07-15 RX ORDER — LANCING DEVICE
EACH MISCELLANEOUS
Qty: 100 EACH | Refills: 5 | Status: SHIPPED | OUTPATIENT
Start: 2021-07-15 | End: 2021-09-29 | Stop reason: SDUPTHER

## 2021-07-15 NOTE — ASSESSMENT & PLAN NOTE
Miguel Angel says the bp is elevated because of back pain but I think we may end up using an ACE for her-counseled on diet, low sodium, exercise, weight loss

## 2021-07-15 NOTE — ASSESSMENT & PLAN NOTE
She definitely has features of fibromyalgia-she does have positive trigger points-she is amenable trying some Cymbalta, which may also work for her chronic low back pain

## 2021-07-15 NOTE — ASSESSMENT & PLAN NOTE
Will go ahead and refer to orthopedics as I think some of her pain is coming from DDD in the back-will likely need further imaging-she would like to try a muscle relaxer and it's possible cymbalta will help her

## 2021-07-15 NOTE — TELEPHONE ENCOUNTER
Needs prior auth on Metformin 1000mg Long Lasting  In the mean time should she take her old Metformin 500mg?

## 2021-07-15 NOTE — PROGRESS NOTES
TCM Call (since 6/14/2021)     Date and time call was made  7/15/2021 11:24 AM    Hospital care reviewed  Records reviewed    Patient was hospitialized at  [address]        Date of Admission  07/10/21    Date of discharge  07/11/21    Diagnosis  BACK PAIN    Disposition  Home    Were the patients medications reviewed and updated  Yes    Current Symptoms  Back pain - right side; Loose Stools; Middle abdominal pain    Loose stool severity  Moderate    Middle abdominal pain severity  Moderate    Back pain, right side, severity  Severe; Moderate    Back pain, right side, onset  Ongoing      TCM Call (since 6/14/2021)     Post hospital issues  None    Should patient be enrolled in anticoag monitoring? Yes    Scheduled for follow up?   Yes    Patients specialists  Cardiologist    Cardiologist name  Chris Burt MD    Cardiologist contact #  [phone]    Referrals needed  NO     Did you obtain your prescribed medications  -- (Comment)  SOME OF THEM    Do you need help managing your prescriptions or medications  No    Is transportation to your appointment needed  No    I have advised the patient to call PCP with any new or worsening symptoms  Ishan Brady, [address]; Spouse or Significiant other    Support System  Partner; Family    The type of support provided  Emotional; Physical    Do you have social support  Yes, as much as I need    Are you recieving any outpatient services  No    Are you recieving home care services  No    Are you using any community resources  No    Current waiver services  No    Have you fallen in the last 12 months  No    Interperter language line needed  No    Counseling  Patient        Assessment/Plan:         Problem List Items Addressed This Visit        Endocrine    Diabetes mellitus type 2 in obese ([address] )    Relevant Medications    metFORMIN (GLUMETZA) 1000 MG (MOD) 24 hr tablet    Other Relevant Orders    POCT hemoglobin A1c (Completed) Cardiovascular and Mediastinum    S/P aortic valve replacement with prosthetic valve       Other    History of mechanical aortic valve replacement    Tobacco dependence due to cigarettes    Morbid obesity (HCC)    Diarrhea     It's possible that this may be related to the metformin she's on-she is amenable to changing the metformin to ER and I talked with her extensively about diet, and exercise-keto, etc         Back pain     Will go ahead and refer to orthopedics as I think some of her pain is coming from DDD in the back-will likely need further imaging-she would like to try a muscle relaxer and it's possible cymbalta will help her         Relevant Medications    cyclobenzaprine (FLEXERIL) 10 mg tablet    DULoxetine (CYMBALTA) 30 mg delayed release capsule    Fibromyalgia     She definitely has features of fibromyalgia-she does have positive trigger points-she is amenable trying some Cymbalta, which may also work for her chronic low back pain           Other Visit Diagnoses     Encounter for support and coordination of transition of care    -  Primary    Osteoarthritis of thoracic spine, unspecified spinal osteoarthritis complication status        Relevant Orders    Ambulatory referral to Orthopedic Surgery    High cholesterol        Relevant Medications    atorvastatin (LIPITOR) 40 mg tablet            Subjective:      Patient ID: Saúl Betancourt is a 64 y o  female      Lauren Artist here for TCM s/p hospitalization for back pain, trouble breathing, and diarrhea-she has a hx of DM II, morbid obesity, COPD and tobacco use, s/p mechanical AVR on Coumadin, hx of DVT, chronic low back pain, anxiety, hx of alcohol abuse in the past and hx of psychiatric hospitalizations, ALESIA, hx of Covid -says she's having trouble with constant diarrhea, especially with taking the metformin she's on-her A1c today went up from 7 5 to 8 3 -she is amenable to being switched to metformin ER-the hospital also mentioned fibromyalgia to her, and today she does test positive for multiple tender trigger point sites-her bp is elevated today, although she says it's because she has constant chronic back pain- I told her I thought a lower dose ACE would be a good idea but not sure how amenable she is to it-not taking any of her psychiatric meds currently      The following portions of the patient's history were reviewed and updated as appropriate:   Past Medical History:  She has a past medical history of Anemia, Anxiety, Aortic valve disorder, Back pain, Chronic pain syndrome, Constipation, Degenerative arthritis of thoracic spine, Degenerative joint disease involving multiple joints, Depression, DVT (deep venous thrombosis) ([facility] ), Edema, Endometrial adenocarcinoma ([facility] [facility] ), Fibromyalgia, Ganglion of right wrist, GERD (gastroesophageal reflux disease), Heart murmur, Hematoma, History of mechanical aortic valve replacement, Hypothyroidism (acquired), Laboratory confirmed diagnosis of COVID-19 (2021), Low blood pressure, Malignant neoplasm of corpus uteri, except isthmus ([facility] [address] ), Mixed hyperlipidemia, Morbid obesity ([facility] ), Obstructive sleep apnea syndrome, Pulmonary embolism ([facility] ), Tobacco dependence syndrome, Transient cerebral ischemia, Urinary incontinence, Viral hepatitis C, and Vitamin D deficiency  ,  _______________________________________________________________________  Medical Problems:  does not have any pertinent problems on file ,  _______________________________________________________________________  Past Surgical History:   has a past surgical history that includes Ankle surgery; Cardiac surgery (); Hysterectomy (2011);  section; Cholecystectomy; and Colonoscopy (2019)  ,  _______________________________________________________________________  Family History:  family history includes Coronary artery disease in her father and mother ,  _______________________________________________________________________  Social History:   reports that she has been smoking cigarettes  She has a 20 00 pack-year smoking history  She has never used smokeless tobacco  She reports current alcohol use  She reports current drug use  Drug: Marijuana  ,  _______________________________________________________________________  Allergies:  is allergic to bactrim [sulfamethoxazole-trimethoprim], penicillins, and tramadol     _______________________________________________________________________  Current Outpatient Medications   Medication Sig Dispense Refill    acetaminophen-codeine (TYLENOL #3) 300-30 mg per tablet Take 1 tablet by mouth every 6 (six) hours as needed for moderate pain for up to 15 days 30 tablet 0    atorvastatin (LIPITOR) 40 mg tablet Take 1 tablet (40 mg total) by mouth daily 30 tablet 3    furosemide (LASIX) 40 mg tablet Take 1 tablet daily for 5 days, then 1 tablet daily on an as needed basis   10 tablet 0    nystatin powder APPLY TOPICALLY 2 (TWO) TIMES A DAY AS NEEDED FOR 14 DAYS      warfarin (Jantoven) 3 mg tablet Take 2 tablets (6 mg total) by mouth daily (Patient taking differently: Take 6 mg by mouth daily 2 5 tablets on Monday) 60 tablet 5    ascorbic acid (VITAMIN C) 1000 MG tablet Take 1 tablet (1,000 mg total) by mouth daily for 5 doses (Patient not taking: Reported on 7/15/2021) 5 tablet 0    cholecalciferol (VITAMIN D3) 1,000 units tablet Take 2 tablets (2,000 Units total) by mouth daily for 10 days (Patient not taking: Reported on 7/15/2021) 20 tablet 0    cyclobenzaprine (FLEXERIL) 10 mg tablet Take 1 tablet (10 mg total) by mouth 3 (three) times a day as needed for muscle spasms 90 tablet 3    dicyclomine (BENTYL) 20 mg tablet Take 1 tablet (20 mg total) by mouth 2 (two) times a day (Patient not taking: Reported on 7/15/2021) 20 tablet 0    DULoxetine (CYMBALTA) 30 mg delayed release capsule Take 1 capsule (30 mg total) by mouth daily 30 capsule 5    metFORMIN (GLUMETZA) 1000 MG (MOD) 24 hr tablet Take 1 tablet daily with morning meal 30 tablet 3    nicotine (NICODERM CQ) 14 mg/24hr TD 24 hr patch Place 1 patch on the skin every 24 hours (Patient not taking: Reported on 7/15/2021) 28 patch 0    sitaGLIPtin (JANUVIA) 50 mg tablet Take 1 tablet (50 mg total) by mouth daily (Patient not taking: Reported on 7/15/2021) 30 tablet 0     No current facility-administered medications for this visit      _______________________________________________________________________  Review of Systems   Constitutional: Positive for fatigue  HENT: Negative for congestion, sinus pressure and sinus pain  Respiratory: Negative for cough and shortness of breath  Cardiovascular: Negative  Gastrointestinal: Positive for diarrhea  Musculoskeletal: Positive for back pain  Hematological: Negative for adenopathy  Does not bruise/bleed easily  Objective:  Vitals:    07/15/21 1101 07/15/21 1148   BP: 140/72 150/78   BP Location: Left arm    Patient Position: Sitting    Cuff Size: Adult    Pulse: 76    Resp: 16    Temp: (!) 97 3 °F (36 3 °C)    TempSrc: Temporal    SpO2: 96%    Weight: (!) 162 kg (357 lb)    Height: 5' 6" (1 676 m)      Body mass index is 57 62 kg/m²  Physical Exam  Vitals reviewed  Constitutional:       Appearance: She is obese  HENT:      Head: Normocephalic and atraumatic  Mouth/Throat:      Mouth: Mucous membranes are moist    Eyes:      Extraocular Movements: Extraocular movements intact  Cardiovascular:      Rate and Rhythm: Normal rate and regular rhythm  Pulmonary:      Effort: Pulmonary effort is normal       Breath sounds: Normal breath sounds  Abdominal:      General: Abdomen is protuberant  Palpations: Abdomen is soft  There is no fluid wave  Tenderness: There is no abdominal tenderness  Skin:     Comments: Large bruising on right arm   Neurological:      General: No focal deficit present  Mental Status: She is alert     Psychiatric:         Mood and Affect: Mood normal          Behavior: Behavior normal

## 2021-07-15 NOTE — ASSESSMENT & PLAN NOTE
It's possible that this may be related to the metformin she's on-she is amenable to changing the metformin to ER and I talked with her extensively about diet, and exercise-keto, etc

## 2021-07-20 ENCOUNTER — DOCUMENTATION (OUTPATIENT)
Dept: FAMILY MEDICINE CLINIC | Facility: CLINIC | Age: 61
End: 2021-07-20

## 2021-07-20 DIAGNOSIS — E66.9 DIABETES MELLITUS TYPE 2 IN OBESE (HCC): Primary | ICD-10-CM

## 2021-07-20 DIAGNOSIS — E11.69 DIABETES MELLITUS TYPE 2 IN OBESE (HCC): Primary | ICD-10-CM

## 2021-07-20 DIAGNOSIS — E11.65 HYPERGLYCEMIA DUE TO DIABETES MELLITUS (HCC): ICD-10-CM

## 2021-07-20 NOTE — PROGRESS NOTES
Metformin ER 1000 Prior Auth was Denied by pts insurance  What do you want to change her to? Rangel is on [employer] [place] preferred med list  Let me know what you want to change her too and I will look to see if it will be covered or not

## 2021-07-20 NOTE — PROGRESS NOTES
That's a shame-we can order the Januvia 100 mg daily and I don't know if she'd tolerate 1 metformin a day with the largest meal or not but we could try it

## 2021-07-20 NOTE — PROGRESS NOTES
Now [facility] Zuhair Buchanan was rejected by insurance   Covered alternatives are GlipiZIDE ER, GlipiZIDE, Glimepiride, MetFORMIN HCl   MetFORMIN HCl ER, GlipiZIDE-MetFORMIN HCl, Jardiance

## 2021-07-22 ENCOUNTER — DOCUMENTATION (OUTPATIENT)
Dept: FAMILY MEDICINE CLINIC | Facility: CLINIC | Age: 61
End: 2021-07-22

## 2021-07-22 DIAGNOSIS — E11.9 TYPE 2 DIABETES MELLITUS WITHOUT COMPLICATION, WITHOUT LONG-TERM CURRENT USE OF INSULIN (HCC): Primary | ICD-10-CM

## 2021-07-23 ENCOUNTER — TELEPHONE (OUTPATIENT)
Dept: FAMILY MEDICINE CLINIC | Facility: CLINIC | Age: 61
End: 2021-07-23

## 2021-07-23 ENCOUNTER — TELEPHONE (OUTPATIENT)
Dept: GASTROENTEROLOGY | Facility: CLINIC | Age: 61
End: 2021-07-23

## 2021-07-23 NOTE — TELEPHONE ENCOUNTER
Patient see GI on 07/28 but the diarrhea is so bad it's just pouring out of her  She wants to know if there is something she can do until she get seen

## 2021-07-23 NOTE — TELEPHONE ENCOUNTER
Agree with recommendation since we have not seen her before we have no prior history would recommend she follow-up with her primary care physician until we can evaluate her in office    Thank you

## 2021-07-23 NOTE — TELEPHONE ENCOUNTER
Left message for patient that there is no appointments prior to 7/28 and would advised doing what the nurse instructed her to do by seeing her pcp

## 2021-07-23 NOTE — TELEPHONE ENCOUNTER
Patient called  She is a new patient to us  She has an appt on 7/28/2021  She states she is having diarrhea and she is incontinent  I told her since she is new to us she should follow up with pcp until her appt  Please call patient to see if we can get her a sooner appt   Thank you

## 2021-07-23 NOTE — TELEPHONE ENCOUNTER
I spoke with Deann Sheridan and she says she's been having severe diarrhea despite being off the metformin several days now-she says the diarrhea is so bad it pours out of her on to the floor and she has been having to wear a pull up-told her we need to do another stool specimen/C diff-she will go to the lab tomorrow and  stuff needed for that-she is trying to stay hydrated-will go ahead and rx a probiotic and some lomotil-I told Deann Sheridan I wasn't completely comfortable with an anti diarrheal agent but she says it's so bad she's making messes in her kitchen and her daughter has to clean them up-so will go ahead and rx-she has an appointment upcoming with GI on July 28th

## 2021-07-28 ENCOUNTER — OFFICE VISIT (OUTPATIENT)
Dept: GASTROENTEROLOGY | Facility: CLINIC | Age: 61
End: 2021-07-28
Payer: COMMERCIAL

## 2021-07-28 VITALS
HEART RATE: 87 BPM | SYSTOLIC BLOOD PRESSURE: 156 MMHG | BODY MASS INDEX: 47.09 KG/M2 | HEIGHT: 66 IN | WEIGHT: 293 LBS | DIASTOLIC BLOOD PRESSURE: 86 MMHG

## 2021-07-28 DIAGNOSIS — R16.0 HEPATOMEGALY: ICD-10-CM

## 2021-07-28 DIAGNOSIS — Z79.01 ANTICOAGULATED ON COUMADIN: ICD-10-CM

## 2021-07-28 DIAGNOSIS — E66.01 MORBID OBESITY WITH BMI OF 50.0-59.9, ADULT (HCC): ICD-10-CM

## 2021-07-28 DIAGNOSIS — K76.0 FATTY LIVER: ICD-10-CM

## 2021-07-28 DIAGNOSIS — R15.2 INCONTINENCE OF FECES WITH FECAL URGENCY: ICD-10-CM

## 2021-07-28 DIAGNOSIS — Z95.2 S/P AORTIC VALVE REPLACEMENT WITH PROSTHETIC VALVE: ICD-10-CM

## 2021-07-28 DIAGNOSIS — R19.7 DIARRHEA, UNSPECIFIED TYPE: Primary | ICD-10-CM

## 2021-07-28 DIAGNOSIS — R15.9 INCONTINENCE OF FECES WITH FECAL URGENCY: ICD-10-CM

## 2021-07-28 PROCEDURE — 1111F DSCHRG MED/CURRENT MED MERGE: CPT | Performed by: INTERNAL MEDICINE

## 2021-07-28 PROCEDURE — 3008F BODY MASS INDEX DOCD: CPT | Performed by: INTERNAL MEDICINE

## 2021-07-28 PROCEDURE — 99204 OFFICE O/P NEW MOD 45 MIN: CPT | Performed by: INTERNAL MEDICINE

## 2021-07-28 PROCEDURE — 4004F PT TOBACCO SCREEN RCVD TLK: CPT | Performed by: INTERNAL MEDICINE

## 2021-07-28 RX ORDER — MIRTAZAPINE 45 MG/1
45 TABLET, FILM COATED ORAL
COMMUNITY
Start: 2021-07-28

## 2021-07-28 RX ORDER — METRONIDAZOLE 500 MG/1
500 TABLET ORAL EVERY 8 HOURS SCHEDULED
Qty: 42 TABLET | Refills: 0 | Status: SHIPPED | OUTPATIENT
Start: 2021-07-28 | End: 2021-07-28

## 2021-07-28 RX ORDER — ZIPRASIDONE HYDROCHLORIDE 60 MG/1
60 CAPSULE ORAL 2 TIMES DAILY WITH MEALS
COMMUNITY
Start: 2021-07-28

## 2021-07-28 NOTE — PROGRESS NOTES
[address] [facility] - Outpatient Consultation  Donnell Torres 63 y o  female MRN: [id number]  Encounter: [id number]          ASSESSMENT AND PLAN:      1  S/P aortic valve replacement with prosthetic valve    2  Anticoagulated on Coumadin    3  Morbid obesity with BMI of 50 0-59 9, adult ([facility] [address] )    4  Diarrhea, unspecified type  -     Clostridium difficile toxin by PCR; Future    5  Incontinence of feces with fecal urgency  -     Clostridium difficile toxin by PCR; Future    6  Fatty liver    7  Hepatomegaly      History of chronic diarrhea, change in bowel habits with fecal urgency incontinence, though she is feeling somewhat better today  In the past stool WBC was positive, culture negative but I could not find any C diff results  Advised to have C diff done, if C diff stool is positive will treat with oral vancomycin  If negative, then may need colonoscopy  She will call me after stool studies  In the meanwhile lactose-free low-fat diet discussed  CT imaging showed hepatomegaly and fatty liver  Consequences and advanced liver disease discussed  Multiple other medical problems including prostatic aortic valve, on Coumadin discussed  ______________________________________________________________________    HPI:    Marion Otero came in for evaluation of her diarrhea  She has 5-8 bowel movements a day with urgency to watery stools and fecal incontinence  She denies any blood in stools, sometimes loses control while walking  She has had this for 6-8 weeks, does not recall any travels antibiotics new medications before onset of the diarrhea  Before then she used to be generally constipated  She may have had a colonoscopy done 5 6 years ago those records not available and she does not remember the details  There is no change in diet  Drink some coffee soda on and fried foods, denies dysphagia coughing choking spells, trying to watch her diet and await    Denies any chest pains shortness of breath fever chills rash, gives history of fibromyalgia, lives with her daughter  Denies any history of liver disease  Diet medications more than 10 pertinent systems were reviewed      REVIEW OF SYSTEMS:    CONSTITUTIONAL: Denies any fever, chills, rigors, and weight loss  HEENT: No earache or tinnitus  CARDIOVASCULAR: No chest pain or palpitations  RESPIRATORY: Denies any cough, hemoptysis, shortness of breath or dyspnea on exertion  GASTROINTESTINAL: As noted in the History of Present Illness  GENITOURINARY: Denies any hematuria or dysuria  NEUROLOGIC: No dizziness or vertigo  MUSCULOSKELETAL: Denies any joint swellings  SKIN: Denies skin rashes or itching  ENDOCRINE: Denies excessive thirst  Denies intolerance to heat or cold  PSYCHOSOCIAL: Denies depression or anxiety  Denies any recent memory loss         Historical Information   Past Medical History:   Diagnosis Date    Anemia     Anxiety     Aortic valve disorder     Back pain     Chronic pain syndrome     Cirrhosis (HCC)     Constipation     Degenerative arthritis of thoracic spine     Degenerative joint disease involving multiple joints     Depression     DVT (deep venous thrombosis) (HCC)     Bilateral    Edema     Endometrial adenocarcinoma (HCC)     Fibromyalgia     Ganglion of right wrist     GERD (gastroesophageal reflux disease)     Heart murmur     Hematoma     History of mechanical aortic valve replacement     Hypothyroidism (acquired)     Laboratory confirmed diagnosis of COVID-19 03/16/2021    Low blood pressure     Malignant neoplasm of corpus uteri, except isthmus (HCC)     Mixed hyperlipidemia     Morbid obesity (HCC)     Obstructive sleep apnea syndrome     Pulmonary embolism (HCC)     Tobacco dependence syndrome     Transient cerebral ischemia     Urinary incontinence     Viral hepatitis C     Vitamin D deficiency      Past Surgical History:   Procedure Laterality Date    ANKLE SURGERY      CARDIAC SURGERY  2015    VALVE REPLACEMENT     SECTION      X3    CHOLECYSTECTOMY      COLONOSCOPY  2019    COLONOSCOPY      HYSTERECTOMY  2011    TOTAL     Social History   Social History     Substance and Sexual Activity   Alcohol Use Yes    Comment: once in awhile     Social History     Substance and Sexual Activity   Drug Use Yes    Types: Marijuana     Social History     Tobacco Use   Smoking Status Current Some Day Smoker    Packs/day: 0 50    Years: 40 00    Pack years: 20 00    Types: Cigarettes   Smokeless Tobacco Never Used     Family History   Problem Relation Age of Onset    Coronary artery disease Mother     Coronary artery disease Father     Cirrhosis Father        Meds/Allergies       Current Outpatient Medications:     Alcohol Swabs (Pharmacist Choice Alcohol) PADS    Blood Glucose Monitoring Suppl (OneTouch Verio IQ System) w/Device KIT    cyclobenzaprine (FLEXERIL) 10 mg tablet    dicyclomine (BENTYL) 20 mg tablet    DULoxetine (CYMBALTA) 30 mg delayed release capsule    furosemide (LASIX) 40 mg tablet    mirtazapine (REMERON) 45 MG tablet    nystatin powder    OneTouch Verio test strip  [facility]  Pharmacist Choice Lancets MISC    saccharomyces boulardii (FLORASTOR) 250 mg capsule    sitaGLIPtin (Januvia) 100 mg tablet    warfarin (Jantoven) 3 mg tablet    ziprasidone (GEODON) 60 mg capsule    ascorbic acid (VITAMIN C) 1000 MG tablet    atorvastatin (LIPITOR) 40 mg tablet    cholecalciferol (VITAMIN D3) 1,000 units tablet    diphenoxylate-atropine (LOMOTIL) 2 5-0 025 mg per tablet    nicotine (NICODERM CQ) 14 mg/24hr TD 24 hr patch    Allergies   Allergen Reactions    Bactrim [Sulfamethoxazole-Trimethoprim] Other (See Comments)     Patient is unaware of why this allergy is recorded, denies knowledge of it    Penicillins Hives     Patient is unaware of why this allergy is recorded, denies knowledge of it    Tramadol GI Intolerance and Vomiting     Could not function - felt very ill            Objective     Blood pressure 156/86, pulse 87, height 5' 6" (1 676 m), weight (!) 158 kg (348 lb)  Body mass index is 56 17 kg/m²  PHYSICAL EXAM:      General Appearance:   Alert, cooperative, no distress   HEENT:   Normocephalic, atraumatic, anicteric  Neck:  Supple, symmetrical, trachea midline   Lungs:   Clear to auscultation bilaterally; no rales, rhonchi or wheezing; respirations unlabored    Heart[de-identified]   Regular rate and rhythm; no murmur  Abdomen:   Soft, non-tender, non-distended; normal bowel sounds; no masses, no organomegaly    Genitalia:   Deferred    Rectal:   Deferred    Extremities:  No cyanosis, clubbing or edema    Skin:  No jaundice, rashes, or lesions    Lymph nodes:  No palpable cervical lymphadenopathy        Lab Results:   No visits with results within 1 Day(s) from this visit  Latest known visit with results is:   Office Visit on 07/15/2021   Component Date Value    Hemoglobin A1C 07/15/2021 8 3*         Radiology Results:   XR chest 1 view portable    Result Date: 7/11/2021  Narrative: CHEST INDICATION:   chest pain  COMPARISON:  3/16/2021 EXAM PERFORMED/VIEWS:  XR CHEST PORTABLE Single view FINDINGS: Sternal wires again evident Cardiomediastinal silhouette appears unremarkable  The lungs are clear  No pneumothorax or pleural effusion  Osseous structures appear within normal limits for patient age  Impression: No acute cardiopulmonary disease  Stable exam Workstation performed: [id number]     CTA ED chest PE Study    Result Date: 7/10/2021  Narrative: CTA - CHEST WITH IV CONTRAST - PULMONARY ANGIOGRAM INDICATION:   PE suspected, intermediate prob, positive D-dimer rule out PE  COMPARISON: CTA chest dated 1/23/2019  TECHNIQUE: CTA examination of the chest was performed using angiographic technique according to a protocol specifically tailored to evaluate for pulmonary embolism    Axial, sagittal, and coronal 2D reformatted images were created from the source data and  submitted for interpretation  In addition, coronal 3D MIP postprocessing was performed on the acquisition scanner  Radiation dose length product (DLP) for this visit:  957 mGy-cm   This examination, like all CT scans performed in the [facility], was performed utilizing techniques to minimize radiation dose exposure, including the use of iterative reconstruction and automated exposure control  IV Contrast:  100 mL of iohexol (OMNIPAQUE)  FINDINGS: PULMONARY ARTERIAL TREE:  No pulmonary embolus is seen  LUNGS:  Lungs are clear  There is no tracheal or endobronchial lesion  PLEURA:  Unremarkable  HEART/GREAT VESSELS:  Normal cardiac size  No pericardial effusion  Status post AVR  Coronary artery calcifications  Normal caliber and course of the great vessels  MEDIASTINUM AND GUICHO:  Unremarkable  CHEST WALL AND LOWER NECK:   Median sternotomy  VISUALIZED STRUCTURES IN THE UPPER ABDOMEN:  Cholecystectomy  Suboptimal evaluation of upper abdominal viscera due to motion artifacts  Within the limitations, there appears to be hepatic steatosis with nodular surface contour, suggestive of cirrhosis  Findings are similar to prior CT abdomen from June 24, 2021  OSSEOUS STRUCTURES:  No acute fracture or destructive osseous lesion  Degenerative changes of thoracic spine  Impression: No pulmonary embolism  No active pulmonary disease  Cirrhotic morphology of the liver with probable steatosis   Workstation performed: [id number]

## 2021-08-03 DIAGNOSIS — Z86.718 PERSONAL HISTORY OF DVT (DEEP VEIN THROMBOSIS): ICD-10-CM

## 2021-08-03 DIAGNOSIS — Z95.2 HISTORY OF MECHANICAL AORTIC VALVE REPLACEMENT: ICD-10-CM

## 2021-08-04 RX ORDER — WARFARIN SODIUM 3 MG/1
6 TABLET ORAL DAILY
Qty: 30 TABLET | Refills: 5 | Status: SHIPPED | OUTPATIENT
Start: 2021-08-04 | End: 2021-08-10 | Stop reason: SDUPTHER

## 2021-08-09 ENCOUNTER — APPOINTMENT (OUTPATIENT)
Dept: LAB | Facility: HOSPITAL | Age: 61
End: 2021-08-09
Payer: COMMERCIAL

## 2021-08-09 ENCOUNTER — ANTICOAG VISIT (OUTPATIENT)
Dept: CARDIOLOGY CLINIC | Facility: CLINIC | Age: 61
End: 2021-08-09

## 2021-08-09 DIAGNOSIS — R15.2 INCONTINENCE OF FECES WITH FECAL URGENCY: ICD-10-CM

## 2021-08-09 DIAGNOSIS — R19.7 DIARRHEA, UNSPECIFIED TYPE: ICD-10-CM

## 2021-08-09 DIAGNOSIS — R19.7 DIARRHEA: ICD-10-CM

## 2021-08-09 DIAGNOSIS — Z95.2 STATUS POST MECHANICAL AORTIC VALVE REPLACEMENT: Primary | ICD-10-CM

## 2021-08-09 DIAGNOSIS — R15.9 INCONTINENCE OF FECES WITH FECAL URGENCY: ICD-10-CM

## 2021-08-09 LAB
ALBUMIN SERPL BCP-MCNC: 3.7 G/DL (ref 3.4–4.8)
ALP SERPL-CCNC: 82.3 U/L (ref 35–140)
ALT SERPL W P-5'-P-CCNC: 21 U/L (ref 5–54)
ANION GAP SERPL CALCULATED.3IONS-SCNC: 5 MMOL/L (ref 4–13)
AST SERPL W P-5'-P-CCNC: 34 U/L (ref 15–41)
BASOPHILS # BLD AUTO: 0.01 THOUSANDS/ΜL (ref 0–0.1)
BASOPHILS NFR BLD AUTO: 0 % (ref 0–1)
BILIRUB SERPL-MCNC: 0.89 MG/DL (ref 0.3–1.2)
BUN SERPL-MCNC: 14 MG/DL (ref 6–20)
CALCIUM SERPL-MCNC: 8.5 MG/DL (ref 8.4–10.2)
CHLORIDE SERPL-SCNC: 105 MMOL/L (ref 96–108)
CO2 SERPL-SCNC: 28 MMOL/L (ref 22–33)
CREAT SERPL-MCNC: 0.56 MG/DL (ref 0.4–1.1)
EOSINOPHIL # BLD AUTO: 0.08 THOUSAND/ΜL (ref 0–0.61)
EOSINOPHIL NFR BLD AUTO: 3 % (ref 0–6)
ERYTHROCYTE [DISTWIDTH] IN BLOOD BY AUTOMATED COUNT: 12.8 % (ref 11.6–15.1)
GFR SERPL CREATININE-BSD FRML MDRD: 102 ML/MIN/1.73SQ M
GLUCOSE SERPL-MCNC: 260 MG/DL (ref 65–140)
HCT VFR BLD AUTO: 42.4 % (ref 34.8–46.1)
HGB BLD-MCNC: 14.2 G/DL (ref 11.5–15.4)
IMM GRANULOCYTES # BLD AUTO: 0.01 THOUSAND/UL (ref 0–0.2)
IMM GRANULOCYTES NFR BLD AUTO: 0 % (ref 0–2)
LYMPHOCYTES # BLD AUTO: 0.64 THOUSANDS/ΜL (ref 0.6–4.47)
LYMPHOCYTES NFR BLD AUTO: 25 % (ref 14–44)
MCH RBC QN AUTO: 31.6 PG (ref 26.8–34.3)
MCHC RBC AUTO-ENTMCNC: 33.5 G/DL (ref 31.4–37.4)
MCV RBC AUTO: 94 FL (ref 82–98)
MONOCYTES # BLD AUTO: 0.2 THOUSAND/ΜL (ref 0.17–1.22)
MONOCYTES NFR BLD AUTO: 8 % (ref 4–12)
NEUTROPHILS # BLD AUTO: 1.67 THOUSANDS/ΜL (ref 1.85–7.62)
NEUTS SEG NFR BLD AUTO: 64 % (ref 43–75)
PLATELET # BLD AUTO: 135 THOUSANDS/UL (ref 149–390)
PMV BLD AUTO: 10.5 FL (ref 8.9–12.7)
POTASSIUM SERPL-SCNC: 3.8 MMOL/L (ref 3.5–5)
PROT SERPL-MCNC: 7.2 G/DL (ref 6.4–8.3)
RBC # BLD AUTO: 4.49 MILLION/UL (ref 3.81–5.12)
SODIUM SERPL-SCNC: 138 MMOL/L (ref 133–145)
WBC # BLD AUTO: 2.61 THOUSAND/UL (ref 4.31–10.16)

## 2021-08-09 PROCEDURE — 85025 COMPLETE CBC W/AUTO DIFF WBC: CPT

## 2021-08-09 PROCEDURE — 87505 NFCT AGENT DETECTION GI: CPT

## 2021-08-09 PROCEDURE — 80053 COMPREHEN METABOLIC PANEL: CPT

## 2021-08-10 ENCOUNTER — TELEPHONE (OUTPATIENT)
Dept: FAMILY MEDICINE CLINIC | Facility: CLINIC | Age: 61
End: 2021-08-10

## 2021-08-10 DIAGNOSIS — Z95.2 HISTORY OF MECHANICAL AORTIC VALVE REPLACEMENT: ICD-10-CM

## 2021-08-10 DIAGNOSIS — Z86.718 PERSONAL HISTORY OF DVT (DEEP VEIN THROMBOSIS): ICD-10-CM

## 2021-08-10 DIAGNOSIS — M54.40 CHRONIC BILATERAL LOW BACK PAIN WITH SCIATICA, SCIATICA LATERALITY UNSPECIFIED: ICD-10-CM

## 2021-08-10 DIAGNOSIS — G89.29 CHRONIC BILATERAL LOW BACK PAIN WITH SCIATICA, SCIATICA LATERALITY UNSPECIFIED: ICD-10-CM

## 2021-08-10 DIAGNOSIS — R19.7 DIARRHEA, UNSPECIFIED TYPE: ICD-10-CM

## 2021-08-10 DIAGNOSIS — E11.9 TYPE 2 DIABETES MELLITUS WITHOUT COMPLICATION, WITHOUT LONG-TERM CURRENT USE OF INSULIN (HCC): ICD-10-CM

## 2021-08-10 LAB
CAMPYLOBACTER DNA SPEC NAA+PROBE: NORMAL
SALMONELLA DNA SPEC QL NAA+PROBE: NORMAL
SHIGA TOXIN STX GENE SPEC NAA+PROBE: NORMAL
SHIGELLA DNA SPEC QL NAA+PROBE: NORMAL

## 2021-08-10 RX ORDER — DIPHENOXYLATE HYDROCHLORIDE AND ATROPINE SULFATE 2.5; .025 MG/1; MG/1
1 TABLET ORAL 4 TIMES DAILY PRN
Qty: 30 TABLET | Refills: 0 | Status: SHIPPED | OUTPATIENT
Start: 2021-08-10 | End: 2021-09-17 | Stop reason: SDUPTHER

## 2021-08-10 RX ORDER — DICYCLOMINE HCL 20 MG
20 TABLET ORAL 2 TIMES DAILY
Qty: 60 TABLET | Refills: 3 | Status: SHIPPED | OUTPATIENT
Start: 2021-08-10 | End: 2021-09-17 | Stop reason: SDUPTHER

## 2021-08-10 RX ORDER — SACCHAROMYCES BOULARDII 250 MG
250 CAPSULE ORAL 2 TIMES DAILY
Qty: 30 CAPSULE | Refills: 0 | Status: SHIPPED | OUTPATIENT
Start: 2021-08-10 | End: 2022-02-10

## 2021-08-10 RX ORDER — CYCLOBENZAPRINE HCL 10 MG
10 TABLET ORAL 3 TIMES DAILY PRN
Qty: 90 TABLET | Refills: 3 | Status: SHIPPED | OUTPATIENT
Start: 2021-08-10 | End: 2021-09-29

## 2021-08-10 RX ORDER — WARFARIN SODIUM 3 MG/1
6 TABLET ORAL DAILY
Qty: 30 TABLET | Refills: 5 | Status: SHIPPED | OUTPATIENT
Start: 2021-08-10 | End: 2021-08-11

## 2021-08-10 RX ORDER — DULOXETIN HYDROCHLORIDE 30 MG/1
30 CAPSULE, DELAYED RELEASE ORAL DAILY
Qty: 30 CAPSULE | Refills: 5 | Status: SHIPPED | OUTPATIENT
Start: 2021-08-10 | End: 2021-09-29

## 2021-08-10 NOTE — TELEPHONE ENCOUNTER
I'm not sure exactly what Arianna Delvalle is talking about-her stool cutlures were negative so far and i'm still waiting on the c diff-I'm not 100% sure what is giving her diarrhea-

## 2021-08-10 NOTE — TELEPHONE ENCOUNTER
Patient called back wanting her medication refilled at the specialty pharmacy  She stated something like they don't want to give her her meds?   Also she wanted to know what her blood work results are and if Dr Tian Shafer would be helping her deal with her diarrhea or if she should go back to her bariatric surgeon

## 2021-08-10 NOTE — TELEPHONE ENCOUNTER
She's on Januvia currently and her sugar was high at 260 so I messaged yesterday about her needing to watch diet-I'd consider a once a week GLP1 but they have GI side effects too-may need to see Gastroenterology

## 2021-08-11 ENCOUNTER — TELEMEDICINE (OUTPATIENT)
Dept: FAMILY MEDICINE CLINIC | Facility: CLINIC | Age: 61
End: 2021-08-11
Payer: COMMERCIAL

## 2021-08-11 ENCOUNTER — APPOINTMENT (OUTPATIENT)
Dept: LAB | Facility: HOSPITAL | Age: 61
End: 2021-08-11
Attending: INTERNAL MEDICINE
Payer: COMMERCIAL

## 2021-08-11 VITALS — HEIGHT: 66 IN | BODY MASS INDEX: 47.09 KG/M2 | WEIGHT: 293 LBS

## 2021-08-11 DIAGNOSIS — K59.1 FUNCTIONAL DIARRHEA: ICD-10-CM

## 2021-08-11 DIAGNOSIS — Z95.2 HISTORY OF MECHANICAL AORTIC VALVE REPLACEMENT: ICD-10-CM

## 2021-08-11 DIAGNOSIS — R19.7 DIARRHEA, UNSPECIFIED TYPE: ICD-10-CM

## 2021-08-11 DIAGNOSIS — E11.69 DIABETES MELLITUS TYPE 2 IN OBESE (HCC): ICD-10-CM

## 2021-08-11 DIAGNOSIS — E11.9 TYPE 2 DIABETES MELLITUS WITHOUT COMPLICATION, WITHOUT LONG-TERM CURRENT USE OF INSULIN (HCC): Primary | ICD-10-CM

## 2021-08-11 DIAGNOSIS — Z86.718 PERSONAL HISTORY OF DVT (DEEP VEIN THROMBOSIS): ICD-10-CM

## 2021-08-11 DIAGNOSIS — E66.9 DIABETES MELLITUS TYPE 2 IN OBESE (HCC): ICD-10-CM

## 2021-08-11 DIAGNOSIS — Z92.29 COVID-19 VACCINE SERIES COMPLETED: ICD-10-CM

## 2021-08-11 PROCEDURE — 3008F BODY MASS INDEX DOCD: CPT | Performed by: INTERNAL MEDICINE

## 2021-08-11 PROCEDURE — 87493 C DIFF AMPLIFIED PROBE: CPT

## 2021-08-11 PROCEDURE — 4004F PT TOBACCO SCREEN RCVD TLK: CPT | Performed by: INTERNAL MEDICINE

## 2021-08-11 PROCEDURE — 99214 OFFICE O/P EST MOD 30 MIN: CPT | Performed by: INTERNAL MEDICINE

## 2021-08-11 RX ORDER — WARFARIN SODIUM 3 MG/1
6 TABLET ORAL DAILY
Qty: 30 TABLET | Refills: 5 | Status: SHIPPED | OUTPATIENT
Start: 2021-08-11 | End: 2021-08-31 | Stop reason: SDUPTHER

## 2021-08-11 RX ORDER — DULAGLUTIDE 0.75 MG/.5ML
0.75 INJECTION, SOLUTION SUBCUTANEOUS WEEKLY
Qty: 2 ML | Refills: 3 | Status: SHIPPED | OUTPATIENT
Start: 2021-08-11 | End: 2021-08-31 | Stop reason: SDUPTHER

## 2021-08-11 NOTE — ASSESSMENT & PLAN NOTE
Lab Results   Component Value Date    HGBA1C 8 3 (A) 07/15/2021   We had a long discussion today about Lauren's diet and trying to increase protein, decrease carbohydrates, drink lots of water-for now will continue [place] [facility] and Yary and will try to add Trulicity

## 2021-08-11 NOTE — PROGRESS NOTES
Virtual Regular Visit    Verification of patient location:    Patient is located in the following state in which I hold an active license [place]      Assessment/Plan:    Problem List Items Addressed This Visit        Endocrine    Diabetes mellitus type 2 in obese [facility])       Lab Results   Component Value Date    HGBA1C 8 3 (A) 07/15/2021   We had a long discussion today about Lauren's diet and trying to increase protein, decrease carbohydrates, drink lots of water-for now will continue [place] and [place] and will try to add Trulicity         Relevant Medications    Dulaglutide (Trulicity) 3 81 IM/8 2QZ SOPN      Other Visit Diagnoses     Type 2 diabetes mellitus without complication, without long-term current use of insulin ([facility] [address] )    -  Primary    Relevant Medications    Dulaglutide (Trulicity) 1 81 CL/8 3BT SOPN    Other Relevant Orders    Ambulatory referral to Diabetic Education    COVID-19 vaccine series completed        BMI 50 0-59 9, adult ([facility] [address] )                   Reason for visit is   Chief Complaint   Patient presents with    Virtual Regular Visit    Diabetes Type 2     F/U - wants to discss if she needs to see endocrinolgy     Virtual Brief Visit        Encounter provider Sangeeta Ascencio MD    Provider located at [address] [place] [id number] [phone]      Recent Visits  Date Type Provider Dept   08/10/21 Telephone [place]   Showing recent visits within past 7 days and meeting all other requirements  Today's Visits  Date Type Provider Dept   08/11/21 Telemedicine Patel Shelton MD [address] [address] today's visits and meeting all other requirements  Future Appointments  No visits were found meeting these conditions  Showing future appointments within next 150 days and meeting all other requirements       The patient was identified by name and date of birth   Becca Brandon was informed that this is a telemedicine visit and that the visit is being conducted through telephone  My office door was closed  No one else was in the room  She acknowledged consent and understanding of privacy and security of the video platform  The patient has agreed to participate and understands they can discontinue the visit at any time  Patient is aware this is a billable service  Subjective  Donnell Torres is a 63 y o  female with diarrhea and elevated blood sugars         I spoke with Marion Otero on the phone regarding her sugar being elevated and we spoke about her ongoing diarrhea-we thought that the diarrhea was being caused/contributed to by the metformin she was on so we took her off of that, tried to get her on metformin XR which was NOT covered by her insurance and ultimately put her on Januvia, a DPP4-she is still having diarrhea-saw GI with Dr Nurys Mcneal and he suggested waiting to see C diff stool results (which she just turned in today) and if negative a colonoscopy-she had labwork done recently showing her sugar was 260 and she says it's always "high" when she checks it at home-I am going to try to initiate a GLP-1 like Trulicity with her-did go over risks, benefits, and side effects with her-can probably d/c the DPP4 at some point in the future       Past Medical History:   Diagnosis Date    Anemia     Anxiety     Aortic valve disorder     Back pain     Chronic pain syndrome     Cirrhosis ([address] )     Constipation     Degenerative arthritis of thoracic spine     Degenerative joint disease involving multiple joints     Depression     DVT (deep venous thrombosis) (HCC)     Bilateral    Edema     Endometrial adenocarcinoma ([facility] [address] )     Fibromyalgia     Ganglion of right wrist     GERD (gastroesophageal reflux disease)     Heart murmur     Hematoma     History of mechanical aortic valve replacement     Hypothyroidism (acquired)     Laboratory confirmed diagnosis of COVID-19 03/16/2021    Low blood pressure  Malignant neoplasm of corpus uteri, except isthmus (HCC)     Mixed hyperlipidemia     Morbid obesity ([address] )     Obstructive sleep apnea syndrome     Pulmonary embolism (HCC)     Tobacco dependence syndrome     Transient cerebral ischemia     Urinary incontinence     Viral hepatitis C     Vitamin D deficiency        Past Surgical History:   Procedure Laterality Date    ANKLE SURGERY      CARDIAC SURGERY  2015    VALVE REPLACEMENT     SECTION      X3    CHOLECYSTECTOMY      COLONOSCOPY  2019    COLONOSCOPY      HYSTERECTOMY  2011    TOTAL       Current Outpatient Medications   Medication Sig Dispense Refill    cyclobenzaprine (FLEXERIL) 10 mg tablet Take 1 tablet (10 mg total) by mouth 3 (three) times a day as needed for muscle spasms 90 tablet 3    dicyclomine (BENTYL) 20 mg tablet Take 1 tablet (20 mg total) by mouth 2 (two) times a day 60 tablet 3    diphenoxylate-atropine (LOMOTIL) 2 5-0 025 mg per tablet Take 1 tablet by mouth 4 (four) times a day as needed for diarrhea 30 tablet 0    DULoxetine (CYMBALTA) 30 mg delayed release capsule Take 1 capsule (30 mg total) by mouth daily 30 capsule 5    furosemide (LASIX) 40 mg tablet Take 1 tablet daily for 5 days, then 1 tablet daily on an as needed basis   10 tablet 0    Jantoven 3 MG tablet Take 2 tablets (6 mg total) by mouth daily 2 5 tablets on Monday 30 tablet 5    mirtazapine (REMERON) 45 MG tablet       nystatin powder APPLY TOPICALLY 2 (TWO) TIMES A DAY AS NEEDED FOR 14 DAYS      saccharomyces boulardii (FLORASTOR) 250 mg capsule Take 1 capsule (250 mg total) by mouth 2 (two) times a day 30 capsule 0    sitaGLIPtin (Januvia) 100 mg tablet Take 1 tablet (100 mg total) by mouth daily 30 tablet 5    ziprasidone (GEODON) 60 mg capsule       Alcohol Swabs (Pharmacist Choice Alcohol) PADS USE 3 TIMES A  each 6    atorvastatin (LIPITOR) 40 mg tablet Take 1 tablet (40 mg total) by mouth daily (Patient not taking: Reported on 7/28/2021) 30 tablet 3    Blood Glucose Monitoring Suppl (OneTouch Verio IQ System) w/Device KIT USE 3 TIMES A DAY 1 kit 1    cholecalciferol (VITAMIN D3) 1,000 units tablet Take 2 tablets (2,000 Units total) by mouth daily for 10 days (Patient not taking: Reported on 7/15/2021) 20 tablet 0    Dulaglutide (Trulicity) 9 73 [place]/9 1BI SOPN Inject 0 5 mL (0 75 mg total) under the skin once a week 2 mL 3    nicotine (NICODERM CQ) 14 mg/24hr TD 24 hr patch Place 1 patch on the skin every 24 hours (Patient not taking: Reported on 7/15/2021) 28 patch 0    OneTouch Verio test strip USE 3 TIMES A  each 5    Pharmacist Choice Lancets MISC USE 3 TIMES A  each 5     No current facility-administered medications for this visit  Allergies   Allergen Reactions    Bactrim [Sulfamethoxazole-Trimethoprim] Other (See Comments)     Patient is unaware of why this allergy is recorded, denies knowledge of it    Penicillins Hives     Patient is unaware of why this allergy is recorded, denies knowledge of it    Tramadol GI Intolerance and Vomiting     Could not function - felt very ill        Review of Systems   HENT: Negative  Respiratory: Negative  Cardiovascular: Negative  Gastrointestinal: Positive for diarrhea  Negative for blood in stool, nausea and rectal pain  Neurological: Negative  Video Exam    Vitals:    08/11/21 1454   Weight: (!) 158 kg (348 lb)   Height: 5' 6" (1 676 m)       Physical Exam  Constitutional:       Appearance: She is obese  Pulmonary:      Effort: Pulmonary effort is normal    Neurological:      General: No focal deficit present  Mental Status: She is alert and oriented to person, place, and time  Psychiatric:         Mood and Affect: Mood normal          Behavior: Behavior normal          Thought Content:  Thought content normal          Judgment: Judgment normal           I spent 20 minutes directly with the patient during this visit    VIRTUAL VISIT 24 [facility] verbally agrees to participate in [employer]  Pt is aware that [employer] could be limited without vital signs or the ability to perform a full hands-on physical [place] understands she or the provider may request at any time to terminate the video visit and request the patient to seek care or treatment in person    BMI Counseling: Body mass index is 56 17 kg/m²  The BMI is above normal  Nutrition recommendations include reducing portion sizes, decreasing overall calorie intake, 3-5 servings of fruits/vegetables daily, reducing fast food intake, consuming healthier snacks, decreasing soda and/or juice intake, moderation in carbohydrate intake, increasing intake of lean protein, reducing intake of saturated fat and trans fat and reducing intake of cholesterol

## 2021-08-12 LAB — C DIFF TOX B TCDB STL QL NAA+PROBE: NEGATIVE

## 2021-08-13 ENCOUNTER — TELEPHONE (OUTPATIENT)
Dept: FAMILY MEDICINE CLINIC | Facility: CLINIC | Age: 61
End: 2021-08-13

## 2021-08-16 ENCOUNTER — DOCUMENTATION (OUTPATIENT)
Dept: FAMILY MEDICINE CLINIC | Facility: CLINIC | Age: 61
End: 2021-08-16

## 2021-08-16 ENCOUNTER — TELEPHONE (OUTPATIENT)
Dept: GASTROENTEROLOGY | Facility: CLINIC | Age: 61
End: 2021-08-16

## 2021-08-17 LAB — INR PPP: 2 (ref 0.84–1.19)

## 2021-08-26 ENCOUNTER — TELEPHONE (OUTPATIENT)
Dept: GASTROENTEROLOGY | Facility: CLINIC | Age: 61
End: 2021-08-26

## 2021-08-26 DIAGNOSIS — R19.7 DIARRHEA, UNSPECIFIED TYPE: Primary | ICD-10-CM

## 2021-08-26 RX ORDER — CHOLESTYRAMINE 4 G/9G
1 POWDER, FOR SUSPENSION ORAL 2 TIMES DAILY WITH MEALS
Qty: 60 PACKET | Refills: 1 | Status: SHIPPED | OUTPATIENT
Start: 2021-08-26 | End: 2021-10-20

## 2021-08-26 NOTE — TELEPHONE ENCOUNTER
Spoke to patient on phone  Patient continues to have diarrhea t reports this has been ongoing for the last 3 months  Patient is moving her bowels 3-4 times daily which is associated with incontinence  Patient reports stool were vary in consistency from watery to loose  Patient did have stool studies done which were negative  Patient also had a CT of the abdomen which showed hepatomegaly and hepatic steatosis but does not mention any signs of colitis  Patient reports prior removal of gallbladder  She is currently taking Bentyl 20 mg 2 times a day, Lomotil 4 times in a day as needed for diarrhea with no relief of symptoms  I started Questran 4 g b I d  with meals  Patient did have an aortic valve replacement and is on Coumadin  Patient most likely needs a colonoscopy will forward this message to Dr Pauline Edge to see if he would like her to have a colonoscopy scheduled, will need clearance to hold coumadin prior to colonoscopy  Patient has follow-up appointment scheduled with Jhony Farris on 09/15

## 2021-08-26 NOTE — TELEPHONE ENCOUNTER
Please schedule patient for colonoscopy with Dr Cary Quinones   She will need to obtain clearance to hold her Coumadin  prior to colonoscopy   Thank you

## 2021-08-26 NOTE — TELEPHONE ENCOUNTER
Pt called to state she is going on 3 months of diarrhea  Saw Dr Ash end of July  Stool/c-diff negative  Scheduled with ML on 9/15/21  Please call to discuss treatment for now    Thank you

## 2021-08-27 NOTE — TELEPHONE ENCOUNTER
[address] is your first availability at [facility]  Her Insurance and BMI dictate her going there  Is this OK?

## 2021-08-31 ENCOUNTER — PREP FOR PROCEDURE (OUTPATIENT)
Dept: GASTROENTEROLOGY | Facility: CLINIC | Age: 61
End: 2021-08-31

## 2021-08-31 ENCOUNTER — TELEPHONE (OUTPATIENT)
Dept: FAMILY MEDICINE CLINIC | Facility: CLINIC | Age: 61
End: 2021-08-31

## 2021-08-31 ENCOUNTER — ANTICOAG VISIT (OUTPATIENT)
Dept: CARDIOLOGY CLINIC | Facility: CLINIC | Age: 61
End: 2021-08-31

## 2021-08-31 DIAGNOSIS — Z95.2 HISTORY OF MECHANICAL AORTIC VALVE REPLACEMENT: ICD-10-CM

## 2021-08-31 DIAGNOSIS — E66.9 DIABETES MELLITUS TYPE 2 IN OBESE (HCC): ICD-10-CM

## 2021-08-31 DIAGNOSIS — E11.69 DIABETES MELLITUS TYPE 2 IN OBESE (HCC): ICD-10-CM

## 2021-08-31 DIAGNOSIS — Z86.718 PERSONAL HISTORY OF DVT (DEEP VEIN THROMBOSIS): ICD-10-CM

## 2021-08-31 DIAGNOSIS — Z95.2 STATUS POST MECHANICAL AORTIC VALVE REPLACEMENT: Primary | ICD-10-CM

## 2021-08-31 DIAGNOSIS — K59.1 FUNCTIONAL DIARRHEA: Primary | ICD-10-CM

## 2021-08-31 DIAGNOSIS — R15.9 INCONTINENCE OF FECES WITH FECAL URGENCY: ICD-10-CM

## 2021-08-31 DIAGNOSIS — R15.2 INCONTINENCE OF FECES WITH FECAL URGENCY: ICD-10-CM

## 2021-08-31 RX ORDER — FLASH GLUCOSE SENSOR
KIT MISCELLANEOUS
Qty: 2 EACH | Refills: 5 | Status: SHIPPED | OUTPATIENT
Start: 2021-08-31 | End: 2021-09-01

## 2021-08-31 RX ORDER — DULAGLUTIDE 0.75 MG/.5ML
0.75 INJECTION, SOLUTION SUBCUTANEOUS WEEKLY
Qty: 2 ML | Refills: 5 | Status: SHIPPED | OUTPATIENT
Start: 2021-08-31 | End: 2021-09-29

## 2021-08-31 RX ORDER — FLASH GLUCOSE SCANNING READER
EACH MISCELLANEOUS
Qty: 2 EACH | Refills: 5 | Status: SHIPPED | OUTPATIENT
Start: 2021-08-31 | End: 2021-09-01

## 2021-08-31 RX ORDER — WARFARIN SODIUM 3 MG/1
6 TABLET ORAL DAILY
Qty: 30 TABLET | Refills: 5 | Status: SHIPPED | OUTPATIENT
Start: 2021-08-31 | End: 2021-12-13 | Stop reason: SDUPTHER

## 2021-08-31 RX ORDER — PEN NEEDLE, DIABETIC 30 GX3/16"
NEEDLE, DISPOSABLE MISCELLANEOUS WEEKLY
Qty: 100 EACH | Refills: 5 | Status: SHIPPED | OUTPATIENT
Start: 2021-08-31 | End: 2021-10-18

## 2021-08-31 RX ORDER — ALBUTEROL SULFATE 90 UG/1
POWDER, METERED RESPIRATORY (INHALATION)
COMMUNITY
Start: 2021-08-18

## 2021-08-31 NOTE — TELEPHONE ENCOUNTER
Patient called stating that she did not receive her Trulicity, jantoven or the scripts to check her sugars that she requested 2 weeks ag0  She hasn't been taking her meds for about that long    Please advise

## 2021-09-01 DIAGNOSIS — E11.9 TYPE 2 DIABETES MELLITUS WITHOUT COMPLICATION, WITHOUT LONG-TERM CURRENT USE OF INSULIN (HCC): ICD-10-CM

## 2021-09-01 DIAGNOSIS — E11.69 DIABETES MELLITUS TYPE 2 IN OBESE (HCC): Primary | ICD-10-CM

## 2021-09-01 DIAGNOSIS — E66.9 DIABETES MELLITUS TYPE 2 IN OBESE (HCC): Primary | ICD-10-CM

## 2021-09-01 RX ORDER — FLASH GLUCOSE SCANNING READER
EACH MISCELLANEOUS
Qty: 2 EACH | Refills: 5 | Status: SHIPPED | OUTPATIENT
Start: 2021-09-01 | End: 2021-09-27 | Stop reason: SDUPTHER

## 2021-09-01 RX ORDER — FLASH GLUCOSE SENSOR
KIT MISCELLANEOUS
Qty: 2 EACH | Refills: 5 | Status: SHIPPED | OUTPATIENT
Start: 2021-09-01 | End: 2021-09-27 | Stop reason: SDUPTHER

## 2021-09-09 ENCOUNTER — TELEPHONE (OUTPATIENT)
Dept: CT IMAGING | Facility: HOSPITAL | Age: 61
End: 2021-09-09

## 2021-09-09 ENCOUNTER — HOSPITAL ENCOUNTER (INPATIENT)
Facility: HOSPITAL | Age: 61
LOS: 5 days | Discharge: HOME | DRG: 241 | End: 2021-09-14
Attending: EMERGENCY MEDICINE | Admitting: INTERNAL MEDICINE
Payer: COMMERCIAL

## 2021-09-09 ENCOUNTER — APPOINTMENT (EMERGENCY)
Dept: CT IMAGING | Facility: HOSPITAL | Age: 61
DRG: 241 | End: 2021-09-09
Payer: COMMERCIAL

## 2021-09-09 DIAGNOSIS — Z86.718 PERSONAL HISTORY OF DVT (DEEP VEIN THROMBOSIS): ICD-10-CM

## 2021-09-09 DIAGNOSIS — R19.7 DIARRHEA, UNSPECIFIED TYPE: ICD-10-CM

## 2021-09-09 DIAGNOSIS — Z95.2 S/P AORTIC VALVE REPLACEMENT WITH PROSTHETIC VALVE: ICD-10-CM

## 2021-09-09 DIAGNOSIS — K70.30 ALCOHOLIC CIRRHOSIS, UNSPECIFIED WHETHER ASCITES PRESENT (HCC): ICD-10-CM

## 2021-09-09 DIAGNOSIS — R10.9 ABDOMINAL PAIN: ICD-10-CM

## 2021-09-09 DIAGNOSIS — R79.1 SUBTHERAPEUTIC INTERNATIONAL NORMALIZED RATIO (INR): ICD-10-CM

## 2021-09-09 DIAGNOSIS — K85.90 PANCREATITIS: Primary | ICD-10-CM

## 2021-09-09 DIAGNOSIS — K59.1 FUNCTIONAL DIARRHEA: ICD-10-CM

## 2021-09-09 DIAGNOSIS — Z86.711 HISTORY OF PULMONARY EMBOLUS (PE): ICD-10-CM

## 2021-09-09 PROBLEM — F10.10 ALCOHOL ABUSE: Status: ACTIVE | Noted: 2021-09-09

## 2021-09-09 PROBLEM — D72.819 LEUKOPENIA: Status: ACTIVE | Noted: 2021-09-09

## 2021-09-09 LAB
ALBUMIN SERPL BCP-MCNC: 2.9 G/DL (ref 3.5–5)
ALP SERPL-CCNC: 103 U/L (ref 46–116)
ALT SERPL W P-5'-P-CCNC: 32 U/L (ref 12–78)
ANION GAP SERPL CALCULATED.3IONS-SCNC: 8 MMOL/L (ref 4–13)
AST SERPL W P-5'-P-CCNC: 61 U/L (ref 5–45)
BASOPHILS # BLD AUTO: 0.01 THOUSANDS/ΜL (ref 0–0.1)
BASOPHILS NFR BLD AUTO: 0 % (ref 0–1)
BILIRUB SERPL-MCNC: 0.54 MG/DL (ref 0.2–1)
BUN SERPL-MCNC: 17 MG/DL (ref 5–25)
CALCIUM ALBUM COR SERPL-MCNC: 9.1 MG/DL (ref 8.3–10.1)
CALCIUM SERPL-MCNC: 8.2 MG/DL (ref 8.3–10.1)
CHLORIDE SERPL-SCNC: 105 MMOL/L (ref 100–108)
CHOLEST SERPL-MCNC: 172 MG/DL (ref 50–200)
CO2 SERPL-SCNC: 22 MMOL/L (ref 21–32)
CREAT SERPL-MCNC: 0.54 MG/DL (ref 0.6–1.3)
EOSINOPHIL # BLD AUTO: 0.08 THOUSAND/ΜL (ref 0–0.61)
EOSINOPHIL NFR BLD AUTO: 3 % (ref 0–6)
ERYTHROCYTE [DISTWIDTH] IN BLOOD BY AUTOMATED COUNT: 13.2 % (ref 11.6–15.1)
GFR SERPL CREATININE-BSD FRML MDRD: 104 ML/MIN/1.73SQ M
GLUCOSE SERPL-MCNC: 120 MG/DL (ref 65–140)
GLUCOSE SERPL-MCNC: 223 MG/DL (ref 65–140)
HCT VFR BLD AUTO: 43.2 % (ref 34.8–46.1)
HDLC SERPL-MCNC: 42 MG/DL
HGB BLD-MCNC: 13.7 G/DL (ref 11.5–15.4)
IMM GRANULOCYTES # BLD AUTO: 0.01 THOUSAND/UL (ref 0–0.2)
IMM GRANULOCYTES NFR BLD AUTO: 0 % (ref 0–2)
INR PPP: 1.76 (ref 0.84–1.19)
LDLC SERPL CALC-MCNC: 78 MG/DL (ref 0–100)
LIPASE SERPL-CCNC: 1010 U/L (ref 73–393)
LYMPHOCYTES # BLD AUTO: 0.72 THOUSANDS/ΜL (ref 0.6–4.47)
LYMPHOCYTES NFR BLD AUTO: 23 % (ref 14–44)
MCH RBC QN AUTO: 31.1 PG (ref 26.8–34.3)
MCHC RBC AUTO-ENTMCNC: 31.7 G/DL (ref 31.4–37.4)
MCV RBC AUTO: 98 FL (ref 82–98)
MONOCYTES # BLD AUTO: 0.23 THOUSAND/ΜL (ref 0.17–1.22)
MONOCYTES NFR BLD AUTO: 7 % (ref 4–12)
NEUTROPHILS # BLD AUTO: 2.09 THOUSANDS/ΜL (ref 1.85–7.62)
NEUTS SEG NFR BLD AUTO: 67 % (ref 43–75)
NRBC BLD AUTO-RTO: 0 /100 WBCS
PLATELET # BLD AUTO: 132 THOUSANDS/UL (ref 149–390)
PMV BLD AUTO: 10.2 FL (ref 8.9–12.7)
POTASSIUM SERPL-SCNC: 4.5 MMOL/L (ref 3.5–5.3)
PROT SERPL-MCNC: 7.4 G/DL (ref 6.4–8.2)
PROTHROMBIN TIME: 20.3 SECONDS (ref 11.6–14.5)
RBC # BLD AUTO: 4.4 MILLION/UL (ref 3.81–5.12)
SODIUM SERPL-SCNC: 135 MMOL/L (ref 136–145)
TRIGL SERPL-MCNC: 260 MG/DL
WBC # BLD AUTO: 3.14 THOUSAND/UL (ref 4.31–10.16)

## 2021-09-09 PROCEDURE — 96361 HYDRATE IV INFUSION ADD-ON: CPT

## 2021-09-09 PROCEDURE — 85025 COMPLETE CBC W/AUTO DIFF WBC: CPT | Performed by: EMERGENCY MEDICINE

## 2021-09-09 PROCEDURE — 85610 PROTHROMBIN TIME: CPT | Performed by: PHYSICIAN ASSISTANT

## 2021-09-09 PROCEDURE — 82948 REAGENT STRIP/BLOOD GLUCOSE: CPT

## 2021-09-09 PROCEDURE — 83690 ASSAY OF LIPASE: CPT | Performed by: EMERGENCY MEDICINE

## 2021-09-09 PROCEDURE — 96374 THER/PROPH/DIAG INJ IV PUSH: CPT

## 2021-09-09 PROCEDURE — 99285 EMERGENCY DEPT VISIT HI MDM: CPT

## 2021-09-09 PROCEDURE — 36415 COLL VENOUS BLD VENIPUNCTURE: CPT

## 2021-09-09 PROCEDURE — 74177 CT ABD & PELVIS W/CONTRAST: CPT

## 2021-09-09 PROCEDURE — 80053 COMPREHEN METABOLIC PANEL: CPT | Performed by: EMERGENCY MEDICINE

## 2021-09-09 PROCEDURE — 99285 EMERGENCY DEPT VISIT HI MDM: CPT | Performed by: PHYSICIAN ASSISTANT

## 2021-09-09 PROCEDURE — 99223 1ST HOSP IP/OBS HIGH 75: CPT | Performed by: PHYSICIAN ASSISTANT

## 2021-09-09 PROCEDURE — 80061 LIPID PANEL: CPT | Performed by: NURSE PRACTITIONER

## 2021-09-09 RX ORDER — CHOLESTYRAMINE LIGHT 4 G/5.7G
4 POWDER, FOR SUSPENSION ORAL 2 TIMES DAILY
Status: DISCONTINUED | OUTPATIENT
Start: 2021-09-10 | End: 2021-09-14 | Stop reason: HOSPADM

## 2021-09-09 RX ORDER — ACETAMINOPHEN 325 MG/1
650 TABLET ORAL EVERY 6 HOURS PRN
Status: DISCONTINUED | OUTPATIENT
Start: 2021-09-09 | End: 2021-09-14 | Stop reason: HOSPADM

## 2021-09-09 RX ORDER — LANOLIN ALCOHOL/MO/W.PET/CERES
100 CREAM (GRAM) TOPICAL DAILY
Status: DISCONTINUED | OUTPATIENT
Start: 2021-09-10 | End: 2021-09-14 | Stop reason: HOSPADM

## 2021-09-09 RX ORDER — KETOROLAC TROMETHAMINE 30 MG/ML
15 INJECTION, SOLUTION INTRAMUSCULAR; INTRAVENOUS ONCE
Status: COMPLETED | OUTPATIENT
Start: 2021-09-09 | End: 2021-09-09

## 2021-09-09 RX ORDER — SODIUM CHLORIDE, SODIUM LACTATE, POTASSIUM CHLORIDE, CALCIUM CHLORIDE 600; 310; 30; 20 MG/100ML; MG/100ML; MG/100ML; MG/100ML
100 INJECTION, SOLUTION INTRAVENOUS CONTINUOUS
Status: DISCONTINUED | OUTPATIENT
Start: 2021-09-09 | End: 2021-09-14

## 2021-09-09 RX ORDER — HYDROMORPHONE HCL/PF 1 MG/ML
0.2 SYRINGE (ML) INJECTION EVERY 4 HOURS PRN
Status: DISCONTINUED | OUTPATIENT
Start: 2021-09-09 | End: 2021-09-12

## 2021-09-09 RX ORDER — DICYCLOMINE HCL 20 MG
20 TABLET ORAL 2 TIMES DAILY
Status: DISCONTINUED | OUTPATIENT
Start: 2021-09-10 | End: 2021-09-14 | Stop reason: HOSPADM

## 2021-09-09 RX ORDER — SODIUM CHLORIDE, SODIUM LACTATE, POTASSIUM CHLORIDE, CALCIUM CHLORIDE 600; 310; 30; 20 MG/100ML; MG/100ML; MG/100ML; MG/100ML
125 INJECTION, SOLUTION INTRAVENOUS CONTINUOUS
Status: CANCELLED | OUTPATIENT
Start: 2021-09-09

## 2021-09-09 RX ORDER — WARFARIN SODIUM 2.5 MG/1
7.5 TABLET ORAL
Status: DISCONTINUED | OUTPATIENT
Start: 2021-09-09 | End: 2021-09-10

## 2021-09-09 RX ORDER — FOLIC ACID 1 MG/1
1 TABLET ORAL DAILY
Status: DISCONTINUED | OUTPATIENT
Start: 2021-09-10 | End: 2021-09-14 | Stop reason: HOSPADM

## 2021-09-09 RX ORDER — WARFARIN SODIUM 6 MG/1
6 TABLET ORAL
Status: DISCONTINUED | OUTPATIENT
Start: 2021-09-11 | End: 2021-09-10

## 2021-09-09 RX ORDER — HYDROMORPHONE HCL/PF 1 MG/ML
0.5 SYRINGE (ML) INJECTION EVERY 4 HOURS PRN
Status: DISCONTINUED | OUTPATIENT
Start: 2021-09-09 | End: 2021-09-12

## 2021-09-09 RX ORDER — HYDROMORPHONE HCL/PF 1 MG/ML
0.2 SYRINGE (ML) INJECTION EVERY 6 HOURS PRN
Status: DISCONTINUED | OUTPATIENT
Start: 2021-09-09 | End: 2021-09-09 | Stop reason: SDUPTHER

## 2021-09-09 RX ORDER — DULOXETIN HYDROCHLORIDE 30 MG/1
30 CAPSULE, DELAYED RELEASE ORAL DAILY
Status: DISCONTINUED | OUTPATIENT
Start: 2021-09-10 | End: 2021-09-14 | Stop reason: HOSPADM

## 2021-09-09 RX ADMIN — IOHEXOL 100 ML: 350 INJECTION, SOLUTION INTRAVENOUS at 19:53

## 2021-09-09 RX ADMIN — KETOROLAC TROMETHAMINE 15 MG: 30 INJECTION, SOLUTION INTRAMUSCULAR at 18:21

## 2021-09-09 RX ADMIN — SODIUM CHLORIDE, SODIUM LACTATE, POTASSIUM CHLORIDE, AND CALCIUM CHLORIDE 100 ML/HR: .6; .31; .03; .02 INJECTION, SOLUTION INTRAVENOUS at 22:00

## 2021-09-09 RX ADMIN — SODIUM CHLORIDE 1000 ML: 0.9 INJECTION, SOLUTION INTRAVENOUS at 18:21

## 2021-09-09 RX ADMIN — HYDROMORPHONE HYDROCHLORIDE 0.5 MG: 1 INJECTION, SOLUTION INTRAMUSCULAR; INTRAVENOUS; SUBCUTANEOUS at 22:00

## 2021-09-09 NOTE — ED PROVIDER NOTES
History  Chief Complaint   Patient presents with    Diarrhea     pt c/o diarrhea for 3 months, progressively getting worse and noticed "the other day it was nothing but blood" recent c-diff test was negative, pt also states "I stand up and it just comes out"     Patient is a 64 y/o female with a PMHx of aortic valve replacement, recurrent DVT with a filter and on Coumadin, ovarian cancer (s/p complete hysterectomy), HTN, HLD, DM2, cirrhosis, fibromyalgia and GERD, presenting to the ED for evaluation of diarrhea x3 months  Patient reports multiple episodes of watery diarrhea almost daily, 3 of which have had some bright red blood  She has had multiple stool studies that have all been negative  No recent antibiotic use or recent travel  She has had intermittent abdominal pain with the diarrhea, but says it has been worsening over the past week  Pain is primarily in RUQ and epigastric region  She does endorse a history of alcohol abuse "years ago" but now only drinks occasionally; last alcohol use was 3 days ago  She denies fevers, chills, nausea, vomiting, chest pain, SOB or urinary symptoms  Prior to Admission Medications   Prescriptions Last Dose Informant Patient Reported? Taking?    Alcohol Swabs (Pharmacist Choice Alcohol) PADS  Self No No   Sig: USE 3 TIMES A DAY   Blood Glucose Monitoring Suppl (OneTouch Verio IQ System) w/Device KIT  Self No No   Sig: USE 3 TIMES A DAY   Continuous Blood Gluc  (FreeStyle Lizy 2 Hooper) ROXANNE   No No   Sig: USE AS DIRECTED   Continuous Blood Gluc Sensor (FreeStyle Lizy 2 Sensor) MISC   No No   Sig: USE AS DIRECTED   DULoxetine (CYMBALTA) 30 mg delayed release capsule   No Yes   Sig: Take 1 capsule (30 mg total) by mouth daily   Dulaglutide (Trulicity) 5 96 JR/1 4SS SOPN   No Yes   Sig: Inject 0 5 mL (0 75 mg total) under the skin once a week   Insulin Pen Needle (Pen Needles) 31G X 5 MM MISC   No Yes   Sig: Use once a week   Jantoven 3 MG tablet   No Yes Sig: Take 2 tablets (6 mg total) by mouth daily 2 5 tablets on Monday   OneTouch Verio test strip  Self No Yes   Sig: USE 3 TIMES A DAY   Pharmacist Choice Lancets MISC  Self No Yes   Sig: USE 3 TIMES A DAY   ProAir RespiClick 540 (90 Base) MCG/ACT AEPB   Yes Yes   Sig: INHALE 1 PUFF EVERY 4 (FOUR) HOURS AS NEEDED (WHEEZING OR SHORTNESS OF BREATH)  atorvastatin (LIPITOR) 40 mg tablet  Self No No   Sig: Take 1 tablet (40 mg total) by mouth daily   Patient not taking: Reported on 7/28/2021   cholecalciferol (VITAMIN D3) 1,000 units tablet  Self No No   Sig: Take 2 tablets (2,000 Units total) by mouth daily for 10 days   Patient not taking: Reported on 7/15/2021   cholestyramine (QUESTRAN) 4 g packet   No No   Sig: Take 1 packet (4 g total) by mouth 2 (two) times a day with meals   cyclobenzaprine (FLEXERIL) 10 mg tablet   No No   Sig: Take 1 tablet (10 mg total) by mouth 3 (three) times a day as needed for muscle spasms   dicyclomine (BENTYL) 20 mg tablet   No Yes   Sig: Take 1 tablet (20 mg total) by mouth 2 (two) times a day   diphenoxylate-atropine (LOMOTIL) 2 5-0 025 mg per tablet   No Yes   Sig: Take 1 tablet by mouth 4 (four) times a day as needed for diarrhea   furosemide (LASIX) 40 mg tablet  Self No Yes   Sig: Take 1 tablet daily for 5 days, then 1 tablet daily on an as needed basis     mirtazapine (REMERON) 45 MG tablet  Self Yes Yes   nicotine (NICODERM CQ) 14 mg/24hr TD 24 hr patch Not Taking at Unknown time Self No No   Sig: Place 1 patch on the skin every 24 hours   Patient not taking: Reported on 7/15/2021   nystatin powder  Self Yes Yes   Sig: APPLY TOPICALLY 2 (TWO) TIMES A DAY AS NEEDED FOR 14 DAYS   saccharomyces boulardii (FLORASTOR) 250 mg capsule   No Yes   Sig: Take 1 capsule (250 mg total) by mouth 2 (two) times a day   sitaGLIPtin (Januvia) 100 mg tablet   No Yes   Sig: Take 1 tablet (100 mg total) by mouth daily   ziprasidone (GEODON) 60 mg capsule  Self Yes Yes      Facility-Administered Medications: None       Past Medical History:   Diagnosis Date    Anemia     Anxiety     Aortic valve disorder     Back pain     Chronic pain syndrome     Cirrhosis (HCC)     Constipation     Degenerative arthritis of thoracic spine     Degenerative joint disease involving multiple joints     Depression     DVT (deep venous thrombosis) (HCC)     Bilateral    Edema     Endometrial adenocarcinoma (HCC)     Fibromyalgia     Ganglion of right wrist     GERD (gastroesophageal reflux disease)     Heart murmur     Hematoma     History of mechanical aortic valve replacement     Hypothyroidism (acquired)     Laboratory confirmed diagnosis of COVID-19 2021    Low blood pressure     Malignant neoplasm of corpus uteri, except isthmus (HCC)     Mixed hyperlipidemia     Morbid obesity (HCC)     Obstructive sleep apnea syndrome     Pulmonary embolism (HCC)     Tobacco dependence syndrome     Transient cerebral ischemia     Urinary incontinence     Viral hepatitis C     Vitamin D deficiency        Past Surgical History:   Procedure Laterality Date    ANKLE SURGERY      CARDIAC SURGERY  2015    VALVE REPLACEMENT     SECTION      X3    CHOLECYSTECTOMY      COLONOSCOPY  2019    COLONOSCOPY      HYSTERECTOMY  2011    TOTAL       Family History   Problem Relation Age of Onset    Coronary artery disease Mother     Coronary artery disease Father     Cirrhosis Father      I have reviewed and agree with the history as documented      E-Cigarette/Vaping    E-Cigarette Use Never User      E-Cigarette/Vaping Substances    Nicotine No     THC No     CBD No     Flavoring No     Other No     Unknown No      Social History     Tobacco Use    Smoking status: Current Some Day Smoker     Packs/day: 0 50     Years: 40 00     Pack years: 20 00     Types: Cigarettes    Smokeless tobacco: Never Used   Vaping Use    Vaping Use: Never used   Substance Use Topics    Alcohol use: Yes     Comment: once in awhile    Drug use: Yes     Types: Marijuana       Review of Systems   Constitutional: Negative for appetite change, chills, fatigue and fever  HENT: Negative for congestion, rhinorrhea, sinus pressure, sinus pain and sore throat  Eyes: Negative for photophobia and visual disturbance  Respiratory: Negative for cough, shortness of breath and wheezing  Cardiovascular: Negative for chest pain, palpitations and leg swelling  Gastrointestinal: Positive for abdominal pain, blood in stool and diarrhea  Negative for constipation, nausea and vomiting  Genitourinary: Negative for difficulty urinating, dysuria, flank pain, frequency, hematuria and urgency  Musculoskeletal: Negative for arthralgias, back pain, joint swelling, myalgias and neck pain  Skin: Negative for color change, pallor and rash  Neurological: Negative for dizziness, syncope, weakness, light-headedness and headaches  All other systems reviewed and are negative  Physical Exam  Physical Exam  Vitals and nursing note reviewed  Constitutional:       General: She is awake  Appearance: Normal appearance  She is morbidly obese  She is not toxic-appearing or diaphoretic  HENT:      Head: Normocephalic and atraumatic  Right Ear: External ear normal       Left Ear: External ear normal       Nose: Nose normal       Mouth/Throat:      Lips: Pink  Mouth: Mucous membranes are moist    Eyes:      General: Lids are normal  No scleral icterus  Conjunctiva/sclera: Conjunctivae normal       Pupils: Pupils are equal, round, and reactive to light  Cardiovascular:      Rate and Rhythm: Normal rate and regular rhythm  Pulses: Normal pulses  Radial pulses are 2+ on the right side and 2+ on the left side  Heart sounds: Normal heart sounds, S1 normal and S2 normal    Pulmonary:      Effort: Pulmonary effort is normal  No accessory muscle usage  Breath sounds: Normal breath sounds   No stridor  No decreased breath sounds, wheezing, rhonchi or rales  Abdominal:      General: Abdomen is flat  Bowel sounds are normal  There is no distension  Palpations: Abdomen is soft  Tenderness: There is abdominal tenderness in the right upper quadrant and epigastric area  There is no right CVA tenderness, left CVA tenderness, guarding or rebound  Musculoskeletal:      Cervical back: Full passive range of motion without pain and neck supple  No signs of trauma  No pain with movement  Right lower leg: No edema  Left lower leg: No edema  Lymphadenopathy:      Cervical: No cervical adenopathy  Skin:     General: Skin is warm and dry  Capillary Refill: Capillary refill takes less than 2 seconds  Coloration: Skin is not cyanotic, jaundiced or pale  Neurological:      Mental Status: She is alert and oriented to person, place, and time  GCS: GCS eye subscore is 4  GCS verbal subscore is 5  GCS motor subscore is 6  Gait: Gait normal    Psychiatric:         Mood and Affect: Mood normal          Speech: Speech normal          Behavior: Behavior is cooperative           Vital Signs  ED Triage Vitals   Temperature Pulse Respirations Blood Pressure SpO2   09/09/21 1459 09/09/21 1459 09/09/21 1459 09/09/21 1459 09/09/21 1459   98 3 °F (36 8 °C) 80 16 146/72 95 %      Temp Source Heart Rate Source Patient Position - Orthostatic VS BP Location FiO2 (%)   09/09/21 1459 09/09/21 1459 09/09/21 1459 09/09/21 1459 --   Oral Monitor Sitting Left arm       Pain Score       09/09/21 1909       No Pain           Vitals:    09/09/21 1459 09/09/21 2006 09/09/21 2343   BP: 146/72 131/63 139/66   Pulse: 80 74 79   Patient Position - Orthostatic VS: Sitting Lying          Visual Acuity  Visual Acuity      Most Recent Value   L Pupil Size (mm)  3   R Pupil Size (mm)  3   L Pupil Shape  Round   R Pupil Shape  Round          ED Medications  Medications   sodium chloride 0 9 % bolus 1,000 mL (0 mL Intravenous Hold 9/9/21 2101)   lactated ringers infusion (100 mL/hr Intravenous New Bag 9/9/21 2200)   HYDROmorphone (DILAUDID) injection 0 5 mg (0 5 mg Intravenous Given 9/9/21 2200)   HYDROmorphone (DILAUDID) injection 0 2 mg (has no administration in time range)   HYDROmorphone (DILAUDID) injection 0 2 mg (has no administration in time range)   DULoxetine (CYMBALTA) delayed release capsule 30 mg (has no administration in time range)   dicyclomine (BENTYL) tablet 20 mg (has no administration in time range)   cholestyramine sugar free (QUESTRAN LIGHT) packet 4 g (has no administration in time range)   warfarin (COUMADIN) tablet 6 mg (has no administration in time range)   warfarin (COUMADIN) tablet 7 5 mg (7 5 mg Oral Not Given 9/10/21 0014)   acetaminophen (TYLENOL) tablet 650 mg (has no administration in time range)   insulin lispro (HumaLOG) 100 units/mL subcutaneous injection 1-5 Units (1 Units Subcutaneous Not Given 9/10/21 0015)   thiamine tablet 100 mg (has no administration in time range)   folic acid (FOLVITE) tablet 1 mg (has no administration in time range)   multivitamin-minerals (CENTRUM) tablet 1 tablet (has no administration in time range)   heparin (porcine) 25,000 units in 0 45% NaCl 250 mL infusion (premix) (18 Units/kg/hr × 125 kg (Order-Specific) Intravenous New Bag 9/10/21 0057)   heparin (porcine) injection 10,000 Units (has no administration in time range)   heparin (porcine) injection 5,000 Units (has no administration in time range)   sodium chloride 0 9 % bolus 1,000 mL (0 mL Intravenous Stopped 9/9/21 2132)   ketorolac (TORADOL) injection 15 mg (15 mg Intravenous Given 9/9/21 1821)   iohexol (OMNIPAQUE) 350 MG/ML injection (SINGLE-DOSE) 100 mL (100 mL Intravenous Given 9/9/21 1953)   heparin (porcine) injection 10,000 Units (10,000 Units Intravenous Given 9/10/21 0058)       Diagnostic Studies  Results Reviewed     Procedure Component Value Units Date/Time    Ethanol [[id number]] (Normal) Collected: 09/10/21 0014    Lab Status: Final result Specimen: Blood from Arm, Right Updated: 09/10/21 0102     Ethanol Lvl <3 mg/dL     APTT [[id number]]  (Abnormal) Collected: 09/10/21 0010    Lab Status: Final result Specimen: Blood from Arm, Right Updated: 09/10/21 0051     PTT 46 seconds     Protime-INR [[id number]]  (Abnormal) Collected: 09/10/21 0010    Lab Status: Final result Specimen: Blood from Arm, Right Updated: 09/10/21 0051     Protime 21 8 seconds      INR 1 93    CBC [[id number]]  (Abnormal) Collected: 09/10/21 0014    Lab Status: Final result Specimen: Blood from Arm, Right Updated: 09/10/21 0027     WBC 4 08 Thousand/uL      RBC 4 10 Million/uL      Hemoglobin 12 9 g/dL      Hematocrit 39 9 %      MCV 97 fL      MCH 31 5 pg      MCHC 32 3 g/dL      RDW 13 1 %      Platelets 092 Thousands/uL      MPV 10 2 fL     APTT [[id number]]     Lab Status: No result Specimen: Blood     Fingerstick Glucose (POCT) [[id number]]  (Normal) Collected: 09/09/21 2345    Lab Status: Final result Updated: 09/09/21 2346     POC Glucose 120 mg/dl     Lipid Panel with Direct LDL reflex [[id number]]  (Abnormal) Collected: 09/09/21 1501    Lab Status: Final result Specimen: Blood from Arm, Right Updated: 09/09/21 2259     Cholesterol 172 mg/dL      Triglycerides 260 mg/dL      HDL, Direct 42 mg/dL      LDL Calculated 78 mg/dL     Clostridium difficile toxin by PCR with EIA [[id number]]     Lab Status: No result Specimen: Stool     Stool Enteric Bacterial Panel by PCR [[id number]]     Lab Status: No result Specimen: Stool     Protime-INR [[id number]]  (Abnormal) Collected: 09/09/21 1501    Lab Status: Final result Specimen: Blood from Arm, Right Updated: 09/09/21 2120     Protime 20 3 seconds      INR 1 76    UA w Reflex to Microscopic w Reflex to Culture [[id number]]     Lab Status: No result Specimen: Urine, Clean Catch     [place] draw [[id number]] Collected: 09/09/21 1501    Lab Status: Final result Specimen: Blood from [place], Right Updated: 09/09/21 1700    Narrative: The following orders were created for panel order [place] draw  Procedure                               Abnormality         Status                     ---------                               -----------         ------                     Alexa Gabriele Top on [facility][[id number]]                           Final result               Gold top on [facility][[id number]]                                 Final result                 Please view results for these tests on the individual orders      CBC and differential [[id number]]  (Abnormal) Collected: 09/09/21 1501    Lab Status: Final result Specimen: Blood from Arm, Right Updated: 09/09/21 1634     WBC 3 14 Thousand/uL      RBC 4 40 Million/uL      Hemoglobin 13 7 g/dL      Hematocrit 43 2 %      MCV 98 fL      MCH 31 1 pg      MCHC 31 7 g/dL      RDW 13 2 %      MPV 10 2 fL      Platelets 850 Thousands/uL      nRBC 0 /100 WBCs      Neutrophils Relative 67 %      Immat GRANS % 0 %      Lymphocytes Relative 23 %      Monocytes Relative 7 %      Eosinophils Relative 3 %      Basophils Relative 0 %      Neutrophils Absolute 2 09 Thousands/µL      Immature Grans Absolute 0 01 Thousand/uL      Lymphocytes Absolute 0 72 Thousands/µL      Monocytes Absolute 0 23 Thousand/µL      Eosinophils Absolute 0 08 Thousand/µL      Basophils Absolute 0 01 Thousands/µL     Lipase [[id number]]  (Abnormal) Collected: 09/09/21 1501    Lab Status: Final result Specimen: Blood from Arm, Right Updated: 09/09/21 1604     Lipase 1,010 u/L     Comprehensive metabolic panel [[id number]]  (Abnormal) Collected: 09/09/21 1501    Lab Status: Final result Specimen: Blood from Arm, Right Updated: 09/09/21 1558     Sodium 135 mmol/L      Potassium 4 5 mmol/L      Chloride 105 mmol/L      CO2 22 mmol/L      ANION GAP 8 mmol/L      BUN 17 mg/dL      Creatinine 0 54 mg/dL      Glucose 223 mg/dL      Calcium 8 2 mg/dL      Corrected Calcium 9 1 mg/dL      AST 61 U/L      ALT 32 U/L      Alkaline Phosphatase 103 U/L      Total Protein 7 4 g/dL      Albumin 2 9 g/dL      Total Bilirubin 0 54 mg/dL      eGFR 104 ml/min/1 73sq m     Narrative:      [place] guidelines for Chronic Kidney Disease (CKD):     Stage 1 with normal or high GFR (GFR > 90 mL/min/1 73 square meters)    Stage 2 Mild CKD (GFR = 60-89 mL/min/1 73 square meters)    Stage 3A Moderate CKD (GFR = 45-59 mL/min/1 73 square meters)    Stage 3B Moderate CKD (GFR = 30-44 mL/min/1 73 square meters)    Stage 4 Severe CKD (GFR = 15-29 mL/min/1 73 square meters)    Stage 5 End Stage CKD (GFR <15 mL/min/1 73 square meters)  Note: GFR calculation is accurate only with a steady state creatinine                 CT abdomen pelvis with contrast   Final Result by Edel Albrecht MD (09/09 2015)      1  No acute inflammatory process in the abdomen or pelvis  2   Fluid-filled distal colon  Correlation for diarrhea illness recommended  3   Hepatic steatosis with lobulated liver contour suggesting early cirrhosis  4   Tiny focus of gas in the urinary bladder  Correlation for recent instrumentation or UTI recommended  Workstation performed: [id number]                    Procedures  Procedures         ED Course  ED Course as of Sep 10 0109   Thu Sep 09, 2021   1756 Lipase(!): 1,010                             SBIRT 20yo+      Most Recent Value   SBIRT (23 yo +)   In order to provide better care to our patients, we are screening all of our patients for alcohol and drug use  Would it be okay to ask you these screening questions?   Unable to answer at this time Filed at: 09/09/2021 1637                    MDM  Number of Diagnoses or Management Options  Abdominal pain  Diarrhea, unspecified type  Pancreatitis  Diagnosis management comments: Patient is a 62 y/o female with a PMHx of aortic valve replacement, recurrent DVT with a filter and on Coumadin, ovarian cancer (s/p complete hysterectomy), HTN, HLD, DM2, cirrhosis, fibromyalgia and GERD, presenting to the ED for evaluation of diarrhea x3 months  Patient's lipase >1,000  No CT evidence of pancreatitis; however, patient has upper/epigstric abd pain and in combination with elevated lipase, meets criteria for pancreatitis  Her lipase was normal 2 months prior; likely unrelated to diarrhea  Patient does not feel comfortable with discharge; discussed with SLIM and patient admitted admitted to medicine  The management plan was discussed in detail with the patient at bedside and all questions were answered  Amount and/or Complexity of Data Reviewed  Clinical lab tests: ordered and reviewed  Tests in the radiology section of CPT®: ordered and reviewed    Patient Progress  Patient progress: stable      Disposition  Final diagnoses:   Pancreatitis   Diarrhea, unspecified type   Abdominal pain     Time reflects when diagnosis was documented in both MDM as applicable and the Disposition within this note     Time User Action Codes Description Comment    9/9/2021  8:45 PM Wander Berrios [K85 [id number]] Pancreatitis     9/9/2021  8:45 PM Tammy Gray Add [R19 7] Diarrhea, unspecified type     9/9/2021  8:45 PM Vinh Goodman Add [R10 9] Abdominal pain       ED Disposition     ED Disposition Condition Date/Time Comment    Admit Stable Thu Sep 9, 2021  8:58 PM Case was discussed with CONOR and the patient's admission status was agreed to be Admission Status: inpatient status to the service of Dr Mario Boogie  Follow-up Information    None         Patient's Medications   Discharge Prescriptions    No medications on file     No discharge procedures on file      PDMP Review     None          ED Provider  Electronically Signed by           Asael Hughes PA-C  09/10/21 0109

## 2021-09-10 ENCOUNTER — APPOINTMENT (INPATIENT)
Dept: ULTRASOUND IMAGING | Facility: HOSPITAL | Age: 61
DRG: 241 | End: 2021-09-10
Payer: COMMERCIAL

## 2021-09-10 PROBLEM — R10.10 PAIN OF UPPER ABDOMEN: Status: ACTIVE | Noted: 2021-09-09

## 2021-09-10 LAB
AFP-TM SERPL-MCNC: 3.4 NG/ML (ref 0.5–8)
ANION GAP SERPL CALCULATED.3IONS-SCNC: 9 MMOL/L (ref 4–13)
APTT PPP: 101 SECONDS (ref 23–37)
APTT PPP: 46 SECONDS (ref 23–37)
APTT PPP: >210 SECONDS (ref 23–37)
BUN SERPL-MCNC: 15 MG/DL (ref 5–25)
CALCIUM SERPL-MCNC: 8.1 MG/DL (ref 8.3–10.1)
CHLORIDE SERPL-SCNC: 109 MMOL/L (ref 100–108)
CO2 SERPL-SCNC: 26 MMOL/L (ref 21–32)
CREAT SERPL-MCNC: 0.62 MG/DL (ref 0.6–1.3)
ERYTHROCYTE [DISTWIDTH] IN BLOOD BY AUTOMATED COUNT: 13.1 % (ref 11.6–15.1)
ERYTHROCYTE [DISTWIDTH] IN BLOOD BY AUTOMATED COUNT: 13.2 % (ref 11.6–15.1)
ETHANOL SERPL-MCNC: <3 MG/DL (ref 0–3)
GFR SERPL CREATININE-BSD FRML MDRD: 99 ML/MIN/1.73SQ M
GLUCOSE SERPL-MCNC: 105 MG/DL (ref 65–140)
GLUCOSE SERPL-MCNC: 121 MG/DL (ref 65–140)
GLUCOSE SERPL-MCNC: 124 MG/DL (ref 65–140)
GLUCOSE SERPL-MCNC: 124 MG/DL (ref 65–140)
GLUCOSE SERPL-MCNC: 98 MG/DL (ref 65–140)
HCT VFR BLD AUTO: 39.5 % (ref 34.8–46.1)
HCT VFR BLD AUTO: 39.9 % (ref 34.8–46.1)
HGB BLD-MCNC: 12.7 G/DL (ref 11.5–15.4)
HGB BLD-MCNC: 12.9 G/DL (ref 11.5–15.4)
INR PPP: 1.93 (ref 0.84–1.19)
MCH RBC QN AUTO: 31.4 PG (ref 26.8–34.3)
MCH RBC QN AUTO: 31.5 PG (ref 26.8–34.3)
MCHC RBC AUTO-ENTMCNC: 32.2 G/DL (ref 31.4–37.4)
MCHC RBC AUTO-ENTMCNC: 32.3 G/DL (ref 31.4–37.4)
MCV RBC AUTO: 97 FL (ref 82–98)
MCV RBC AUTO: 98 FL (ref 82–98)
PLATELET # BLD AUTO: 110 THOUSANDS/UL (ref 149–390)
PLATELET # BLD AUTO: 122 THOUSANDS/UL (ref 149–390)
PMV BLD AUTO: 10 FL (ref 8.9–12.7)
PMV BLD AUTO: 10.2 FL (ref 8.9–12.7)
POTASSIUM SERPL-SCNC: 3 MMOL/L (ref 3.5–5.3)
PROTHROMBIN TIME: 21.8 SECONDS (ref 11.6–14.5)
RBC # BLD AUTO: 4.05 MILLION/UL (ref 3.81–5.12)
RBC # BLD AUTO: 4.1 MILLION/UL (ref 3.81–5.12)
SODIUM SERPL-SCNC: 144 MMOL/L (ref 136–145)
WBC # BLD AUTO: 3.24 THOUSAND/UL (ref 4.31–10.16)
WBC # BLD AUTO: 4.08 THOUSAND/UL (ref 4.31–10.16)

## 2021-09-10 PROCEDURE — 87505 NFCT AGENT DETECTION GI: CPT | Performed by: PHYSICIAN ASSISTANT

## 2021-09-10 PROCEDURE — 99232 SBSQ HOSP IP/OBS MODERATE 35: CPT | Performed by: INTERNAL MEDICINE

## 2021-09-10 PROCEDURE — 99223 1ST HOSP IP/OBS HIGH 75: CPT | Performed by: INTERNAL MEDICINE

## 2021-09-10 PROCEDURE — 85730 THROMBOPLASTIN TIME PARTIAL: CPT | Performed by: INTERNAL MEDICINE

## 2021-09-10 PROCEDURE — 85730 THROMBOPLASTIN TIME PARTIAL: CPT | Performed by: PHYSICIAN ASSISTANT

## 2021-09-10 PROCEDURE — 87329 GIARDIA AG IA: CPT | Performed by: PHYSICIAN ASSISTANT

## 2021-09-10 PROCEDURE — 82077 ASSAY SPEC XCP UR&BREATH IA: CPT | Performed by: PHYSICIAN ASSISTANT

## 2021-09-10 PROCEDURE — 80048 BASIC METABOLIC PNL TOTAL CA: CPT | Performed by: PHYSICIAN ASSISTANT

## 2021-09-10 PROCEDURE — 85027 COMPLETE CBC AUTOMATED: CPT | Performed by: PHYSICIAN ASSISTANT

## 2021-09-10 PROCEDURE — 36415 COLL VENOUS BLD VENIPUNCTURE: CPT | Performed by: PHYSICIAN ASSISTANT

## 2021-09-10 PROCEDURE — 87493 C DIFF AMPLIFIED PROBE: CPT | Performed by: PHYSICIAN ASSISTANT

## 2021-09-10 PROCEDURE — 83993 ASSAY FOR CALPROTECTIN FECAL: CPT | Performed by: PHYSICIAN ASSISTANT

## 2021-09-10 PROCEDURE — 85610 PROTHROMBIN TIME: CPT | Performed by: PHYSICIAN ASSISTANT

## 2021-09-10 PROCEDURE — 82948 REAGENT STRIP/BLOOD GLUCOSE: CPT

## 2021-09-10 PROCEDURE — 82105 ALPHA-FETOPROTEIN SERUM: CPT | Performed by: PHYSICIAN ASSISTANT

## 2021-09-10 PROCEDURE — 76705 ECHO EXAM OF ABDOMEN: CPT

## 2021-09-10 RX ORDER — HEPARIN SODIUM 1000 [USP'U]/ML
10000 INJECTION, SOLUTION INTRAVENOUS; SUBCUTANEOUS ONCE
Status: COMPLETED | OUTPATIENT
Start: 2021-09-10 | End: 2021-09-10

## 2021-09-10 RX ORDER — POTASSIUM CHLORIDE 20 MEQ/1
40 TABLET, EXTENDED RELEASE ORAL EVERY 4 HOURS
Status: COMPLETED | OUTPATIENT
Start: 2021-09-10 | End: 2021-09-10

## 2021-09-10 RX ORDER — HEPARIN SODIUM 1000 [USP'U]/ML
10000 INJECTION, SOLUTION INTRAVENOUS; SUBCUTANEOUS
Status: DISCONTINUED | OUTPATIENT
Start: 2021-09-10 | End: 2021-09-14

## 2021-09-10 RX ORDER — HEPARIN SODIUM 10000 [USP'U]/100ML
3-30 INJECTION, SOLUTION INTRAVENOUS
Status: DISCONTINUED | OUTPATIENT
Start: 2021-09-10 | End: 2021-09-12

## 2021-09-10 RX ORDER — HEPARIN SODIUM 1000 [USP'U]/ML
5000 INJECTION, SOLUTION INTRAVENOUS; SUBCUTANEOUS
Status: DISCONTINUED | OUTPATIENT
Start: 2021-09-10 | End: 2021-09-14

## 2021-09-10 RX ADMIN — CHOLESTYRAMINE 4 G: 4 POWDER, FOR SUSPENSION ORAL at 18:28

## 2021-09-10 RX ADMIN — HEPARIN SODIUM 10000 UNITS: 1000 INJECTION INTRAVENOUS; SUBCUTANEOUS at 00:58

## 2021-09-10 RX ADMIN — DULOXETINE HYDROCHLORIDE 30 MG: 30 CAPSULE, DELAYED RELEASE ORAL at 10:16

## 2021-09-10 RX ADMIN — THIAMINE HCL TAB 100 MG 100 MG: 100 TAB at 10:16

## 2021-09-10 RX ADMIN — MULTIPLE VITAMINS W/ MINERALS TAB 1 TABLET: TAB ORAL at 10:18

## 2021-09-10 RX ADMIN — POTASSIUM CHLORIDE 40 MEQ: 1500 TABLET, EXTENDED RELEASE ORAL at 13:56

## 2021-09-10 RX ADMIN — POTASSIUM CHLORIDE 40 MEQ: 1500 TABLET, EXTENDED RELEASE ORAL at 10:18

## 2021-09-10 RX ADMIN — HEPARIN SODIUM 15 UNITS/KG/HR: 10000 INJECTION, SOLUTION INTRAVENOUS at 13:58

## 2021-09-10 RX ADMIN — DICYCLOMINE HYDROCHLORIDE 20 MG: 20 TABLET ORAL at 10:17

## 2021-09-10 RX ADMIN — FOLIC ACID 1 MG: 1 TABLET ORAL at 10:17

## 2021-09-10 RX ADMIN — HEPARIN SODIUM 18 UNITS/KG/HR: 10000 INJECTION, SOLUTION INTRAVENOUS at 00:57

## 2021-09-10 RX ADMIN — DICYCLOMINE HYDROCHLORIDE 20 MG: 20 TABLET ORAL at 18:28

## 2021-09-10 NOTE — ASSESSMENT & PLAN NOTE
· Noted with cirrhosis seen on CT scan  · Patient states that she drink upwards of 10 beers daily back in her youth  · She states that she still drinks now, however this is intermittent  · States that she recently drink about 3 beers 2-3 days ago  · [facility] protocol  · Check ethanol level

## 2021-09-10 NOTE — ASSESSMENT & PLAN NOTE
Wt Readings from Last 3 Encounters:   08/11/21 (!) 158 kg (348 lb)   07/28/21 (!) 158 kg (348 lb)   07/15/21 (!) 162 kg (357 lb)    Appears compensated at this time   Last echocardiogram from February 4888: "LV systolic function was normal   Ejection fraction was estimated to be 65%  There was no regional wall motion abnormalities  Features were consistent with a pseudonormal left ventricular filling pattern, with concomitant abnormal relaxation increased filling pressure, G2DD  Aortic valve; a mechanical prosthesis was present "   Monitor intake and output, daily weights   Low sodium diet and fluid restriction   Continue home medications   Hold diuretics until p o   Intake improves

## 2021-09-10 NOTE — ASSESSMENT & PLAN NOTE
· Present on admission, INR of 1 76 on admission  · Patient is on 6 mg of warfarin every day except Monday and Friday for which she takes 7 5 mg   ·  INR goal at 2 5-3 5  · Repeat PT/INR daily

## 2021-09-10 NOTE — ASSESSMENT & PLAN NOTE
· Present on admission, lipase of 1010  · CT abdomen pelvis: "No acute inflammatory process in the abdomen or pelvis  Fluid-filled distal colon  Correlation for diarrhea illness recommended  Hepatic steatosis with lobulated liver contour suggesting early cirrhosis  Tiny focus of gas in the urinary bladder "  · Etiology:  Hypertriglyceridemia induced versus medication induced related to diuretics versus ETOH induced  · Obtain lipid panel to rule out hypertriglyceridemia past worse  · IV hydration  · Pain control  · She is complaining of some mild right upper quadrant/epigastric abdominal pain, so give lipase elevation will treat for pancreatitis  · Patient is also stating that food is causing her diarrhea, so NPO will help diarrheal symptoms

## 2021-09-10 NOTE — ASSESSMENT & PLAN NOTE
Wt Readings from Last 3 Encounters:   08/11/21 (!) 158 kg (348 lb)   07/28/21 (!) 158 kg (348 lb)   07/15/21 (!) 162 kg (357 lb)    Appears compensated at this time   Last echocardiogram from February 6341: "LV systolic function was normal   Ejection fraction was estimated to be 65%  There was no regional wall motion abnormalities  Features were consistent with a pseudonormal left ventricular filling pattern, with concomitant abnormal relaxation increased filling pressure, G2DD  Aortic valve; a mechanical prosthesis was present "   Monitor intake and output, daily weights   Low sodium diet and fluid restriction   Continue home medications   Hold home p o  diuretic of 40 mg furosemide in the setting of acute pancreatitis

## 2021-09-10 NOTE — ASSESSMENT & PLAN NOTE
· Present on admission, lipase of 1010  · CT abdomen pelvis: "No acute inflammatory process in the abdomen or pelvis  Fluid-filled distal colon  Correlation for diarrhea illness recommended  Hepatic steatosis with lobulated liver contour suggesting early cirrhosis  Tiny focus of gas in the urinary bladder "  · Etiology:  Unclear, normal-appearing pancreas on imaging  · Triglycerides slightly elevated to 260  · IV hydration  · Pain control    · Clear liquid diet  · Case was discussed with Gastroenterology-continue supportive care and plan for EGD and colonoscopy

## 2021-09-10 NOTE — ED NOTES
Patient attempted to provide a stool sample  Unfortunately it was contaminated with urine  Lab was called to see if this was acceptable and they will not take a stool sample contaminated with urine  Will encourage patient to try and provide a clean stool and urine sample          Swapnil [employer], RN  09/10/21 4871

## 2021-09-10 NOTE — ASSESSMENT & PLAN NOTE
· Presentation:  Patient reports ongoing, progressively worsening diarrhea for the past 3 months  Patient reports multiple, watery diarrheal episodes daily  Patient reports several days ago she had nothing but blood for 3 episodes  She reports during those episodes bright, red blood  Patient denies recent antibiotic use or recent travel  She last had stool studies performed as an outpatient on 08/11/2021  Patient has ongoing workup with GI as an outpatient  Of note, patient has a colonoscopy scheduled for 10/27/2021 with Dr Juarez Hicks   · f/u C diff, stool enteric bacterial panel which patient has had negative previously  · IV hydration  Monitor volume status closely in the setting of G2DD  · Nursing to complete stool record at bedside  · Monitor electrolytes closely  · Continue Bentyl and Florastor    · Hold all anti diarrheal is a until infectious diarrhea ruled out  · Due for colonoscopy in October -gastroenterology consulted, plan for EGD colonoscopy while inpatient

## 2021-09-10 NOTE — H&P
Esa Stallings  [birth year]  1960, 63 y o  female MRN: [id number]  Unit/Bed#: ED 25 Encounter: [id number]  Primary Care Provider: Lay Rosales MD   Date and time admitted to hospital: 9/9/2021  4:25 PM    * Pancreatitis  Assessment & Plan  · Present on admission, lipase of 1010  · CT abdomen pelvis: "No acute inflammatory process in the abdomen or pelvis  Fluid-filled distal colon  Correlation for diarrhea illness recommended  Hepatic steatosis with lobulated liver contour suggesting early cirrhosis  Tiny focus of gas in the urinary bladder "  · Etiology:  Hypertriglyceridemia induced versus medication induced related to diuretics versus ETOH induced  · Obtain lipid panel to rule out hypertriglyceridemia past worse  · IV hydration  · Pain control  · She is complaining of some mild right upper quadrant/epigastric abdominal pain, so give lipase elevation will treat for pancreatitis  · Patient is also stating that food is causing her diarrhea, so NPO will help diarrheal symptoms  Diarrhea  Assessment & Plan  · Presentation:  Patient reports ongoing, progressively worsening diarrhea for the past 3 months  Patient reports multiple, watery diarrheal episodes daily  Patient reports several days ago she had nothing but blood for 3 episodes  She reports during those episodes bright, red blood  Patient denies recent antibiotic use or recent travel  She last had stool studies performed as an outpatient on 08/11/2021  Patient has ongoing workup with GI as an outpatient  Of note, patient has a colonoscopy scheduled for 10/27/2021 with Dr Brii Kamara   · f/u C diff, stool enteric bacterial panel which patient has had negative previously  · IV hydration  Monitor volume status closely in the setting of G2DD  · Nursing to complete stool record at bedside  · Monitor electrolytes closely  · Continue Bentyl and Florastor    · Hold all anti diarrheal is a until infectious diarrhea ruled out  · Due for colonoscopy in October   · Can likely follow up outpatient for this if workup negative    Subtherapeutic international normalized ratio (INR)  Assessment & Plan  · Present on admission, INR of 1 76 on admission  · Patient is on 6 mg of warfarin every day except Monday and Friday for which she takes 7 5 mg   · Bridge with VTE warfarin until INR therapeutic   · Repeat PT/INR daily  History of mechanical aortic valve replacement  Assessment & Plan  · History of aortic valve replacement  · Warfarin 6 mg daily except warfarin 7 5 mg on Mondays  · PT INR pending  · Monitor PT INR daily  Hyperlipidemia  Assessment & Plan  · Patient previously on Lipitor 40 mg daily  Patient reports not taking statin since July  · Obtain lipid panel  Heart failure with preserved ejection fraction, borderline, class III (HCC)  Assessment & Plan  Wt Readings from Last 3 Encounters:   08/11/21 (!) 158 kg (348 lb)   07/28/21 (!) 158 kg (348 lb)   07/15/21 (!) 162 kg (357 lb)    Appears compensated at this time   Last echocardiogram from February 7825: "LV systolic function was normal   Ejection fraction was estimated to be 65%  There was no regional wall motion abnormalities  Features were consistent with a pseudonormal left ventricular filling pattern, with concomitant abnormal relaxation increased filling pressure, G2DD  Aortic valve; a mechanical prosthesis was present "   Monitor intake and output, daily weights   Low sodium diet and fluid restriction   Continue home medications   Hold home p o  diuretic of 40 mg furosemide in the setting of acute pancreatitis  Hepatitis C antibody test positive  Assessment & Plan  · History of viral Hepatitis C     Leukopenia  Assessment & Plan  · Chronic  Unclear etiology per chart review  · Present on admission, WBC 3 14; currently at baseline      Bipolar depression ([facility] [address] )  Assessment & Plan  · She has not been taking her home medications been off on for quite some time  · Will hold off on restarting all of her anti psych medications at this time    Morbid obesity with BMI of 50 0-59 9, adult [facility])  Assessment & Plan  · Encouraged lifestyle modifications  Alcohol abuse  Assessment & Plan  · Noted with cirrhosis seen on CT scan  · Patient states that she drink upwards of 10 beers daily back in her youth  · She states that she still drinks now, however this is intermittent  · States that she recently drink about 3 beers 2-3 days ago  · CIWA protocol  · Check ethanol level  Tobacco dependence due to cigarettes  Assessment & Plan  · Encouraged smoking cessation  · Nicotine patch  VTE Pharmacologic Prophylaxis: VTE Score: 4 Moderate Risk (Score 3-4) - Pharmacological DVT Prophylaxis Ordered: warfarin (Coumadin)  Code Status: Prior fullcode   Discussion with family: Patient declined call to   Anticipated Length of Stay: Patient will be admitted on an inpatient basis with an anticipated length of stay of greater than 2 midnights secondary to pancreatitis   Total Time for Visit, including Counseling / Coordination of Care: 70 minutes Greater than 50% of this total time spent on direct patient counseling and coordination of care  Chief Complaint: diarrhea but I have been having abdominal pain     History of Present Illness:  Chasity Lechuga is a 63 y o  female with a PMH of cirrhosis, aortic valve replacement, tobacco dependence, hep C, obesity, fibromyalgia who presents with diarrhea over the past 4 months with incidental lipase elevation  Patient states that for the past 4 months she has been having significant amounts of watery diarrhea that have been intermittently bloody but not been bloody for the past couple of weeks  She states that she has followed up outpatient for her diarrhea and has undergone C diff testing as well as stool enteric pathogens all of which has been negative    She was supposed to follow-up with colonoscopy October but stated that she was unable to wait that long she was having copious amounts of diarrhea  This prompted her to come into the emergency department  She has also been experiencing some right upper quadrant pain which is radiating into were back  She does have known history of cirrhosis secondary to alcohol abuse in her youth but states that she has not used alcohol in the past 3 days  She did have noted lipase elevation at greater than 1000 which is above her baseline of normal lipase  Review of Systems:  Review of Systems   Constitutional: Negative for chills, fatigue, fever and unexpected weight change  Respiratory: Negative for cough, chest tightness and shortness of breath  Cardiovascular: Negative for chest pain and palpitations  Gastrointestinal: Positive for abdominal distention, abdominal pain, blood in stool, diarrhea and nausea  Negative for vomiting  Genitourinary: Negative for decreased urine volume, dysuria, frequency and urgency  Musculoskeletal: Negative for arthralgias  Neurological: Negative for dizziness, syncope, light-headedness and headaches  All other systems reviewed and are negative        Past Medical and Surgical History:   Past Medical History:   Diagnosis Date    Anemia     Anxiety     Aortic valve disorder     Back pain     Chronic pain syndrome     Cirrhosis (HCC)     Constipation     Degenerative arthritis of thoracic spine     Degenerative joint disease involving multiple joints     Depression     DVT (deep venous thrombosis) (HCC)     Bilateral    Edema     Endometrial adenocarcinoma (HCC)     Fibromyalgia     Ganglion of right wrist     GERD (gastroesophageal reflux disease)     Heart murmur     Hematoma     History of mechanical aortic valve replacement     Hypothyroidism (acquired)     Laboratory confirmed diagnosis of COVID-19 03/16/2021    Low blood pressure     Malignant neoplasm of corpus uteri, except isthmus ([facility] )     Mixed hyperlipidemia     Morbid obesity ([facility] )     Obstructive sleep apnea syndrome     Pulmonary embolism (HCC)     Tobacco dependence syndrome     Transient cerebral ischemia     Urinary incontinence     Viral hepatitis C     Vitamin D deficiency        Past Surgical History:   Procedure Laterality Date    ANKLE SURGERY      CARDIAC SURGERY  2015    VALVE REPLACEMENT     SECTION      X3    CHOLECYSTECTOMY      COLONOSCOPY  2019    COLONOSCOPY      HYSTERECTOMY  2011    TOTAL       Meds/Allergies:  Prior to Admission medications    Medication Sig Start Date End Date Taking? Authorizing Provider   dicyclomine (BENTYL) 20 mg tablet Take 1 tablet (20 mg total) by mouth 2 (two) times a day 8/10/21 9/9/21 Yes Lis Mcgee MD   diphenoxylate-atropine (LOMOTIL) 2 5-0 025 mg per tablet Take 1 tablet by mouth 4 (four) times a day as needed for diarrhea 8/10/21  Yes Lis Mcgee MD   Dulaglutide (Trulicity) 6 14 / 4IG SOPN Inject 0 5 mL (0 75 mg total) under the skin once a week 21  Yes Lis Mcgee MD   DULoxetine (CYMBALTA) 30 mg delayed release capsule Take 1 capsule (30 mg total) by mouth daily 8/10/21  Yes Lis Mcgee MD   furosemide (LASIX) 40 mg tablet Take 1 tablet daily for 5 days, then 1 tablet daily on an as needed basis   21  Yes Lis Mcgee MD   Insulin Pen Needle (Pen Needles) 31G X 5 MM MISC Use once a week 21  Yes Lis Mcgee MD   Jantoven 3 MG tablet Take 2 tablets (6 mg total) by mouth daily 2 5 tablets on 21  Yes Lis Mcgee MD   mirtazapine (REMERON) 45 MG tablet  21  Yes Historical Provider, MD   nystatin powder APPLY TOPICALLY 2 (TWO) TIMES A DAY AS NEEDED FOR 14 DAYS 3/23/21  Yes Historical Provider, MD   OneTouch Verio test strip USE 3 TIMES A DAY 7/15/21  Yes Lsi Mcgee MD   Pharmacist Choice Lancets MISC USE 3 TIMES A DAY 7/15/21  Yes Lis Mcgee MD   ProAir RespiClick 858 (09 Base) MCG/ACT AEPB INHALE 1 PUFF EVERY 4 (FOUR) HOURS AS NEEDED (WHEEZING OR SHORTNESS OF BREATH)  8/18/21  Yes Historical Provider, MD   saccharomyces boulardii (FLORASTOR) 250 mg capsule Take 1 capsule (250 mg total) by mouth 2 (two) times a day 8/10/21  Yes Andrew Ortega MD   sitaGLIPtin (Januvia) 100 mg tablet Take 1 tablet (100 mg total) by mouth daily 8/10/21  Yes Adnrew Ortega MD   ziprasidone (GEODON) 60 mg capsule  7/28/21  Yes Historical Provider, MD   Alcohol Swabs (Pharmacist Choice Alcohol) PADS USE 3 TIMES A DAY 7/15/21   Andrew Ortega MD   atorvastatin (LIPITOR) 40 mg tablet Take 1 tablet (40 mg total) by mouth daily  Patient not taking: Reported on 7/28/2021 7/15/21   Andrew Ortega MD   Blood Glucose Monitoring Suppl (OneTouch Verio IQ System) w/Device KIT USE 3 TIMES A DAY 7/15/21   Andrew Ortega MD   cholecalciferol (VITAMIN D3) 1,000 units tablet Take 2 tablets (2,000 Units total) by mouth daily for 10 days  Patient not taking: Reported on 7/15/2021 3/16/21 3/26/21  Barbra Tucker DO   cholestyramine Drema Hyannis) 4 g packet Take 1 packet (4 g total) by mouth 2 (two) times a day with meals 8/26/21 9/25/21  JOSE Jim   Continuous Blood Gluc  (FreeStyle [place] 2 [place]) ROXANNE USE AS DIRECTED 9/1/21   Andrew Ortega MD   Continuous Blood Gluc Sensor (FreeStyle Lizy 2 Sensor) MISC USE AS DIRECTED 9/1/21   Andrew Ortega MD   cyclobenzaprine (FLEXERIL) 10 mg tablet Take 1 tablet (10 mg total) by mouth 3 (three) times a day as needed for muscle spasms 8/10/21   Andrew Ortega MD   nicotine (NICODERM CQ) 14 mg/24hr TD 24 hr patch Place 1 patch on the skin every 24 hours  Patient not taking: Reported on 7/15/2021 4/12/21   Andrew Ortega MD     I have reviewed home medications with patient personally  Allergies:    Allergies   Allergen Reactions    Bactrim [Sulfamethoxazole-Trimethoprim] Other (See Comments)     Patient is unaware of why this allergy is recorded, denies knowledge of it    Penicillins Hives     Patient is unaware of why this allergy is recorded, denies knowledge of it    Tramadol GI Intolerance and Vomiting     Could not function - felt very ill        Social History:  Marital Status:    Occupation: unknown   Patient Pre-hospital Living Situation: Home  Patient Pre-hospital Level of Mobility: walks  Patient Pre-hospital Diet Restrictions: npo  Substance Use History:   Social History     Substance and Sexual Activity   Alcohol Use Yes    Comment: once in awhile     Social History     Tobacco Use   Smoking Status Current Some Day Smoker    Packs/day: 0 50    Years: 40 00    Pack years: 20 00    Types: Cigarettes   Smokeless Tobacco Never Used     Social History     Substance and Sexual Activity   Drug Use Yes    Types: Marijuana       Family History:  Family History   Problem Relation Age of Onset    Coronary artery disease Mother     Coronary artery disease Father     Cirrhosis Father        Physical Exam:     Vitals:   Blood Pressure: 131/63 (09/09/21 2006)  Pulse: 74 (09/09/21 2006)  Temperature: 98 3 °F (36 8 °C) (09/09/21 1459)  Temp Source: Oral (09/09/21 1459)  Respirations: 16 (09/09/21 2006)  SpO2: 97 % (09/09/21 2006)    Physical Exam  Constitutional:       General: She is not in acute distress  Appearance: She is obese  She is not diaphoretic  HENT:      Head: Normocephalic and atraumatic  Mouth/Throat:      Mouth: Mucous membranes are moist       Pharynx: Oropharynx is clear  No oropharyngeal exudate  Eyes:      General:         Right eye: No discharge  Left eye: No discharge  Conjunctiva/sclera: Conjunctivae normal       Pupils: Pupils are equal, round, and reactive to light  Cardiovascular:      Rate and Rhythm: Normal rate and regular rhythm  Pulses: Normal pulses  Heart sounds: Normal heart sounds  No murmur heard  Pulmonary:      Effort: Pulmonary effort is normal  No respiratory distress        Breath sounds: No wheezing or rales  Comments: Decreased breath sounds  Abdominal:      General: Abdomen is flat  There is no distension  Palpations: Abdomen is soft  Tenderness: There is abdominal tenderness (Epigastric/right upper quadrant)  There is no right CVA tenderness, left CVA tenderness, guarding or rebound  Musculoskeletal:         General: Normal range of motion  Cervical back: Neck supple  No muscular tenderness  Right lower leg: No edema  Left lower leg: No edema  Skin:     General: Skin is warm and dry  Capillary Refill: Capillary refill takes less than 2 seconds  Coloration: Skin is not jaundiced  Neurological:      General: No focal deficit present  Mental Status: She is alert and oriented to person, place, and time  Cranial Nerves: No cranial nerve deficit  Psychiatric:         Mood and Affect: Mood normal           Additional Data:     Lab Results:  Results from last 7 days   Lab Units 09/09/21  1501   WBC Thousand/uL 3 14*   HEMOGLOBIN g/dL 13 7   HEMATOCRIT % 43 2   PLATELETS Thousands/uL 132*   NEUTROS PCT % 67   LYMPHS PCT % 23   MONOS PCT % 7   EOS PCT % 3     Results from last 7 days   Lab Units 09/09/21  1501   SODIUM mmol/L 135*   POTASSIUM mmol/L 4 5   CHLORIDE mmol/L 105   CO2 mmol/L 22   BUN mg/dL 17   CREATININE mg/dL 0 54*   ANION GAP mmol/L 8   CALCIUM mg/dL 8 2*   ALBUMIN g/dL 2 9*   TOTAL BILIRUBIN mg/dL 0 54   ALK PHOS U/L 103   ALT U/L 32   AST U/L 61*   GLUCOSE RANDOM mg/dL 223*     Results from last 7 days   Lab Units 09/09/21  1501   INR  1 76*                   Imaging: Reviewed radiology reports from this admission including: abdominal/pelvic CT  CT abdomen pelvis with contrast   Final Result by Mariah Saul MD (09/09 2015)      1  No acute inflammatory process in the abdomen or pelvis  2   Fluid-filled distal colon  Correlation for diarrhea illness recommended        3   Hepatic steatosis with lobulated liver contour suggesting early cirrhosis  4   Tiny focus of gas in the urinary bladder  Correlation for recent instrumentation or UTI recommended  Workstation performed: [id number]             EKG and Other Studies Reviewed on Admission:   · EKG: No EKG obtained  ** Please Note: This note has been constructed using a voice recognition system   **

## 2021-09-10 NOTE — ASSESSMENT & PLAN NOTE
· History of aortic valve replacement  · Warfarin 6 mg daily except warfarin 7 5 mg on Mondays  · PT INR pending  · Monitor PT INR daily

## 2021-09-10 NOTE — CONSULTS
Consultation -  [facility]  Charisma Da Silva 63 y o  female MRN: [id number]  Unit/Bed#: ED 25 Encounter: [id number]        Inpatient consult to gastroenterology  Consult performed by: Bear Jean PA-C  Consult ordered by: Milan Noel PA-C        Reason for Consult / Principal Problem: Pancreatitis    HPI: Charisma Da Silva is a 63y o  year old female with multiple morbidities including morbid obesity, valvular heart disease status post mechanical aortic valve replacement, hepatitis C, ovarian cancer status post complete hysterectomy, history of alcohol abuse and tobacco abuse, DVT, anticoagulated with Coumadin, who presented to emergency room yesterday with complaints of diarrhea for 3 months which was progressively worsening, and with recent development of bloody diarrhea in the last couple of days  Patient said she had 3 episodes of diarrhea with bright red blood, had undergone stool testing including C diff testing which was negative, after having seen GI Dr Titus Jewell on 07/28 in the office  She says her most recent episode of diarrhea while here in the emergency room was not bloody  She had denied any recent antibiotics use or any recent travel  She said she was also having epigastric and right upper quadrant pain intermittently over the course of her diarrheal symptomatology, although this has also been more frequent and significant recently  She says she tried Bentyl and cholestyramine which did not help, also tried Imodium over-the-counter which did not help  Patient endorses history of alcohol abuse years ago, and endorses intermittent alcohol use as of late, with last alcohol use consisting of several beers 3 days before presentation  She reports history of IV drug use many years ago and had tattoos done while in [facility]    She thinks she was treated for hepatitis C with injections a long time ago (probably interferon) but does not remember details and does not know for sure if she was ever confirmed cured  She recalled having had a colonoscopy 5-6 years ago but did not remember details of findings, she thinks it was normal though  She is not sure if she had EGD  Upon this presentation, she had CT scan done which showed fatty liver and mildly lobulated contour suggestive of early cirrhosis  Her platelet count is mildly depressed at 110k  Other relevant labs include albumin of 2 9, bilirubin 0 54, creatinine 0 6 to, sodium 144  INR is 1 93 as of this morning but she is again normally anticoagulated with Coumadin  Hemoglobin is normal at 12 7  Her lipase was elevated at 1000 (less than 3 times upper limit of normal), and her CT scan showed no evidence of pancreatitis  She denies any postprandial component to her upper abdominal pain, she says that when she tries to eat or drink anything she just has to run to the bathroom but does not experience pain  She denies any known history of pancreatitis  REVIEW OF SYSTEMS:    CONSTITUTIONAL: Denies any fever, chills, or rigors  Good appetite, and no recent weight loss  HEENT: No earache or tinnitus  Denies hearing loss or visual disturbances  CARDIOVASCULAR: No chest pain or palpitations  RESPIRATORY: Denies any cough, hemoptysis, shortness of breath or dyspnea on exertion  GASTROINTESTINAL: As noted in the History of Present Illness  GENITOURINARY: No problems with urination  Denies any hematuria or dysuria  NEUROLOGIC: No dizziness or vertigo, denies headaches  MUSCULOSKELETAL: Denies any muscle or joint pain  SKIN: Denies skin rashes or itching  ENDOCRINE: Denies excessive thirst  Denies intolerance to heat or cold  PSYCHOSOCIAL: Denies depression or anxiety  Denies any recent memory loss         Historical Information   Past Medical History:   Diagnosis Date    Anemia     Anxiety     Aortic valve disorder     Back pain     Chronic pain syndrome     Cirrhosis (HCC)     Constipation     Degenerative arthritis of thoracic spine     Degenerative joint disease involving multiple joints     Depression     DVT (deep venous thrombosis) (HCC)     Bilateral    Edema     Endometrial adenocarcinoma (HCC)     Fibromyalgia     Ganglion of right wrist     GERD (gastroesophageal reflux disease)     Heart murmur     Hematoma     History of mechanical aortic valve replacement     Hypothyroidism (acquired)     Laboratory confirmed diagnosis of COVID-19 2021    Low blood pressure     Malignant neoplasm of corpus uteri, except isthmus (HCC)     Mixed hyperlipidemia     Morbid obesity (HCC)     Obstructive sleep apnea syndrome     Pulmonary embolism (HCC)     Tobacco dependence syndrome     Transient cerebral ischemia     Urinary incontinence     Viral hepatitis C     Vitamin D deficiency      Past Surgical History:   Procedure Laterality Date    ANKLE SURGERY      CARDIAC SURGERY  2015    VALVE REPLACEMENT     SECTION      X3    CHOLECYSTECTOMY      COLONOSCOPY  2019    COLONOSCOPY      HYSTERECTOMY  2011    TOTAL     Social History   Social History     Substance and Sexual Activity   Alcohol Use Yes    Comment: once in awhile     Social History     Substance and Sexual Activity   Drug Use Yes    Types: Marijuana     Social History     Tobacco Use   Smoking Status Current Some Day Smoker    Packs/day: 0 50    Years: 40 00    Pack years: 20 00    Types: Cigarettes   Smokeless Tobacco Never Used     Family History   Problem Relation Age of Onset    Coronary artery disease Mother     Coronary artery disease Father     Cirrhosis Father        Meds/Allergies     (Not in a hospital admission)    Current Facility-Administered Medications   Medication Dose Route Frequency    acetaminophen (TYLENOL) tablet 650 mg  650 mg Oral Q6H PRN    cholestyramine sugar free (QUESTRAN LIGHT) packet 4 g  4 g Oral BID    dicyclomine (BENTYL) tablet 20 mg  20 mg Oral BID    DULoxetine (CYMBALTA) delayed release capsule 30 mg  30 mg Oral Daily    folic acid (FOLVITE) tablet 1 mg  1 mg Oral Daily    heparin (porcine) 25,000 units in 0 45% NaCl 250 mL infusion (premix)  3-30 Units/kg/hr (Order-Specific) Intravenous Titrated    heparin (porcine) injection 10,000 Units  10,000 Units Intravenous Q1H PRN    heparin (porcine) injection 5,000 Units  5,000 Units Intravenous Q1H PRN    HYDROmorphone (DILAUDID) injection 0 2 mg  0 2 mg Intravenous Q4H PRN    HYDROmorphone (DILAUDID) injection 0 2 mg  0 2 mg Intravenous Q4H PRN    HYDROmorphone (DILAUDID) injection 0 5 mg  0 5 mg Intravenous Q4H PRN    insulin lispro (HumaLOG) 100 units/mL subcutaneous injection 1-5 Units  1-5 Units Subcutaneous Q6H [address]    lactated ringers infusion  100 mL/hr Intravenous Continuous    multivitamin-minerals (CENTRUM) tablet 1 tablet  1 tablet Oral Daily    potassium chloride (K-DUR,KLOR-CON) CR tablet 40 mEq  40 mEq Oral Q4H    sodium chloride 0 9 % bolus 1,000 mL  1,000 mL Intravenous Once    thiamine tablet 100 mg  100 mg Oral Daily    [START ON 9/11/2021] warfarin (COUMADIN) tablet 6 mg  6 mg Oral Once per day on Sun Tue Wed Thu Sat    warfarin (COUMADIN) tablet 7 5 mg  7 5 mg Oral Once per day on Mon Fri       Allergies   Allergen Reactions    Bactrim [Sulfamethoxazole-Trimethoprim] Other (See Comments)     Patient is unaware of why this allergy is recorded, denies knowledge of it    Penicillins Hives     Patient is unaware of why this allergy is recorded, denies knowledge of it    Tramadol GI Intolerance and Vomiting     Could not function - felt very ill            Objective     Blood pressure 120/66, pulse 76, temperature 97 9 °F (36 6 °C), temperature source Oral, resp  rate 16, SpO2 99 %        Intake/Output Summary (Last 24 hours) at 9/10/2021 0850  Last data filed at 9/9/2021 2132  Gross per 24 hour   Intake 1000 ml   Output --   Net 1000 ml         PHYSICAL EXAM     General Appearance:   Alert, cooperative, no distress, appears stated age    HEENT:   Normocephalic, atraumatic, anicteric      Neck:  Supple, symmetrical, trachea midline, no adenopathy;    thyroid: no enlargement/tenderness/nodules; no carotid  bruit or JVD    Lungs:   Clear to auscultation bilaterally; no rales, rhonchi or wheezing; respirations unlabored    Heart[de-identified]   S1 and S2 normal; regular rate and rhythm; no murmur, rub, or gallop     Abdomen:   Soft, morbid obesity/body habitus limits examination but abdomen is moderately tender in the left upper quadrant and right upper quadrant with no guarding ; lower abdomen is non-tender, non-distended; normal bowel sounds; no masses, no organomegaly    Genitalia:   Deferred    Rectal:   Deferred    Extremities:  No cyanosis, clubbing or edema    Pulses:  2+ and symmetric all extremities    Skin:  Skin color, texture, turgor normal, no rashes or lesions    Lymph nodes:  No palpable cervical, axillary or inguinal lymphadenopathy        Lab Results:   Admission on 09/09/2021   Component Date Value    WBC 09/09/2021 3 14*    RBC 09/09/2021 4 40     Hemoglobin 09/09/2021 13 7     Hematocrit 09/09/2021 43 2     MCV 09/09/2021 98     MCH 09/09/2021 31 1     MCHC 09/09/2021 31 7     RDW 09/09/2021 13 2     MPV 09/09/2021 10 2     Platelets 21/72/1813 132*    nRBC 09/09/2021 0     Neutrophils Relative 09/09/2021 67     Immat GRANS % 09/09/2021 0     Lymphocytes Relative 09/09/2021 23     Monocytes Relative 09/09/2021 7     Eosinophils Relative 09/09/2021 3     Basophils Relative 09/09/2021 0     Neutrophils Absolute 09/09/2021 2 09     Immature Grans Absolute 09/09/2021 0 01     Lymphocytes Absolute 09/09/2021 0 72     Monocytes Absolute 09/09/2021 0 23     Eosinophils Absolute 09/09/2021 0 08     Basophils Absolute 09/09/2021 0 01     Sodium 09/09/2021 135*    Potassium 09/09/2021 4 5     Chloride 09/09/2021 105     CO2 09/09/2021 22     ANION GAP 09/09/2021 8     BUN 09/09/2021 17     Creatinine 09/09/2021 0 54*    Glucose 09/09/2021 223*    Calcium 09/09/2021 8 2*    Corrected Calcium 09/09/2021 9 1     AST 09/09/2021 61*    ALT 09/09/2021 32     Alkaline Phosphatase 09/09/2021 103     Total Protein 09/09/2021 7 4     Albumin 09/09/2021 2 9*    Total Bilirubin 09/09/2021 0 54     eGFR 09/09/2021 104     Lipase 09/09/2021 1,010*    Protime 09/09/2021 20 3*    INR 09/09/2021 1 76*    Ethanol Lvl 09/10/2021 <3     Cholesterol 09/09/2021 172     Triglycerides 09/09/2021 260*    HDL, Direct 09/09/2021 42     LDL Calculated 09/09/2021 78     POC Glucose 09/09/2021 120     PTT 09/10/2021 46*    WBC 09/10/2021 4 08*    RBC 09/10/2021 4 10     Hemoglobin 09/10/2021 12 9     Hematocrit 09/10/2021 39 9     MCV 09/10/2021 97     MCH 09/10/2021 31 5     MCHC 09/10/2021 32 3     RDW 09/10/2021 13 1     Platelets 12/74/0418 122*    MPV 09/10/2021 10 2     Protime 09/10/2021 21 8*    INR 09/10/2021 1 93*    Sodium 09/10/2021 144     Potassium 09/10/2021 3 0*    Chloride 09/10/2021 109*    CO2 09/10/2021 26     ANION GAP 09/10/2021 9     BUN 09/10/2021 15     Creatinine 09/10/2021 0 62     Glucose 09/10/2021 124     Calcium 09/10/2021 8 1*    eGFR 09/10/2021 99     WBC 09/10/2021 3 24*    RBC 09/10/2021 4 05     Hemoglobin 09/10/2021 12 7     Hematocrit 09/10/2021 39 5     MCV 09/10/2021 98     MCH 09/10/2021 31 4     MCHC 09/10/2021 32 2     RDW 09/10/2021 13 2     Platelets 94/42/7197 110*    MPV 09/10/2021 10 0     POC Glucose 09/10/2021 124     POC Glucose 09/10/2021 121        Imaging Studies: I have personally reviewed pertinent reports  CT ABDOMEN AND PELVIS WITH IV CONTRAST     INDICATION:   upper abdominal pain, diarrhea, elevated lipase      COMPARISON:  6/24/2021     TECHNIQUE:  CT examination of the abdomen and pelvis was performed   Axial, sagittal, and coronal 2D reformatted images were created from the source data and submitted for interpretation      Radiation dose length product (DLP) for this visit:  [phone] mGy-cm   This examination, like all CT scans performed in the [facility], was performed utilizing techniques to minimize radiation dose exposure, including the use of iterative   reconstruction and automated exposure control      IV Contrast:  100 mL of iohexol (OMNIPAQUE)  Enteric Contrast:  Enteric contrast was not administered      FINDINGS:     ABDOMEN     LOWER CHEST:  No clinically significant abnormality identified in the visualized lower chest      LIVER/BILIARY TREE:  Liver is diffusely decreased in density consistent with fatty change  No CT evidence of suspicious hepatic mass  Liver contour appears mildly lobulated suggesting early cirrhosis  No biliary dilatation      GALLBLADDER:  Gallbladder is surgically absent      SPLEEN:  Unremarkable      PANCREAS:  Unremarkable      ADRENAL GLANDS:  Unremarkable      KIDNEYS/URETERS:  Unremarkable  No hydronephrosis      STOMACH AND BOWEL:  The distal colon is fluid-filled  No wall thickening or stranding      APPENDIX:  No findings to suggest appendicitis      ABDOMINOPELVIC CAVITY:  No ascites  No pneumoperitoneum  No lymphadenopathy      VESSELS:  Unremarkable for patient's age      PELVIS     REPRODUCTIVE ORGANS:  Surgical changes of prior hysterectomy      URINARY BLADDER:  There is a tiny focus of gas in the bladder      ABDOMINAL WALL/INGUINAL REGIONS:  Unremarkable      OSSEOUS STRUCTURES:  No acute fracture or destructive osseous lesion      IMPRESSION:     1  No acute inflammatory process in the abdomen or pelvis       2  Fluid-filled distal colon  Correlation for diarrhea illness recommended      3  Hepatic steatosis with lobulated liver contour suggesting early cirrhosis      4   Tiny focus of gas in the urinary bladder  Correlation for recent instrumentation or UTI recommended  ASSESSMENT/PLAN:     1    Liver cirrhosis with positive hepatitis C antibody  Likely related to chronic hepatitis C and history of alcohol use, though should exclude other comorbid etiologies  She does have history of heart disease although echocardiogram in February did not show obvious evidence of right-sided heart failure  MELD score confounded by Coumadin anticoagulation therapy  AFP unknown, but no evidence of HCC on contrast enhanced CT scan  Does require variceal surveillance  - monitor MELD labs  - check right upper quadrant ultrasound  - check AFP level  - advised patient regarding the importance of alcohol cessation  - plan for EGD at the same time as colonoscopy, see below  - I did discuss risks and benefits of EGD and colonoscopy with patient at this time, risks including but not limited to infection, perforation and bleeding  - regular office follow-up after discharge  - will check hepatitis C viral load by PCR, and genotyping  -will also check serologies for autoimmune hepatitis, check alpha-1 antitrypsin level, ceruloplasmin level, iron panel; assess for other superimposed etiologies of cirrhosis      2    Subacute/chronic diarrhea approx 3 months duration per patient; stool C diff was negative, consider microscopic colitis, inflammatory bowel disease, bile acid diarrhea, celiac disease, IBS    - check celiac disease antibody profile, stool calprotectin, Giardia antigen  - may continue Bentyl and cholestyramine for now, may discontinue cholestyramine if patient has persistent complaint of bloating however  - will eventually require colonoscopy for further evaluation, after she can be adequately prepped; will also need to hold Coumadin and most likely need to bridge given her history of mechanical aortic valve placement  - plan for colonoscopy at the same time as EGD, patient can be prepped and bridged for her anticoagulation over the weekend for procedure early next week, I discussed with Dr Tom Linda ([facility])      3   Elevated lipase; does not appear to specifically represent pancreatitis as this is below 3 times the upper limit of normal and there was no evidence of pancreatitis on her CT scan    - follow-up on right upper quadrant ultrasound  - can advance to clear liquid diet and gradually advance as tolerated, can recheck lipase tomorrow morning  - again, advised about importance of alcohol avoidance  - will advance to clear liquid diet, gradually advanced as tolerated (although she will need to be on clear liquid diet for the entire day prior to EGD/colonoscopy)      The patient was seen and examined by Dr Robert Parekh, all bowen medical decisions were made with Dr Robert Parekh  Thank you for allowing us to participate in the care of this pleasant patient  We will follow up with you closely

## 2021-09-10 NOTE — ASSESSMENT & PLAN NOTE
· Chronic  Unclear etiology per chart review  · Present on admission, WBC 3 14; currently at baseline

## 2021-09-10 NOTE — ASSESSMENT & PLAN NOTE
· Presentation:  Patient reports ongoing, progressively worsening diarrhea for the past 3 months  Patient reports multiple, watery diarrheal episodes daily  Patient reports several days ago she had nothing but blood for 3 episodes  She reports during those episodes bright, red blood  Patient denies recent antibiotic use or recent travel  She last had stool studies performed as an outpatient on 08/11/2021  Patient has ongoing workup with GI as an outpatient  Of note, patient has a colonoscopy scheduled for 10/27/2021 with Dr Keila Moreira   · f/u C diff, stool enteric bacterial panel which patient has had negative previously  · IV hydration  Monitor volume status closely in the setting of G2DD  · Nursing to complete stool record at bedside  · Monitor electrolytes closely  · Continue Bentyl and Florastor    · Hold all anti diarrheal is a until infectious diarrhea ruled out  · Due for colonoscopy in October   · Can likely follow up outpatient for this if workup negative

## 2021-09-10 NOTE — ASSESSMENT & PLAN NOTE
· Present on admission, INR of 1 76 on admission  · Patient is on 6 mg of warfarin every day except Monday and Friday for which she takes 7 5 mg   · Will give extra dose of 7 5 mg warfarin tonight, and tomorrow to increased INR to therapeutic at 2 5-3 5  · Repeat PT/INR daily

## 2021-09-10 NOTE — UTILIZATION REVIEW
Inpatient Admission Authorization Request   NOTIFICATION OF INPATIENT ADMISSION/INPATIENT AUTHORIZATION REQUEST   SERVICING FACILITY:   [address]  Tax ID: [id number]  NPI: [id number]  Place of Service: Inpatient [facility]  [address]  Place of Service Code: 24     ATTENDING PROVIDER:  Attending Name and NPI#: Lindsay Espinal Md [[id number]]  Address: [address]  Phone: [phone]     UTILIZATION REVIEW CONTACT:  [facility] Utilization   Network Utilization Review Department  Phone: [phone]  Fax: [phone]  Email: [email or web address]     PHYSICIAN ADVISORY SERVICES:  FOR [email or web address] REVIEW - MEDICAL NECESSITY DENIAL  Phone: [phone]  Fax: [phone]  Email: [email or web address]     TYPE OF REQUEST:  Inpatient Status     ADMISSION INFORMATION:  ADMISSION DATE/TIME: 9/9/21  8:59 PM  PATIENT DIAGNOSIS CODE/DESCRIPTION:  Diarrhea [R19 7]  Pancreatitis [K85 90]  Abdominal pain [R10 9]  Diarrhea, unspecified type [R19 7]  DISCHARGE DATE/TIME: No discharge date for patient encounter  DISCHARGE DISPOSITION (IF DISCHARGED): Home/Self Care     IMPORTANT INFORMATION:  Please contact the [facility] directly with any questions or concerns regarding this request  Department voicemails are confidential     Send requests for admission clinical reviews, concurrent reviews, approvals, and administrative denials due to lack of clinical to fax [phone]

## 2021-09-10 NOTE — ASSESSMENT & PLAN NOTE
· History of aortic valve replacement  · Warfarin 6 mg daily except warfarin 7 5 mg on Mondays  · INR subtherapeutic on admission and was started on IV heparin drip    Continue to monitor INR daily  · Continue heparin and hold warfarin until EGD and colonoscopy

## 2021-09-10 NOTE — ASSESSMENT & PLAN NOTE
· Noted with cirrhosis seen on CT scan  · Patient states that she drink upwards of 10 beers daily back in her youth  · She states that she still drinks now, however this is intermittent  · States that she recently drink about 3 beers 2-3 days ago  · [facility] protocol    · Ethanol negative in the emergency department

## 2021-09-10 NOTE — UTILIZATION REVIEW
Initial Clinical Review    Admission: Date/Time/Statement:   Admission Orders (From admission, onward)     Ordered        09/09/21 2059  Inpatient Admission  Once                   Orders Placed This Encounter   Procedures    Inpatient Admission     Standing Status:   Standing     Number of Occurrences:   1     Order Specific Question:   Level of Care     Answer:   Med Surg [16]     Order Specific Question:   Estimated length of stay     Answer:   More than 2 Midnights     Order Specific Question:   Certification     Answer:   I certify that inpatient services are medically necessary for this patient for a duration of greater than two midnights  See H&P and MD Progress Notes for additional information about the patient's course of treatment  ED Arrival Information     Expected Arrival Acuity    - 9/9/2021 14:45 Urgent         Means of arrival Escorted by Service Admission type    Wheelchair Family Member Hospitalist -         Arrival complaint    Diarrhea x 3 months        Chief Complaint   Patient presents with    Diarrhea     pt c/o diarrhea for 3 months, progressively getting worse and noticed "the other day it was nothing but blood" recent c-diff test was negative, pt also states "I stand up and it just comes out"       Initial Presentation: 62 yo fem w/hx cirrhosis, etoh abuse, uterine ca, aortic valve replacement, CHF, smoker, hep c, obesity, fibromyalgia to ED from home admitted as inpatient due to severe diarrhea  Presented with 4 months of diarrhea, watery, intermittently bloody with negative workup thus far  Was due to have colono in October, but diarrhea is now copious and can't wait that long  C/o RUQ abd pain radiating into the back  No etoh in prior 3 days  Exam w/epigastric and RUQ tenderness, decreased breath sounds  Lipase>1000  CT a&p w/early cirrhosis, diarrheal illness  IVF, analgesics, NPO, stool cultures, serial labs in progress, hold antidiarrheals until infection ruled out   GI consulted  Date: 9/10   Day 2: not feeling well today  Ongoing diarrhea and upper abdominal lpain with nausea  Hasn't tried to eat  abd is tender  Remains on IVF with analgesics, antiemetics in progress  GI following, planning for GI scopes  Per GI: elevated lipase does not specifically represent pancreatitis  CT shows no evidence and lipase is <3x ULN  consider microscopic colitis, IBD, bile acid diarrhea, celiac, or IBS  abd moderately tender LUQ, RUQ  Check RUQ US, afp, will do EGD and colonoscopy  Autoimmune, stool, and celiac workup ordered  Continue bentyl and cholestyramine fo rnow, but if had bloating then dc  Hold anticoag, might need bridge due to AVR  Will be prepped over the weekend and perform the studies next week  Keep on clears, advance as tolerated       ED Triage Vitals   Temperature Pulse Respirations Blood Pressure SpO2   09/09/21 1459 09/09/21 1459 09/09/21 1459 09/09/21 1459 09/09/21 1459   98 3 °F (36 8 °C) 80 16 146/72 95 %      Temp Source Heart Rate Source Patient Position - Orthostatic VS BP Location FiO2 (%)   09/09/21 1459 09/09/21 1459 09/09/21 1459 09/09/21 1459 --   Oral Monitor Sitting Left arm       Pain Score       09/09/21 1909       No Pain          Wt Readings from Last 1 Encounters:   08/11/21 (!) 158 kg (348 lb)     Additional Vital Signs:   Date/Time  Temp  Pulse  Resp  BP  MAP (mmHg)  SpO2  O2 Device  Patient Position - Orthostatic VS   09/10/21 1400  98 6 °F (37 °C)  78  18  135/66  93  92 %  None (Room air)  Lying   09/10/21 1330  --  78  --  --  --  --  --  --   09/10/21 1300  --  80  --  --  --  --  --  --   09/10/21 0956  --  73  16  116/57  87  94 %  None (Room air)  Lying   09/10/21 0744  97 9 °F (36 6 °C)  76  16  120/66  87  99 %  None (Room air)  Lying   09/10/21 0115  --  80  18  115/64  84  99 %  None (Room air)  --   09/09/21 2343  --  79  --  139/66  --  --  --  --   09/09/21 2006  --  74  16  131/63  --  97 %  None (Room air)  Lying     [email or web address] 0    Pertinent Labs/Diagnostic Test Results:        US right upper quadrant   Final Result by Ted Waller MD (09/10 1329)      Cirrhotic liver and moderate hepatomegaly  Status post cholecystectomy  No evidence of biliary ductal dilatation  Workstation performed: [id number]         CT abdomen pelvis with contrast   Final Result by Nu Clark MD (09/09 2015)      1  No acute inflammatory process in the abdomen or pelvis  2   Fluid-filled distal colon  Correlation for diarrhea illness recommended  3   Hepatic steatosis with lobulated liver contour suggesting early cirrhosis  4   Tiny focus of gas in the urinary bladder  Correlation for recent instrumentation or UTI recommended              Workstation performed: [id number]             Results from last 7 days   Lab Units 09/10/21  0708 09/10/21  0014 09/09/21  1501   WBC Thousand/uL 3 24* 4 08* 3 14*   HEMOGLOBIN g/dL 12 7 12 9 13 7   HEMATOCRIT % 39 5 39 9 43 2   PLATELETS Thousands/uL 110* 122* 132*   NEUTROS ABS Thousands/µL  --   --  2 09         Results from last 7 days   Lab Units 09/10/21  0708 09/09/21  1501   SODIUM mmol/L 144 135*   POTASSIUM mmol/L 3 0* 4 5   CHLORIDE mmol/L 109* 105   CO2 mmol/L 26 22   ANION GAP mmol/L 9 8   BUN mg/dL 15 17   CREATININE mg/dL 0 62 0 54*   EGFR ml/min/1 73sq m 99 104   CALCIUM mg/dL 8 1* 8 2*     Results from last 7 days   Lab Units 09/09/21  1501   AST U/L 61*   ALT U/L 32   ALK PHOS U/L 103   TOTAL PROTEIN g/dL 7 4   ALBUMIN g/dL 2 9*   TOTAL BILIRUBIN mg/dL 0 54     Results from last 7 days   Lab Units 09/10/21  1236 09/10/21  0729 09/10/21  0706 09/09/21  2345   POC GLUCOSE mg/dl 105 121 124 120     Results from last 7 days   Lab Units 09/10/21  0708 09/09/21  1501   GLUCOSE RANDOM mg/dL 124 223*     Results from last 7 days   Lab Units 09/10/21  0708 09/10/21  0010 09/09/21  1501   PROTIME seconds  --  21 8* 20 3*   INR   --  1 93* 1 76*   PTT seconds >210* 46*  --        Results from last 7 days   Lab Units 09/09/21  1501   LIPASE u/L 1,010*       Results from last 7 days   Lab Units 09/10/21  0014   ETHANOL LVL mg/dL <3         ED Treatment:   Medication Administration from 09/09/2021 1445 to 09/10/2021 1453       Date/Time Order Dose Route Action     09/09/2021 1821 sodium chloride 0 9 % bolus 1,000 mL 1,000 mL Intravenous New Bag     09/09/2021 1821 ketorolac (TORADOL) injection 15 mg 15 mg Intravenous Given     09/09/2021 1953 iohexol (OMNIPAQUE) 350 MG/ML injection (SINGLE-DOSE) 100 mL 100 mL Intravenous Given     09/09/2021 2200 lactated ringers infusion 100 mL/hr Intravenous New Bag     09/09/2021 2200 HYDROmorphone (DILAUDID) injection 0 5 mg 0 5 mg Intravenous Given     09/10/2021 1016 DULoxetine (CYMBALTA) delayed release capsule 30 mg 30 mg Oral Given     09/10/2021 1017 dicyclomine (BENTYL) tablet 20 mg 20 mg Oral Given     09/10/2021 1016 thiamine tablet 100 mg 100 mg Oral Given     50/12/4266 1929 folic acid (FOLVITE) tablet 1 mg 1 mg Oral Given     09/10/2021 1018 multivitamin-minerals (CENTRUM) tablet 1 tablet 1 tablet Oral Given     09/10/2021 0058 heparin (porcine) injection 10,000 Units 10,000 Units Intravenous Given     09/10/2021 1358 heparin (porcine) 25,000 units in 0 45% NaCl 250 mL infusion (premix) 15 Units/kg/hr Intravenous New Bag     09/10/2021 1014 heparin (porcine) 25,000 units in 0 45% NaCl 250 mL infusion (premix) 15 Units/kg/hr Intravenous Restarted     09/10/2021 0057 heparin (porcine) 25,000 units in 0 45% NaCl 250 mL infusion (premix) 18 Units/kg/hr Intravenous New Bag     09/10/2021 1356 potassium chloride (K-DUR,KLOR-CON) CR tablet 40 mEq 40 mEq Oral Given     09/10/2021 1018 potassium chloride (K-DUR,KLOR-CON) CR tablet 40 mEq 40 mEq Oral Given        Past Medical History:   Diagnosis Date    Anemia     Anxiety     Aortic valve disorder     Back pain     Chronic pain syndrome     Cirrhosis (HCC)     Constipation     Degenerative arthritis of thoracic spine     Degenerative joint disease involving multiple joints     Depression     DVT (deep venous thrombosis) (HCC)     Bilateral    Edema     Endometrial adenocarcinoma (HCC)     Fibromyalgia     Ganglion of right wrist     GERD (gastroesophageal reflux disease)     Heart murmur     Hematoma     History of mechanical aortic valve replacement     Hypothyroidism (acquired)     Laboratory confirmed diagnosis of COVID-19 03/16/2021    Low blood pressure     Malignant neoplasm of corpus uteri, except isthmus (HCC)     Mixed hyperlipidemia     Morbid obesity (HCC)     Obstructive sleep apnea syndrome     Pulmonary embolism (HCC)     Tobacco dependence syndrome     Transient cerebral ischemia     Urinary incontinence     Viral hepatitis C     Vitamin D deficiency      Present on Admission:   Hyperlipidemia   Bipolar depression ([facility] )   Heart failure with preserved ejection fraction, borderline, class III (HCC)   Diarrhea   Hepatitis C antibody test positive      Admitting Diagnosis: Diarrhea [R19 7]  Age/Sex: 63 y o  female  Admission Orders:  Scheduled Medications: SEE ED TX SECTION FOR MEDS GIVEN  cholestyramine sugar free, 4 g, Oral, BID  dicyclomine, 20 mg, Oral, BID  DULoxetine, 30 mg, Oral, Daily  folic acid, 1 mg, Oral, Daily  insulin lispro, 1-5 Units, Subcutaneous, Q6H [address]  multivitamin-minerals, 1 tablet, Oral, Daily  sodium chloride, 1,000 mL, Intravenous, Once  thiamine, 100 mg, Oral, Daily    Continuous IV Infusions:  heparin (porcine), 3-30 Units/kg/hr (Order-Specific), Intravenous, Titrated  lactated ringers, 100 mL/hr, Intravenous, Continuous      PRN Meds:  acetaminophen, 650 mg, Oral, Q6H PRN  heparin (porcine), 10,000 Units, Intravenous, Q1H PRN  heparin (porcine), 5,000 Units, Intravenous, Q1H PRN  HYDROmorphone, 0 2 mg, Intravenous, Q4H PRN  HYDROmorphone, 0 2 mg, Intravenous, Q4H PRN  HYDROmorphone, 0 5 mg, Intravenous, Q4H PRN    SCD  CIWA monitoring  Clear liquids    IP CONSULT TO GASTROENTEROLOGY    Network Utilization Review Department  ATTENTION: Please call with any questions or concerns to [phone] and carefully listen to the prompts so that you are directed to the right person  All voicemails are confidential   Julio Pedro all requests for admission clinical reviews, approved or denied determinations and any other requests to dedicated fax number below belonging to the campus where the patient is receiving treatment   List of dedicated fax numbers for the Facilities:  [address] DENIALS (Administrative/Medical Necessity) [phone]   [address] (Maternity/NICU/Pediatrics) [phone]   [address] [place] [address] [address] [place] [address] [facility] [place] [address] [id number] [place] [address] [address] [facility] [place] [address] [phone]

## 2021-09-10 NOTE — PLAN OF CARE
Problem: Potential for Falls  Goal: Patient will remain free of falls  Description: INTERVENTIONS:  - Educate patient/family on patient safety including physical limitations  - Instruct patient to call for assistance with activity   - Consult OT/PT to assist with strengthening/mobility   - Keep Call bell within reach  - Keep bed low and locked with side rails adjusted as appropriate  - Keep care items and personal belongings within reach  - Initiate and maintain comfort rounds  - Make Fall Risk Sign visible to staff  - Offer Toileting every 2 Hours, in advance of need  - Initiate/Maintain alarm  - Obtain necessary fall risk management equipment: tabs  - Apply yellow socks and bracelet for high fall risk patients  - Consider moving patient to room near nurses station  Outcome: Progressing

## 2021-09-10 NOTE — ASSESSMENT & PLAN NOTE
· She has not been taking her home medications been off on for quite some time    · Will hold off on restarting all of her anti psych medications at this time

## 2021-09-10 NOTE — PROGRESS NOTES
[facility]  Progress Note - Narda Blanc 1960, 63 y o  female MRN: [id number]  Unit/Bed#: ED 25 Encounter: [id number]  Primary Care Provider: Logan Childers MD   Date and time admitted to hospital: 9/9/2021  4:25 PM    * Pain of upper abdomen  Assessment & Plan  · Present on admission, lipase of 1010  · CT abdomen pelvis: "No acute inflammatory process in the abdomen or pelvis  Fluid-filled distal colon  Correlation for diarrhea illness recommended  Hepatic steatosis with lobulated liver contour suggesting early cirrhosis  Tiny focus of gas in the urinary bladder "  · Etiology:  Unclear, normal-appearing pancreas on imaging  · Triglycerides slightly elevated to 260  · IV hydration  · Pain control  · Clear liquid diet  · Case was discussed with Gastroenterology-continue supportive care and plan for EGD and colonoscopy    Alcohol abuse  Assessment & Plan  · Noted with cirrhosis seen on CT scan  · Patient states that she drink upwards of 10 beers daily back in her youth  · She states that she still drinks now, however this is intermittent  · States that she recently drink about 3 beers 2-3 days ago  · CIWA protocol  · Ethanol negative in the emergency department    Diarrhea  Assessment & Plan  · Presentation:  Patient reports ongoing, progressively worsening diarrhea for the past 3 months  Patient reports multiple, watery diarrheal episodes daily  Patient reports several days ago she had nothing but blood for 3 episodes  She reports during those episodes bright, red blood  Patient denies recent antibiotic use or recent travel  She last had stool studies performed as an outpatient on 08/11/2021  Patient has ongoing workup with GI as an outpatient  Of note, patient has a colonoscopy scheduled for 10/27/2021 with Dr Scotty Faustin   · f/u C diff, stool enteric bacterial panel which patient has had negative previously  · IV hydration    Monitor volume status closely in the setting of G2DD  · Nursing to complete stool record at bedside  · Monitor electrolytes closely  · Continue Bentyl and Florastor  · Hold all anti diarrheal is a until infectious diarrhea ruled out  · Due for colonoscopy in October -gastroenterology consulted, plan for EGD colonoscopy while inpatient    Heart failure with preserved ejection fraction, borderline, class III ([facility] )  Assessment & Plan  Wt Readings from Last 3 Encounters:   08/11/21 (!) 158 kg (348 lb)   07/28/21 (!) 158 kg (348 lb)   07/15/21 (!) 162 kg (357 lb)    Appears compensated at this time   Last echocardiogram from February 6687: "LV systolic function was normal   Ejection fraction was estimated to be 65%  There was no regional wall motion abnormalities  Features were consistent with a pseudonormal left ventricular filling pattern, with concomitant abnormal relaxation increased filling pressure, G2DD  Aortic valve; a mechanical prosthesis was present "   Monitor intake and output, daily weights   Low sodium diet and fluid restriction   Continue home medications   Hold diuretics until p o  Intake improves          Leukopenia  Assessment & Plan  · Chronic  Unclear etiology per chart review  · Present on admission, WBC 3 14; currently at baseline  Morbid obesity with BMI of 50 0-59 9, adult ([facility] )  Assessment & Plan  · Encouraged lifestyle modifications  History of mechanical aortic valve replacement  Assessment & Plan  · History of aortic valve replacement  · Warfarin 6 mg daily except warfarin 7 5 mg on Mondays  · INR subtherapeutic on admission and was started on IV heparin drip  Continue to monitor INR daily  · Continue heparin and hold warfarin until EGD and colonoscopy    Subtherapeutic international normalized ratio (INR)  Assessment & Plan  · Present on admission, INR of 1 76 on admission    · Patient is on 6 mg of warfarin every day except Monday and Friday for which she takes 7 5 mg   ·  INR goal at 2 5-3 5  · Repeat PT/INR daily  Bipolar depression ([facility] [address] )  Assessment & Plan  · She has not been taking her home medications been off on for quite some time  · Will hold off on restarting all of her anti psych medications at this time    Hyperlipidemia  Assessment & Plan  · Patient previously on Lipitor 40 mg daily  Patient reports not taking statin since July  Hepatitis C antibody test positive  Assessment & Plan  · History of viral Hepatitis C       VTE Pharmacologic Prophylaxis: VTE Score: 4 Moderate Risk (Score 3-4) - Pharmacological DVT Prophylaxis Ordered: heparin drip  Patient Centered Rounds: I performed bedside rounds with nursing staff today  Discussions with Specialists or Other Care Team Provider gastroenterology    Education and Discussions with Family / Patient: Patient declined call to   Time Spent for Care: 30 minutes  More than 50% of total time spent on counseling and coordination of care as described above  Current Length of Stay: 1 day(s)  Current Patient Status: Inpatient   Certification Statement: The patient will continue to require additional inpatient hospital stay due to Diarrhea and abdominal pain  Discharge Plan: Anticipate discharge in 48-72 hrs to home  Code Status: Level 1 - Full Code    Subjective:   Patient not feeling well today  She reports ongoing diarrhea and upper abdominal pain  Feels nauseous  Has not tried to eat anything yet  Objective:     Vitals:   Temp (24hrs), Av 1 °F (36 7 °C), Min:97 9 °F (36 6 °C), Max:98 3 °F (36 8 °C)    Temp:  [97 9 °F (36 6 °C)-98 3 °F (36 8 °C)] 97 9 °F (36 6 °C)  HR:  [73-80] 73  Resp:  [16-18] 16  BP: (115-146)/(57-72) 116/57  SpO2:  [94 %-99 %] 94 %  There is no height or weight on file to calculate BMI  Input and Output Summary (last 24 hours):      Intake/Output Summary (Last 24 hours) at 9/10/2021 1054  Last data filed at 2021 2132  Gross per 24 hour   Intake 1000 ml   Output --   Net 1000 ml       Physical Exam:       Gen -Patient comfortable at rest  Morbidly obese  Neck- Supple  No thyromegaly or lymphadenopathy  Lungs-Clear bilaterally without any wheeze or rales   Heart S1-S2, regular rate and rhythm, no murmurs  Abdomen-upper abdominal tenderness without any rebound or guarding    Extremities-no cyanosi,  clubbing or edema  Skin- no rash  Neuro-nonfocal     Additional Data:     Labs:  Results from last 7 days   Lab Units 09/10/21  0708 09/09/21  1501   WBC Thousand/uL 3 24* 3 14*   HEMOGLOBIN g/dL 12 7 13 7   HEMATOCRIT % 39 5 43 2   PLATELETS Thousands/uL 110* 132*   NEUTROS PCT %  --  67   LYMPHS PCT %  --  23   MONOS PCT %  --  7   EOS PCT %  --  3     Results from last 7 days   Lab Units 09/10/21  0708 09/09/21  1501   SODIUM mmol/L 144 135*   POTASSIUM mmol/L 3 0* 4 5   CHLORIDE mmol/L 109* 105   CO2 mmol/L 26 22   BUN mg/dL 15 17   CREATININE mg/dL 0 62 0 54*   ANION GAP mmol/L 9 8   CALCIUM mg/dL 8 1* 8 2*   ALBUMIN g/dL  --  2 9*   TOTAL BILIRUBIN mg/dL  --  0 54   ALK PHOS U/L  --  103   ALT U/L  --  32   AST U/L  --  61*   GLUCOSE RANDOM mg/dL 124 223*     Results from last 7 days   Lab Units 09/10/21  0010   INR  1 93*     Results from last 7 days   Lab Units 09/10/21  0729 09/10/21  0706 09/09/21  2345   POC GLUCOSE mg/dl 121 124 120               Lines/Drains:  Invasive Devices     Peripheral Intravenous Line            Peripheral IV 09/09/21 Right Antecubital <1 day                      Imaging: Reviewed radiology reports from this admission including: abdominal/pelvic CT and CT head    Recent Cultures (last 7 days):         Last 24 Hours Medication List:   Current Facility-Administered Medications   Medication Dose Route Frequency Provider Last Rate    acetaminophen  650 mg Oral Q6H PRN Lisandro Roberts PA-C      cholestyramine sugar free  4 g Oral BID Abdulaziz Bray PA-C      dicyclomine  20 mg Oral BID Abdulaziz Bray PA-C      DULoxetine  30 mg Oral Daily Abdulaziz VISHAL Bray      folic acid  1 mg Oral Daily Abdulaziz Bray PA-C      heparin (porcine)  3-30 Units/kg/hr (Order-Specific) Intravenous Titrated NIYAH Barbosa-JANIS 15 Units/kg/hr (09/10/21 1014)    heparin (porcine)  10,000 Units Intravenous Q1H PRN NIYAH Barbosa-JANIS      heparin (porcine)  5,000 Units Intravenous Q1H PRN Doralee Stager, PA-C      HYDROmorphone  0 2 mg Intravenous Q4H PRN Doralee Stager, PA-C      HYDROmorphone  0 2 mg Intravenous Q4H PRN Doralee Stager, PA-C      HYDROmorphone  0 5 mg Intravenous Q4H PRN Abdulaziz Bray PA-C      insulin lispro  1-5 Units Subcutaneous Q6H [facility] Abdulaziz Bray PA-C      lactated ringers  100 mL/hr Intravenous Continuous Abdulaziz Bray PA-C 100 mL/hr (09/09/21 2200)    multivitamin-minerals  1 tablet Oral Daily Abdulaziz Bray PA-C      potassium chloride  40 mEq Oral Q4H Dheeraj Morin MD      sodium chloride  1,000 mL Intravenous Once Doraleochoa Loya PA-C Stopped (09/09/21 2101)    thiamine  100 mg Oral Daily Abdulaziz Bray PA-C          Today, Patient Was Seen By: Susan Guzmán MD    **Please Note: This note may have been constructed using a voice recognition system  **

## 2021-09-11 LAB
ALBUMIN SERPL BCP-MCNC: 2.8 G/DL (ref 3.5–5)
ALP SERPL-CCNC: 95 U/L (ref 46–116)
ALT SERPL W P-5'-P-CCNC: 31 U/L (ref 12–78)
ANION GAP SERPL CALCULATED.3IONS-SCNC: 6 MMOL/L (ref 4–13)
APTT PPP: 126 SECONDS (ref 23–37)
APTT PPP: 53 SECONDS (ref 23–37)
APTT PPP: 67 SECONDS (ref 23–37)
AST SERPL W P-5'-P-CCNC: 46 U/L (ref 5–45)
BILIRUB SERPL-MCNC: 0.95 MG/DL (ref 0.2–1)
BUN SERPL-MCNC: 14 MG/DL (ref 5–25)
C DIFF TOX B TCDB STL QL NAA+PROBE: NEGATIVE
CALCIUM ALBUM COR SERPL-MCNC: 9.2 MG/DL (ref 8.3–10.1)
CALCIUM SERPL-MCNC: 8.2 MG/DL (ref 8.3–10.1)
CAMPYLOBACTER DNA SPEC NAA+PROBE: NORMAL
CHLORIDE SERPL-SCNC: 106 MMOL/L (ref 100–108)
CO2 SERPL-SCNC: 30 MMOL/L (ref 21–32)
CREAT SERPL-MCNC: 0.5 MG/DL (ref 0.6–1.3)
ERYTHROCYTE [DISTWIDTH] IN BLOOD BY AUTOMATED COUNT: 12.8 % (ref 11.6–15.1)
FERRITIN SERPL-MCNC: 120 NG/ML (ref 8–388)
GFR SERPL CREATININE-BSD FRML MDRD: 106 ML/MIN/1.73SQ M
GLUCOSE SERPL-MCNC: 102 MG/DL (ref 65–140)
GLUCOSE SERPL-MCNC: 103 MG/DL (ref 65–140)
GLUCOSE SERPL-MCNC: 110 MG/DL (ref 65–140)
GLUCOSE SERPL-MCNC: 91 MG/DL (ref 65–140)
GLUCOSE SERPL-MCNC: 92 MG/DL (ref 65–140)
GLUCOSE SERPL-MCNC: 94 MG/DL (ref 65–140)
HBV CORE AB SER QL: ABNORMAL
HBV CORE IGM SER QL: ABNORMAL
HBV SURFACE AG SER QL: ABNORMAL
HCT VFR BLD AUTO: 38.7 % (ref 34.8–46.1)
HCV AB SER QL: ABNORMAL
HGB BLD-MCNC: 12.8 G/DL (ref 11.5–15.4)
INR PPP: 1.56 (ref 0.84–1.19)
IRON SATN MFR SERPL: 69 % (ref 15–50)
IRON SERPL-MCNC: 204 UG/DL (ref 50–170)
MCH RBC QN AUTO: 31.7 PG (ref 26.8–34.3)
MCHC RBC AUTO-ENTMCNC: 33.1 G/DL (ref 31.4–37.4)
MCV RBC AUTO: 96 FL (ref 82–98)
PLATELET # BLD AUTO: 111 THOUSANDS/UL (ref 149–390)
PMV BLD AUTO: 9.8 FL (ref 8.9–12.7)
POTASSIUM SERPL-SCNC: 3.4 MMOL/L (ref 3.5–5.3)
PROT SERPL-MCNC: 6.7 G/DL (ref 6.4–8.2)
PROTHROMBIN TIME: 18.5 SECONDS (ref 11.6–14.5)
RBC # BLD AUTO: 4.04 MILLION/UL (ref 3.81–5.12)
SALMONELLA DNA SPEC QL NAA+PROBE: NORMAL
SHIGA TOXIN STX GENE SPEC NAA+PROBE: NORMAL
SHIGELLA DNA SPEC QL NAA+PROBE: NORMAL
SODIUM SERPL-SCNC: 142 MMOL/L (ref 136–145)
TIBC SERPL-MCNC: 296 UG/DL (ref 250–450)
WBC # BLD AUTO: 2.93 THOUSAND/UL (ref 4.31–10.16)

## 2021-09-11 PROCEDURE — 82948 REAGENT STRIP/BLOOD GLUCOSE: CPT

## 2021-09-11 PROCEDURE — 86704 HEP B CORE ANTIBODY TOTAL: CPT | Performed by: INTERNAL MEDICINE

## 2021-09-11 PROCEDURE — 86803 HEPATITIS C AB TEST: CPT | Performed by: INTERNAL MEDICINE

## 2021-09-11 PROCEDURE — 86038 ANTINUCLEAR ANTIBODIES: CPT | Performed by: INTERNAL MEDICINE

## 2021-09-11 PROCEDURE — 86235 NUCLEAR ANTIGEN ANTIBODY: CPT | Performed by: INTERNAL MEDICINE

## 2021-09-11 PROCEDURE — 83550 IRON BINDING TEST: CPT | Performed by: INTERNAL MEDICINE

## 2021-09-11 PROCEDURE — 83516 IMMUNOASSAY NONANTIBODY: CPT | Performed by: INTERNAL MEDICINE

## 2021-09-11 PROCEDURE — 99232 SBSQ HOSP IP/OBS MODERATE 35: CPT | Performed by: INTERNAL MEDICINE

## 2021-09-11 PROCEDURE — 82390 ASSAY OF CERULOPLASMIN: CPT | Performed by: INTERNAL MEDICINE

## 2021-09-11 PROCEDURE — 86255 FLUORESCENT ANTIBODY SCREEN: CPT | Performed by: INTERNAL MEDICINE

## 2021-09-11 PROCEDURE — 86256 FLUORESCENT ANTIBODY TITER: CPT | Performed by: INTERNAL MEDICINE

## 2021-09-11 PROCEDURE — 87902 NFCT AGT GNTYP ALYS HEP C: CPT | Performed by: INTERNAL MEDICINE

## 2021-09-11 PROCEDURE — 85730 THROMBOPLASTIN TIME PARTIAL: CPT | Performed by: INTERNAL MEDICINE

## 2021-09-11 PROCEDURE — 83540 ASSAY OF IRON: CPT | Performed by: INTERNAL MEDICINE

## 2021-09-11 PROCEDURE — 82784 ASSAY IGA/IGD/IGG/IGM EACH: CPT | Performed by: INTERNAL MEDICINE

## 2021-09-11 PROCEDURE — 82728 ASSAY OF FERRITIN: CPT | Performed by: INTERNAL MEDICINE

## 2021-09-11 PROCEDURE — 82103 ALPHA-1-ANTITRYPSIN TOTAL: CPT | Performed by: INTERNAL MEDICINE

## 2021-09-11 PROCEDURE — 87340 HEPATITIS B SURFACE AG IA: CPT | Performed by: INTERNAL MEDICINE

## 2021-09-11 PROCEDURE — 86705 HEP B CORE ANTIBODY IGM: CPT | Performed by: INTERNAL MEDICINE

## 2021-09-11 PROCEDURE — 80053 COMPREHEN METABOLIC PANEL: CPT | Performed by: INTERNAL MEDICINE

## 2021-09-11 PROCEDURE — 87522 HEPATITIS C REVRS TRNSCRPJ: CPT | Performed by: INTERNAL MEDICINE

## 2021-09-11 PROCEDURE — 85027 COMPLETE CBC AUTOMATED: CPT | Performed by: INTERNAL MEDICINE

## 2021-09-11 PROCEDURE — 85610 PROTHROMBIN TIME: CPT | Performed by: INTERNAL MEDICINE

## 2021-09-11 RX ADMIN — THIAMINE HCL TAB 100 MG 100 MG: 100 TAB at 08:06

## 2021-09-11 RX ADMIN — DICYCLOMINE HYDROCHLORIDE 20 MG: 20 TABLET ORAL at 08:07

## 2021-09-11 RX ADMIN — DICYCLOMINE HYDROCHLORIDE 20 MG: 20 TABLET ORAL at 18:06

## 2021-09-11 RX ADMIN — HYDROMORPHONE HYDROCHLORIDE 0.5 MG: 1 INJECTION, SOLUTION INTRAMUSCULAR; INTRAVENOUS; SUBCUTANEOUS at 20:49

## 2021-09-11 RX ADMIN — HEPARIN SODIUM 5000 UNITS: 1000 INJECTION INTRAVENOUS; SUBCUTANEOUS at 18:07

## 2021-09-11 RX ADMIN — DULOXETINE HYDROCHLORIDE 30 MG: 30 CAPSULE, DELAYED RELEASE ORAL at 08:07

## 2021-09-11 RX ADMIN — ACETAMINOPHEN 650 MG: 325 TABLET, FILM COATED ORAL at 05:35

## 2021-09-11 RX ADMIN — CHOLESTYRAMINE 4 G: 4 POWDER, FOR SUSPENSION ORAL at 08:07

## 2021-09-11 RX ADMIN — HEPARIN SODIUM 10 UNITS/KG/HR: 10000 INJECTION, SOLUTION INTRAVENOUS at 06:38

## 2021-09-11 RX ADMIN — CHOLESTYRAMINE 4 G: 4 POWDER, FOR SUSPENSION ORAL at 18:06

## 2021-09-11 RX ADMIN — FOLIC ACID 1 MG: 1 TABLET ORAL at 08:06

## 2021-09-11 RX ADMIN — MULTIPLE VITAMINS W/ MINERALS TAB 1 TABLET: TAB ORAL at 08:07

## 2021-09-11 NOTE — PROGRESS NOTES
Progress Note - Stefanie Deras 63 y o  female MRN: [id number]    Unit/Bed#: W -01 Encounter: [id number]        Subjective:   Patient reports still having pain in her upper abdomen which is not worse since yesterday, she still denies any postprandial component and says that she does tolerate intake of clear liquids, she just has urgency to defecate with diarrhea immediately after drinking  Denies any fever or chills  Denies any rectal bleeding or melena  Objective:     Vitals: Blood pressure 148/89, pulse 71, temperature 98 2 °F (36 8 °C), temperature source Oral, resp  rate 17, weight (!) 158 kg (349 lb 6 4 oz), SpO2 94 %  ,Body mass index is 56 39 kg/m²  Intake/Output Summary (Last 24 hours) at 9/11/2021 1327  Last data filed at 9/10/2021 2000  Gross per 24 hour   Intake 0 ml   Output --   Net 0 ml       Physical Exam:   General appearance: alert, appears stated age and cooperative  Lungs: clear to auscultation bilaterally, no labored breathing/accessory muscle use  Heart: regular rate and rhythm, S1, S2 normal, no murmur, click, rub or gallop  Abdomen: soft, morbidly obese, mild nonspecific tenderness in the upper abdomen, exam limited by body habitus,  bowel sounds normal; no masses,  no organomegaly  Extremities: no edema    Invasive Devices     Peripheral Intravenous Line            Long-Dwell Peripheral IV (Midline) 26/59/94 Right Cephalic <1 day                Lab, Imaging and other studies: I have personally reviewed pertinent reports      Admission on 09/09/2021   Component Date Value    WBC 09/09/2021 3 14*    RBC 09/09/2021 4 40     Hemoglobin 09/09/2021 13 7     Hematocrit 09/09/2021 43 2     MCV 09/09/2021 98     MCH 09/09/2021 31 1     MCHC 09/09/2021 31 7     RDW 09/09/2021 13 2     MPV 09/09/2021 10 2     Platelets 15/37/5754 132*    nRBC 09/09/2021 0     Neutrophils Relative 09/09/2021 67     Immat GRANS % 09/09/2021 0     Lymphocytes Relative 09/09/2021 23     Monocytes Relative 09/09/2021 7     Eosinophils Relative 09/09/2021 3     Basophils Relative 09/09/2021 0     Neutrophils Absolute 09/09/2021 2 09     Immature Grans Absolute 09/09/2021 0 01     Lymphocytes Absolute 09/09/2021 0 72     Monocytes Absolute 09/09/2021 0 23     Eosinophils Absolute 09/09/2021 0 08     Basophils Absolute 09/09/2021 0 01     Sodium 09/09/2021 135*    Potassium 09/09/2021 4 5     Chloride 09/09/2021 105     CO2 09/09/2021 22     ANION GAP 09/09/2021 8     BUN 09/09/2021 17     Creatinine 09/09/2021 0 54*    Glucose 09/09/2021 223*    Calcium 09/09/2021 8 2*    Corrected Calcium 09/09/2021 9 1     AST 09/09/2021 61*    ALT 09/09/2021 32     Alkaline Phosphatase 09/09/2021 103     Total Protein 09/09/2021 7 4     Albumin 09/09/2021 2 9*    Total Bilirubin 09/09/2021 0 54     eGFR 09/09/2021 104     Lipase 09/09/2021 1,010*    Protime 09/09/2021 20 3*    INR 09/09/2021 1 76*    Ethanol Lvl 09/10/2021 <3     Cholesterol 09/09/2021 172     Triglycerides 09/09/2021 260*    HDL, Direct 09/09/2021 42     LDL Calculated 09/09/2021 78      C difficile toxin by PC* 09/10/2021 Negative     POC Glucose 09/09/2021 120     PTT 09/10/2021 46*    WBC 09/10/2021 4 08*    RBC 09/10/2021 4 10     Hemoglobin 09/10/2021 12 9     Hematocrit 09/10/2021 39 9     MCV 09/10/2021 97     MCH 09/10/2021 31 5     MCHC 09/10/2021 32 3     RDW 09/10/2021 13 1     Platelets 98/56/7967 122*    MPV 09/10/2021 10 2     Protime 09/10/2021 21 8*    INR 09/10/2021 1 93*    Sodium 09/10/2021 144     Potassium 09/10/2021 3 0*    Chloride 09/10/2021 109*    CO2 09/10/2021 26     ANION GAP 09/10/2021 9     BUN 09/10/2021 15     Creatinine 09/10/2021 0 62     Glucose 09/10/2021 124     Calcium 09/10/2021 8 1*    eGFR 09/10/2021 99     WBC 09/10/2021 3 24*    RBC 09/10/2021 4 05     Hemoglobin 09/10/2021 12 7     Hematocrit 09/10/2021 39 5     MCV 09/10/2021 98     [facility] 09/10/2021 31 4     MCHC 09/10/2021 32 2     RDW 09/10/2021 13 2     Platelets 33/78/3773 110*    MPV 09/10/2021 10 0     PTT 09/10/2021 >210*    POC Glucose 09/10/2021 124     POC Glucose 09/10/2021 121     AFP TUMOR MARKER 09/10/2021 3 4     POC Glucose 09/10/2021 105     PTT 09/10/2021 101*    POC Glucose 09/10/2021 98     POC Glucose 09/11/2021 94     WBC 09/11/2021 2 93*    RBC 09/11/2021 4 04     Hemoglobin 09/11/2021 12 8     Hematocrit 09/11/2021 38 7     MCV 09/11/2021 96     MCH 09/11/2021 31 7     MCHC 09/11/2021 33 1     RDW 09/11/2021 12 8     Platelets 34/37/1222 111*    MPV 09/11/2021 9 8     Sodium 09/11/2021 142     Potassium 09/11/2021 3 4*    Chloride 09/11/2021 106     CO2 09/11/2021 30     ANION GAP 09/11/2021 6     BUN 09/11/2021 14     Creatinine 09/11/2021 0 50*    Glucose 09/11/2021 103     Calcium 09/11/2021 8 2*    Corrected Calcium 09/11/2021 9 2     AST 09/11/2021 46*    ALT 09/11/2021 31     Alkaline Phosphatase 09/11/2021 95     Total Protein 09/11/2021 6 7     Albumin 09/11/2021 2 8*    Total Bilirubin 09/11/2021 0 95     eGFR 09/11/2021 106     Ferritin 09/11/2021 120     Iron Saturation 09/11/2021 69*    TIBC 09/11/2021 296     Iron 09/11/2021 204*    PTT 09/11/2021 126*    Protime 09/11/2021 18 5*    INR 09/11/2021 1 56*    Hepatitis B Surface Ag 09/11/2021 Non-reactive     Hepatitis C Ab 09/11/2021 Low Reactive*    Hep B C IgM 09/11/2021 Non-reactive     Hep B Core Total Ab 09/11/2021 Non-reactive     POC Glucose 09/11/2021 110     PTT 09/11/2021 67*    POC Glucose 09/11/2021 91      Results for Alverna August (MRN [id number]) as of 9/11/2021 13:28   Ref   Range 9/11/2021 05:15 9/11/2021 06:25 9/11/2021 09:17 9/11/2021 12:05   POC Glucose Latest Ref Range: 65 - 140 mg/dl  110  91   Protime Latest Ref Range: 11 6 - 14 5 seconds 18 5 (H)      INR Latest Ref Range: 0 84 - 1 19  1 56 (H)      PTT Latest Ref Range: 23 - 37 seconds   67 (H) HEPATITIS B SURFACE ANTIGEN Latest Ref Range: Non-reactive, NonReactive - Confirmed  Non-reactive      HEPATITIS B CORE TOTAL ANTIBODY Latest Ref Range: Non-reactive  Non-reactive      HEPATITIS B CORE IGM ANTIBODY Latest Ref Range: Non-reactive  Non-reactive      HEPATITIS C ANTIBODY Latest Ref Range: Non-reactive  Low Reactive (A)          Assessment/Plan:    1  Subacute/chronic diarrhea, infectious studies still pending but returning negative so far; rule out microscopic colitis, inflammatory bowel disease, could also potentially be functional    - will plan for colonoscopy Monday; patient was advised regarding risks and benefits of the procedures  - will need to be on clear liquid diet tomorrow, prep tomorrow for procedure Monday  - follow-up on remaining stool studies for infectious processes  - may continue with Bentyl and Questran  - follow-up on celiac disease antibody profile      2  Liver cirrhosis felt to be related to history of alcohol use and chronic hepatitis C; patient reports her hepatitis-C was treated with what sounds from her description like peginterferon and ribavirin, but it appears unclear whether she attained SVR  - continue avoidance of alcohol, patient was advised regarding importance of this  - AFP level was noted to be normal  - will assess for esophageal varices via EGD on Monday; again patient was advised regarding risks and benefits of the procedure  - regular office follow-up post discharge  - follow-up on remaining serologic workup to exclude other causes of cirrhosis; most of this is still pending      3   Elevated lipase with epigastric pain, unclear if this truly represents pancreatitis given her unremarkable imaging findings with respect to her pancreas, and relatively modest elevation of her lipase less than 3 times upper limit of normal; epigastric pain could also be related to peptic ulcer disease/gastritis    - check lipase again today, I ordered this to be added on to her labs drawn from this morning  - conservative management of diet, can potentially advance to full liquid or low-fat diet this afternoon if lipase appears decreased and abdominal exam is stable  - continue IV fluids  - continue avoidance of alcohol

## 2021-09-11 NOTE — ASSESSMENT & PLAN NOTE
· Noted with cirrhosis seen on CT scan  · Patient states that she drink upwards of 10 beers daily back in her youth  · She states that she still drinks now, however this is intermittent  · States that she recently drink about 3 beers 2-3 days ago  · [facility] protocol    · Ethanol negative in the emergency department

## 2021-09-11 NOTE — ASSESSMENT & PLAN NOTE
· Ongoing diarrhea  · Follow-up on stool studies  · GI input noted  · GI plans for colonoscopy evaluation on Monday noted  · Supportive care

## 2021-09-11 NOTE — ASSESSMENT & PLAN NOTE
· Present on admission, INR of 1 76 on admission  · Patient is on 6 mg of warfarin every day except Monday and Friday for which she takes 7 5 mg  ·  INR goal at 2 5-3 5   · PT/INR daily

## 2021-09-11 NOTE — ASSESSMENT & PLAN NOTE
· Present on admission, lipase of 1010  · CT abdomen pelvis: "No acute inflammatory process in the abdomen or pelvis  Fluid-filled distal colon  Correlation for diarrhea illness recommended  Hepatic steatosis with lobulated liver contour suggesting early cirrhosis  Tiny focus of gas in the urinary bladder "  · Etiology:  Unclear, normal-appearing pancreas on imaging  · Triglycerides slightly elevated to 260  · IV hydration  · Pain control    · Clear liquid diet  · GI following, input noted

## 2021-09-11 NOTE — ASSESSMENT & PLAN NOTE
· History of aortic valve replacement  · Warfarin 6 mg daily except warfarin 7 5 mg on Mondays    · Continue heparin and hold warfarin until EGD and colonoscopy

## 2021-09-11 NOTE — ASSESSMENT & PLAN NOTE
Wt Readings from Last 3 Encounters:   09/11/21 (!) 158 kg (349 lb 6 4 oz)   08/11/21 (!) 158 kg (348 lb)   07/28/21 (!) 158 kg (348 lb)    Appears compensated at this time   Last echocardiogram from February 9197: "LV systolic function was normal   Ejection fraction was estimated to be 65%  There was no regional wall motion abnormalities  Features were consistent with a pseudonormal left ventricular filling pattern, with concomitant abnormal relaxation increased filling pressure, G2DD  Aortic valve; a mechanical prosthesis was present "   Monitor intake and output, daily weights   Low sodium diet and fluid restriction   Continue home medications   Hold diuretics until p o   Intake improves

## 2021-09-11 NOTE — PROGRESS NOTES
[facility]  Progress Note - Carie Ramirez 1960, 63 y o  female MRN: [id number]  Unit/Bed#: W -36 Encounter: [id number]  Primary Care Provider: Ketty Carter MD   Date and time admitted to hospital: 9/9/2021  4:25 PM    * Pain of upper abdomen  Assessment & Plan  · Present on admission, lipase of 1010  · CT abdomen pelvis: "No acute inflammatory process in the abdomen or pelvis  Fluid-filled distal colon  Correlation for diarrhea illness recommended  Hepatic steatosis with lobulated liver contour suggesting early cirrhosis  Tiny focus of gas in the urinary bladder "  · Etiology:  Unclear, normal-appearing pancreas on imaging  · Triglycerides slightly elevated to 260  · IV hydration  · Pain control  · Clear liquid diet  · GI following, input noted    Alcohol abuse  Assessment & Plan  · Noted with cirrhosis seen on CT scan  · Patient states that she drink upwards of 10 beers daily back in her youth  · She states that she still drinks now, however this is intermittent  · States that she recently drink about 3 beers 2-3 days ago  · CIWA protocol  · Ethanol negative in the emergency department    Leukopenia  Assessment & Plan  · Chronic  Unclear etiology per chart review    · Present on admission, WBC 3 14    Subtherapeutic international normalized ratio (INR)  Assessment & Plan  · Present on admission, INR of 1 76 on admission  · Patient is on 6 mg of warfarin every day except Monday and Friday for which she takes 7 5 mg  ·  INR goal at 2 5-3 5   · PT/INR daily    Diarrhea  Assessment & Plan  · Ongoing diarrhea  · Follow-up on stool studies  · GI input noted  · GI plans for colonoscopy evaluation on Monday noted  · Supportive care    Heart failure with preserved ejection fraction, borderline, class III (HCC)  Assessment & Plan  Wt Readings from Last 3 Encounters:   09/11/21 (!) 158 kg (349 lb 6 4 oz)   08/11/21 (!) 158 kg (348 lb)   07/28/21 (!) 158 kg (348 lb)  Appears compensated at this time   Last echocardiogram from February 2022: "LV systolic function was normal   Ejection fraction was estimated to be 65%  There was no regional wall motion abnormalities  Features were consistent with a pseudonormal left ventricular filling pattern, with concomitant abnormal relaxation increased filling pressure, G2DD  Aortic valve; a mechanical prosthesis was present "   Monitor intake and output, daily weights   Low sodium diet and fluid restriction   Continue home medications   Hold diuretics until p o  Intake improves          Bipolar depression ([address] )  Assessment & Plan  · She has not been taking her home medications been off on for quite some time  · Will hold off on restarting all of her anti psych medications at this time    Morbid obesity with BMI of 50 0-59 9, adult [facility])  Assessment & Plan  · Encouraged lifestyle modifications  Hepatitis C antibody test positive  Assessment & Plan  · History of viral Hepatitis C  Hyperlipidemia  Assessment & Plan  · Patient previously on Lipitor 40 mg daily  Patient reports not taking statin since July  History of mechanical aortic valve replacement  Assessment & Plan  · History of aortic valve replacement  · Warfarin 6 mg daily except warfarin 7 5 mg on Mondays  · Continue heparin and hold warfarin until EGD and colonoscopy          VTE Pharmacologic Prophylaxis: VTE Score: 4 Moderate Risk (Score 3-4) - Pharmacological DVT Prophylaxis Ordered: heparin drip  Patient Centered Rounds: I performed bedside rounds with nursing staff today  Discussions with Specialists or Other Care Team Provider:     Education and Discussions with Family / Patient: Discussed with the patient, reports she is keeping her family updated  Time Spent for Care: 30 minutes  More than 50% of total time spent on counseling and coordination of care as described above      Current Length of Stay: 2 day(s)  Current Patient Status: Inpatient Certification Statement: The patient will continue to require additional inpatient hospital stay due to As outlined  Discharge Plan: Awaiting clinical and symptomatic improvement, for EGD colonoscopy GI on Monday    Code Status: Level 1 - Full Code    Subjective:     Comfortably in bed  Reports episodes of diarrhea  Denies nausea vomiting  Tolerating p o  Clears  History chart labs medications reviewed  Encouraged out of bed into chair    Objective:     Vitals:   Temp (24hrs), Av 1 °F (36 7 °C), Min:97 9 °F (36 6 °C), Max:98 2 °F (36 8 °C)    Temp:  [97 9 °F (36 6 °C)-98 2 °F (36 8 °C)] 98 2 °F (36 8 °C)  HR:  [71-80] 71  Resp:  [17-18] 17  BP: (130-148)/(69-89) 148/89  SpO2:  [89 %-95 %] 94 %  Body mass index is 56 39 kg/m²  Input and Output Summary (last 24 hours):      Intake/Output Summary (Last 24 hours) at 2021 1430  Last data filed at 9/10/2021 2000  Gross per 24 hour   Intake 0 ml   Output --   Net 0 ml       Physical Exam:   Physical Exam     Comfortably lying in bed  Obese  Short thick neck  Lungs diminished breath sounds bilateral  Heart sounds distant S1-S2 noted  Abdomen soft  Abdominal obesity noted  Awake obeys simple commands  Pulses noted  No rash    Additional Data:     Labs:  Results from last 7 days   Lab Units 21  0155 21  1501   WBC Thousand/uL 2 93* 3 14*   HEMOGLOBIN g/dL 12 8 13 7   HEMATOCRIT % 38 7 43 2   PLATELETS Thousands/uL 111* 132*   NEUTROS PCT %  --  67   LYMPHS PCT %  --  23   MONOS PCT %  --  7   EOS PCT %  --  3     Results from last 7 days   Lab Units 21  0155   SODIUM mmol/L 142   POTASSIUM mmol/L 3 4*   CHLORIDE mmol/L 106   CO2 mmol/L 30   BUN mg/dL 14   CREATININE mg/dL 0 50*   ANION GAP mmol/L 6   CALCIUM mg/dL 8 2*   ALBUMIN g/dL 2 8*   TOTAL BILIRUBIN mg/dL 0 95   ALK PHOS U/L 95   ALT U/L 31   AST U/L 46*   GLUCOSE RANDOM mg/dL 103     Results from last 7 days   Lab Units 21  0515   INR  1 56*     Results from last 7 days   Lab Units 09/11/21  1205 09/11/21  0625 09/11/21  0028 09/10/21  1807 09/10/21  1236 09/10/21  0729 09/10/21  0706 09/09/21  2345   POC GLUCOSE mg/dl 91 110 94 98 105 121 124 120               Lines/Drains:  Invasive Devices     Peripheral Intravenous Line            Long-Dwell Peripheral IV (Midline) 77/21/13 Right Cephalic <1 day                      Imaging: Reviewed radiology reports from this admission including: abdominal/pelvic CT and ultrasound(s)    Recent Cultures (last 7 days):   Results from last 7 days   Lab Units 09/10/21  2127   C DIFF TOXIN B BY PCR  Negative       Last 24 Hours Medication List:   Current Facility-Administered Medications   Medication Dose Route Frequency Provider Last Rate    acetaminophen  650 mg Oral Q6H PRN Abdulaziz Mariaalides, PA-C      cholestyramine sugar free  4 g Oral BID Abdulaziz Mariaalides, PA-C      dicyclomine  20 mg Oral BID NIYAH Raman-C      DULoxetine  30 mg Oral Daily Abdulaziz Bray, PA-C      folic acid  1 mg Oral Daily Abdulaziz Mariaalides, PA-C      heparin (porcine)  3-30 Units/kg/hr (Order-Specific) Intravenous Titrated Abdulaziz Pasalides, PA-C 10 Units/kg/hr (09/11/21 0953)    heparin (porcine)  10,000 Units Intravenous Q1H PRN Abdulaziz Mariaalides, PA-C      heparin (porcine)  5,000 Units Intravenous Q1H PRN Gualberto Seamus, PA-C      HYDROmorphone  0 2 mg Intravenous Q4H PRN Gualberto Senters, PA-C      HYDROmorphone  0 2 mg Intravenous Q4H PRN Gualberto Way, PA-C      HYDROmorphone  0 5 mg Intravenous Q4H PRN Abdulaziz Mariaalides, PA-C      insulin lispro  1-5 Units Subcutaneous Q6H [address] Abdulaziz Mariaalides, PA-C      lactated ringers  100 mL/hr Intravenous Continuous Abdulaziz Pasalides, PA-C Stopped (09/10/21 1500)    multivitamin-minerals  1 tablet Oral Daily Abdulaziz Mariaalides, PA-C      sodium chloride  1,000 mL Intravenous Once NIYAH Raman-C Stopped (09/09/21 2101)    thiamine  100 mg Oral Daily Abdulaziz Bray, PA-C          Today, Patient Was Seen By: Alicia Le, MD    **Please Note: This note may have been constructed using a voice recognition system  **

## 2021-09-12 LAB
A1AT SERPL-MCNC: 165 MG/DL (ref 101–187)
APTT PPP: 59 SECONDS (ref 23–37)
APTT PPP: 65 SECONDS (ref 23–37)
APTT PPP: 80 SECONDS (ref 23–37)
APTT PPP: 92 SECONDS (ref 23–37)
CERULOPLASMIN SERPL-MCNC: 23.7 MG/DL (ref 19–39)
GLUCOSE SERPL-MCNC: 100 MG/DL (ref 65–140)
GLUCOSE SERPL-MCNC: 111 MG/DL (ref 65–140)
GLUCOSE SERPL-MCNC: 86 MG/DL (ref 65–140)
GLUCOSE SERPL-MCNC: 97 MG/DL (ref 65–140)
GLUCOSE SERPL-MCNC: 97 MG/DL (ref 65–140)
INR PPP: 1.39 (ref 0.84–1.19)
LIPASE SERPL-CCNC: 129 U/L (ref 73–393)
PROTHROMBIN TIME: 17 SECONDS (ref 11.6–14.5)

## 2021-09-12 PROCEDURE — 83690 ASSAY OF LIPASE: CPT | Performed by: PHYSICIAN ASSISTANT

## 2021-09-12 PROCEDURE — 99232 SBSQ HOSP IP/OBS MODERATE 35: CPT | Performed by: PHYSICIAN ASSISTANT

## 2021-09-12 PROCEDURE — 85610 PROTHROMBIN TIME: CPT | Performed by: INTERNAL MEDICINE

## 2021-09-12 PROCEDURE — 85730 THROMBOPLASTIN TIME PARTIAL: CPT | Performed by: INTERNAL MEDICINE

## 2021-09-12 PROCEDURE — 82948 REAGENT STRIP/BLOOD GLUCOSE: CPT

## 2021-09-12 PROCEDURE — C9113 INJ PANTOPRAZOLE SODIUM, VIA: HCPCS | Performed by: PHYSICIAN ASSISTANT

## 2021-09-12 RX ORDER — ONDANSETRON 4 MG/1
4 TABLET, ORALLY DISINTEGRATING ORAL EVERY 6 HOURS PRN
Status: DISCONTINUED | OUTPATIENT
Start: 2021-09-12 | End: 2021-09-14 | Stop reason: HOSPADM

## 2021-09-12 RX ORDER — HEPARIN SODIUM 10000 [USP'U]/100ML
3-30 INJECTION, SOLUTION INTRAVENOUS
Status: ACTIVE | OUTPATIENT
Start: 2021-09-12 | End: 2021-09-13

## 2021-09-12 RX ORDER — MAGNESIUM HYDROXIDE/ALUMINUM HYDROXICE/SIMETHICONE 120; 1200; 1200 MG/30ML; MG/30ML; MG/30ML
30 SUSPENSION ORAL EVERY 4 HOURS PRN
Status: DISCONTINUED | OUTPATIENT
Start: 2021-09-12 | End: 2021-09-14 | Stop reason: HOSPADM

## 2021-09-12 RX ORDER — ONDANSETRON 2 MG/ML
4 INJECTION INTRAMUSCULAR; INTRAVENOUS EVERY 6 HOURS PRN
Status: DISCONTINUED | OUTPATIENT
Start: 2021-09-12 | End: 2021-09-12

## 2021-09-12 RX ORDER — PANTOPRAZOLE SODIUM 40 MG/1
40 INJECTION, POWDER, FOR SOLUTION INTRAVENOUS EVERY 12 HOURS SCHEDULED
Status: DISCONTINUED | OUTPATIENT
Start: 2021-09-12 | End: 2021-09-14 | Stop reason: HOSPADM

## 2021-09-12 RX ADMIN — CHOLESTYRAMINE 4 G: 4 POWDER, FOR SUSPENSION ORAL at 08:27

## 2021-09-12 RX ADMIN — PANTOPRAZOLE SODIUM 40 MG: 40 INJECTION, POWDER, FOR SOLUTION INTRAVENOUS at 10:53

## 2021-09-12 RX ADMIN — DICYCLOMINE HYDROCHLORIDE 20 MG: 20 TABLET ORAL at 08:28

## 2021-09-12 RX ADMIN — PANTOPRAZOLE SODIUM 40 MG: 40 INJECTION, POWDER, FOR SOLUTION INTRAVENOUS at 20:22

## 2021-09-12 RX ADMIN — DICYCLOMINE HYDROCHLORIDE 20 MG: 20 TABLET ORAL at 18:11

## 2021-09-12 RX ADMIN — MULTIPLE VITAMINS W/ MINERALS TAB 1 TABLET: TAB ORAL at 08:28

## 2021-09-12 RX ADMIN — HEPARIN SODIUM 5000 UNITS: 1000 INJECTION INTRAVENOUS; SUBCUTANEOUS at 08:40

## 2021-09-12 RX ADMIN — THIAMINE HCL TAB 100 MG 100 MG: 100 TAB at 08:28

## 2021-09-12 RX ADMIN — HYDROMORPHONE HYDROCHLORIDE 0.5 MG: 1 INJECTION, SOLUTION INTRAMUSCULAR; INTRAVENOUS; SUBCUTANEOUS at 08:54

## 2021-09-12 RX ADMIN — FOLIC ACID 1 MG: 1 TABLET ORAL at 08:28

## 2021-09-12 RX ADMIN — HEPARIN SODIUM 12 UNITS/KG/HR: 10000 INJECTION, SOLUTION INTRAVENOUS at 23:07

## 2021-09-12 RX ADMIN — HEPARIN SODIUM 10 UNITS/KG/HR: 10000 INJECTION, SOLUTION INTRAVENOUS at 00:46

## 2021-09-12 RX ADMIN — POLYETHYLENE GLYCOL 3350, SODIUM SULFATE ANHYDROUS, SODIUM BICARBONATE, SODIUM CHLORIDE, POTASSIUM CHLORIDE 4000 ML: 236; 22.74; 6.74; 5.86; 2.97 POWDER, FOR SOLUTION ORAL at 14:07

## 2021-09-12 RX ADMIN — HYDROMORPHONE HYDROCHLORIDE 0.5 MG: 1 INJECTION, SOLUTION INTRAMUSCULAR; INTRAVENOUS; SUBCUTANEOUS at 03:49

## 2021-09-12 RX ADMIN — DULOXETINE HYDROCHLORIDE 30 MG: 30 CAPSULE, DELAYED RELEASE ORAL at 08:28

## 2021-09-12 RX ADMIN — ONDANSETRON 4 MG: 4 TABLET, ORALLY DISINTEGRATING ORAL at 15:00

## 2021-09-12 NOTE — ASSESSMENT & PLAN NOTE
· Noted with cirrhosis seen on CT scan  Patient states that she drink upwards of 10 beers daily back in her youth  She states that she still drinks now, however this is intermittent  However, she tells me that she had 3 mixed drinks and 1/2 can of beer prior to admission  Reports roughly 2 shots per mixed drink  · CIWA protocol    · Ethanol negative in the emergency department

## 2021-09-12 NOTE — ASSESSMENT & PLAN NOTE
· Present on admission, lipase of 1010   CT abdomen pelvis without acute findings, but did show cirrhosis and evidence of diarrheal illness   · Doubt pancreatitis, but likely PUD/gastritis   · Continue IV hydration   · Add PPI IV BID that was recommended by GI   · Add Mylanta PRN   · Stop IV Dilaudid   · Plan for EGD and Colonoscopy Monday   · Prep tonight as per GI   · Clear liquid diet, NPO after midnight   · Continue Heparin drip, stop 6 hours prior to procedure

## 2021-09-12 NOTE — ASSESSMENT & PLAN NOTE
· Present on admission, INR of 1 76 on admission  · Patient is on 6 mg of warfarin every day except Monday and Friday for which she takes 7 5 mg  · INR goal at 2 5-3 5   · PT/INR daily  · She is currently on heparin drip for procedure

## 2021-09-12 NOTE — ASSESSMENT & PLAN NOTE
Wt Readings from Last 3 Encounters:   09/11/21 (!) 158 kg (349 lb 6 4 oz)   08/11/21 (!) 158 kg (348 lb)   07/28/21 (!) 158 kg (348 lb)    Appears compensated at this time  Last Echo from 2021 showed EF of 65%  o Monitor intake and output, daily weights   o Low sodium diet and fluid restriction  o Continue home medications     Hold diuretics until PO Intake improves

## 2021-09-12 NOTE — ASSESSMENT & PLAN NOTE
· Ongoing diarrhea   Stool studies negative   · GI input noted  · GI plans for colonoscopy evaluation on Monday noted  · Supportive care  · Bentyl, Lomotil, Questran

## 2021-09-12 NOTE — PROGRESS NOTES
[facility]  Progress Note - Praneeth Doing 1960, 63 y o  female MRN: [id number]  Unit/Bed#: W -75 Encounter: [id number]  Primary Care Provider: Gely Cheung MD   Date and time admitted to hospital: 9/9/2021  4:25 PM    * Pain of upper abdomen  Assessment & Plan  · Present on admission, lipase of 1010  CT abdomen pelvis without acute findings, but did show cirrhosis and evidence of diarrheal illness   · Doubt pancreatitis, but likely PUD/gastritis   · Continue IV hydration   · Add PPI IV BID that was recommended by GI   · Add Mylanta PRN   · Stop IV Dilaudid   · Plan for EGD and Colonoscopy Monday   · Prep tonight as per GI   · Clear liquid diet, NPO after midnight   · Continue Heparin drip, stop 6 hours prior to procedure     Subtherapeutic international normalized ratio (INR)  Assessment & Plan  · Present on admission, INR of 1 76 on admission  · Patient is on 6 mg of warfarin every day except Monday and Friday for which she takes 7 5 mg  · INR goal at 2 5-3 5   · PT/INR daily  · She is currently on heparin drip for procedure     History of mechanical aortic valve replacement  Assessment & Plan  · History of aortic valve replacement  · Warfarin 6 mg daily except warfarin 7 5 mg on Mondays  · Continue heparin and hold warfarin until EGD and colonoscopy  · Heparin on hold 6 hours prior to procedure     Alcohol abuse  Assessment & Plan  · Noted with cirrhosis seen on CT scan  Patient states that she drink upwards of 10 beers daily back in her youth  She states that she still drinks now, however this is intermittent  However, she tells me that she had 3 mixed drinks and 1/2 can of beer prior to admission  Reports roughly 2 shots per mixed drink  · CIWA protocol  · Ethanol negative in the emergency department    Diarrhea  Assessment & Plan  · Ongoing diarrhea   Stool studies negative   · GI input noted  · GI plans for colonoscopy evaluation on Monday noted  · Supportive care  · Bentyl, Lomotil, Questran     Hepatitis C antibody test positive  Assessment & Plan  · History of viral Hepatitis C  Heart failure with preserved ejection fraction, borderline, class III (HCC)  Assessment & Plan  Wt Readings from Last 3 Encounters:   09/11/21 (!) 158 kg (349 lb 6 4 oz)   08/11/21 (!) 158 kg (348 lb)   07/28/21 (!) 158 kg (348 lb)    Appears compensated at this time  Last Echo from 2021 showed EF of 65%  o Monitor intake and output, daily weights   o Low sodium diet and fluid restriction  o Continue home medications   Hold diuretics until PO Intake improves          Morbid obesity with BMI of 50 0-59 9, adult (HCC)  Assessment & Plan  · BMI noted   · Encouraged lifestyle modifications  Bipolar depression ([facility] [address] )  Assessment & Plan  · She has not been taking her home medications been off on for quite some time  · Will hold off on restarting all of her anti psych medications at this time    Hyperlipidemia  Assessment & Plan  · Patient previously on Lipitor 40 mg daily  · Patient reports not taking statin since July  Leukopenia  Assessment & Plan  · Chronic  Unclear etiology per chart review  · Will check tomorrow and monitor  · No signs of infection         VTE Pharmacologic Prophylaxis: VTE Score: 4 Moderate Risk (Score 3-4) - Pharmacological DVT Prophylaxis Ordered: heparin drip  Patient Centered Rounds: I performed bedside rounds with nursing staff today  Discussions with Specialists or Other Care Team Provider: Discussed with RN, Reviewed GI note     Education and Discussions with Family / Patient: Patient declined call to   Time Spent for Care: 30 minutes  More than 50% of total time spent on counseling and coordination of care as described above      Current Length of Stay: 3 day(s)  Current Patient Status: Inpatient   Certification Statement: The patient will continue to require additional inpatient hospital stay due to EGD and colonoscopy tomorrow Discharge Plan: Anticipate discharge in 24-48 hrs to home  Code Status: Level 1 - Full Code    Subjective:   Patient reports on going abdominal pain, but was sleeping on my arrival into the room  Reports diarrhea as well that she says makes her weak  Reports drinking 2-3 cups of water overnight  Denies vomiting  Objective:     Vitals:   Temp (24hrs), Av 4 °F (36 9 °C), Min:97 9 °F (36 6 °C), Max:98 6 °F (37 °C)    Temp:  [97 9 °F (36 6 °C)-98 6 °F (37 °C)] 97 9 °F (36 6 °C)  HR:  [63-79] 63  Resp:  [16-18] 18  BP: (117-148)/(57-89) 117/69  SpO2:  [93 %-96 %] 96 %  Body mass index is 56 39 kg/m²  Input and Output Summary (last 24 hours): Intake/Output Summary (Last 24 hours) at 2021 1039  Last data filed at 2021 0940  Gross per 24 hour   Intake 360 ml   Output --   Net 360 ml       Physical Exam:   Physical Exam  Constitutional:       General: She is sleeping  She is not in acute distress  Appearance: Normal appearance  She is morbidly obese  She is not ill-appearing or diaphoretic  HENT:      Head: Normocephalic and atraumatic  Mouth/Throat:      Mouth: Mucous membranes are moist    Eyes:      General: No scleral icterus  Pupils: Pupils are equal, round, and reactive to light  Cardiovascular:      Rate and Rhythm: Normal rate and regular rhythm  Pulses: Normal pulses  Heart sounds: Normal heart sounds, S1 normal and S2 normal  No murmur heard  No systolic murmur is present  No diastolic murmur is present  No gallop  No S3 or S4 sounds  Pulmonary:      Effort: Pulmonary effort is normal  No accessory muscle usage or respiratory distress  Breath sounds: Normal breath sounds  No stridor  No decreased breath sounds, wheezing, rhonchi or rales  Chest:      Chest wall: No tenderness  Abdominal:      General: Bowel sounds are normal  There is no distension  Palpations: Abdomen is soft  Tenderness: There is no abdominal tenderness   There is no guarding  Musculoskeletal:      Right lower leg: No edema  Left lower leg: No edema  Skin:     General: Skin is warm and dry  Coloration: Skin is not jaundiced  Neurological:      General: No focal deficit present  Mental Status: She is easily aroused  Mental status is at baseline  Motor: No tremor or seizure activity  Psychiatric:         Behavior: Behavior is cooperative  Additional Data:     Labs:  Results from last 7 days   Lab Units 09/11/21  0155 09/09/21  1501   WBC Thousand/uL 2 93* 3 14*   HEMOGLOBIN g/dL 12 8 13 7   HEMATOCRIT % 38 7 43 2   PLATELETS Thousands/uL 111* 132*   NEUTROS PCT %  --  67   LYMPHS PCT %  --  23   MONOS PCT %  --  7   EOS PCT %  --  3     Results from last 7 days   Lab Units 09/11/21  0155   SODIUM mmol/L 142   POTASSIUM mmol/L 3 4*   CHLORIDE mmol/L 106   CO2 mmol/L 30   BUN mg/dL 14   CREATININE mg/dL 0 50*   ANION GAP mmol/L 6   CALCIUM mg/dL 8 2*   ALBUMIN g/dL 2 8*   TOTAL BILIRUBIN mg/dL 0 95   ALK PHOS U/L 95   ALT U/L 31   AST U/L 46*   GLUCOSE RANDOM mg/dL 103     Results from last 7 days   Lab Units 09/12/21  0632   INR  1 39*     Results from last 7 days   Lab Units 09/12/21  0716 09/12/21  0629 09/11/21  2345 09/11/21  1803 09/11/21  1205 09/11/21  0625 09/11/21  0028 09/10/21  1807 09/10/21  1236 09/10/21  0729 09/10/21  0706 09/09/21  2345   POC GLUCOSE mg/dl 100 97 102 92 91 110 94 98 105 121 124 120               Lines/Drains:  Invasive Devices     Peripheral Intravenous Line            Long-Dwell Peripheral IV (Midline) 60/58/42 Right Cephalic 1 day                      Imaging: No pertinent imaging reviewed      Recent Cultures (last 7 days):   Results from last 7 days   Lab Units 09/10/21  2127   C DIFF TOXIN B BY PCR  Negative       Last 24 Hours Medication List:   Current Facility-Administered Medications   Medication Dose Route Frequency Provider Last Rate    acetaminophen  650 mg Oral Q6H JONON Rico Leon PA-C  aluminum-magnesium hydroxide-simethicone  30 mL Oral Q4H PRN Smita Stallworth PA-C      cholestyramine sugar free  4 g Oral BID Margurette Lennox, PA-C      dicyclomine  20 mg Oral BID Margurette Lennox, PA-C      DULoxetine  30 mg Oral Daily Abdulaziz Bray PA-C      folic acid  1 mg Oral Daily Abdulaziz Bray PA-C      heparin (porcine)  3-30 Units/kg/hr (Order-Specific) Intravenous Titrated Abdulaziz Bray PA-C 12 Units/kg/hr (09/12/21 0843)    heparin (porcine)  10,000 Units Intravenous Q1H PRN Abdulaziz Bray PA-C      heparin (porcine)  5,000 Units Intravenous Q1H PRN Abdulaziz Bray PA-C      insulin lispro  1-5 Units Subcutaneous Q6H [facility] Abdulaziz Bray PA-C      lactated ringers  100 mL/hr Intravenous Continuous Abdulaziz Bray PA-C Stopped (09/10/21 1500)    multivitamin-minerals  1 tablet Oral Daily Abdulaziz Bray PA-C      pantoprazole  40 mg Intravenous Q12H [facility] Dajuan Ocasio PA-C      polyethylene glycol  4,000 mL Oral Once Jr Barclay PA-C      sodium chloride  1,000 mL Intravenous Once Margurette Lennox, PA-C Stopped (09/09/21 2101)    thiamine  100 mg Oral Daily Abdulaziz Bray PA-C          Today, Patient Was Seen By: Smita Stallworth PA-C    **Please Note: This note may have been constructed using a voice recognition system  **

## 2021-09-12 NOTE — ASSESSMENT & PLAN NOTE
· History of aortic valve replacement  · Warfarin 6 mg daily except warfarin 7 5 mg on Mondays    · Continue heparin and hold warfarin until EGD and colonoscopy  · Heparin on hold 6 hours prior to procedure

## 2021-09-12 NOTE — ASSESSMENT & PLAN NOTE
· Chronic  Unclear etiology per chart review    · Will check tomorrow and monitor  · No signs of infection

## 2021-09-12 NOTE — PLAN OF CARE
Problem: Potential for Falls  Goal: Patient will remain free of falls  Description: INTERVENTIONS:  - Educate patient/family on patient safety including physical limitations  - Instruct patient to call for assistance with activity   - Consult OT/PT to assist with strengthening/mobility   - Keep Call bell within reach  - Keep bed low and locked with side rails adjusted as appropriate  - Keep care items and personal belongings within reach  - Initiate and maintain comfort rounds  - Make Fall Risk Sign visible to staff  - Obtain necessary fall risk management equipment  - Apply yellow socks and bracelet for high fall risk patients  - Consider moving patient to room near nurses station  Outcome: Progressing     Problem: GASTROINTESTINAL - ADULT  Goal: Minimal or absence of nausea and/or vomiting  Description: INTERVENTIONS:  - Administer IV fluids if ordered to ensure adequate hydration  - Maintain NPO status until nausea and vomiting are resolved  - Nasogastric tube if ordered  - Administer ordered antiemetic medications as needed  - Provide nonpharmacologic comfort measures as appropriate  - Advance diet as tolerated, if ordered  - Consider nutrition services referral to assist patient with adequate nutrition and appropriate food choices  Outcome: Progressing  Goal: Maintains adequate nutritional intake  Description: INTERVENTIONS:  - Monitor percentage of each meal consumed  - Identify factors contributing to decreased intake, treat as appropriate  - Assist with meals as needed  - Monitor I&O, weight, and lab values if indicated  - Obtain nutrition services referral as needed  Outcome: Progressing

## 2021-09-13 ENCOUNTER — ANESTHESIA EVENT (INPATIENT)
Dept: GASTROENTEROLOGY | Facility: HOSPITAL | Age: 61
DRG: 241 | End: 2021-09-13
Payer: COMMERCIAL

## 2021-09-13 ENCOUNTER — APPOINTMENT (INPATIENT)
Dept: GASTROENTEROLOGY | Facility: HOSPITAL | Age: 61
DRG: 241 | End: 2021-09-13
Payer: COMMERCIAL

## 2021-09-13 ENCOUNTER — ANESTHESIA (INPATIENT)
Dept: GASTROENTEROLOGY | Facility: HOSPITAL | Age: 61
DRG: 241 | End: 2021-09-13
Payer: COMMERCIAL

## 2021-09-13 LAB
ACTIN IGG SERPL-ACNC: 13 UNITS (ref 0–19)
ANION GAP SERPL CALCULATED.3IONS-SCNC: 9 MMOL/L (ref 4–13)
APTT PPP: 64 SECONDS (ref 23–37)
APTT PPP: 95 SECONDS (ref 23–37)
BASOPHILS # BLD AUTO: 0.01 THOUSANDS/ΜL (ref 0–0.1)
BASOPHILS NFR BLD AUTO: 0 % (ref 0–1)
BUN SERPL-MCNC: 7 MG/DL (ref 5–25)
CALCIUM SERPL-MCNC: 7.1 MG/DL (ref 8.3–10.1)
CHLORIDE SERPL-SCNC: 109 MMOL/L (ref 100–108)
CO2 SERPL-SCNC: 26 MMOL/L (ref 21–32)
CREAT SERPL-MCNC: 0.51 MG/DL (ref 0.6–1.3)
ENDOMYSIUM IGA SER QL: NEGATIVE
EOSINOPHIL # BLD AUTO: 0.1 THOUSAND/ΜL (ref 0–0.61)
EOSINOPHIL NFR BLD AUTO: 3 % (ref 0–6)
ERYTHROCYTE [DISTWIDTH] IN BLOOD BY AUTOMATED COUNT: 12.9 % (ref 11.6–15.1)
G LAMBLIA AG STL QL IA: NEGATIVE
GFR SERPL CREATININE-BSD FRML MDRD: 106 ML/MIN/1.73SQ M
GLIADIN PEPTIDE IGA SER-ACNC: 6 UNITS (ref 0–19)
GLIADIN PEPTIDE IGG SER-ACNC: 2 UNITS (ref 0–19)
GLUCOSE SERPL-MCNC: 114 MG/DL (ref 65–140)
GLUCOSE SERPL-MCNC: 147 MG/DL (ref 65–140)
GLUCOSE SERPL-MCNC: 150 MG/DL (ref 65–140)
GLUCOSE SERPL-MCNC: 84 MG/DL (ref 65–140)
GLUCOSE SERPL-MCNC: 87 MG/DL (ref 65–140)
HCT VFR BLD AUTO: 37.4 % (ref 34.8–46.1)
HGB BLD-MCNC: 12.2 G/DL (ref 11.5–15.4)
IGA SERPL-MCNC: 565 MG/DL (ref 87–352)
IMM GRANULOCYTES # BLD AUTO: 0.01 THOUSAND/UL (ref 0–0.2)
IMM GRANULOCYTES NFR BLD AUTO: 0 % (ref 0–2)
INR PPP: 1.21 (ref 0.84–1.19)
LYMPHOCYTES # BLD AUTO: 0.83 THOUSANDS/ΜL (ref 0.6–4.47)
LYMPHOCYTES NFR BLD AUTO: 27 % (ref 14–44)
MCH RBC QN AUTO: 31.2 PG (ref 26.8–34.3)
MCHC RBC AUTO-ENTMCNC: 32.6 G/DL (ref 31.4–37.4)
MCV RBC AUTO: 96 FL (ref 82–98)
MITOCHONDRIA M2 IGG SER-ACNC: <20 UNITS (ref 0–20)
MONOCYTES # BLD AUTO: 0.24 THOUSAND/ΜL (ref 0.17–1.22)
MONOCYTES NFR BLD AUTO: 8 % (ref 4–12)
NEUTROPHILS # BLD AUTO: 1.84 THOUSANDS/ΜL (ref 1.85–7.62)
NEUTS SEG NFR BLD AUTO: 62 % (ref 43–75)
NRBC BLD AUTO-RTO: 0 /100 WBCS
PLATELET # BLD AUTO: 98 THOUSANDS/UL (ref 149–390)
PMV BLD AUTO: 9.8 FL (ref 8.9–12.7)
POTASSIUM SERPL-SCNC: 3.1 MMOL/L (ref 3.5–5.3)
PROTHROMBIN TIME: 15.3 SECONDS (ref 11.6–14.5)
RBC # BLD AUTO: 3.91 MILLION/UL (ref 3.81–5.12)
RYE IGE QN: NEGATIVE
SODIUM SERPL-SCNC: 144 MMOL/L (ref 136–145)
TTG IGA SER-ACNC: <2 U/ML (ref 0–3)
TTG IGG SER-ACNC: 3 U/ML (ref 0–5)
WBC # BLD AUTO: 3.03 THOUSAND/UL (ref 4.31–10.16)

## 2021-09-13 PROCEDURE — 88305 TISSUE EXAM BY PATHOLOGIST: CPT | Performed by: PATHOLOGY

## 2021-09-13 PROCEDURE — 0DJ08ZZ INSPECTION OF UPPER INTESTINAL TRACT, VIA NATURAL OR ARTIFICIAL OPENING ENDOSCOPIC: ICD-10-PCS | Performed by: INTERNAL MEDICINE

## 2021-09-13 PROCEDURE — 85025 COMPLETE CBC W/AUTO DIFF WBC: CPT | Performed by: PHYSICIAN ASSISTANT

## 2021-09-13 PROCEDURE — C9113 INJ PANTOPRAZOLE SODIUM, VIA: HCPCS | Performed by: PHYSICIAN ASSISTANT

## 2021-09-13 PROCEDURE — 82948 REAGENT STRIP/BLOOD GLUCOSE: CPT

## 2021-09-13 PROCEDURE — 85730 THROMBOPLASTIN TIME PARTIAL: CPT | Performed by: PHYSICIAN ASSISTANT

## 2021-09-13 PROCEDURE — 0DBK8ZX EXCISION OF ASCENDING COLON, VIA NATURAL OR ARTIFICIAL OPENING ENDOSCOPIC, DIAGNOSTIC: ICD-10-PCS | Performed by: INTERNAL MEDICINE

## 2021-09-13 PROCEDURE — 80048 BASIC METABOLIC PNL TOTAL CA: CPT | Performed by: PHYSICIAN ASSISTANT

## 2021-09-13 PROCEDURE — 0DBL8ZX EXCISION OF TRANSVERSE COLON, VIA NATURAL OR ARTIFICIAL OPENING ENDOSCOPIC, DIAGNOSTIC: ICD-10-PCS | Performed by: INTERNAL MEDICINE

## 2021-09-13 PROCEDURE — 85610 PROTHROMBIN TIME: CPT | Performed by: PHYSICIAN ASSISTANT

## 2021-09-13 PROCEDURE — 85730 THROMBOPLASTIN TIME PARTIAL: CPT | Performed by: INTERNAL MEDICINE

## 2021-09-13 PROCEDURE — 43235 EGD DIAGNOSTIC BRUSH WASH: CPT | Performed by: INTERNAL MEDICINE

## 2021-09-13 PROCEDURE — 45385 COLONOSCOPY W/LESION REMOVAL: CPT | Performed by: INTERNAL MEDICINE

## 2021-09-13 PROCEDURE — 99232 SBSQ HOSP IP/OBS MODERATE 35: CPT | Performed by: PHYSICIAN ASSISTANT

## 2021-09-13 PROCEDURE — C9113 INJ PANTOPRAZOLE SODIUM, VIA: HCPCS | Performed by: INTERNAL MEDICINE

## 2021-09-13 RX ORDER — KETAMINE HYDROCHLORIDE 50 MG/ML
INJECTION, SOLUTION, CONCENTRATE INTRAMUSCULAR; INTRAVENOUS AS NEEDED
Status: DISCONTINUED | OUTPATIENT
Start: 2021-09-13 | End: 2021-09-13

## 2021-09-13 RX ORDER — GLYCOPYRROLATE 0.2 MG/ML
INJECTION INTRAMUSCULAR; INTRAVENOUS AS NEEDED
Status: DISCONTINUED | OUTPATIENT
Start: 2021-09-13 | End: 2021-09-13

## 2021-09-13 RX ORDER — PROPOFOL 10 MG/ML
INJECTION, EMULSION INTRAVENOUS AS NEEDED
Status: DISCONTINUED | OUTPATIENT
Start: 2021-09-13 | End: 2021-09-13

## 2021-09-13 RX ORDER — HEPARIN SODIUM 10000 [USP'U]/100ML
3-30 INJECTION, SOLUTION INTRAVENOUS
Status: DISCONTINUED | OUTPATIENT
Start: 2021-09-13 | End: 2021-09-14

## 2021-09-13 RX ORDER — WARFARIN SODIUM 6 MG/1
6 TABLET ORAL
Status: DISCONTINUED | OUTPATIENT
Start: 2021-09-14 | End: 2021-09-14 | Stop reason: HOSPADM

## 2021-09-13 RX ORDER — PROPOFOL 10 MG/ML
INJECTION, EMULSION INTRAVENOUS CONTINUOUS PRN
Status: DISCONTINUED | OUTPATIENT
Start: 2021-09-13 | End: 2021-09-13

## 2021-09-13 RX ORDER — SODIUM CHLORIDE, SODIUM LACTATE, POTASSIUM CHLORIDE, CALCIUM CHLORIDE 600; 310; 30; 20 MG/100ML; MG/100ML; MG/100ML; MG/100ML
INJECTION, SOLUTION INTRAVENOUS CONTINUOUS PRN
Status: DISCONTINUED | OUTPATIENT
Start: 2021-09-13 | End: 2021-09-13

## 2021-09-13 RX ORDER — DIPHENOXYLATE HYDROCHLORIDE AND ATROPINE SULFATE 2.5; .025 MG/1; MG/1
1 TABLET ORAL 4 TIMES DAILY PRN
Status: DISCONTINUED | OUTPATIENT
Start: 2021-09-13 | End: 2021-09-14 | Stop reason: HOSPADM

## 2021-09-13 RX ORDER — WARFARIN SODIUM 7.5 MG/1
7.5 TABLET ORAL WEEKLY
Status: DISCONTINUED | OUTPATIENT
Start: 2021-09-13 | End: 2021-09-14 | Stop reason: HOSPADM

## 2021-09-13 RX ADMIN — DIPHENOXYLATE HYDROCHLORIDE AND ATROPINE SULFATE 1 TABLET: 2.5; .025 TABLET ORAL at 18:04

## 2021-09-13 RX ADMIN — KETAMINE HYDROCHLORIDE 30 MG: 50 INJECTION INTRAMUSCULAR; INTRAVENOUS at 13:04

## 2021-09-13 RX ADMIN — SODIUM CHLORIDE, SODIUM LACTATE, POTASSIUM CHLORIDE, AND CALCIUM CHLORIDE: .6; .31; .03; .02 INJECTION, SOLUTION INTRAVENOUS at 13:02

## 2021-09-13 RX ADMIN — Medication 40 MG: at 13:28

## 2021-09-13 RX ADMIN — DICYCLOMINE HYDROCHLORIDE 20 MG: 20 TABLET ORAL at 09:20

## 2021-09-13 RX ADMIN — CHOLESTYRAMINE 4 G: 4 POWDER, FOR SUSPENSION ORAL at 18:01

## 2021-09-13 RX ADMIN — FOLIC ACID 1 MG: 1 TABLET ORAL at 09:20

## 2021-09-13 RX ADMIN — PANTOPRAZOLE SODIUM 40 MG: 40 INJECTION, POWDER, FOR SOLUTION INTRAVENOUS at 09:20

## 2021-09-13 RX ADMIN — PROPOFOL 100 MCG/KG/MIN: 10 INJECTION, EMULSION INTRAVENOUS at 13:14

## 2021-09-13 RX ADMIN — GLYCOPYRROLATE 0.2 MG: 0.2 INJECTION, SOLUTION INTRAMUSCULAR; INTRAVENOUS at 13:03

## 2021-09-13 RX ADMIN — PROPOFOL 40 MG: 10 INJECTION, EMULSION INTRAVENOUS at 13:17

## 2021-09-13 RX ADMIN — PROPOFOL 80 MG: 10 INJECTION, EMULSION INTRAVENOUS at 13:04

## 2021-09-13 RX ADMIN — WARFARIN SODIUM 7.5 MG: 7.5 TABLET ORAL at 18:01

## 2021-09-13 RX ADMIN — DULOXETINE HYDROCHLORIDE 30 MG: 30 CAPSULE, DELAYED RELEASE ORAL at 09:20

## 2021-09-13 RX ADMIN — MULTIPLE VITAMINS W/ MINERALS TAB 1 TABLET: TAB ORAL at 09:20

## 2021-09-13 RX ADMIN — KETAMINE HYDROCHLORIDE 20 MG: 50 INJECTION INTRAMUSCULAR; INTRAVENOUS at 13:08

## 2021-09-13 RX ADMIN — HEPARIN SODIUM 18 UNITS/KG/HR: 10000 INJECTION, SOLUTION INTRAVENOUS at 15:39

## 2021-09-13 RX ADMIN — THIAMINE HCL TAB 100 MG 100 MG: 100 TAB at 09:20

## 2021-09-13 RX ADMIN — PANTOPRAZOLE SODIUM 40 MG: 40 INJECTION, POWDER, FOR SOLUTION INTRAVENOUS at 21:02

## 2021-09-13 RX ADMIN — SODIUM CHLORIDE, SODIUM LACTATE, POTASSIUM CHLORIDE, AND CALCIUM CHLORIDE 100 ML/HR: .6; .31; .03; .02 INJECTION, SOLUTION INTRAVENOUS at 23:53

## 2021-09-13 RX ADMIN — HEPARIN SODIUM 16 UNITS/KG/HR: 10000 INJECTION, SOLUTION INTRAVENOUS at 23:53

## 2021-09-13 RX ADMIN — DICYCLOMINE HYDROCHLORIDE 20 MG: 20 TABLET ORAL at 18:01

## 2021-09-13 RX ADMIN — PROPOFOL 30 MG: 10 INJECTION, EMULSION INTRAVENOUS at 13:06

## 2021-09-13 NOTE — ASSESSMENT & PLAN NOTE
· Ongoing diarrhea  Stool studies negative   Colonoscopy unremarkable besides for polyps    · GI input noted  · Supportive care  · Bentyl, Lomotil, Questran

## 2021-09-13 NOTE — PLAN OF CARE
Problem: Potential for Falls  Goal: Patient will remain free of falls  Description: INTERVENTIONS:  - Educate patient/family on patient safety including physical limitations  - Instruct patient to call for assistance with activity   - Consult OT/PT to assist with strengthening/mobility   - Keep Call bell within reach  - Keep bed low and locked with side rails adjusted as appropriate  - Keep care items and personal belongings within reach  - Initiate and maintain comfort rounds  - Make Fall Risk Sign visible to staff  - Offer Toileting every 2 Hours, in advance of need  - Obtain necessary fall risk management equipment  - Apply yellow socks and bracelet for high fall risk patients  - Consider moving patient to room near nurses station  Outcome: Progressing     Problem: GASTROINTESTINAL - ADULT  Goal: Minimal or absence of nausea and/or vomiting  Description: INTERVENTIONS:  - Administer IV fluids if ordered to ensure adequate hydration  - Maintain NPO status until nausea and vomiting are resolved  - Nasogastric tube if ordered  - Administer ordered antiemetic medications as needed  - Provide nonpharmacologic comfort measures as appropriate  - Advance diet as tolerated, if ordered  - Consider nutrition services referral to assist patient with adequate nutrition and appropriate food choices  Outcome: Progressing  Goal: Maintains adequate nutritional intake  Description: INTERVENTIONS:  - Monitor percentage of each meal consumed  - Identify factors contributing to decreased intake, treat as appropriate  - Assist with meals as needed  - Monitor I&O, weight, and lab values if indicated  - Obtain nutrition services referral as needed  Outcome: Progressing

## 2021-09-13 NOTE — QUICK NOTE
Heparin infusion to be stopped at 0900 on 09/13/2021, 6 hours prior to scheduled EGD/colonoscopy  Plan to restart heparin infusion after scopes, discuss with GI on timing of restarting heparin infusion tomorrow

## 2021-09-13 NOTE — ASSESSMENT & PLAN NOTE
· Present on admission, INR of 1 76 on admission  · Patient is on 6 mg of warfarin every day except Monday and Friday for which she takes 7 5 mg  · INR goal at 2 5-3 5   · PT/INR daily  · She is currently on heparin drip for procedure   · Restarted Coumadin

## 2021-09-13 NOTE — PROGRESS NOTES
[facility]  Progress Note - Jamochoa Lennox 1960, 63 y o  female MRN: [id number]  Unit/Bed#: W -63 Encounter: [id number]  Primary Care Provider: Marlys Martin MD   Date and time admitted to hospital: 9/9/2021  4:25 PM    * Pain of upper abdomen  Assessment & Plan  · Present on admission, lipase of 1010  CT abdomen pelvis without acute findings, but did show cirrhosis and evidence of diarrheal illness   · Doubt pancreatitis, but likely PUD/gastritis   · EGD unremarkable   · Stop PPI, no indication   · Add Mylanta PRN   · Stop IV Dilaudid   · Advance diet as tolerated  · Restart Heparin drip, stop 6 hours prior to procedure     Subtherapeutic international normalized ratio (INR)  Assessment & Plan  · Present on admission, INR of 1 76 on admission  · Patient is on 6 mg of warfarin every day except Monday and Friday for which she takes 7 5 mg  · INR goal at 2 5-3 5   · PT/INR daily  · She is currently on heparin drip for procedure   · Restarted Coumadin     History of mechanical aortic valve replacement  Assessment & Plan  · History of aortic valve replacement  · Warfarin 6 mg daily except warfarin 7 5 mg on Mondays  · Continue Heparin-Coumadin bridge    Alcohol abuse  Assessment & Plan  · Noted with cirrhosis seen on CT scan  Patient states that she drink upwards of 10 beers daily back in her youth  She states that she still drinks now, however this is intermittent  However, she tells me that she had 3 mixed drinks and 1/2 can of beer prior to admission  Reports roughly 2 shots per mixed drink  · CIWA protocol  · Ethanol negative in the emergency department    Diarrhea  Assessment & Plan  · Ongoing diarrhea  Stool studies negative  Colonoscopy unremarkable besides for polyps    · GI input noted  · Supportive care  · Bentyl, Lomotil, Questran     Hepatitis C antibody test positive  Assessment & Plan  · History of viral Hepatitis C      Heart failure with preserved ejection fraction, borderline, class III (HCC)  Assessment & Plan  Wt Readings from Last 3 Encounters:   09/13/21 (!) 158 kg (348 lb 5 2 oz)   08/11/21 (!) 158 kg (348 lb)   07/28/21 (!) 158 kg (348 lb)    Appears compensated at this time  Last Echo from 2021 showed EF of 65%  o Monitor intake and output, daily weights   o Low sodium diet and fluid restriction  o Continue home medications   Hold diuretics until PO Intake improves          Morbid obesity with BMI of 50 0-59 9, adult (HCC)  Assessment & Plan  · BMI noted   · Encouraged lifestyle modifications  Bipolar depression ([facility] [address] )  Assessment & Plan  · She has not been taking her home medications been off on for quite some time  · Will hold off on restarting all of her anti psych medications at this time    Hyperlipidemia  Assessment & Plan  · Patient previously on Lipitor 40 mg daily  · Patient reports not taking statin since July  Leukopenia  Assessment & Plan  · Chronic  Unclear etiology per chart review  · No signs of infection         VTE Pharmacologic Prophylaxis: VTE Score: 4 Moderate Risk (Score 3-4) - Pharmacological DVT Prophylaxis Ordered: heparin drip  Patient Centered Rounds: I performed bedside rounds with nursing staff today  Discussions with Specialists or Other Care Team Provider: Discussed with RN, CM    Education and Discussions with Family / Patient: Patient declined call to   Time Spent for Care: 30 minutes  More than 50% of total time spent on counseling and coordination of care as described above  Current Length of Stay: 4 day(s)  Current Patient Status: Inpatient   Certification Statement: The patient will continue to require additional inpatient hospital stay due to heparin-coumadin bridging   Discharge Plan: When INR therapeutic     Code Status: Level 1 - Full Code    Subjective:   Patient reports no complaints today  Denies abdominal pain  Wondering why she was having so much diarrhea       Objective: Vitals:   Temp (24hrs), Av °F (36 7 °C), Min:97 2 °F (36 2 °C), Max:98 7 °F (37 1 °C)    Temp:  [97 2 °F (36 2 °C)-98 7 °F (37 1 °C)] 98 2 °F (36 8 °C)  HR:  [69-78] 77  Resp:  [16-18] 16  BP: ()/() 128/73  SpO2:  [92 %-97 %] 96 %  Body mass index is 56 22 kg/m²  Input and Output Summary (last 24 hours): Intake/Output Summary (Last 24 hours) at 2021 1532  Last data filed at 2021 1327  Gross per 24 hour   Intake 400 ml   Output --   Net 400 ml       Physical Exam:   Physical Exam  Constitutional:       General: She is not in acute distress  Appearance: Normal appearance  She is morbidly obese  She is not ill-appearing or diaphoretic  HENT:      Head: Normocephalic and atraumatic  Mouth/Throat:      Mouth: Mucous membranes are moist    Eyes:      General: No scleral icterus  Pupils: Pupils are equal, round, and reactive to light  Cardiovascular:      Rate and Rhythm: Normal rate and regular rhythm  Pulses: Normal pulses  Heart sounds: Normal heart sounds, S1 normal and S2 normal  No murmur heard  No systolic murmur is present  No diastolic murmur is present  No gallop  No S3 or S4 sounds  Pulmonary:      Effort: Pulmonary effort is normal  No accessory muscle usage or respiratory distress  Breath sounds: Normal breath sounds  No stridor  No decreased breath sounds, wheezing, rhonchi or rales  Chest:      Chest wall: No tenderness  Abdominal:      General: Bowel sounds are normal  There is no distension  Palpations: Abdomen is soft  Tenderness: There is no abdominal tenderness  There is no guarding  Musculoskeletal:      Right lower leg: No edema  Left lower leg: No edema  Skin:     General: Skin is warm and dry  Coloration: Skin is not jaundiced  Neurological:      General: No focal deficit present  Mental Status: She is alert  Mental status is at baseline  Motor: No tremor or seizure activity  Psychiatric:         Behavior: Behavior is cooperative            Additional Data:     Labs:  Results from last 7 days   Lab Units 09/13/21  0600   WBC Thousand/uL 3 03*   HEMOGLOBIN g/dL 12 2   HEMATOCRIT % 37 4   PLATELETS Thousands/uL 98*   NEUTROS PCT % 62   LYMPHS PCT % 27   MONOS PCT % 8   EOS PCT % 3     Results from last 7 days   Lab Units 09/13/21  1059 09/11/21  0155   SODIUM mmol/L 144 142   POTASSIUM mmol/L 3 1* 3 4*   CHLORIDE mmol/L 109* 106   CO2 mmol/L 26 30   BUN mg/dL 7 14   CREATININE mg/dL 0 51* 0 50*   ANION GAP mmol/L 9 6   CALCIUM mg/dL 7 1* 8 2*   ALBUMIN g/dL  --  2 8*   TOTAL BILIRUBIN mg/dL  --  0 95   ALK PHOS U/L  --  95   ALT U/L  --  31   AST U/L  --  46*   GLUCOSE RANDOM mg/dL 84 103     Results from last 7 days   Lab Units 09/13/21  0600   INR  1 21*     Results from last 7 days   Lab Units 09/13/21  1131 09/13/21  0554 09/12/21  2353 09/12/21  1900 09/12/21  1215 09/12/21  0716 09/12/21  0629 09/11/21  2345 09/11/21  1803 09/11/21  1205 09/11/21  0625 09/11/21  0028   POC GLUCOSE mg/dl 114 87 86 111 97 100 97 102 92 91 110 94               Lines/Drains:  Invasive Devices     Peripheral Intravenous Line            Long-Dwell Peripheral IV (Midline) 94/28/80 Right Cephalic 2 days                      Imaging: Reviewed radiology reports from this admission including: procedure reports    Recent Cultures (last 7 days):   Results from last 7 days   Lab Units 09/10/21  2127   C DIFF TOXIN B BY PCR  Negative       Last 24 Hours Medication List:   Current Facility-Administered Medications   Medication Dose Route Frequency Provider Last Rate    acetaminophen  650 mg Oral Q6H PRN Ramona Will MD      aluminum-magnesium hydroxide-simethicone  30 mL Oral Q4H PRN Ramona Will MD      cholestyramine sugar free  4 g Oral BID Ramona Will MD      dicyclomine  20 mg Oral BID Ramona Will MD      diphenoxylate-atropine  1 tablet Oral 4x Daily PRN Mona Pratt PA-C      DULoxetine  30 mg Oral Daily Asuncion Moyer MD      folic acid  1 mg Oral Daily Asuncion Moyer MD      heparin (porcine)  3-30 Units/kg/hr (Order-Specific) Intravenous Titrated Dajuan Mercer PA-C      heparin (porcine)  10,000 Units Intravenous Q1H PRN Asuncion Moyer MD      heparin (porcine)  5,000 Units Intravenous Q1H PRN Asuncion Moyer MD      insulin lispro  1-5 Units Subcutaneous Q6H [address] Asuncion Moyer MD      lactated ringers  100 mL/hr Intravenous Continuous Asuncion Moyer MD Stopped (09/10/21 1500)    multivitamin-minerals  1 tablet Oral Daily Asuncion Moyer MD      ondansetron  4 mg Oral Q6H PRN Asuncion Moyer MD      pantoprazole  40 mg Intravenous Q12H [address] Asuncion Moyer MD      sodium chloride  1,000 mL Intravenous Once Asuncion Moyer MD Stopped (09/09/21 2101)    thiamine  100 mg Oral Daily Asuncion Moyer MD      [START ON 9/14/2021] warfarin  6 mg Oral Daily (warfarin) Dajuan Mercer PA-C      warfarin  7 5 mg Oral Weekly Dajuan Mercer PA-C          Today, Patient Was Seen By: Lilia Lynch PA-C    **Please Note: This note may have been constructed using a voice recognition system  **

## 2021-09-13 NOTE — ANESTHESIA POSTPROCEDURE EVALUATION
Post-Op Assessment Note    CV Status:  Stable  Pain Score: 0    Pain management: adequate     Mental Status:  Alert and awake   Hydration Status:  Euvolemic   PONV Controlled:  Controlled   Airway Patency:  Patent      Post Op Vitals Reviewed: Yes      Staff: CRNA   Comments: Pt awake, alert, able to maintain own airway, VSS, report to recovery RN        No complications documented      BP  148/74   Temp     Pulse  77   Resp   16   SpO2   97% RA

## 2021-09-13 NOTE — ASSESSMENT & PLAN NOTE
· History of aortic valve replacement  · Warfarin 6 mg daily except warfarin 7 5 mg on Mondays    · Continue Heparin-Coumadin bridge

## 2021-09-13 NOTE — ASSESSMENT & PLAN NOTE
Wt Readings from Last 3 Encounters:   09/13/21 (!) 158 kg (348 lb 5 2 oz)   08/11/21 (!) 158 kg (348 lb)   07/28/21 (!) 158 kg (348 lb)    Appears compensated at this time  Last Echo from 2021 showed EF of 65%  o Monitor intake and output, daily weights   o Low sodium diet and fluid restriction  o Continue home medications     Hold diuretics until PO Intake improves

## 2021-09-13 NOTE — ASSESSMENT & PLAN NOTE
· Present on admission, lipase of 1010   CT abdomen pelvis without acute findings, but did show cirrhosis and evidence of diarrheal illness   · Doubt pancreatitis, but likely PUD/gastritis   · EGD unremarkable   · Stop PPI, no indication   · Add Mylanta PRN   · Stop IV Dilaudid   · Advance diet as tolerated  · Restart Heparin drip, stop 6 hours prior to procedure

## 2021-09-13 NOTE — ANESTHESIA PREPROCEDURE EVALUATION
Procedure:  COLONOSCOPY  EGD    Relevant Problems   CARDIO   (+) History of mechanical aortic valve replacement   (+) Hyperlipidemia   (+) S/P aortic valve replacement with prosthetic valve   (+) Status post mechanical aortic valve replacement      ENDO   (+) Diabetes mellitus type 2 in obese (HCC)   (+) Hypothyroidism      MUSCULOSKELETAL   (+) Back pain   (+) Fibromyalgia      NEURO/PSYCH   (+) Fibromyalgia   (+) History of ovarian cancer   (+) History of pulmonary embolus (PE)   (+) Personal history of DVT (deep vein thrombosis)        Physical Exam    Airway    Mallampati score: III  TM Distance: >3 FB  Neck ROM: full     Dental   No notable dental hx     Cardiovascular  Cardiovascular exam normal    Pulmonary  Pulmonary exam normal     Other Findings      Previous heavy drinker but not any more  Denies symptoms of CHF  Anesthesia Plan  ASA Score- 3     Anesthesia Type- IV sedation with anesthesia and general with ASA Monitors  Additional Monitors:   Airway Plan:           Plan Factors-Exercise tolerance (METS): >4 METS  Chart reviewed  Existing labs reviewed  Patient summary reviewed  Patient is not a current smoker  Induction- intravenous  Postoperative Plan-     Informed Consent- Anesthetic plan and risks discussed with patient

## 2021-09-14 VITALS
BODY MASS INDEX: 47.09 KG/M2 | DIASTOLIC BLOOD PRESSURE: 68 MMHG | OXYGEN SATURATION: 97 % | HEART RATE: 66 BPM | RESPIRATION RATE: 17 BRPM | TEMPERATURE: 98.6 F | WEIGHT: 293 LBS | SYSTOLIC BLOOD PRESSURE: 121 MMHG | HEIGHT: 66 IN

## 2021-09-14 LAB
APTT PPP: 109 SECONDS (ref 23–37)
GLUCOSE SERPL-MCNC: 110 MG/DL (ref 65–140)
GLUCOSE SERPL-MCNC: 127 MG/DL (ref 65–140)
HCV RNA SERPL NAA+PROBE-ACNC: NORMAL IU/ML
INR PPP: 1.23 (ref 0.84–1.19)
PROTHROMBIN TIME: 15.5 SECONDS (ref 11.6–14.5)
TEST INFORMATION: NORMAL

## 2021-09-14 PROCEDURE — 99239 HOSP IP/OBS DSCHRG MGMT >30: CPT | Performed by: PHYSICIAN ASSISTANT

## 2021-09-14 PROCEDURE — 85610 PROTHROMBIN TIME: CPT | Performed by: PHYSICIAN ASSISTANT

## 2021-09-14 PROCEDURE — 82948 REAGENT STRIP/BLOOD GLUCOSE: CPT

## 2021-09-14 PROCEDURE — 99232 SBSQ HOSP IP/OBS MODERATE 35: CPT | Performed by: INTERNAL MEDICINE

## 2021-09-14 PROCEDURE — 85730 THROMBOPLASTIN TIME PARTIAL: CPT | Performed by: PHYSICIAN ASSISTANT

## 2021-09-14 PROCEDURE — C9113 INJ PANTOPRAZOLE SODIUM, VIA: HCPCS | Performed by: INTERNAL MEDICINE

## 2021-09-14 RX ORDER — ENOXAPARIN SODIUM 300 MG/3ML
1 INJECTION INTRAVENOUS; SUBCUTANEOUS EVERY 12 HOURS SCHEDULED
Status: DISCONTINUED | OUTPATIENT
Start: 2021-09-14 | End: 2021-09-14 | Stop reason: HOSPADM

## 2021-09-14 RX ORDER — NYSTATIN 100000 [USP'U]/G
POWDER TOPICAL 2 TIMES DAILY
Status: DISCONTINUED | OUTPATIENT
Start: 2021-09-14 | End: 2021-09-14 | Stop reason: HOSPADM

## 2021-09-14 RX ORDER — LANOLIN ALCOHOL/MO/W.PET/CERES
6 CREAM (GRAM) TOPICAL
Status: DISCONTINUED | OUTPATIENT
Start: 2021-09-14 | End: 2021-09-14 | Stop reason: HOSPADM

## 2021-09-14 RX ADMIN — THIAMINE HCL TAB 100 MG 100 MG: 100 TAB at 08:25

## 2021-09-14 RX ADMIN — DULOXETINE HYDROCHLORIDE 30 MG: 30 CAPSULE, DELAYED RELEASE ORAL at 08:25

## 2021-09-14 RX ADMIN — SODIUM CHLORIDE, SODIUM LACTATE, POTASSIUM CHLORIDE, AND CALCIUM CHLORIDE 100 ML/HR: .6; .31; .03; .02 INJECTION, SOLUTION INTRAVENOUS at 10:10

## 2021-09-14 RX ADMIN — FOLIC ACID 1 MG: 1 TABLET ORAL at 08:25

## 2021-09-14 RX ADMIN — MULTIPLE VITAMINS W/ MINERALS TAB 1 TABLET: TAB ORAL at 08:25

## 2021-09-14 RX ADMIN — Medication 6 MG: at 00:22

## 2021-09-14 RX ADMIN — PANTOPRAZOLE SODIUM 40 MG: 40 INJECTION, POWDER, FOR SOLUTION INTRAVENOUS at 08:25

## 2021-09-14 RX ADMIN — NYSTATIN: 100000 POWDER TOPICAL at 08:39

## 2021-09-14 RX ADMIN — DIPHENOXYLATE HYDROCHLORIDE AND ATROPINE SULFATE 1 TABLET: 2.5; .025 TABLET ORAL at 08:28

## 2021-09-14 RX ADMIN — DICYCLOMINE HYDROCHLORIDE 20 MG: 20 TABLET ORAL at 08:25

## 2021-09-14 RX ADMIN — HEPARIN SODIUM 16 UNITS/KG/HR: 10000 INJECTION, SOLUTION INTRAVENOUS at 04:06

## 2021-09-14 RX ADMIN — ENOXAPARIN SODIUM 160 MG: 300 INJECTION INTRAVENOUS; SUBCUTANEOUS at 13:16

## 2021-09-14 NOTE — ASSESSMENT & PLAN NOTE
· History of aortic valve replacement  · Subtherapeutic INR of 1 76 on admission  Goal INR 2 5-3 5    · Patient is on 6 mg of warfarin every day except Monday and Friday for which she takes 7 5 mg  · Will discharge patient on Lovenox subcu in addition to warfarin, will notify PCP she will need repeat lab work in the outpatient setting and follow up

## 2021-09-14 NOTE — ASSESSMENT & PLAN NOTE
· Present on admission, lipase of 1010   CT abdomen pelvis without acute findings, but did show cirrhosis and evidence of diarrheal illness   · Doubt pancreatitis, but likely PUD/gastritis   · EGD 9/13 shows mild gastropathy  · Colonoscopy 9/13 shows 3 polyps that were removed  · Continue supportive care  · Diet as tolerated

## 2021-09-14 NOTE — ASSESSMENT & PLAN NOTE
Wt Readings from Last 3 Encounters:   09/13/21 (!) 158 kg (348 lb 5 2 oz)   08/11/21 (!) 158 kg (348 lb)   07/28/21 (!) 158 kg (348 lb)    Appears compensated at this time  Last Echo from 2021 showed EF of 65%  o Monitor intake and output, daily weights   o Low sodium diet and fluid restriction    o Hold diuretics until PO Intake improves

## 2021-09-14 NOTE — DISCHARGE INSTR - AVS FIRST PAGE
Take Lovenox twice daily with Coumadin at night  Check INR on Friday morning or Monday morning  Follow-up with PCP

## 2021-09-14 NOTE — ASSESSMENT & PLAN NOTE
· She has not been taking her home medications; been off on for quite some time    · Will hold off on restarting all of her anti psych medications at this time

## 2021-09-14 NOTE — DISCHARGE SUMMARY
[facility]  Discharge- Formerly [facility] 1960, 63 y o  female MRN: [id number]  Unit/Bed#: W -31 Encounter: [id number]  Primary Care Provider: Rosa Meadows MD   Date and time admitted to hospital: 9/9/2021  4:25 PM    * Pain of upper abdomen  Assessment & Plan  · Present on admission, lipase of 1010  CT abdomen pelvis without acute findings, but did show cirrhosis and evidence of diarrheal illness   · Doubt pancreatitis, but likely PUD/gastritis   · EGD 9/13 shows mild gastropathy  · Colonoscopy 9/13 shows 3 polyps that were removed  · Continue supportive care  · Diet as tolerated    History of mechanical aortic valve replacement  Assessment & Plan  · History of aortic valve replacement  · Subtherapeutic INR of 1 76 on admission  Goal INR 2 5-3 5  · Patient is on 6 mg of warfarin every day except Monday and Friday for which she takes 7 5 mg  · Will discharge patient on Lovenox subcu in addition to warfarin, will notify PCP she will need repeat lab work in the outpatient setting and follow up    Alcohol abuse  Assessment & Plan  · Noted with cirrhosis seen on CT scan  Patient states that she drink upwards of 10 beers daily back in her youth  She states that she still drinks now, however this is intermittent  However, she tells me that she had 3 mixed drinks and 1/2 can of beer prior to admission  Reports roughly 2 shots per mixed drink  · CIWA protocol  · Ethanol negative in the emergency department    Diarrhea  Assessment & Plan  · Ongoing diarrhea  Stool studies negative  Colonoscopy unremarkable besides for polyps    · GI input noted  · Supportive care  · Bentyl, Lomotil, Questran     Heart failure with preserved ejection fraction, borderline, class III (HCC)  Assessment & Plan  Wt Readings from Last 3 Encounters:   09/13/21 (!) 158 kg (348 lb 5 2 oz)   08/11/21 (!) 158 kg (348 lb)   07/28/21 (!) 158 kg (348 lb)    Appears compensated at this time   Last Echo from 2021 showed EF of 65%  o Monitor intake and output, daily weights   o Low sodium diet and fluid restriction  o Hold diuretics until PO Intake improves    Bipolar depression ([facility] )  Assessment & Plan  · She has not been taking her home medications; been off on for quite some time  · Will hold off on restarting all of her anti psych medications at this time    Morbid obesity with BMI of 50 0-59 9, adult ([facility] [address] )  Assessment & Plan  · BMI noted  Encouraged lifestyle modifications  Hepatitis C antibody test positive  Assessment & Plan  · History of viral Hepatitis C  Hyperlipidemia  Assessment & Plan  · Patient previously on Lipitor 40 mg daily  · Patient reports not taking statin since July  Medical Problems     Resolved Problems  Date Reviewed: 9/14/2021    None              Discharging Physician / Practitioner: Leonila Ceja PA-C  PCP: Mayo Zavala MD  Admission Date:   Admission Orders (From admission, onward)     Ordered        09/09/21 2059  Inpatient Admission  Once                   Discharge Date: 09/14/21    Consultations During Hospital Stay:  · Gastroenterology     Procedures Performed:   · EGD and colonoscopy 9/13    Significant Findings / Test Results:   · Diarrhea, unknown etiology, functional, irritable bowel  · Hepatitis-C reactive antibody  · Celiac panel IgA positive  · Right upper quadrant ultrasound was cirrhotic liver and moderate hepatomegaly status post cholecystectomy no evidence of biliary ductal dilatation  · CT abdomen pelvis no acute inflammatory process, fluid-filled distal colon possible diarrheal illness, hepatic steatosis with a lobulated liver suggesting early cirrhosis, gas in the bladder    Incidental Findings:   · None      Test Results Pending at Discharge (will require follow up):    · Pathology from colonoscopy     Outpatient Tests Requested:  · Follow-up with PCP    Complications:  None    Reason for Admission:  Diarrhea    Hospital Course:   Lona Chino is a 63 y o  female patient with past medical history significant for mechanical aortic valve, hyperlipidemia, hepatitis-C, morbid obesity, bipolar depression, diastolic heart failure, history of alcohol abuse who originally presented to the hospital on 9/9/2021 due to four-month history of diarrhea and worsening epigastric abdominal pain  Patient was being worked up in the outpatient setting with no improvement  She was evaluated by Gastroenterology and underwent EGD and colonoscopy on 09/13  These were unremarkable for etiology  She did have some improvement in her diarrhea and was tolerating regular diet  She will follow up with Gastroenterology in the outpatient setting  Her PT INR was subtherapeutic and she was discharged on Lovenox bridge  She expressed understanding and agreed with plan  Please see above list of diagnoses and related plan for additional information  Condition at Discharge: stable    Discharge Day Visit / Exam:     Subjective:  Patient seen examined at bedside  States she feels improved today  Had a bowel movement that was solid  Tolerated regular diet  Vitals: Blood Pressure: 121/68 (09/14/21 0749)  Pulse: 66 (09/14/21 0749)  Temperature: 98 6 °F (37 °C) (09/14/21 0749)  Temp Source: Oral (09/14/21 0749)  Respirations: 17 (09/14/21 0749)  Height: 5' 6" (167 6 cm) (09/13/21 1420)  Weight - Scale: (!) 158 kg (348 lb 5 2 oz) (09/13/21 1420)  SpO2: 97 % (09/14/21 0749)    Exam:   Physical Exam  Constitutional:       Appearance: Normal appearance  She is obese  HENT:      Head: Normocephalic and atraumatic  Mouth/Throat:      Mouth: Mucous membranes are moist    Cardiovascular:      Rate and Rhythm: Normal rate and regular rhythm  Heart sounds: Murmur heard  Abdominal:      General: Bowel sounds are normal       Palpations: Abdomen is soft  Musculoskeletal:         General: Normal range of motion  Cervical back: Normal range of motion and neck supple     Skin: General: Skin is warm and dry  Neurological:      General: No focal deficit present  Mental Status: She is alert and oriented to person, place, and time  Psychiatric:         Mood and Affect: Mood normal         Discussion with Family: Patient declined call to   Discharge instructions/Information to patient and family:   See after visit summary for information provided to patient and family  Provisions for Follow-Up Care:  See after visit summary for information related to follow-up care and any pertinent home health orders  Disposition:   Home    Planned Readmission: none     Discharge Statement:  I spent 45 minutes discharging the patient  This time was spent on the day of discharge  I had direct contact with the patient on the day of discharge  Greater than 50% of the total time was spent examining patient, answering all patient questions, arranging and discussing plan of care with patient as well as directly providing post-discharge instructions  Additional time then spent on discharge activities  Discharge Medications:  See after visit summary for reconciled discharge medications provided to patient and/or family        **Please Note: This note may have been constructed using a voice recognition system**

## 2021-09-14 NOTE — TELEPHONE ENCOUNTER
Pt Hospitalized on 9/9/21, FLIP done by Dr Mallory Him 9/13/21  Pt still in hospital as of today  Her sister called to Cxl appt with ML 9/15 and Colonoscopy on 10/27/21

## 2021-09-14 NOTE — CASE MANAGEMENT
CM contacted [address]  Spoke with Jose Comment  Patient's Lovenox has no copay cost  Lovenox will be ready at 1300  Patient will have to  medication at pharmacy  CM spoke with patient at the bedside  CM introduced self and role  Patient updated Lovenox is covered 100%  Patient updated prescription will be available today after 1300  Patient aware herself or her sister will need to  script at discharge  CM provided location of pharmacy to patient  Patient stated her sister will pick her up today at 1400  Patient had some questions about diabetes and her nutrition  Nutrition left information at bedside  CM messaged nutritionist to request f/u with patient re:questions/concerns  SLIM and nursing updated

## 2021-09-15 ENCOUNTER — PATIENT OUTREACH (OUTPATIENT)
Dept: CASE MANAGEMENT | Facility: OTHER | Age: 61
End: 2021-09-15

## 2021-09-15 DIAGNOSIS — Z71.89 COMPLEX CARE COORDINATION: Primary | ICD-10-CM

## 2021-09-15 LAB — CALPROTECTIN STL-MCNT: 41 UG/G (ref 0–120)

## 2021-09-15 NOTE — UTILIZATION REVIEW
Notification of Discharge   This is a Notification of Discharge from our facility [address]  Please be advised that this patient has been discharge from our facility  Below you will find the admission and discharge date and time including the patients disposition  UTILIZATION REVIEW CONTACT:  Sharyle Schlichter  Utilization   Network Utilization Review Department  Phone: [phone] x carefully listen to the prompts  All voicemails are confidential   Email: [email or web address]     PHYSICIAN ADVISORY SERVICES:  FOR [id number] REVIEW - MEDICAL NECESSITY DENIAL  Phone: [phone]  Fax: [phone]  Email: [email or web address]     PRESENTATION DATE: 9/9/2021  4:25 PM  OBERVATION ADMISSION DATE:  INPATIENT ADMISSION DATE: 9/9/21  8:59 PM   DISCHARGE DATE: 9/14/2021  4:54 PM  DISPOSITION: Home/Self Care Home/Self Care      IMPORTANT INFORMATION:  Send all requests for admission clinical reviews, approved or denied determinations and any other requests to dedicated fax number below belonging to the campus where the patient is receiving treatment   List of dedicated fax numbers:  [address] DENIALS (Administrative/Medical Necessity) [phone]   [address] [place] [address] (Maternity/NICU/Pediatrics) [phone]   Milton Paul [phone]   Kesha Counts [phone]   Zenobia Mcgee [phone]   [address] [phone]   [facility]  [phone]   [address] [place] [address], [address]  [address] [facility] [address] [facility] [phone]

## 2021-09-15 NOTE — PROGRESS NOTES
In basket message received of discharge from hospital  Chart reviewed  I spoke with Kamini Orozco who reports "things are getting back to normal"  She is on a call with her therapist and asked me to call her back in 10 minutes  I asked her to ave her discharge instructions so we can discuss

## 2021-09-16 ENCOUNTER — PATIENT OUTREACH (OUTPATIENT)
Dept: CASE MANAGEMENT | Facility: OTHER | Age: 61
End: 2021-09-16

## 2021-09-16 ENCOUNTER — TELEPHONE (OUTPATIENT)
Dept: GASTROENTEROLOGY | Facility: AMBULARY SURGERY CENTER | Age: 61
End: 2021-09-16

## 2021-09-16 ENCOUNTER — TRANSITIONAL CARE MANAGEMENT (OUTPATIENT)
Dept: FAMILY MEDICINE CLINIC | Facility: CLINIC | Age: 61
End: 2021-09-16

## 2021-09-16 LAB
HCV GENTYP SERPL NAA+PROBE: NORMAL
HCV PLEASE NOTE: NORMAL

## 2021-09-16 NOTE — TELEPHONE ENCOUNTER
Informed pt of results, pt verbalized understanding  Advised to call with any questions or concerns  PT understood  PT states she is still having diarrhea at least 3 x a day  She thinks it has to do with the covid shot  She stated the medications are not helping  Please advise    Thank you

## 2021-09-16 NOTE — TELEPHONE ENCOUNTER
----- Message from Elaina Moore MD sent at 9/16/2021 12:05 PM EDT -----  Colon polyps removed came back as pre cancerous tubular adenomas  Patient to continue Questran p r n  For diarrhea      Colonoscopy in 3 years

## 2021-09-16 NOTE — PROGRESS NOTES
I spoke with patient who read her discharge instructions and asked could she give herself the Trulicty in the arm since she is giving her Lovenox in the abdomen  After checking the instructions I advised her she can use the arms  She is stilll having diarrhea and I advised her to take the Lomotil as directed  She asked if she could put Questran in jello or yogurt since it is not palatable for her to drink mixed in water  According to the instructions Jessica Mis can be taken in soft foods and I advised her of this  She reports she has an appointment with her PCP in two weeks  I advised her she has only 30 days of her prescription medications and to make sure she sees the doctor  She has my contact information and will call with questions/concerns

## 2021-09-17 DIAGNOSIS — R19.7 DIARRHEA, UNSPECIFIED TYPE: ICD-10-CM

## 2021-09-17 RX ORDER — DICYCLOMINE HCL 20 MG
20 TABLET ORAL 2 TIMES DAILY
Qty: 60 TABLET | Refills: 3 | Status: SHIPPED | OUTPATIENT
Start: 2021-09-17 | End: 2021-11-30 | Stop reason: ALTCHOICE

## 2021-09-17 NOTE — TELEPHONE ENCOUNTER
Please schedule patient for follow-up appointment with Dr Derrell Anne ( Patient follow with him as outpatient)  I sent this message to [facility] scheduling Department  Spoke to patient on phone she should continue Questran 2 times a day and Lomotil 4 times daily as needed for diarrhea  Patient reports she still continues to have diarrhea 3 times daily this has been ongoing ever since she received the 2nd COVID injection  I put patient on dicyclomine 20 mg 2 times a day  Reviewed results of recent colonoscopy which showed colon polyps  Biopsies positive for tubular adenomas  Also reviewed CT of abdomen and pelvis which was done when she was hospitalized which is consistent with fluid-filled colon which correlates with reports of diarrhea  CT scan also suggests direct cirrhosis  Will have her follow-up with Dr Derrell Anne for further recommendations    Thank you

## 2021-09-20 ENCOUNTER — APPOINTMENT (OUTPATIENT)
Dept: LAB | Facility: HOSPITAL | Age: 61
End: 2021-09-20
Payer: COMMERCIAL

## 2021-09-20 DIAGNOSIS — Z86.718 PERSONAL HISTORY OF DVT (DEEP VEIN THROMBOSIS): ICD-10-CM

## 2021-09-20 DIAGNOSIS — Z86.711 HISTORY OF PULMONARY EMBOLUS (PE): ICD-10-CM

## 2021-09-20 DIAGNOSIS — Z95.2 S/P AORTIC VALVE REPLACEMENT WITH PROSTHETIC VALVE: ICD-10-CM

## 2021-09-20 LAB
INR PPP: 1.54 (ref 0.9–1.1)
PROTHROMBIN TIME: 17 SECONDS (ref 9.5–12.1)

## 2021-09-20 PROCEDURE — 85610 PROTHROMBIN TIME: CPT

## 2021-09-20 PROCEDURE — 36415 COLL VENOUS BLD VENIPUNCTURE: CPT

## 2021-09-21 ENCOUNTER — ANTICOAG VISIT (OUTPATIENT)
Dept: CARDIOLOGY CLINIC | Facility: CLINIC | Age: 61
End: 2021-09-21

## 2021-09-21 DIAGNOSIS — Z95.2 STATUS POST MECHANICAL AORTIC VALVE REPLACEMENT: Primary | ICD-10-CM

## 2021-09-22 ENCOUNTER — APPOINTMENT (OUTPATIENT)
Dept: LAB | Facility: HOSPITAL | Age: 61
End: 2021-09-22
Payer: COMMERCIAL

## 2021-09-22 ENCOUNTER — ANTICOAG VISIT (OUTPATIENT)
Dept: CARDIOLOGY CLINIC | Facility: CLINIC | Age: 61
End: 2021-09-22

## 2021-09-22 DIAGNOSIS — Z95.2 STATUS POST MECHANICAL AORTIC VALVE REPLACEMENT: Primary | ICD-10-CM

## 2021-09-22 NOTE — UTILIZATION REVIEW
Initial Clinical Review    Admission: Date/Time/Statement:   Admission Orders (From admission, onward)     Ordered        07/10/21 1253  Place in Observation  Once                   Orders Placed This Encounter   Procedures    Place in Observation     Standing Status:   Standing     Number of Occurrences:   1     Order Specific Question:   Level of Care     Answer:   Med Surg [16]     ED Arrival Information     Expected Arrival Acuity    - 7/10/2021 10:45 Urgent         Means of arrival Escorted by Service Admission type    Walk-In Self Hospitalist Urgent         Arrival complaint            Chief Complaint   Patient presents with    Back Pain     Right lower backl pain, work up with it this morning, reports pain takes her breath away and pain spasms  Initial Presentation: 65 yo female Past medical history adult onset diabetes, fibromyalgia, chronic pain syndrome, anxiety, GERD, DVT on Coumadin, history of mechanical aortic valve replacement, bipolar depression presented due to low back pain and shortness of breath  Patient was evaluated in the ED 1 day prior to admission complaining of chronic diarrhea for for over a month  She low longer has diarrhea  Currently she states she has severe back pain on the right side  She complains that her legs are not working and that she has shooting pain in her legs  Due to the pain she is anxious and has difficulty breathing  Breathing worsens with exertion  Oxygen saturation is % on room air    She has had difficulty walking for the past couple of days      Labs show INR 1 9, D-dimer 0 63 glucose elevated 294     CTA shows no pulmonary embolism, cirrhotic morphology of the liver with probable steatosis     * Back pain  Assessment & Plan  Patient has severe back pain in the right side close to the costovertebral angle  She complains of pain in her legs  She says she has had weakness in her legs as well  Likely related to fibromyalgia  Continue morphine REVIEW OF CARDIOVASCULAR SYSTEMS:  Cardiovascular problem(s): Negative    Patient does not smoke.    Patient does take aspirin.   p r n  Patient likely short of breath because of anxiety  Currently she is saturating well on room air and is calm  CTA was done and was negative  D-dimer slightly elevated 0 63  History of mechanical aortic valve replacement  Assessment & Plan  History of aortic valve replacement  Takes warfarin 6 mg daily  Monitor INR     Diabetes mellitus type 2 in obese [facility])  Assessment & Plan        Lab Results   Component Value Date     HGBA1C 7 5 (A) 04/12/2021         No results for input(s): POCGLU in the last 72 hours  Home oral medications discontinued  On sliding scale insulin  Hemoglobin A1c in April was 7 5     Blood Sugar Average: Last 72 hrs:        Morbid obesity with BMI of 50 0-59 9, adult [facility])  Assessment & Plan  Patient counseled on weight loss     Bipolar depression [facility])  Assessment & Plan  Psychiatry consulted  Patient is not taking psych medications     VTE Prophylaxis: Warfarin (Coumadin)  / sequential compression device   Code Status: DNR/DNI  POLST: There is no POLST form on file for this patient (pre-hospital)  Discussion with family:  None     Anticipated Length of Stay:  Patient will be admitted on an Observation basis with an anticipated length of stay of  less than 2 midnights     Justification for Hospital Stay:  Severe back pain with shortness of breath    Date:    Day 2:     ED Triage Vitals   Temperature Pulse Respirations Blood Pressure SpO2   07/10/21 1047 07/10/21 1047 07/10/21 1047 07/10/21 1047 07/10/21 1047   98 °F (36 7 °C) 75 22 169/58 98 %      Temp Source Heart Rate Source Patient Position - Orthostatic VS BP Location FiO2 (%)   07/10/21 1047 07/10/21 1047 07/10/21 1336 07/10/21 1047 --   Oral Monitor Lying Left arm       Pain Score       07/10/21 1118       6          Wt Readings from Last 1 Encounters:   07/10/21 (!) 162 kg (357 lb 2 3 oz)     Additional Vital Signs:   Pertinent Labs/Diagnostic Test Results:       Results from last 7 days   Lab Units 07/11/21  0545 07/10/21  1117 07/09/21  1359   WBC Thousand/uL 3 07* 3 45* 2 90*   HEMOGLOBIN g/dL 13 0 12 8 13 2   HEMATOCRIT % 40 4 39 8 41 2   PLATELETS Thousands/uL 132* 130* 133*   NEUTROS ABS Thousands/µL  --  2 48 1 97         Results from last 7 days   Lab Units 07/11/21  0545 07/10/21  1117 07/09/21  1359   SODIUM mmol/L 140 138 138   POTASSIUM mmol/L 3 6 4 0 3 6   CHLORIDE mmol/L 102 102 100   CO2 mmol/L 33 29 31   ANION GAP mmol/L 5 7 7   BUN mg/dL 14 13 13   CREATININE mg/dL 0 62 0 64 0 57   EGFR ml/min/1 73sq m 100 99 103   CALCIUM mg/dL 8 4 8 6 8 5     Results from last 7 days   Lab Units 07/10/21  1117 07/09/21  1359   AST U/L 29 31   ALT U/L 19 18   ALK PHOS U/L 72 6 68 9   TOTAL PROTEIN g/dL 6 8 6 8   ALBUMIN g/dL 3 5 3 5   TOTAL BILIRUBIN mg/dL 0 65 0 87     Results from last 7 days   Lab Units 07/11/21  0715 07/10/21  2046 07/10/21  1606 07/10/21  1430   POC GLUCOSE mg/dl 149* 118 210* 149*     Results from last 7 days   Lab Units 07/11/21  0545 07/10/21  1117 07/09/21  1359   GLUCOSE RANDOM mg/dL 149* 294* 338*             No results found for: BETA-HYDROXYBUTYRATE                   Results from last 7 days   Lab Units 07/10/21  2058 07/10/21  1608 07/10/21  1117 07/09/21  1359   TROPONIN I ng/mL <0 03 <0 03 <0 03 <0 03     Results from last 7 days   Lab Units 07/10/21  1117   D-DIMER QUANTITATIVE mg/L FEU 0 63*     Results from last 7 days   Lab Units 07/11/21  0545 07/10/21  1117   PROTIME seconds 20 6* 20 9*   INR  1 88* 1 91*     Results from last 7 days   Lab Units 07/11/21  0545   TSH 3RD GENERATON uIU/mL 3 105                                 Results from last 7 days   Lab Units 07/10/21  1117 07/09/21  1359   LIPASE u/L 54 58             Results from last 7 days   Lab Units 07/09/21  1449   CLARITY UA  Clear   COLOR UA  Yellow   SPEC GRAV UA  1 015   PH UA  7 0   GLUCOSE UA mg/dl 3+*   KETONES UA mg/dl Negative   BLOOD UA  Negative   PROTEIN UA mg/dl Negative   NITRITE UA  Negative   BILIRUBIN UA  Negative UROBILINOGEN UA E U /dl >=8 0*   LEUKOCYTES UA  Negative                         Results from last 7 days   Lab Units 07/09/21  1408   SALMONELLA SP PCR  None Detected   SHIGELLA SP/ENTEROINVASIVE E  COLI (EIEC)  None Detected   CAMPYLOBACTER SP (JEJUNI AND COLI)  None Detected   SHIGA TOXIN 1/SHIGA TOXIN 2  None Detected                       ED Treatment:   Medication Administration from 07/10/2021 1045 to 07/10/2021 1335       Date/Time Order Dose Route Action Action by Comments     07/10/2021 1300 sodium chloride 0 9 % infusion 0 mL/hr Intravenous Stopped [place] Michael, RN      07/10/2021 1118 sodium chloride 0 9 % infusion 500 mL/hr Intravenous New [address] Jalen Marcelino, RN      07/10/2021 1120 HYDROmorphone (DILAUDID) injection 0 5 mg 0 5 mg Intravenous Given Jalen Gallus, RN      07/10/2021 1118 ondansetron (ZOFRAN) injection 4 mg 4 mg Intravenous Given Jalen Marcelino, RN      07/10/2021 1250 iohexol (OMNIPAQUE) 350 MG/ML injection (SINGLE-DOSE) 100 mL 100 mL Intravenous Given Rober Cunningham         Past Medical History:   Diagnosis Date    Anemia     Anxiety     Aortic valve disorder     Back pain     Chronic pain syndrome     Constipation     Degenerative joint disease involving multiple joints     Depression     DVT (deep venous thrombosis) (HCC)     Bilateral    Edema     Endometrial adenocarcinoma (HCC)     Fibromyalgia     Ganglion of right wrist     GERD (gastroesophageal reflux disease)     Heart murmur     Hematoma     History of mechanical aortic valve replacement     Hypothyroidism (acquired)     Laboratory confirmed diagnosis of COVID-19 03/16/2021    Low blood pressure     Malignant neoplasm of corpus uteri, except isthmus (HCC)     Mixed hyperlipidemia     Morbid obesity (HCC)     Obstructive sleep apnea syndrome     Pulmonary embolism (HCC)     Tobacco dependence syndrome     Transient cerebral ischemia     Urinary incontinence     Viral hepatitis C     Vitamin D deficiency      Present on Admission:   Diabetes mellitus type 2 in obese ([facility] [address] )   [facility]      Admitting Diagnosis: Back pain [M54 9]  Dyspnea, unspecified type [R06 00]  Age/Sex: 64 y o  female  Admission Orders:  Scheduled Medications:  atorvastatin, 20 mg, Oral, Daily  furosemide, 40 mg, Oral, Daily  insulin lispro, 1-5 Units, Subcutaneous, TID AC  insulin lispro, 1-5 Units, Subcutaneous, HS  nicotine, 1 patch, Transdermal, Daily  nystatin, , Topical, BID  warfarin, 6 mg, Oral, Daily      Continuous IV Infusions:     PRN Meds:  oxyCODONE-acetaminophen, 1 tablet, Oral, Q6H PRN  traZODone, 50 mg, Oral, HS PRN        IP CONSULT TO PSYCHIATRY    Network Utilization Review Department  ATTENTION: Please call with any questions or concerns to [phone] and carefully listen to the prompts so that you are directed to the right person  All voicemails are confidential   Julio Pedro all requests for admission clinical reviews, approved or denied determinations and any other requests to dedicated fax number below belonging to the campus where the patient is receiving treatment   List of dedicated fax numbers for the Facilities:  [address] [place] [address] DENIALS (Administrative/Medical Necessity) [phone]   [address] (Maternity/NICU/Pediatrics) [phone]   [address] [place] [phone] [id number] [place] [address] Ashlyn Chelle [address] [facility] [address] [facility] [phone]

## 2021-09-23 ENCOUNTER — PATIENT OUTREACH (OUTPATIENT)
Dept: CASE MANAGEMENT | Facility: OTHER | Age: 61
End: 2021-09-23

## 2021-09-23 ENCOUNTER — APPOINTMENT (OUTPATIENT)
Dept: LAB | Facility: HOSPITAL | Age: 61
End: 2021-09-23
Payer: COMMERCIAL

## 2021-09-23 ENCOUNTER — ANTICOAG VISIT (OUTPATIENT)
Dept: CARDIOLOGY CLINIC | Facility: CLINIC | Age: 61
End: 2021-09-23

## 2021-09-23 DIAGNOSIS — Z95.2 STATUS POST MECHANICAL AORTIC VALVE REPLACEMENT: Primary | ICD-10-CM

## 2021-09-23 NOTE — PROGRESS NOTES
I spoke with Nathan Nabil who reports she feels tired because of persistent diarrhea  She is taking both lomotil and Gina  She also reports her INR was low and she had it repeated today  Her coumadin dose was adjusted by cardiology  She has an appointment with her PCP 9/29 and I encouraged her to discuss the persistent diarrhea  She has talked to her GI office for advice  On 9/16 she received a prescription for Bentyl  Otherwise she states she is doing well  Will continue to follow

## 2021-09-24 ENCOUNTER — TELEPHONE (OUTPATIENT)
Dept: FAMILY MEDICINE CLINIC | Facility: CLINIC | Age: 61
End: 2021-09-24

## 2021-09-27 DIAGNOSIS — E11.9 TYPE 2 DIABETES MELLITUS WITHOUT COMPLICATION, WITHOUT LONG-TERM CURRENT USE OF INSULIN (HCC): ICD-10-CM

## 2021-09-27 RX ORDER — HYDROXYZINE 50 MG/1
TABLET, FILM COATED ORAL
COMMUNITY
Start: 2021-09-03 | End: 2021-12-13

## 2021-09-27 RX ORDER — CLONAZEPAM 1 MG/1
1 TABLET ORAL DAILY PRN
COMMUNITY
Start: 2021-09-03

## 2021-09-27 RX ORDER — FLASH GLUCOSE SCANNING READER
EACH MISCELLANEOUS
Qty: 2 EACH | Refills: 5 | Status: SHIPPED | OUTPATIENT
Start: 2021-09-27 | End: 2021-12-02

## 2021-09-27 RX ORDER — FLASH GLUCOSE SENSOR
KIT MISCELLANEOUS
Qty: 2 EACH | Refills: 5 | Status: SHIPPED | OUTPATIENT
Start: 2021-09-27 | End: 2021-12-02

## 2021-09-29 ENCOUNTER — OFFICE VISIT (OUTPATIENT)
Dept: FAMILY MEDICINE CLINIC | Facility: CLINIC | Age: 61
End: 2021-09-29
Payer: COMMERCIAL

## 2021-09-29 VITALS
BODY MASS INDEX: 47.09 KG/M2 | SYSTOLIC BLOOD PRESSURE: 120 MMHG | TEMPERATURE: 97.3 F | WEIGHT: 293 LBS | DIASTOLIC BLOOD PRESSURE: 60 MMHG | HEIGHT: 66 IN

## 2021-09-29 DIAGNOSIS — E78.5 HYPERLIPIDEMIA, UNSPECIFIED HYPERLIPIDEMIA TYPE: ICD-10-CM

## 2021-09-29 DIAGNOSIS — K59.1 FUNCTIONAL DIARRHEA: ICD-10-CM

## 2021-09-29 DIAGNOSIS — M54.40 CHRONIC BILATERAL LOW BACK PAIN WITH SCIATICA, SCIATICA LATERALITY UNSPECIFIED: ICD-10-CM

## 2021-09-29 DIAGNOSIS — M79.7 FIBROMYALGIA: ICD-10-CM

## 2021-09-29 DIAGNOSIS — Z76.89 ENCOUNTER FOR SUPPORT AND COORDINATION OF TRANSITION OF CARE: Primary | ICD-10-CM

## 2021-09-29 DIAGNOSIS — E11.9 TYPE 2 DIABETES MELLITUS WITHOUT COMPLICATION, WITHOUT LONG-TERM CURRENT USE OF INSULIN (HCC): ICD-10-CM

## 2021-09-29 DIAGNOSIS — Z28.21 PNEUMOCOCCAL VACCINATION DECLINED BY PATIENT: ICD-10-CM

## 2021-09-29 DIAGNOSIS — E66.9 DIABETES MELLITUS TYPE 2 IN OBESE (HCC): ICD-10-CM

## 2021-09-29 DIAGNOSIS — Z28.21 TETANUS, DIPHTHERIA, AND ACELLULAR PERTUSSIS (TDAP) VACCINATION DECLINED: ICD-10-CM

## 2021-09-29 DIAGNOSIS — Z28.21 HEPATITIS B VACCINATION DECLINED: ICD-10-CM

## 2021-09-29 DIAGNOSIS — Z28.21 INFLUENZA VACCINATION DECLINED BY PATIENT: ICD-10-CM

## 2021-09-29 DIAGNOSIS — Z95.2 S/P AORTIC VALVE REPLACEMENT WITH PROSTHETIC VALVE: ICD-10-CM

## 2021-09-29 DIAGNOSIS — R21 RASH: ICD-10-CM

## 2021-09-29 DIAGNOSIS — I50.30 HEART FAILURE WITH PRESERVED EJECTION FRACTION, BORDERLINE, CLASS III (HCC): ICD-10-CM

## 2021-09-29 DIAGNOSIS — G89.29 CHRONIC BILATERAL LOW BACK PAIN WITH SCIATICA, SCIATICA LATERALITY UNSPECIFIED: ICD-10-CM

## 2021-09-29 DIAGNOSIS — Z28.21 HEPATITIS A VACCINATION DECLINED: ICD-10-CM

## 2021-09-29 DIAGNOSIS — R19.7 DIARRHEA, UNSPECIFIED TYPE: ICD-10-CM

## 2021-09-29 DIAGNOSIS — E11.69 DIABETES MELLITUS TYPE 2 IN OBESE (HCC): ICD-10-CM

## 2021-09-29 DIAGNOSIS — E66.01 MORBID OBESITY (HCC): ICD-10-CM

## 2021-09-29 PROCEDURE — 3008F BODY MASS INDEX DOCD: CPT | Performed by: INTERNAL MEDICINE

## 2021-09-29 PROCEDURE — 99495 TRANSJ CARE MGMT MOD F2F 14D: CPT | Performed by: INTERNAL MEDICINE

## 2021-09-29 RX ORDER — DULOXETIN HYDROCHLORIDE 60 MG/1
60 CAPSULE, DELAYED RELEASE ORAL DAILY
Qty: 30 CAPSULE | Refills: 5 | Status: SHIPPED | OUTPATIENT
Start: 2021-09-29 | End: 2021-12-13 | Stop reason: SDUPTHER

## 2021-09-29 RX ORDER — DULAGLUTIDE 1.5 MG/.5ML
1.5 INJECTION, SOLUTION SUBCUTANEOUS WEEKLY
Qty: 2 ML | Refills: 5 | Status: SHIPPED | OUTPATIENT
Start: 2021-09-29 | End: 2021-11-01

## 2021-09-29 RX ORDER — LANCING DEVICE
EACH MISCELLANEOUS
Qty: 200 EACH | Refills: 5 | Status: SHIPPED | OUTPATIENT
Start: 2021-09-29 | End: 2022-02-24 | Stop reason: SDUPTHER

## 2021-09-29 RX ORDER — BACLOFEN 10 MG/1
10 TABLET ORAL 3 TIMES DAILY PRN
Qty: 90 TABLET | Refills: 3 | Status: SHIPPED | OUTPATIENT
Start: 2021-09-29 | End: 2022-01-17 | Stop reason: SDUPTHER

## 2021-09-29 RX ORDER — NYSTATIN 100000 [USP'U]/G
POWDER TOPICAL 2 TIMES DAILY
Qty: 60 G | Refills: 5 | Status: SHIPPED | OUTPATIENT
Start: 2021-09-29 | End: 2022-01-31 | Stop reason: SDUPTHER

## 2021-09-29 RX ORDER — BLOOD SUGAR DIAGNOSTIC
1 STRIP MISCELLANEOUS 3 TIMES DAILY
Qty: 300 EACH | Refills: 5 | Status: SHIPPED | OUTPATIENT
Start: 2021-09-29 | End: 2022-02-24 | Stop reason: SDUPTHER

## 2021-09-29 RX ORDER — NYSTATIN 100000 [USP'U]/G
POWDER TOPICAL 2 TIMES DAILY
Qty: 60 G | Refills: 5 | Status: SHIPPED | OUTPATIENT
Start: 2021-09-29 | End: 2021-09-29

## 2021-09-29 NOTE — ASSESSMENT & PLAN NOTE
Lab Results   Component Value Date    HGBA1C 8 3 (A) 07/15/2021   Will d/c Januvia and increase Trulicity dose-advised on diet, exercise, weight loss

## 2021-09-29 NOTE — PROGRESS NOTES
TCM Call (since 8/29/2021)     Date and time call was made  9/16/2021  3:15 PM    Hospital care reviewed  Records reviewed    Date of Admission  09/09/21    Date of discharge  09/09/21    Diagnosis  Pain of upper abdomen    Disposition  Home    Were the patients medications reviewed and updated  Yes    Current Symptoms  Loose Stools    Loose stool severity  Moderate      TCM Call (since 8/29/2021)     Should patient be enrolled in anticoag monitoring? No    Scheduled for follow up?   Yes    Did you obtain your prescribed medications  Yes    Do you need help managing your prescriptions or medications  No    Is transportation to your appointment needed  No    I have advised the patient to call PCP with any new or worsening symptoms  Ana Liang MA        Assessment/Plan:         Problem List Items Addressed This Visit        Digestive    Functional diarrhea     Not sure exactly why she has this-has yet to try Xifaxan -could be a form of diabetic neuropathy-is being followed by GI            Endocrine    Diabetes mellitus type 2 in [facility])       Lab Results   Component Value Date    HGBA1C 8 3 (A) 07/15/2021   Will d/c Januvia and increase Trulicity dose-advised on diet, exercise, weight loss         Relevant Medications    Dulaglutide (Trulicity) 1 5 PO/2 7JJ SOPN       Cardiovascular and Mediastinum    S/P aortic valve replacement with prosthetic valve    Heart failure with preserved ejection fraction, borderline, class III (HCC)       Other    Hyperlipidemia    Morbid obesity (HCC)    Diarrhea    Back pain    Relevant Medications    DULoxetine (CYMBALTA) 60 mg delayed release capsule    Fibromyalgia     Cymbalta dosage increased         Relevant Medications    baclofen 10 mg tablet      Other Visit Diagnoses     Encounter for support and coordination of transition of care    -  Primary    Type 2 diabetes mellitus without complication, without long-term current use of insulin (HCC)        Relevant Medications glucose blood (OneTouch Verio) test strip    Pharmacist Choice Lancets MISC    Dulaglutide (Trulicity) 1 5 LV/3 6ZR SOPN    Influenza vaccination declined by patient        Pneumococcal vaccination declined by patient        Tetanus, diphtheria, and acellular pertussis (Tdap) vaccination declined        Hepatitis A vaccination declined        Hepatitis B vaccination declined        Rash        Relevant Medications    nystatin powder    BMI 50 0-59 9, adult ([facility] [address] )                Subjective:      Patient ID: Lachelle Scott is a 63 y o  female      Charity Major here with her sister today for TCM-she has a hx of poorly controlled DM, obesity, bipolar depression, fibromyalgia, mechanical heart valve, HFpEF, cirrhosis of the liver, hx of heavy alcohol use-she was in the hospital for abdominal pain, elevated lipase and had EGD and colonoscopy done-says her sugars are always high at home-she says she's had chronic diarrhea since she got the covid shot several months ago-GI has tried her on Questran, Lomotil and Dicyclomine and it hasn't really helped-just a few days ago, I sent in Bob Giron for her to try-she just got it yesterday and has yet to start taking it-      The following portions of the patient's history were reviewed and updated as appropriate:   Past Medical History:  She has a past medical history of Anemia, Anxiety, Aortic valve disorder, Back pain, Chronic pain syndrome, Cirrhosis ([address] ), Constipation, Degenerative arthritis of thoracic spine, Degenerative joint disease involving multiple joints, Depression, DVT (deep venous thrombosis) ([facility] [address] ), Edema, Endometrial adenocarcinoma ([address] ), Fibromyalgia, Ganglion of right wrist, GERD (gastroesophageal reflux disease), Heart murmur, Hematoma, History of mechanical aortic valve replacement, Hypothyroidism (acquired), Laboratory confirmed diagnosis of COVID-19 (03/16/2021), Low blood pressure, Malignant neoplasm of corpus uteri, except isthmus ([address] ), Mixed hyperlipidemia, Morbid obesity ([facility] [address] ), Obstructive sleep apnea syndrome, Pulmonary embolism ([facility] [address] ), Tobacco dependence syndrome, Transient cerebral ischemia, Urinary incontinence, Viral hepatitis C, and Vitamin D deficiency  ,  _______________________________________________________________________  Medical Problems:  does not have any pertinent problems on file ,  _______________________________________________________________________  Past Surgical History:   has a past surgical history that includes Ankle surgery; Cardiac surgery (); Hysterectomy (2011);  section; Cholecystectomy; Colonoscopy (2019); and Colonoscopy  ,  _______________________________________________________________________  Family History:  family history includes Cirrhosis in her father; Coronary artery disease in her father and mother ,  _______________________________________________________________________  Social History:   reports that she has been smoking cigarettes  She has a 20 00 pack-year smoking history  She has never used smokeless tobacco  She reports current alcohol use  She reports current drug use  Drug: Marijuana  ,  _______________________________________________________________________  Allergies:  is allergic to bactrim [sulfamethoxazole-trimethoprim], penicillins, and tramadol     _______________________________________________________________________  Current Outpatient Medications   Medication Sig Dispense Refill    Alcohol Swabs (Pharmacist Choice Alcohol) PADS USE 3 TIMES A  each 6    cholestyramine (QUESTRAN) 4 g packet Take 1 packet (4 g total) by mouth 2 (two) times a day with meals 60 packet 1    clonazePAM (KlonoPIN) 1 mg tablet Take 1 mg by mouth daily as needed      dicyclomine (BENTYL) 20 mg tablet Take 1 tablet (20 mg total) by mouth 2 (two) times a day 60 tablet 3    diphenoxylate-atropine (LOMOTIL) 2 5-0 025 mg per tablet Take 1 tablet by mouth 4 (four) times a day as needed for diarrhea 120 tablet 1    DULoxetine (CYMBALTA) 60 mg delayed release capsule Take 1 capsule (60 mg total) by mouth daily 30 capsule 5    glucose blood (OneTouch Verio) test strip Use 1 each 3 (three) times a day Use as instructed 300 each 5    hydrOXYzine HCL (ATARAX) 50 mg tablet TAKE 1 TO 2 TABLETS BY MOUTH DAILY AT NIGHT      Jantoven 3 MG tablet Take 2 tablets (6 mg total) by mouth daily 2 5 tablets on Monday (Patient taking differently: Take 6 mg by mouth daily 2 5 tablets on Monday and Friday) 30 tablet 5    mirtazapine (REMERON) 45 MG tablet Take 45 mg by mouth daily at bedtime       nystatin powder Apply topically 2 (two) times a day 60 g 5    ProAir RespiClick 405 (90 Base) MCG/ACT AEPB INHALE 1 PUFF EVERY 4 (FOUR) HOURS AS NEEDED (WHEEZING OR SHORTNESS OF BREATH)   ziprasidone (GEODON) 60 mg capsule       baclofen 10 mg tablet Take 1 tablet (10 mg total) by mouth 3 (three) times a day as needed for muscle spasms 90 tablet 3    Blood Glucose Monitoring Suppl (OneTouch Verio IQ System) w/Device KIT USE 3 TIMES A DAY (Patient not taking: Reported on 9/29/2021) 1 kit 1    Continuous Blood Gluc  (FreeStyle [place] 2 [place]) ROXANNE USE AS DIRECTED (Patient not taking: Reported on 9/29/2021) 2 each 5    Continuous Blood Gluc Sensor (FreeStyle Lizy 2 Sensor) MISC USE AS DIRECTED (Patient not taking: Reported on 9/29/2021) 2 each 5    Dulaglutide (Trulicity) 1 5 LQ/8 0RP SOPN Inject 0 5 mL (1 5 mg total) under the skin once a week 2 mL 5    enoxaparin (LOVENOX) 80 mg/0 8 mL Inject 1 6 mL (160 mg total) under the skin every 12 (twelve) hours for 14 days (Patient not taking: Reported on 9/29/2021) 44 8 mL 0    furosemide (LASIX) 40 mg tablet Take 1 tablet daily for 5 days, then 1 tablet daily on an as needed basis   (Patient not taking: Reported on 9/29/2021) 10 tablet 0    Insulin Pen Needle (Pen Needles) 31G X 5 MM MISC Use once a week 100 each 5    Pharmacist Choice Lancets MISC USE TO TEST GLUCOSE UP TO 3 TIMES A DAY - ONETOUCH VERIO LANCETS 200 each 5    rifaximin (XIFAXAN) 550 mg tablet Take 1 tablet (550 mg total) by mouth every 8 (eight) hours for 14 days (Patient not taking: Reported on 9/29/2021) 42 tablet 0    saccharomyces boulardii (FLORASTOR) 250 mg capsule Take 1 capsule (250 mg total) by mouth 2 (two) times a day (Patient not taking: Reported on 9/29/2021) 30 capsule 0     No current facility-administered medications for this visit      _______________________________________________________________________  Review of Systems   Constitutional: Negative for fever and unexpected weight change  HENT: Negative  Eyes: Negative for visual disturbance  Respiratory: Negative for cough, shortness of breath and wheezing  Gastrointestinal: Positive for diarrhea  Negative for abdominal pain  Musculoskeletal: Positive for arthralgias and myalgias  Neurological: Negative  Psychiatric/Behavioral: Negative  Objective:  Vitals:    09/29/21 1109 09/29/21 1149   BP: 150/64 120/60   BP Location: Right arm    Patient Position: Sitting    Cuff Size: Large    Temp: (!) 97 3 °F (36 3 °C)    TempSrc: Temporal    Weight: (!) 164 kg (361 lb)    Height: 5' 6" (1 676 m)      Body mass index is 58 27 kg/m²  Physical Exam  Vitals reviewed  Constitutional:       Appearance: She is obese  HENT:      Head: Normocephalic and atraumatic  Right Ear: External ear normal       Left Ear: External ear normal       Nose: Nose normal       Mouth/Throat:      Mouth: Mucous membranes are moist    Eyes:      General: No scleral icterus  Conjunctiva/sclera: Conjunctivae normal    Cardiovascular:      Rate and Rhythm: Normal rate and regular rhythm  Pulmonary:      Effort: Pulmonary effort is normal       Breath sounds: Normal breath sounds  Abdominal:      Comments: obese   Musculoskeletal:      Cervical back: Normal range of motion and neck supple  Right lower leg: Edema present        Left lower leg: Edema present  Neurological:      General: No focal deficit present  Mental Status: She is alert and oriented to person, place, and time     Psychiatric:         Mood and Affect: Mood normal

## 2021-09-29 NOTE — ASSESSMENT & PLAN NOTE
Not sure exactly why she has this-has yet to try Xifaxan -could be a form of diabetic neuropathy-is being followed by GI

## 2021-09-30 ENCOUNTER — TELEPHONE (OUTPATIENT)
Dept: FAMILY MEDICINE CLINIC | Facility: CLINIC | Age: 61
End: 2021-09-30

## 2021-09-30 DIAGNOSIS — R19.7 DIARRHEA, UNSPECIFIED TYPE: ICD-10-CM

## 2021-09-30 RX ORDER — RIFAXIMIN 550 MG/1
TABLET ORAL
Qty: 42 TABLET | Refills: 0 | Status: SHIPPED | OUTPATIENT
Start: 2021-09-30 | End: 2021-10-07

## 2021-10-04 ENCOUNTER — PATIENT OUTREACH (OUTPATIENT)
Dept: CASE MANAGEMENT | Facility: OTHER | Age: 61
End: 2021-10-04

## 2021-10-08 DIAGNOSIS — R19.7 DIARRHEA, UNSPECIFIED TYPE: ICD-10-CM

## 2021-10-14 RX ORDER — DIPHENOXYLATE HYDROCHLORIDE AND ATROPINE SULFATE 2.5; .025 MG/1; MG/1
1 TABLET ORAL 4 TIMES DAILY PRN
Qty: 120 TABLET | Refills: 1 | Status: SHIPPED | OUTPATIENT
Start: 2021-10-14 | End: 2021-11-29

## 2021-10-19 DIAGNOSIS — R19.7 DIARRHEA, UNSPECIFIED TYPE: ICD-10-CM

## 2021-10-20 RX ORDER — CHOLESTYRAMINE 4 G/9G
1 POWDER, FOR SUSPENSION ORAL 2 TIMES DAILY WITH MEALS
Qty: 60 EACH | Refills: 2 | Status: SHIPPED | OUTPATIENT
Start: 2021-10-20 | End: 2021-11-30 | Stop reason: ALTCHOICE

## 2021-10-25 ENCOUNTER — PATIENT OUTREACH (OUTPATIENT)
Dept: CASE MANAGEMENT | Facility: OTHER | Age: 61
End: 2021-10-25

## 2021-10-27 ENCOUNTER — PATIENT OUTREACH (OUTPATIENT)
Dept: CASE MANAGEMENT | Facility: OTHER | Age: 61
End: 2021-10-27

## 2021-10-27 ENCOUNTER — VBI (OUTPATIENT)
Dept: ADMINISTRATIVE | Facility: OTHER | Age: 61
End: 2021-10-27

## 2021-11-01 ENCOUNTER — TELEPHONE (OUTPATIENT)
Dept: FAMILY MEDICINE CLINIC | Facility: CLINIC | Age: 61
End: 2021-11-01

## 2021-11-01 DIAGNOSIS — R00.0 TACHYCARDIA: ICD-10-CM

## 2021-11-01 DIAGNOSIS — R06.02 SOB (SHORTNESS OF BREATH): ICD-10-CM

## 2021-11-01 DIAGNOSIS — R00.2 PALPITATIONS: ICD-10-CM

## 2021-11-01 DIAGNOSIS — E66.01 MORBID OBESITY WITH BMI OF 50.0-59.9, ADULT (HCC): ICD-10-CM

## 2021-11-01 RX ORDER — FUROSEMIDE 40 MG/1
TABLET ORAL
Qty: 30 TABLET | Refills: 5 | Status: SHIPPED | OUTPATIENT
Start: 2021-11-01 | End: 2021-12-13 | Stop reason: SDUPTHER

## 2021-11-01 RX ORDER — DULAGLUTIDE 0.75 MG/.5ML
INJECTION, SOLUTION SUBCUTANEOUS
COMMUNITY
Start: 2021-10-16 | End: 2021-11-11

## 2021-11-11 ENCOUNTER — OFFICE VISIT (OUTPATIENT)
Dept: FAMILY MEDICINE CLINIC | Facility: CLINIC | Age: 61
End: 2021-11-11
Payer: COMMERCIAL

## 2021-11-11 VITALS
HEART RATE: 71 BPM | BODY MASS INDEX: 47.09 KG/M2 | DIASTOLIC BLOOD PRESSURE: 74 MMHG | TEMPERATURE: 97.6 F | OXYGEN SATURATION: 97 % | HEIGHT: 66 IN | SYSTOLIC BLOOD PRESSURE: 132 MMHG | WEIGHT: 293 LBS | RESPIRATION RATE: 18 BRPM

## 2021-11-11 DIAGNOSIS — E11.69 DIABETES MELLITUS TYPE 2 IN OBESE (HCC): Primary | ICD-10-CM

## 2021-11-11 DIAGNOSIS — E66.9 DIABETES MELLITUS TYPE 2 IN OBESE (HCC): Primary | ICD-10-CM

## 2021-11-11 DIAGNOSIS — E11.9 DIABETIC EYE EXAM (HCC): ICD-10-CM

## 2021-11-11 DIAGNOSIS — T50.Z95A DIARRHEA AFTER VACCINATION: ICD-10-CM

## 2021-11-11 DIAGNOSIS — Z01.00 DIABETIC EYE EXAM (HCC): ICD-10-CM

## 2021-11-11 DIAGNOSIS — R19.7 DIARRHEA AFTER VACCINATION: ICD-10-CM

## 2021-11-11 LAB — SL AMB POCT HEMOGLOBIN AIC: 7 (ref ?–6.5)

## 2021-11-11 PROCEDURE — 83036 HEMOGLOBIN GLYCOSYLATED A1C: CPT | Performed by: INTERNAL MEDICINE

## 2021-11-11 PROCEDURE — 4004F PT TOBACCO SCREEN RCVD TLK: CPT | Performed by: INTERNAL MEDICINE

## 2021-11-11 PROCEDURE — 99214 OFFICE O/P EST MOD 30 MIN: CPT | Performed by: INTERNAL MEDICINE

## 2021-11-11 PROCEDURE — 3051F HG A1C>EQUAL 7.0%<8.0%: CPT | Performed by: INTERNAL MEDICINE

## 2021-11-11 RX ORDER — DULAGLUTIDE 1.5 MG/.5ML
1.5 INJECTION, SOLUTION SUBCUTANEOUS WEEKLY
Qty: 2 ML | Refills: 5 | Status: SHIPPED | OUTPATIENT
Start: 2021-11-11 | End: 2021-12-13 | Stop reason: SDUPTHER

## 2021-11-12 ENCOUNTER — PATIENT OUTREACH (OUTPATIENT)
Dept: CASE MANAGEMENT | Facility: OTHER | Age: 61
End: 2021-11-12

## 2021-11-18 ENCOUNTER — TELEPHONE (OUTPATIENT)
Dept: FAMILY MEDICINE CLINIC | Facility: CLINIC | Age: 61
End: 2021-11-18

## 2021-11-18 ENCOUNTER — ANTICOAG VISIT (OUTPATIENT)
Dept: CARDIOLOGY CLINIC | Facility: CLINIC | Age: 61
End: 2021-11-18

## 2021-11-18 ENCOUNTER — APPOINTMENT (OUTPATIENT)
Dept: LAB | Facility: HOSPITAL | Age: 61
End: 2021-11-18
Payer: COMMERCIAL

## 2021-11-18 DIAGNOSIS — Z95.2 STATUS POST MECHANICAL AORTIC VALVE REPLACEMENT: Primary | ICD-10-CM

## 2021-11-26 ENCOUNTER — APPOINTMENT (EMERGENCY)
Dept: CT IMAGING | Facility: HOSPITAL | Age: 61
DRG: 253 | End: 2021-11-26
Payer: COMMERCIAL

## 2021-11-26 ENCOUNTER — HOSPITAL ENCOUNTER (INPATIENT)
Facility: HOSPITAL | Age: 61
LOS: 1 days | Discharge: HOME | DRG: 253 | End: 2021-11-29
Attending: EMERGENCY MEDICINE | Admitting: INTERNAL MEDICINE
Payer: COMMERCIAL

## 2021-11-26 DIAGNOSIS — R79.1 SUBTHERAPEUTIC INTERNATIONAL NORMALIZED RATIO (INR): ICD-10-CM

## 2021-11-26 DIAGNOSIS — R19.7 DIARRHEA, UNSPECIFIED TYPE: ICD-10-CM

## 2021-11-26 DIAGNOSIS — K70.30 ALCOHOLIC CIRRHOSIS OF LIVER WITHOUT ASCITES (HCC): ICD-10-CM

## 2021-11-26 DIAGNOSIS — G89.29 CHRONIC BILATERAL LOW BACK PAIN WITH SCIATICA, SCIATICA LATERALITY UNSPECIFIED: ICD-10-CM

## 2021-11-26 DIAGNOSIS — M54.40 CHRONIC BILATERAL LOW BACK PAIN WITH SCIATICA, SCIATICA LATERALITY UNSPECIFIED: ICD-10-CM

## 2021-11-26 DIAGNOSIS — Z95.2 S/P AORTIC VALVE REPLACEMENT WITH PROSTHETIC VALVE: ICD-10-CM

## 2021-11-26 DIAGNOSIS — D69.6 THROMBOCYTOPENIA (HCC): ICD-10-CM

## 2021-11-26 DIAGNOSIS — K92.2 GI BLEED: Primary | ICD-10-CM

## 2021-11-26 DIAGNOSIS — D72.819 LEUKOCYTOPENIA: ICD-10-CM

## 2021-11-26 DIAGNOSIS — R10.10 PAIN OF UPPER ABDOMEN: ICD-10-CM

## 2021-11-26 DIAGNOSIS — Z79.01 ANTICOAGULATED ON COUMADIN: ICD-10-CM

## 2021-11-26 DIAGNOSIS — K59.1 FUNCTIONAL DIARRHEA: ICD-10-CM

## 2021-11-26 DIAGNOSIS — Z86.711 HISTORY OF PULMONARY EMBOLUS (PE): ICD-10-CM

## 2021-11-26 DIAGNOSIS — N30.00 ACUTE CYSTITIS WITHOUT HEMATURIA: ICD-10-CM

## 2021-11-26 DIAGNOSIS — Z95.2 HISTORY OF MECHANICAL AORTIC VALVE REPLACEMENT: ICD-10-CM

## 2021-11-26 LAB
ALBUMIN SERPL BCP-MCNC: 3.5 G/DL (ref 3.4–4.8)
ALP SERPL-CCNC: 83.7 U/L (ref 35–140)
ALT SERPL W P-5'-P-CCNC: 26 U/L (ref 5–54)
ANION GAP SERPL CALCULATED.3IONS-SCNC: 6 MMOL/L (ref 4–13)
APTT PPP: 36 SECONDS (ref 23–37)
AST SERPL W P-5'-P-CCNC: 42 U/L (ref 15–41)
BASOPHILS # BLD AUTO: 0.02 THOUSANDS/ΜL (ref 0–0.1)
BASOPHILS NFR BLD AUTO: 1 % (ref 0–1)
BILIRUB SERPL-MCNC: 0.44 MG/DL (ref 0.3–1.2)
BUN SERPL-MCNC: 15 MG/DL (ref 6–20)
CALCIUM SERPL-MCNC: 8.4 MG/DL (ref 8.4–10.2)
CHLORIDE SERPL-SCNC: 101 MMOL/L (ref 96–108)
CO2 SERPL-SCNC: 31 MMOL/L (ref 22–33)
CREAT SERPL-MCNC: 0.54 MG/DL (ref 0.4–1.1)
EOSINOPHIL # BLD AUTO: 0.1 THOUSAND/ΜL (ref 0–0.61)
EOSINOPHIL NFR BLD AUTO: 3 % (ref 0–6)
ERYTHROCYTE [DISTWIDTH] IN BLOOD BY AUTOMATED COUNT: 13.9 % (ref 11.6–15.1)
GFR SERPL CREATININE-BSD FRML MDRD: 104 ML/MIN/1.73SQ M
GLUCOSE SERPL-MCNC: 184 MG/DL (ref 65–140)
HCT VFR BLD AUTO: 40.2 % (ref 34.8–46.1)
HGB BLD-MCNC: 12.7 G/DL (ref 11.5–15.4)
IMM GRANULOCYTES # BLD AUTO: 0 THOUSAND/UL (ref 0–0.2)
IMM GRANULOCYTES NFR BLD AUTO: 0 % (ref 0–2)
INR PPP: 1.5 (ref 0.84–1.19)
LYMPHOCYTES # BLD AUTO: 0.9 THOUSANDS/ΜL (ref 0.6–4.47)
LYMPHOCYTES NFR BLD AUTO: 28 % (ref 14–44)
MCH RBC QN AUTO: 28.8 PG (ref 26.8–34.3)
MCHC RBC AUTO-ENTMCNC: 31.6 G/DL (ref 31.4–37.4)
MCV RBC AUTO: 91 FL (ref 82–98)
MONOCYTES # BLD AUTO: 0.36 THOUSAND/ΜL (ref 0.17–1.22)
MONOCYTES NFR BLD AUTO: 11 % (ref 4–12)
NEUTROPHILS # BLD AUTO: 1.83 THOUSANDS/ΜL (ref 1.85–7.62)
NEUTS SEG NFR BLD AUTO: 57 % (ref 43–75)
PLATELET # BLD AUTO: 136 THOUSANDS/UL (ref 149–390)
PMV BLD AUTO: 10.7 FL (ref 8.9–12.7)
POTASSIUM SERPL-SCNC: 3.9 MMOL/L (ref 3.5–5)
PROT SERPL-MCNC: 6.6 G/DL (ref 6.4–8.3)
PROTHROMBIN TIME: 17.9 SECONDS (ref 11.6–14.5)
RBC # BLD AUTO: 4.41 MILLION/UL (ref 3.81–5.12)
SODIUM SERPL-SCNC: 138 MMOL/L (ref 133–145)
WBC # BLD AUTO: 3.21 THOUSAND/UL (ref 4.31–10.16)

## 2021-11-26 PROCEDURE — 96374 THER/PROPH/DIAG INJ IV PUSH: CPT

## 2021-11-26 PROCEDURE — 96375 TX/PRO/DX INJ NEW DRUG ADDON: CPT

## 2021-11-26 PROCEDURE — G1004 CDSM NDSC: HCPCS

## 2021-11-26 PROCEDURE — 99220 PR INITIAL OBSERVATION CARE/DAY 70 MINUTES: CPT | Performed by: PHYSICIAN ASSISTANT

## 2021-11-26 PROCEDURE — 99285 EMERGENCY DEPT VISIT HI MDM: CPT

## 2021-11-26 PROCEDURE — 80053 COMPREHEN METABOLIC PANEL: CPT | Performed by: EMERGENCY MEDICINE

## 2021-11-26 PROCEDURE — 85025 COMPLETE CBC W/AUTO DIFF WBC: CPT | Performed by: EMERGENCY MEDICINE

## 2021-11-26 PROCEDURE — 85610 PROTHROMBIN TIME: CPT | Performed by: EMERGENCY MEDICINE

## 2021-11-26 PROCEDURE — 36415 COLL VENOUS BLD VENIPUNCTURE: CPT | Performed by: EMERGENCY MEDICINE

## 2021-11-26 PROCEDURE — 85730 THROMBOPLASTIN TIME PARTIAL: CPT | Performed by: EMERGENCY MEDICINE

## 2021-11-26 PROCEDURE — 99285 EMERGENCY DEPT VISIT HI MDM: CPT | Performed by: EMERGENCY MEDICINE

## 2021-11-26 PROCEDURE — 74177 CT ABD & PELVIS W/CONTRAST: CPT

## 2021-11-26 RX ORDER — DICYCLOMINE HYDROCHLORIDE 10 MG/1
10 CAPSULE ORAL
Status: DISCONTINUED | OUTPATIENT
Start: 2021-11-26 | End: 2021-11-29 | Stop reason: HOSPADM

## 2021-11-26 RX ORDER — OXYCODONE HYDROCHLORIDE 5 MG/1
5 TABLET ORAL EVERY 6 HOURS PRN
Status: DISCONTINUED | OUTPATIENT
Start: 2021-11-26 | End: 2021-11-29 | Stop reason: HOSPADM

## 2021-11-26 RX ORDER — OXYCODONE HYDROCHLORIDE 5 MG/1
5 TABLET ORAL EVERY 6 HOURS PRN
Status: DISCONTINUED | OUTPATIENT
Start: 2021-11-26 | End: 2021-11-26

## 2021-11-26 RX ORDER — HYDROMORPHONE HCL/PF 1 MG/ML
0.5 SYRINGE (ML) INJECTION ONCE
Status: COMPLETED | OUTPATIENT
Start: 2021-11-26 | End: 2021-11-26

## 2021-11-26 RX ORDER — CHOLESTYRAMINE LIGHT 4 G/5.7G
4 POWDER, FOR SUSPENSION ORAL 2 TIMES DAILY
Status: DISCONTINUED | OUTPATIENT
Start: 2021-11-27 | End: 2021-11-29 | Stop reason: HOSPADM

## 2021-11-26 RX ORDER — HYDROXYZINE HYDROCHLORIDE 25 MG/1
50 TABLET, FILM COATED ORAL
Status: DISCONTINUED | OUTPATIENT
Start: 2021-11-26 | End: 2021-11-29 | Stop reason: HOSPADM

## 2021-11-26 RX ORDER — OXYCODONE HYDROCHLORIDE 5 MG/1
2.5 TABLET ORAL EVERY 6 HOURS PRN
Status: DISCONTINUED | OUTPATIENT
Start: 2021-11-26 | End: 2021-11-29 | Stop reason: HOSPADM

## 2021-11-26 RX ORDER — NYSTATIN 100000 [USP'U]/G
POWDER TOPICAL 2 TIMES DAILY
Status: DISCONTINUED | OUTPATIENT
Start: 2021-11-27 | End: 2021-11-29 | Stop reason: HOSPADM

## 2021-11-26 RX ORDER — PANTOPRAZOLE SODIUM 40 MG/1
40 TABLET, DELAYED RELEASE ORAL
Status: DISCONTINUED | OUTPATIENT
Start: 2021-11-27 | End: 2021-11-29 | Stop reason: HOSPADM

## 2021-11-26 RX ORDER — DULOXETIN HYDROCHLORIDE 60 MG/1
60 CAPSULE, DELAYED RELEASE ORAL DAILY
Status: DISCONTINUED | OUTPATIENT
Start: 2021-11-27 | End: 2021-11-29 | Stop reason: HOSPADM

## 2021-11-26 RX ORDER — DIPHENOXYLATE HYDROCHLORIDE AND ATROPINE SULFATE 2.5; .025 MG/1; MG/1
1 TABLET ORAL 4 TIMES DAILY
Status: DISCONTINUED | OUTPATIENT
Start: 2021-11-26 | End: 2021-11-29 | Stop reason: HOSPADM

## 2021-11-26 RX ORDER — SODIUM CHLORIDE 9 MG/ML
125 INJECTION, SOLUTION INTRAVENOUS CONTINUOUS
Status: DISCONTINUED | OUTPATIENT
Start: 2021-11-26 | End: 2021-11-27

## 2021-11-26 RX ORDER — ZIPRASIDONE HYDROCHLORIDE 20 MG/1
60 CAPSULE ORAL 2 TIMES DAILY WITH MEALS
Status: DISCONTINUED | OUTPATIENT
Start: 2021-11-27 | End: 2021-11-29 | Stop reason: HOSPADM

## 2021-11-26 RX ORDER — ONDANSETRON 2 MG/ML
4 INJECTION INTRAMUSCULAR; INTRAVENOUS ONCE
Status: COMPLETED | OUTPATIENT
Start: 2021-11-26 | End: 2021-11-26

## 2021-11-26 RX ORDER — ACETAMINOPHEN 325 MG/1
650 TABLET ORAL EVERY 6 HOURS PRN
Status: DISCONTINUED | OUTPATIENT
Start: 2021-11-26 | End: 2021-11-29 | Stop reason: HOSPADM

## 2021-11-26 RX ORDER — ONDANSETRON 2 MG/ML
4 INJECTION INTRAMUSCULAR; INTRAVENOUS EVERY 6 HOURS PRN
Status: DISCONTINUED | OUTPATIENT
Start: 2021-11-26 | End: 2021-11-29 | Stop reason: HOSPADM

## 2021-11-26 RX ORDER — PANTOPRAZOLE SODIUM 40 MG/1
40 INJECTION, POWDER, FOR SOLUTION INTRAVENOUS ONCE
Status: COMPLETED | OUTPATIENT
Start: 2021-11-26 | End: 2021-11-27

## 2021-11-26 RX ADMIN — HYDROMORPHONE HYDROCHLORIDE 0.5 MG: 1 INJECTION, SOLUTION INTRAMUSCULAR; INTRAVENOUS; SUBCUTANEOUS at 20:02

## 2021-11-26 RX ADMIN — DIPHENOXYLATE HYDROCHLORIDE AND ATROPINE SULFATE 1 TABLET: 2.5; .025 TABLET ORAL at 23:57

## 2021-11-26 RX ADMIN — OXYCODONE HYDROCHLORIDE 5 MG: 5 TABLET ORAL at 23:58

## 2021-11-26 RX ADMIN — IOHEXOL 100 ML: 350 INJECTION, SOLUTION INTRAVENOUS at 20:36

## 2021-11-26 RX ADMIN — DICYCLOMINE HYDROCHLORIDE 10 MG: 10 CAPSULE ORAL at 23:57

## 2021-11-26 RX ADMIN — ONDANSETRON 4 MG: 2 INJECTION INTRAMUSCULAR; INTRAVENOUS at 20:01

## 2021-11-27 LAB
AFP-TM SERPL-MCNC: 4.1 NG/ML (ref 0.5–8)
ALBUMIN SERPL BCP-MCNC: 3.4 G/DL (ref 3.4–4.8)
ALP SERPL-CCNC: 74.4 U/L (ref 35–140)
ALT SERPL W P-5'-P-CCNC: 24 U/L (ref 5–54)
ANION GAP SERPL CALCULATED.3IONS-SCNC: 6 MMOL/L (ref 4–13)
AST SERPL W P-5'-P-CCNC: 38 U/L (ref 15–41)
BACTERIA UR QL AUTO: ABNORMAL /HPF
BILIRUB SERPL-MCNC: 0.71 MG/DL (ref 0.3–1.2)
BILIRUB UR QL STRIP: NEGATIVE
BUN SERPL-MCNC: 13 MG/DL (ref 6–20)
CALCIUM ALBUM COR SERPL-MCNC: 8.7 MG/DL (ref 8.3–10.1)
CALCIUM SERPL-MCNC: 8.2 MG/DL (ref 8.4–10.2)
CHLORIDE SERPL-SCNC: 103 MMOL/L (ref 96–108)
CLARITY UR: CLEAR
CO2 SERPL-SCNC: 29 MMOL/L (ref 22–33)
COLOR UR: ABNORMAL
CREAT SERPL-MCNC: 0.49 MG/DL (ref 0.4–1.1)
FERRITIN SERPL-MCNC: 52 NG/ML (ref 8–388)
GFR SERPL CREATININE-BSD FRML MDRD: 107 ML/MIN/1.73SQ M
GLUCOSE P FAST SERPL-MCNC: 113 MG/DL (ref 70–105)
GLUCOSE SERPL-MCNC: 113 MG/DL (ref 65–140)
GLUCOSE SERPL-MCNC: 123 MG/DL (ref 65–140)
GLUCOSE SERPL-MCNC: 128 MG/DL (ref 65–140)
GLUCOSE SERPL-MCNC: 131 MG/DL (ref 65–140)
GLUCOSE SERPL-MCNC: 144 MG/DL (ref 65–140)
GLUCOSE UR STRIP-MCNC: NEGATIVE MG/DL
HGB BLD-MCNC: 12.3 G/DL (ref 11.5–15.4)
HGB UR QL STRIP.AUTO: NEGATIVE
IRON SATN MFR SERPL: 27 % (ref 15–50)
IRON SERPL-MCNC: 97 UG/DL (ref 50–170)
KETONES UR STRIP-MCNC: NEGATIVE MG/DL
LEUKOCYTE ESTERASE UR QL STRIP: NEGATIVE
MAGNESIUM SERPL-MCNC: 1.8 MG/DL (ref 1.6–2.6)
NITRITE UR QL STRIP: POSITIVE
NON-SQ EPI CELLS URNS QL MICRO: ABNORMAL /HPF
PH UR STRIP.AUTO: 5.5 [PH]
PHOSPHATE SERPL-MCNC: 3.1 MG/DL (ref 2.5–5)
POTASSIUM SERPL-SCNC: 3.7 MMOL/L (ref 3.5–5)
PROT SERPL-MCNC: 6.5 G/DL (ref 6.4–8.3)
PROT UR STRIP-MCNC: NEGATIVE MG/DL
RBC #/AREA URNS AUTO: ABNORMAL /HPF
SODIUM SERPL-SCNC: 138 MMOL/L (ref 133–145)
SP GR UR STRIP.AUTO: 1.02 (ref 1–1.03)
TIBC SERPL-MCNC: 353 UG/DL (ref 250–450)
UROBILINOGEN UR QL STRIP.AUTO: 0.2 E.U./DL
WBC #/AREA URNS AUTO: ABNORMAL /HPF

## 2021-11-27 PROCEDURE — 84100 ASSAY OF PHOSPHORUS: CPT | Performed by: PHYSICIAN ASSISTANT

## 2021-11-27 PROCEDURE — 82948 REAGENT STRIP/BLOOD GLUCOSE: CPT

## 2021-11-27 PROCEDURE — 83540 ASSAY OF IRON: CPT | Performed by: PHYSICIAN ASSISTANT

## 2021-11-27 PROCEDURE — 81001 URINALYSIS AUTO W/SCOPE: CPT | Performed by: INTERNAL MEDICINE

## 2021-11-27 PROCEDURE — 82728 ASSAY OF FERRITIN: CPT | Performed by: PHYSICIAN ASSISTANT

## 2021-11-27 PROCEDURE — 99225 PR SBSQ OBSERVATION CARE/DAY 25 MINUTES: CPT | Performed by: INTERNAL MEDICINE

## 2021-11-27 PROCEDURE — 83735 ASSAY OF MAGNESIUM: CPT | Performed by: PHYSICIAN ASSISTANT

## 2021-11-27 PROCEDURE — 85018 HEMOGLOBIN: CPT | Performed by: PHYSICIAN ASSISTANT

## 2021-11-27 PROCEDURE — 80053 COMPREHEN METABOLIC PANEL: CPT | Performed by: PHYSICIAN ASSISTANT

## 2021-11-27 PROCEDURE — 82105 ALPHA-FETOPROTEIN SERUM: CPT | Performed by: PHYSICIAN ASSISTANT

## 2021-11-27 PROCEDURE — C9113 INJ PANTOPRAZOLE SODIUM, VIA: HCPCS | Performed by: PHYSICIAN ASSISTANT

## 2021-11-27 PROCEDURE — 83550 IRON BINDING TEST: CPT | Performed by: PHYSICIAN ASSISTANT

## 2021-11-27 RX ORDER — ENOXAPARIN SODIUM 300 MG/3ML
1 INJECTION INTRAVENOUS; SUBCUTANEOUS EVERY 12 HOURS SCHEDULED
Status: DISCONTINUED | OUTPATIENT
Start: 2021-11-27 | End: 2021-11-29 | Stop reason: HOSPADM

## 2021-11-27 RX ADMIN — ENOXAPARIN SODIUM 160 MG: 300 INJECTION INTRAVENOUS; SUBCUTANEOUS at 23:40

## 2021-11-27 RX ADMIN — SODIUM CHLORIDE 125 ML/HR: 0.9 INJECTION, SOLUTION INTRAVENOUS at 07:49

## 2021-11-27 RX ADMIN — DIPHENOXYLATE HYDROCHLORIDE AND ATROPINE SULFATE 1 TABLET: 2.5; .025 TABLET ORAL at 08:14

## 2021-11-27 RX ADMIN — OXYCODONE HYDROCHLORIDE 5 MG: 5 TABLET ORAL at 08:14

## 2021-11-27 RX ADMIN — SODIUM CHLORIDE 125 ML/HR: 0.9 INJECTION, SOLUTION INTRAVENOUS at 00:18

## 2021-11-27 RX ADMIN — NYSTATIN: 100000 POWDER TOPICAL at 17:32

## 2021-11-27 RX ADMIN — OXYCODONE HYDROCHLORIDE 5 MG: 5 TABLET ORAL at 17:32

## 2021-11-27 RX ADMIN — DICYCLOMINE HYDROCHLORIDE 10 MG: 10 CAPSULE ORAL at 17:32

## 2021-11-27 RX ADMIN — PANTOPRAZOLE SODIUM 40 MG: 40 TABLET, DELAYED RELEASE ORAL at 07:48

## 2021-11-27 RX ADMIN — ENOXAPARIN SODIUM 160 MG: 300 INJECTION INTRAVENOUS; SUBCUTANEOUS at 15:27

## 2021-11-27 RX ADMIN — ZIPRASIDONE HYDROCHLORIDE 60 MG: 20 CAPSULE ORAL at 17:31

## 2021-11-27 RX ADMIN — DULOXETINE HYDROCHLORIDE 60 MG: 60 CAPSULE, DELAYED RELEASE ORAL at 08:14

## 2021-11-27 RX ADMIN — DICYCLOMINE HYDROCHLORIDE 10 MG: 10 CAPSULE ORAL at 08:14

## 2021-11-27 RX ADMIN — DICYCLOMINE HYDROCHLORIDE 10 MG: 10 CAPSULE ORAL at 21:07

## 2021-11-27 RX ADMIN — PANTOPRAZOLE SODIUM 40 MG: 40 INJECTION, POWDER, FOR SOLUTION INTRAVENOUS at 00:01

## 2021-11-28 PROBLEM — N39.0 UTI (URINARY TRACT INFECTION): Status: ACTIVE | Noted: 2021-11-28

## 2021-11-28 LAB
ANION GAP SERPL CALCULATED.3IONS-SCNC: 5 MMOL/L (ref 4–13)
BASOPHILS # BLD AUTO: 0.01 THOUSANDS/ΜL (ref 0–0.1)
BASOPHILS NFR BLD AUTO: 1 % (ref 0–1)
BUN SERPL-MCNC: 12 MG/DL (ref 6–20)
CALCIUM SERPL-MCNC: 8.1 MG/DL (ref 8.4–10.2)
CHLORIDE SERPL-SCNC: 102 MMOL/L (ref 96–108)
CO2 SERPL-SCNC: 32 MMOL/L (ref 22–33)
CREAT SERPL-MCNC: 0.5 MG/DL (ref 0.4–1.1)
EOSINOPHIL # BLD AUTO: 0.07 THOUSAND/ΜL (ref 0–0.61)
EOSINOPHIL NFR BLD AUTO: 4 % (ref 0–6)
ERYTHROCYTE [DISTWIDTH] IN BLOOD BY AUTOMATED COUNT: 14 % (ref 11.6–15.1)
GFR SERPL CREATININE-BSD FRML MDRD: 106 ML/MIN/1.73SQ M
GLUCOSE SERPL-MCNC: 101 MG/DL (ref 65–140)
GLUCOSE SERPL-MCNC: 114 MG/DL (ref 65–140)
GLUCOSE SERPL-MCNC: 118 MG/DL (ref 65–140)
GLUCOSE SERPL-MCNC: 142 MG/DL (ref 65–140)
GLUCOSE SERPL-MCNC: 175 MG/DL (ref 65–140)
HCT VFR BLD AUTO: 38.5 % (ref 34.8–46.1)
HGB BLD-MCNC: 12 G/DL (ref 11.5–15.4)
IMM GRANULOCYTES # BLD AUTO: 0 THOUSAND/UL (ref 0–0.2)
IMM GRANULOCYTES NFR BLD AUTO: 0 % (ref 0–2)
INR PPP: 1.66 (ref 0.84–1.19)
LYMPHOCYTES # BLD AUTO: 0.65 THOUSANDS/ΜL (ref 0.6–4.47)
LYMPHOCYTES NFR BLD AUTO: 33 % (ref 14–44)
MCH RBC QN AUTO: 29.3 PG (ref 26.8–34.3)
MCHC RBC AUTO-ENTMCNC: 31.2 G/DL (ref 31.4–37.4)
MCV RBC AUTO: 94 FL (ref 82–98)
MONOCYTES # BLD AUTO: 0.24 THOUSAND/ΜL (ref 0.17–1.22)
MONOCYTES NFR BLD AUTO: 12 % (ref 4–12)
NEUTROPHILS # BLD AUTO: 1.03 THOUSANDS/ΜL (ref 1.85–7.62)
NEUTS SEG NFR BLD AUTO: 50 % (ref 43–75)
PLATELET # BLD AUTO: 119 THOUSANDS/UL (ref 149–390)
PMV BLD AUTO: 11.2 FL (ref 8.9–12.7)
POTASSIUM SERPL-SCNC: 3.8 MMOL/L (ref 3.5–5)
PROTHROMBIN TIME: 19.3 SECONDS (ref 11.6–14.5)
RBC # BLD AUTO: 4.1 MILLION/UL (ref 3.81–5.12)
SODIUM SERPL-SCNC: 139 MMOL/L (ref 133–145)
WBC # BLD AUTO: 2 THOUSAND/UL (ref 4.31–10.16)

## 2021-11-28 PROCEDURE — 80048 BASIC METABOLIC PNL TOTAL CA: CPT | Performed by: INTERNAL MEDICINE

## 2021-11-28 PROCEDURE — 82948 REAGENT STRIP/BLOOD GLUCOSE: CPT

## 2021-11-28 PROCEDURE — 85025 COMPLETE CBC W/AUTO DIFF WBC: CPT | Performed by: INTERNAL MEDICINE

## 2021-11-28 PROCEDURE — 99232 SBSQ HOSP IP/OBS MODERATE 35: CPT | Performed by: INTERNAL MEDICINE

## 2021-11-28 PROCEDURE — 85610 PROTHROMBIN TIME: CPT | Performed by: INTERNAL MEDICINE

## 2021-11-28 RX ORDER — CEFTRIAXONE 1 G/50ML
1000 INJECTION, SOLUTION INTRAVENOUS EVERY 24 HOURS
Status: DISCONTINUED | OUTPATIENT
Start: 2021-11-28 | End: 2021-11-28

## 2021-11-28 RX ORDER — CEFTRIAXONE 1 G/50ML
1000 INJECTION, SOLUTION INTRAVENOUS EVERY 24 HOURS
Status: DISCONTINUED | OUTPATIENT
Start: 2021-11-29 | End: 2021-11-28

## 2021-11-28 RX ADMIN — NYSTATIN 1 APPLICATION: 100000 POWDER TOPICAL at 08:29

## 2021-11-28 RX ADMIN — ENOXAPARIN SODIUM 160 MG: 300 INJECTION INTRAVENOUS; SUBCUTANEOUS at 22:11

## 2021-11-28 RX ADMIN — PANTOPRAZOLE SODIUM 40 MG: 40 TABLET, DELAYED RELEASE ORAL at 07:28

## 2021-11-28 RX ADMIN — OXYCODONE HYDROCHLORIDE 5 MG: 5 TABLET ORAL at 07:28

## 2021-11-28 RX ADMIN — ZIPRASIDONE HYDROCHLORIDE 60 MG: 20 CAPSULE ORAL at 18:07

## 2021-11-28 RX ADMIN — OXYCODONE HYDROCHLORIDE 5 MG: 5 TABLET ORAL at 16:28

## 2021-11-28 RX ADMIN — WARFARIN SODIUM 7 MG: 5 TABLET ORAL at 18:07

## 2021-11-28 RX ADMIN — DICYCLOMINE HYDROCHLORIDE 10 MG: 10 CAPSULE ORAL at 08:28

## 2021-11-28 RX ADMIN — INSULIN LISPRO 2 UNITS: 100 INJECTION, SOLUTION INTRAVENOUS; SUBCUTANEOUS at 16:00

## 2021-11-28 RX ADMIN — DICYCLOMINE HYDROCHLORIDE 10 MG: 10 CAPSULE ORAL at 12:15

## 2021-11-28 RX ADMIN — ENOXAPARIN SODIUM 160 MG: 300 INJECTION INTRAVENOUS; SUBCUTANEOUS at 08:38

## 2021-11-28 RX ADMIN — DULOXETINE HYDROCHLORIDE 60 MG: 60 CAPSULE, DELAYED RELEASE ORAL at 08:28

## 2021-11-28 RX ADMIN — ONDANSETRON 4 MG: 2 INJECTION INTRAMUSCULAR; INTRAVENOUS at 07:28

## 2021-11-28 RX ADMIN — DIPHENOXYLATE HYDROCHLORIDE AND ATROPINE SULFATE 1 TABLET: 2.5; .025 TABLET ORAL at 22:11

## 2021-11-28 RX ADMIN — DICYCLOMINE HYDROCHLORIDE 10 MG: 10 CAPSULE ORAL at 22:11

## 2021-11-28 RX ADMIN — DICYCLOMINE HYDROCHLORIDE 10 MG: 10 CAPSULE ORAL at 16:00

## 2021-11-28 RX ADMIN — LIDOCAINE HYDROCHLORIDE 250 MG: 10 INJECTION, SOLUTION EPIDURAL; INFILTRATION; INTRACAUDAL; PERINEURAL at 12:15

## 2021-11-29 ENCOUNTER — TELEPHONE (OUTPATIENT)
Dept: FAMILY MEDICINE CLINIC | Facility: CLINIC | Age: 61
End: 2021-11-29

## 2021-11-29 VITALS
HEART RATE: 69 BPM | WEIGHT: 293 LBS | OXYGEN SATURATION: 93 % | TEMPERATURE: 97.7 F | HEIGHT: 66 IN | RESPIRATION RATE: 17 BRPM | BODY MASS INDEX: 47.09 KG/M2 | SYSTOLIC BLOOD PRESSURE: 113 MMHG | DIASTOLIC BLOOD PRESSURE: 47 MMHG

## 2021-11-29 DIAGNOSIS — R19.7 DIARRHEA, UNSPECIFIED TYPE: ICD-10-CM

## 2021-11-29 LAB
GLUCOSE SERPL-MCNC: 119 MG/DL (ref 65–140)
GLUCOSE SERPL-MCNC: 149 MG/DL (ref 65–140)
INR PPP: 1.45 (ref 0.84–1.19)
PROTHROMBIN TIME: 17.4 SECONDS (ref 11.6–14.5)

## 2021-11-29 PROCEDURE — 82948 REAGENT STRIP/BLOOD GLUCOSE: CPT

## 2021-11-29 PROCEDURE — 99239 HOSP IP/OBS DSCHRG MGMT >30: CPT | Performed by: INTERNAL MEDICINE

## 2021-11-29 PROCEDURE — 85610 PROTHROMBIN TIME: CPT | Performed by: INTERNAL MEDICINE

## 2021-11-29 RX ORDER — DIPHENOXYLATE HYDROCHLORIDE AND ATROPINE SULFATE 2.5; .025 MG/1; MG/1
1 TABLET ORAL 4 TIMES DAILY PRN
Qty: 120 TABLET | Refills: 1 | Status: SHIPPED | OUTPATIENT
Start: 2021-11-29 | End: 2021-11-30 | Stop reason: ALTCHOICE

## 2021-11-29 RX ORDER — NITROFURANTOIN 25; 75 MG/1; MG/1
100 CAPSULE ORAL 2 TIMES DAILY WITH MEALS
Status: DISCONTINUED | OUTPATIENT
Start: 2021-11-29 | End: 2021-11-29 | Stop reason: HOSPADM

## 2021-11-29 RX ORDER — NITROFURANTOIN 25; 75 MG/1; MG/1
100 CAPSULE ORAL 2 TIMES DAILY WITH MEALS
Qty: 6 CAPSULE | Refills: 0 | Status: SHIPPED | OUTPATIENT
Start: 2021-11-29 | End: 2021-12-02

## 2021-11-29 RX ADMIN — CHOLESTYRAMINE 4 G: 4 POWDER, FOR SUSPENSION ORAL at 08:43

## 2021-11-29 RX ADMIN — DICYCLOMINE HYDROCHLORIDE 10 MG: 10 CAPSULE ORAL at 12:41

## 2021-11-29 RX ADMIN — DICYCLOMINE HYDROCHLORIDE 10 MG: 10 CAPSULE ORAL at 08:42

## 2021-11-29 RX ADMIN — DULOXETINE HYDROCHLORIDE 60 MG: 60 CAPSULE, DELAYED RELEASE ORAL at 08:42

## 2021-11-29 RX ADMIN — ENOXAPARIN SODIUM 160 MG: 300 INJECTION INTRAVENOUS; SUBCUTANEOUS at 08:43

## 2021-11-29 RX ADMIN — OXYCODONE HYDROCHLORIDE 2.5 MG: 5 TABLET ORAL at 09:00

## 2021-11-29 RX ADMIN — PANTOPRAZOLE SODIUM 40 MG: 40 TABLET, DELAYED RELEASE ORAL at 07:00

## 2021-11-29 RX ADMIN — DIPHENOXYLATE HYDROCHLORIDE AND ATROPINE SULFATE 1 TABLET: 2.5; .025 TABLET ORAL at 12:41

## 2021-11-29 RX ADMIN — NYSTATIN: 100000 POWDER TOPICAL at 08:46

## 2021-11-29 RX ADMIN — DIPHENOXYLATE HYDROCHLORIDE AND ATROPINE SULFATE 1 TABLET: 2.5; .025 TABLET ORAL at 08:42

## 2021-11-30 ENCOUNTER — ANTICOAG VISIT (OUTPATIENT)
Dept: CARDIOLOGY CLINIC | Facility: CLINIC | Age: 61
End: 2021-11-30

## 2021-11-30 ENCOUNTER — LAB (OUTPATIENT)
Dept: LAB | Facility: HOSPITAL | Age: 61
End: 2021-11-30
Payer: COMMERCIAL

## 2021-11-30 ENCOUNTER — TRANSITIONAL CARE MANAGEMENT (OUTPATIENT)
Dept: FAMILY MEDICINE CLINIC | Facility: CLINIC | Age: 61
End: 2021-11-30

## 2021-11-30 ENCOUNTER — OFFICE VISIT (OUTPATIENT)
Dept: GASTROENTEROLOGY | Facility: CLINIC | Age: 61
End: 2021-11-30
Payer: COMMERCIAL

## 2021-11-30 VITALS
HEART RATE: 101 BPM | SYSTOLIC BLOOD PRESSURE: 189 MMHG | HEIGHT: 65 IN | WEIGHT: 293 LBS | DIASTOLIC BLOOD PRESSURE: 106 MMHG | BODY MASS INDEX: 48.82 KG/M2

## 2021-11-30 DIAGNOSIS — Z95.2 S/P AORTIC VALVE REPLACEMENT WITH PROSTHETIC VALVE: ICD-10-CM

## 2021-11-30 DIAGNOSIS — Z86.711 HISTORY OF PULMONARY EMBOLUS (PE): ICD-10-CM

## 2021-11-30 DIAGNOSIS — K58.0 IRRITABLE BOWEL SYNDROME WITH DIARRHEA: ICD-10-CM

## 2021-11-30 DIAGNOSIS — K59.1 FUNCTIONAL DIARRHEA: Primary | ICD-10-CM

## 2021-11-30 DIAGNOSIS — Z79.01 ANTICOAGULATED ON COUMADIN: ICD-10-CM

## 2021-11-30 DIAGNOSIS — E66.9 DIABETES MELLITUS TYPE 2 IN OBESE (HCC): ICD-10-CM

## 2021-11-30 DIAGNOSIS — E66.01 MORBID OBESITY (HCC): ICD-10-CM

## 2021-11-30 DIAGNOSIS — R79.1 SUBTHERAPEUTIC INTERNATIONAL NORMALIZED RATIO (INR): ICD-10-CM

## 2021-11-30 DIAGNOSIS — R76.8 HEPATITIS C ANTIBODY TEST POSITIVE: ICD-10-CM

## 2021-11-30 DIAGNOSIS — K62.5 GASTROINTESTINAL HEMORRHAGE ASSOCIATED WITH ANORECTAL SOURCE: ICD-10-CM

## 2021-11-30 DIAGNOSIS — K70.30 ALCOHOLIC CIRRHOSIS OF LIVER WITHOUT ASCITES (HCC): ICD-10-CM

## 2021-11-30 DIAGNOSIS — Z95.2 STATUS POST MECHANICAL AORTIC VALVE REPLACEMENT: Primary | ICD-10-CM

## 2021-11-30 DIAGNOSIS — E11.69 DIABETES MELLITUS TYPE 2 IN OBESE (HCC): ICD-10-CM

## 2021-11-30 DIAGNOSIS — E66.01 MORBID OBESITY WITH BMI OF 50.0-59.9, ADULT (HCC): ICD-10-CM

## 2021-11-30 DIAGNOSIS — Z95.2 STATUS POST MECHANICAL AORTIC VALVE REPLACEMENT: ICD-10-CM

## 2021-11-30 LAB
INR PPP: 1.39 (ref 0.84–1.19)
PROTHROMBIN TIME: 16.8 SECONDS (ref 11.6–14.5)

## 2021-11-30 PROCEDURE — 3008F BODY MASS INDEX DOCD: CPT | Performed by: INTERNAL MEDICINE

## 2021-11-30 PROCEDURE — 99214 OFFICE O/P EST MOD 30 MIN: CPT | Performed by: INTERNAL MEDICINE

## 2021-11-30 PROCEDURE — 36415 COLL VENOUS BLD VENIPUNCTURE: CPT

## 2021-11-30 PROCEDURE — 85610 PROTHROMBIN TIME: CPT

## 2021-11-30 RX ORDER — CHLORDIAZEPOXIDE HYDROCHLORIDE AND CLIDINIUM BROMIDE 5; 2.5 MG/1; MG/1
1 CAPSULE ORAL
Qty: 90 CAPSULE | Refills: 3 | Status: SHIPPED | OUTPATIENT
Start: 2021-11-30 | End: 2021-12-02 | Stop reason: ALTCHOICE

## 2021-11-30 RX ORDER — ACETAMINOPHEN AND CODEINE PHOSPHATE 300; 30 MG/1; MG/1
1 TABLET ORAL EVERY 8 HOURS PRN
Qty: 12 TABLET | Refills: 0 | Status: SHIPPED | OUTPATIENT
Start: 2021-11-30 | End: 2021-12-10

## 2021-12-01 ENCOUNTER — APPOINTMENT (OUTPATIENT)
Dept: LAB | Facility: HOSPITAL | Age: 61
End: 2021-12-01
Payer: COMMERCIAL

## 2021-12-01 ENCOUNTER — PATIENT OUTREACH (OUTPATIENT)
Dept: CASE MANAGEMENT | Facility: OTHER | Age: 61
End: 2021-12-01

## 2021-12-01 LAB
INR PPP: 1.51 (ref 0.84–1.19)
PROTHROMBIN TIME: 17.9 SECONDS (ref 11.6–14.5)

## 2021-12-01 PROCEDURE — 85610 PROTHROMBIN TIME: CPT

## 2021-12-01 PROCEDURE — 36415 COLL VENOUS BLD VENIPUNCTURE: CPT

## 2021-12-02 ENCOUNTER — TELEPHONE (OUTPATIENT)
Dept: FAMILY MEDICINE CLINIC | Facility: CLINIC | Age: 61
End: 2021-12-02

## 2021-12-02 ENCOUNTER — APPOINTMENT (OUTPATIENT)
Dept: LAB | Facility: HOSPITAL | Age: 61
End: 2021-12-02
Payer: COMMERCIAL

## 2021-12-02 ENCOUNTER — ANTICOAG VISIT (OUTPATIENT)
Dept: CARDIOLOGY CLINIC | Facility: CLINIC | Age: 61
End: 2021-12-02

## 2021-12-02 ENCOUNTER — PATIENT OUTREACH (OUTPATIENT)
Dept: CASE MANAGEMENT | Facility: OTHER | Age: 61
End: 2021-12-02

## 2021-12-02 DIAGNOSIS — Z95.2 S/P AORTIC VALVE REPLACEMENT WITH PROSTHETIC VALVE: ICD-10-CM

## 2021-12-02 DIAGNOSIS — Z95.2 STATUS POST MECHANICAL AORTIC VALVE REPLACEMENT: Primary | ICD-10-CM

## 2021-12-02 DIAGNOSIS — E66.9 DIABETES MELLITUS TYPE 2 IN OBESE (HCC): Primary | ICD-10-CM

## 2021-12-02 DIAGNOSIS — E11.69 DIABETES MELLITUS TYPE 2 IN OBESE (HCC): Primary | ICD-10-CM

## 2021-12-02 DIAGNOSIS — K59.1 FUNCTIONAL DIARRHEA: Primary | ICD-10-CM

## 2021-12-02 LAB
INR PPP: 1.55 (ref 0.84–1.19)
PROTHROMBIN TIME: 18.3 SECONDS (ref 11.6–14.5)

## 2021-12-02 PROCEDURE — 36415 COLL VENOUS BLD VENIPUNCTURE: CPT

## 2021-12-02 PROCEDURE — 85610 PROTHROMBIN TIME: CPT

## 2021-12-02 RX ORDER — BLOOD-GLUCOSE,RECEIVER,CONT
EACH MISCELLANEOUS
Qty: 1 EACH | Refills: 5
Start: 2021-12-02 | End: 2022-03-07

## 2021-12-02 RX ORDER — BLOOD-GLUCOSE SENSOR
EACH MISCELLANEOUS
Qty: 1 EACH | Refills: 5
Start: 2021-12-02 | End: 2022-03-07

## 2021-12-02 RX ORDER — CHOLESTYRAMINE 4 G/9G
1 POWDER, FOR SUSPENSION ORAL 3 TIMES DAILY PRN
Qty: 90 PACKET | Refills: 1 | Status: SHIPPED | OUTPATIENT
Start: 2021-12-02 | End: 2021-12-13

## 2021-12-02 NOTE — RESULT ENCOUNTER NOTE
Patient was recently discharged from hospital her inr is subtherapeutic on repeat labs as outpatient   She was prescribed lovenox on discharge to bridge

## 2021-12-03 ENCOUNTER — ANTICOAG VISIT (OUTPATIENT)
Dept: CARDIOLOGY CLINIC | Facility: CLINIC | Age: 61
End: 2021-12-03

## 2021-12-03 ENCOUNTER — PATIENT OUTREACH (OUTPATIENT)
Dept: CASE MANAGEMENT | Facility: OTHER | Age: 61
End: 2021-12-03

## 2021-12-03 ENCOUNTER — APPOINTMENT (OUTPATIENT)
Dept: LAB | Facility: HOSPITAL | Age: 61
End: 2021-12-03
Payer: COMMERCIAL

## 2021-12-03 DIAGNOSIS — Z95.2 STATUS POST MECHANICAL AORTIC VALVE REPLACEMENT: Primary | ICD-10-CM

## 2021-12-06 ENCOUNTER — APPOINTMENT (OUTPATIENT)
Dept: LAB | Facility: HOSPITAL | Age: 61
End: 2021-12-06
Payer: COMMERCIAL

## 2021-12-06 ENCOUNTER — ANTICOAG VISIT (OUTPATIENT)
Dept: CARDIOLOGY CLINIC | Facility: CLINIC | Age: 61
End: 2021-12-06

## 2021-12-06 DIAGNOSIS — Z95.2 STATUS POST MECHANICAL AORTIC VALVE REPLACEMENT: Primary | ICD-10-CM

## 2021-12-06 DIAGNOSIS — Z95.2 S/P AORTIC VALVE REPLACEMENT WITH PROSTHETIC VALVE: ICD-10-CM

## 2021-12-06 LAB
INR PPP: 2.1 (ref 0.84–1.19)
PROTHROMBIN TIME: 23.1 SECONDS (ref 11.6–14.5)

## 2021-12-06 PROCEDURE — 36415 COLL VENOUS BLD VENIPUNCTURE: CPT

## 2021-12-06 PROCEDURE — 85610 PROTHROMBIN TIME: CPT

## 2021-12-13 ENCOUNTER — OFFICE VISIT (OUTPATIENT)
Dept: FAMILY MEDICINE CLINIC | Facility: CLINIC | Age: 61
End: 2021-12-13
Payer: COMMERCIAL

## 2021-12-13 ENCOUNTER — TELEPHONE (OUTPATIENT)
Dept: FAMILY MEDICINE CLINIC | Facility: CLINIC | Age: 61
End: 2021-12-13

## 2021-12-13 VITALS
HEART RATE: 70 BPM | WEIGHT: 293 LBS | SYSTOLIC BLOOD PRESSURE: 120 MMHG | RESPIRATION RATE: 16 BRPM | BODY MASS INDEX: 48.82 KG/M2 | DIASTOLIC BLOOD PRESSURE: 60 MMHG | OXYGEN SATURATION: 97 % | TEMPERATURE: 96.8 F | HEIGHT: 65 IN

## 2021-12-13 DIAGNOSIS — R00.0 TACHYCARDIA: ICD-10-CM

## 2021-12-13 DIAGNOSIS — R06.02 SOB (SHORTNESS OF BREATH): ICD-10-CM

## 2021-12-13 DIAGNOSIS — E66.9 DIABETES MELLITUS TYPE 2 IN OBESE (HCC): ICD-10-CM

## 2021-12-13 DIAGNOSIS — E11.69 DIABETES MELLITUS TYPE 2 IN OBESE (HCC): ICD-10-CM

## 2021-12-13 DIAGNOSIS — K59.1 FUNCTIONAL DIARRHEA: ICD-10-CM

## 2021-12-13 DIAGNOSIS — Z86.718 PERSONAL HISTORY OF DVT (DEEP VEIN THROMBOSIS): ICD-10-CM

## 2021-12-13 DIAGNOSIS — E03.9 HYPOTHYROIDISM, UNSPECIFIED TYPE: ICD-10-CM

## 2021-12-13 DIAGNOSIS — E11.9 ENCOUNTER FOR DIABETIC FOOT EXAM (HCC): ICD-10-CM

## 2021-12-13 DIAGNOSIS — M54.40 CHRONIC BILATERAL LOW BACK PAIN WITH SCIATICA, SCIATICA LATERALITY UNSPECIFIED: ICD-10-CM

## 2021-12-13 DIAGNOSIS — K70.30 ALCOHOLIC CIRRHOSIS OF LIVER WITHOUT ASCITES (HCC): ICD-10-CM

## 2021-12-13 DIAGNOSIS — Z95.2 HISTORY OF MECHANICAL AORTIC VALVE REPLACEMENT: ICD-10-CM

## 2021-12-13 DIAGNOSIS — E66.01 MORBID OBESITY WITH BMI OF 50.0-59.9, ADULT (HCC): ICD-10-CM

## 2021-12-13 DIAGNOSIS — R00.2 PALPITATIONS: ICD-10-CM

## 2021-12-13 DIAGNOSIS — G89.29 CHRONIC BILATERAL LOW BACK PAIN WITH SCIATICA, SCIATICA LATERALITY UNSPECIFIED: ICD-10-CM

## 2021-12-13 DIAGNOSIS — Z76.89 ENCOUNTER FOR SUPPORT AND COORDINATION OF TRANSITION OF CARE: Primary | ICD-10-CM

## 2021-12-13 DIAGNOSIS — E78.5 HYPERLIPIDEMIA, UNSPECIFIED HYPERLIPIDEMIA TYPE: ICD-10-CM

## 2021-12-13 PROCEDURE — 3008F BODY MASS INDEX DOCD: CPT | Performed by: INTERNAL MEDICINE

## 2021-12-13 PROCEDURE — 99496 TRANSJ CARE MGMT HIGH F2F 7D: CPT | Performed by: INTERNAL MEDICINE

## 2021-12-13 RX ORDER — CHOLESTYRAMINE 4 G/9G
1 POWDER, FOR SUSPENSION ORAL 2 TIMES DAILY WITH MEALS
Qty: 60 PACKET | Refills: 1 | Status: SHIPPED | OUTPATIENT
Start: 2021-12-13 | End: 2022-04-28

## 2021-12-13 RX ORDER — DULAGLUTIDE 1.5 MG/.5ML
1.5 INJECTION, SOLUTION SUBCUTANEOUS WEEKLY
Qty: 2 ML | Refills: 5 | Status: SHIPPED | OUTPATIENT
Start: 2021-12-13 | End: 2022-04-28

## 2021-12-13 RX ORDER — FUROSEMIDE 40 MG/1
TABLET ORAL
Qty: 30 TABLET | Refills: 5 | Status: SHIPPED | OUTPATIENT
Start: 2021-12-13 | End: 2022-05-10

## 2021-12-13 RX ORDER — DULOXETIN HYDROCHLORIDE 60 MG/1
60 CAPSULE, DELAYED RELEASE ORAL DAILY
Qty: 30 CAPSULE | Refills: 5 | Status: SHIPPED | OUTPATIENT
Start: 2021-12-13 | End: 2022-02-10

## 2021-12-13 RX ORDER — WARFARIN SODIUM 3 MG/1
TABLET ORAL
Qty: 30 TABLET | Refills: 5 | Status: SHIPPED | OUTPATIENT
Start: 2021-12-13 | End: 2022-02-02

## 2021-12-13 RX ORDER — WARFARIN SODIUM 3 MG/1
6 TABLET ORAL DAILY
Qty: 30 TABLET | Refills: 5 | Status: SHIPPED | OUTPATIENT
Start: 2021-12-13 | End: 2021-12-13 | Stop reason: SDUPTHER

## 2021-12-14 ENCOUNTER — PATIENT OUTREACH (OUTPATIENT)
Dept: CASE MANAGEMENT | Facility: OTHER | Age: 61
End: 2021-12-14

## 2021-12-14 ENCOUNTER — APPOINTMENT (OUTPATIENT)
Dept: LAB | Facility: HOSPITAL | Age: 61
End: 2021-12-14
Payer: COMMERCIAL

## 2021-12-14 ENCOUNTER — ANTICOAG VISIT (OUTPATIENT)
Dept: CARDIOLOGY CLINIC | Facility: CLINIC | Age: 61
End: 2021-12-14

## 2021-12-14 DIAGNOSIS — Z95.2 S/P AORTIC VALVE REPLACEMENT WITH PROSTHETIC VALVE: ICD-10-CM

## 2021-12-14 DIAGNOSIS — Z95.2 STATUS POST MECHANICAL AORTIC VALVE REPLACEMENT: Primary | ICD-10-CM

## 2021-12-14 LAB
INR PPP: 2.49 (ref 0.84–1.19)
PROTHROMBIN TIME: 26.3 SECONDS (ref 11.6–14.5)

## 2021-12-14 PROCEDURE — 36415 COLL VENOUS BLD VENIPUNCTURE: CPT

## 2021-12-14 PROCEDURE — 85610 PROTHROMBIN TIME: CPT

## 2021-12-29 ENCOUNTER — PATIENT OUTREACH (OUTPATIENT)
Dept: CASE MANAGEMENT | Facility: OTHER | Age: 61
End: 2021-12-29

## 2022-01-12 ENCOUNTER — PATIENT OUTREACH (OUTPATIENT)
Dept: CASE MANAGEMENT | Facility: OTHER | Age: 62
End: 2022-01-12

## 2022-01-12 NOTE — PROGRESS NOTES
Patient called to report she has her continuous glucose monitor and the sensors  She also reports taking kaopectate for her diarrhea and she has not been symptomatic for 4 days  Will continue to folllow

## 2022-01-13 ENCOUNTER — TELEPHONE (OUTPATIENT)
Dept: FAMILY MEDICINE CLINIC | Facility: CLINIC | Age: 62
End: 2022-01-13

## 2022-01-13 DIAGNOSIS — F17.200 TOBACCO DEPENDENCE: Primary | ICD-10-CM

## 2022-01-13 NOTE — TELEPHONE ENCOUNTER
Can you send nicotine patch for her 21 mg/d for 4 weeks, 14 mg/day for 4 weeks and then 7 mg/day for 4 weeks

## 2022-01-14 RX ORDER — NICOTINE 21 MG/24HR
1 PATCH, TRANSDERMAL 24 HOURS TRANSDERMAL EVERY 24 HOURS
Qty: 28 PATCH | Refills: 0 | Status: SHIPPED | OUTPATIENT
Start: 2022-01-14 | End: 2022-05-10

## 2022-01-14 RX ORDER — OXCARBAZEPINE 300 MG/1
TABLET, FILM COATED ORAL
COMMUNITY
Start: 2021-12-20 | End: 2022-02-10

## 2022-01-14 RX ORDER — DOXEPIN HYDROCHLORIDE 25 MG/1
25 CAPSULE ORAL
COMMUNITY
Start: 2021-12-20

## 2022-01-14 RX ORDER — ESCITALOPRAM OXALATE 10 MG/1
10 TABLET ORAL DAILY
COMMUNITY
Start: 2021-12-20

## 2022-01-14 RX ORDER — CLONAZEPAM 0.5 MG/1
0.5 TABLET ORAL
COMMUNITY
Start: 2021-12-20

## 2022-01-17 DIAGNOSIS — M79.7 FIBROMYALGIA: ICD-10-CM

## 2022-01-17 RX ORDER — BACLOFEN 10 MG/1
10 TABLET ORAL 3 TIMES DAILY PRN
Qty: 90 TABLET | Refills: 3 | Status: SHIPPED | OUTPATIENT
Start: 2022-01-17 | End: 2022-06-22

## 2022-01-19 ENCOUNTER — PATIENT OUTREACH (OUTPATIENT)
Dept: CASE MANAGEMENT | Facility: OTHER | Age: 62
End: 2022-01-19

## 2022-01-19 NOTE — PROGRESS NOTES
I spoke with Amrit Posadas who reports she is having occasional diarrhea "but not everyday" which is an improvement  She reports she is using the continuous glucose monitor and her blood sugars are good  She has a sleep study scheduled for 2/27

## 2022-01-25 ENCOUNTER — APPOINTMENT (OUTPATIENT)
Dept: LAB | Facility: HOSPITAL | Age: 62
End: 2022-01-25
Payer: MEDICARE

## 2022-01-25 ENCOUNTER — ANTICOAG VISIT (OUTPATIENT)
Dept: CARDIOLOGY CLINIC | Facility: CLINIC | Age: 62
End: 2022-01-25

## 2022-01-25 DIAGNOSIS — Z95.2 STATUS POST MECHANICAL AORTIC VALVE REPLACEMENT: Primary | ICD-10-CM

## 2022-02-02 DIAGNOSIS — Z95.2 HISTORY OF MECHANICAL AORTIC VALVE REPLACEMENT: ICD-10-CM

## 2022-02-02 DIAGNOSIS — Z86.718 PERSONAL HISTORY OF DVT (DEEP VEIN THROMBOSIS): ICD-10-CM

## 2022-02-02 DIAGNOSIS — E11.9 TYPE 2 DIABETES MELLITUS WITHOUT COMPLICATION, WITHOUT LONG-TERM CURRENT USE OF INSULIN (HCC): ICD-10-CM

## 2022-02-02 RX ORDER — ISOPROPYL ALCOHOL 0.7 ML/ML
SWAB TOPICAL 3 TIMES DAILY
Qty: 100 EACH | Refills: 1 | Status: SHIPPED | OUTPATIENT
Start: 2022-02-02

## 2022-02-02 RX ORDER — WARFARIN SODIUM 3 MG/1
TABLET ORAL
Qty: 60 TABLET | Refills: 5 | Status: SHIPPED | OUTPATIENT
Start: 2022-02-02 | End: 2022-02-27 | Stop reason: SDUPTHER

## 2022-02-10 ENCOUNTER — OFFICE VISIT (OUTPATIENT)
Dept: FAMILY MEDICINE CLINIC | Facility: CLINIC | Age: 62
End: 2022-02-10
Payer: MEDICARE

## 2022-02-10 VITALS
RESPIRATION RATE: 16 BRPM | HEART RATE: 86 BPM | TEMPERATURE: 97.3 F | WEIGHT: 293 LBS | SYSTOLIC BLOOD PRESSURE: 122 MMHG | DIASTOLIC BLOOD PRESSURE: 80 MMHG | HEIGHT: 65 IN | OXYGEN SATURATION: 96 % | BODY MASS INDEX: 48.82 KG/M2

## 2022-02-10 DIAGNOSIS — E78.5 HYPERLIPIDEMIA, UNSPECIFIED HYPERLIPIDEMIA TYPE: ICD-10-CM

## 2022-02-10 DIAGNOSIS — Z53.20 MAMMOGRAM DECLINED: ICD-10-CM

## 2022-02-10 DIAGNOSIS — G89.29 CHRONIC BILATERAL LOW BACK PAIN WITH SCIATICA, SCIATICA LATERALITY UNSPECIFIED: ICD-10-CM

## 2022-02-10 DIAGNOSIS — Z95.2 S/P AORTIC VALVE REPLACEMENT WITH PROSTHETIC VALVE: ICD-10-CM

## 2022-02-10 DIAGNOSIS — E11.69 DIABETES MELLITUS TYPE 2 IN OBESE (HCC): ICD-10-CM

## 2022-02-10 DIAGNOSIS — K70.30 ALCOHOLIC CIRRHOSIS OF LIVER WITHOUT ASCITES (HCC): ICD-10-CM

## 2022-02-10 DIAGNOSIS — E11.9 TYPE 2 DIABETES MELLITUS WITHOUT COMPLICATION, WITHOUT LONG-TERM CURRENT USE OF INSULIN (HCC): ICD-10-CM

## 2022-02-10 DIAGNOSIS — E03.9 HYPOTHYROIDISM, UNSPECIFIED TYPE: ICD-10-CM

## 2022-02-10 DIAGNOSIS — Z00.00 ANNUAL PHYSICAL EXAM: Primary | ICD-10-CM

## 2022-02-10 DIAGNOSIS — Z79.01 ANTICOAGULATED ON COUMADIN: ICD-10-CM

## 2022-02-10 DIAGNOSIS — Z12.31 ENCOUNTER FOR SCREENING MAMMOGRAM FOR MALIGNANT NEOPLASM OF BREAST: ICD-10-CM

## 2022-02-10 DIAGNOSIS — M54.40 CHRONIC BILATERAL LOW BACK PAIN WITH SCIATICA, SCIATICA LATERALITY UNSPECIFIED: ICD-10-CM

## 2022-02-10 DIAGNOSIS — Z90.710 HISTORY OF HYSTERECTOMY: ICD-10-CM

## 2022-02-10 DIAGNOSIS — K59.1 FUNCTIONAL DIARRHEA: ICD-10-CM

## 2022-02-10 DIAGNOSIS — M79.7 FIBROMYALGIA: ICD-10-CM

## 2022-02-10 DIAGNOSIS — E66.9 DIABETES MELLITUS TYPE 2 IN OBESE (HCC): ICD-10-CM

## 2022-02-10 PROBLEM — Z72.0 TOBACCO USE: Status: ACTIVE | Noted: 2022-02-10

## 2022-02-10 LAB — SL AMB POCT HEMOGLOBIN AIC: 6 (ref ?–6.5)

## 2022-02-10 PROCEDURE — 99214 OFFICE O/P EST MOD 30 MIN: CPT | Performed by: INTERNAL MEDICINE

## 2022-02-10 PROCEDURE — 83036 HEMOGLOBIN GLYCOSYLATED A1C: CPT | Performed by: INTERNAL MEDICINE

## 2022-02-10 PROCEDURE — 99396 PREV VISIT EST AGE 40-64: CPT | Performed by: INTERNAL MEDICINE

## 2022-02-10 RX ORDER — DULOXETIN HYDROCHLORIDE 60 MG/1
60 CAPSULE, DELAYED RELEASE ORAL DAILY
Qty: 30 CAPSULE | Refills: 5 | Status: SHIPPED | OUTPATIENT
Start: 2022-02-10 | End: 2022-07-22

## 2022-02-10 RX ORDER — DULOXETIN HYDROCHLORIDE 30 MG/1
30 CAPSULE, DELAYED RELEASE ORAL DAILY
Qty: 30 CAPSULE | Refills: 5 | Status: SHIPPED | OUTPATIENT
Start: 2022-02-10 | End: 2022-06-23

## 2022-02-10 NOTE — ASSESSMENT & PLAN NOTE
Lab Results   Component Value Date    HGBA1C 6 0 02/10/2022   A1c is 6 0% today-she is on Trulicity-I told her in theory we could decrease the dosage back down to 0 75 mg but we decided we'd hold at the 1 5 mg as I don't want her to gain weight back and that she may possibly lose some more weight

## 2022-02-10 NOTE — PATIENT INSTRUCTIONS

## 2022-02-10 NOTE — PROGRESS NOTES
[address] [facility] FAMILY MEDICINE    NAME: Mirta Krishnamurthy  AGE: 63 y o   SEX: female  : 1960     DATE: 2/10/2022     Assessment and Plan:     Problem List Items Addressed This Visit        Digestive    Functional diarrhea     Sounds like her diarrhea is finally under control         Alcoholic cirrhosis of liver without ascites (HCC)       Endocrine    Hypothyroidism    Relevant Orders    CBC and differential    Comprehensive metabolic panel    Lipid panel    TSH, 3rd generation    Diabetes mellitus type 2 in obese [facility])       Lab Results   Component Value Date    HGBA1C 6 0 02/10/2022   A1c is 6 0% today-she is on Trulicity-I told her in theory we could decrease the dosage back down to 0 75 mg but we decided we'd hold at the 1 5 mg as I don't want her to gain weight back and that she may possibly lose some more weight            Cardiovascular and Mediastinum    S/P aortic valve replacement with prosthetic valve     Says her valve was replaced originally because it was a bicuspid valve-she is on warfarin            Other    Hyperlipidemia    Relevant Orders    CBC and differential    Comprehensive metabolic panel    Lipid panel    TSH, 3rd generation    Anticoagulated on Coumadin    Back pain    Relevant Medications    DULoxetine (CYMBALTA) 60 mg delayed release capsule    DULoxetine (Cymbalta) 30 mg delayed release capsule    Fibromyalgia     Will increase duloxetine dosage to 90 mg by giving her a 60 mg capsule and a 30 mg capsule         Relevant Medications    DULoxetine (Cymbalta) 30 mg delayed release capsule    Other Relevant Orders    Ambulatory Referral to Rheumatology    History of hysterectomy      Other Visit Diagnoses     Annual physical exam    -  Primary    Relevant Orders    CBC and differential    Comprehensive metabolic panel    Lipid panel    TSH, 3rd generation    Microalbumin / creatinine urine ratio    Mammogram declined BMI 50 0-59 9, adult (HCC)        Relevant Orders    CBC and differential    Comprehensive metabolic panel    Lipid panel    TSH, 3rd generation    Type 2 diabetes mellitus without complication, without long-term current use of insulin (HCC)        Relevant Orders    POCT hemoglobin A1c (Completed)    CBC and differential    Comprehensive metabolic panel    Lipid panel    TSH, 3rd generation    Microalbumin / creatinine urine ratio    Encounter for screening mammogram for malignant neoplasm of breast        Relevant Orders    Mammo screening bilateral w 3d & cad          Immunizations and preventive care screenings were discussed with patient today  Appropriate education was printed on patient's after visit summary  Counseling:  Alcohol/drug use: discussed moderation in alcohol intake, the recommendations for healthy alcohol use, and avoidance of illicit drug use  · Exercise: the importance of regular exercise/physical activity was discussed  Recommend exercise 3-5 times per week for at least 30 minutes  BMI Counseling: Body mass index is 55 75 kg/m²  The BMI is above normal  Nutrition recommendations include decreasing portion sizes and decreasing fast food intake  Exercise recommendations include exercising 3-5 times per week and obtaining a gym membership  No pharmacotherapy was ordered  Rationale for BMI follow-up plan is due to patient being overweight or obese  No follow-ups on file  Chief Complaint:     Chief Complaint   Patient presents with    Physical Exam     diarrhea is getting better, having pain all over body which is just getting worse  (has hx of fibromyalgia)      History of Present Illness:     Adult Annual Physical   Patient here for a comprehensive physical exam  The patient reports problems - hurts all over, her weight  Diet and Physical Activity  · Diet/Nutrition: poor diet  · Exercise: no formal exercise        Depression Screening  PHQ-2/9 Depression Screening General Health  · Sleep: sleeps poorly  · Hearing: normal - bilateral   · Vision: no vision problems  · Dental: regular dental visits  /GYN Health  · Patient is: postmenopausal  · Last menstrual period: hx of hysterectomy  · Contraceptive method: hysterectomy  Review of Systems:     Review of Systems   Constitutional: Negative for fatigue and fever  Respiratory: Negative  Cardiovascular: Negative  Gastrointestinal: Positive for diarrhea  Musculoskeletal: Negative  Psychiatric/Behavioral: Positive for dysphoric mood and sleep disturbance  The patient is nervous/anxious         Past Medical History:     Past Medical History:   Diagnosis Date    Anemia     Anxiety     Aortic valve disorder     Back pain     Chronic pain syndrome     Cirrhosis (HCC)     Constipation     Degenerative arthritis of thoracic spine     Degenerative joint disease involving multiple joints     Depression     DVT (deep venous thrombosis) (HCC)     Bilateral    Edema     Endometrial adenocarcinoma (HCC)     Fibromyalgia     Ganglion of right wrist     GERD (gastroesophageal reflux disease)     Heart murmur     Hematoma     History of mechanical aortic valve replacement     Hypothyroidism (acquired)     Laboratory confirmed diagnosis of COVID-19 2021    Low blood pressure     Malignant neoplasm of corpus uteri, except isthmus (HCC)     Mixed hyperlipidemia     Morbid obesity (HCC)     Obstructive sleep apnea syndrome     Pulmonary embolism (HCC)     Tobacco dependence syndrome     Transient cerebral ischemia     Urinary incontinence     Viral hepatitis C     Vitamin D deficiency       Past Surgical History:     Past Surgical History:   Procedure Laterality Date    ANKLE SURGERY      CARDIAC SURGERY  2015    VALVE REPLACEMENT     SECTION      X3    CHOLECYSTECTOMY      COLONOSCOPY  2019    COLONOSCOPY      HYSTERECTOMY  2011    TOTAL      Social History:     Social History     Socioeconomic History    Marital status:      Spouse name: None    Number of children: None    Years of education: 15    Highest education level: GED or equivalent   Occupational History    None   Tobacco Use    Smoking status: Current Some Day Smoker     Packs/day: 0 50     Years: 40 00     Pack years: 20 00     Types: Cigarettes    Smokeless tobacco: Never Used   Vaping Use    Vaping Use: Never used   Substance and Sexual Activity    Alcohol use: Yes     Comment: once in [place] Drug use: Yes     Types: Marijuana    Sexual activity: None   Other Topics Concern    None   Social History Narrative    Do you currently or have you served in [employer]: No    Were you activated, into active duty, as a member of the [employer] or as a Reservist: No    Occupation: unemployed    Education: 15 ged    Marital status:     Exercise level: None    Diet: Regular    General stress level: High    Has smoked since age: 17    Alcohol intake: None    Caffeine intake: Heavy    Chewing tobacco: none    Illicit drugs: none    Guns present in home: Yes    Seat belts used routinely: No    Sunscreen used routinely: No    Smoke alarm in home: Yes    Advance directive: Yes living will    Live alone or with others: with others    Are there stairs in your home: Yes    Pets: Yes 1 dog    Deaf or serious difficulty hearing: No    Blind or serious difficulty seeing: No    Difficulty concentrating, remembering or making decisions: No    Difficulty walking or climbing stairs: Yes    Difficulty dressing or bathing: Yes    Difficulty doing errands alone: Yes    can't put socks on     Social Determinants of Health     Financial Resource Strain: Not on file   Food Insecurity: Not on file   Transportation Needs: Not on file   Physical Activity: Not on file   Stress: Not on file   Social Connections: Not on file   Intimate Partner Violence: Not on file   Housing Stability: Not on file      Family History:     Family History   Problem Relation Age of Onset    Coronary artery disease Mother     Coronary artery disease Father     Cirrhosis Father       Current Medications:     Current Outpatient Medications   Medication Sig Dispense Refill    baclofen 10 mg tablet Take 1 tablet (10 mg total) by mouth 3 (three) times a day as needed for muscle spasms 90 tablet 3    cholestyramine (QUESTRAN) 4 g packet Take 1 packet (4 g total) by mouth 2 (two) times a day with meals Do not take within 2 hours of other oral medications 60 packet 1    clonazePAM (KlonoPIN) 0 5 mg tablet Take 0 5 mg by mouth daily at bedtime        clonazePAM (KlonoPIN) 1 mg tablet Take 1 mg by mouth daily as needed      doxepin (SINEquan) 25 mg capsule Take 25 mg by mouth daily at bedtime        Dulaglutide (Trulicity) 1 5 UQ/1 3UP SOPN Inject 0 5 mL (1 5 mg total) under the skin once a week 2 mL 5    DULoxetine (CYMBALTA) 60 mg delayed release capsule Take 1 capsule (60 mg total) by mouth daily Pt to take a 60 mg cymbalta and a 30 mg cymbalta to make 90 mg 30 capsule 5    escitalopram (LEXAPRO) 10 mg tablet Take 10 mg by mouth daily        furosemide (LASIX) 40 mg tablet Take 1 tablet daily for 5 days, then 1 tablet daily on an as needed basis  30 tablet 5    mirtazapine (REMERON) 45 MG tablet Take 45 mg by mouth daily at bedtime        nicotine (NICODERM CQ) 14 mg/24hr TD 24 hr patch Place 1 patch on the skin every 24 hours 28 patch 0    nystatin powder Apply topically 2 (two) times a day 60 g 5    ProAir RespiClick 988 (90 Base) MCG/ACT AEPB INHALE 1 PUFF EVERY 4 (FOUR) HOURS AS NEEDED (WHEEZING OR SHORTNESS OF BREATH)        warfarin (Jantoven) 3 mg tablet TAKE 2 TABLETS (6 MG TOTAL) BY MOUTH DAILY 2 5 TABLETS ON MONDAY AND FRIDAY AS DIRECTED BY YOUR DOCTOR 60 tablet 5    ziprasidone (GEODON) 60 mg capsule Take 60 mg by mouth 2 (two) times a day with meals        Alcohol Swabs (Pharmacist Choice Alcohol) PADS Apply topically 3 (three) times a day 100 each 1    BD Pen Needle Mary 2nd Gen 32G X 4 MM MISC USE 3-4 TIMES A DAY AS DIRECTED   Blood Glucose Monitoring Suppl (OneTouch Verio IQ System) w/Device KIT USE 3 TIMES A DAY (Patient not taking: Reported on 9/29/2021) 1 kit 1    Continuous Blood Gluc  (Dexcom G4 Platinum ) ROXANNE USE AS DIRECTED 1 each 5    Continuous Blood Gluc Sensor (Dexcom G4 Sensor) MISC USE AS DIRECTED 1 each 5    DULoxetine (Cymbalta) 30 mg delayed release capsule Take 1 capsule (30 mg total) by mouth daily 30 capsule 5    glucose blood (OneTouch Verio) test strip Use 1 each 3 (three) times a day Use as instructed 300 each 5    nicotine (NICODERM CQ) 21 mg/24 hr TD 24 hr patch Place 1 patch on the skin every 24 hours 28 patch 0    Pharmacist Choice Lancets MISC USE TO TEST GLUCOSE UP TO 3 TIMES A DAY - ONETOUCH VERIO LANCETS 200 each 5     No current facility-administered medications for this visit  Allergies: Allergies   Allergen Reactions    Bactrim [Sulfamethoxazole-Trimethoprim] Other (See Comments)     Patient is unaware of why this allergy is recorded, denies knowledge of it    Penicillins Hives     Patient is unaware of why this allergy is recorded, denies knowledge of it    Tramadol GI Intolerance and Vomiting     Could not function - felt very ill     Rocephin [Ceftriaxone] Dizziness      Physical Exam:     /80 (BP Location: Left arm, Patient Position: Sitting, Cuff Size: Adult)   Pulse 86   Temp (!) 97 3 °F (36 3 °C) (Temporal)   Resp 16   Ht 5' 5" (1 651 m)   Wt (!) 152 kg (335 lb)   SpO2 96%   BMI 55 75 kg/m²     Physical Exam  Constitutional:       Appearance: She is obese  HENT:      Head: Normocephalic and atraumatic        Right Ear: External ear normal       Left Ear: External ear normal       Nose: Nose normal       Mouth/Throat:      Mouth: Mucous membranes are moist    Eyes:      Extraocular Movements: Extraocular movements intact  Pupils: Pupils are equal, round, and reactive to light  Cardiovascular:      Rate and Rhythm: Normal rate and regular rhythm  Pulmonary:      Effort: Pulmonary effort is normal       Breath sounds: Normal breath sounds  Abdominal:      General: Abdomen is flat  Palpations: Abdomen is soft  Comments: pannus   Musculoskeletal:         General: Normal range of motion  Cervical back: Normal range of motion and neck supple  Skin:     Capillary Refill: Capillary refill takes less than 2 seconds  Neurological:      General: No focal deficit present  Mental Status: She is alert and oriented to person, place, and time     Psychiatric:         Mood and Affect: Mood normal          Behavior: Behavior normal           Darcy Blakely MD  [facility]

## 2022-02-10 NOTE — ASSESSMENT & PLAN NOTE
Is trying to cut back on smoking-we'll have to get a LDCT at some point for lung cancer screening although she did have a CTA chest that was normal and negative for lesion

## 2022-02-18 ENCOUNTER — APPOINTMENT (OUTPATIENT)
Dept: LAB | Facility: HOSPITAL | Age: 62
End: 2022-02-18
Payer: MEDICARE

## 2022-02-18 ENCOUNTER — ANTICOAG VISIT (OUTPATIENT)
Dept: CARDIOLOGY CLINIC | Facility: CLINIC | Age: 62
End: 2022-02-18

## 2022-02-18 DIAGNOSIS — E11.9 TYPE 2 DIABETES MELLITUS WITHOUT COMPLICATION, WITHOUT LONG-TERM CURRENT USE OF INSULIN (HCC): ICD-10-CM

## 2022-02-18 DIAGNOSIS — Z00.00 ANNUAL PHYSICAL EXAM: ICD-10-CM

## 2022-02-18 DIAGNOSIS — E78.5 HYPERLIPIDEMIA, UNSPECIFIED HYPERLIPIDEMIA TYPE: ICD-10-CM

## 2022-02-18 DIAGNOSIS — E03.9 HYPOTHYROIDISM, UNSPECIFIED TYPE: ICD-10-CM

## 2022-02-18 DIAGNOSIS — Z95.2 STATUS POST MECHANICAL AORTIC VALVE REPLACEMENT: Primary | ICD-10-CM

## 2022-02-18 LAB
ALBUMIN SERPL BCP-MCNC: 4 G/DL (ref 3.4–4.8)
ALP SERPL-CCNC: 84.2 U/L (ref 35–140)
ALT SERPL W P-5'-P-CCNC: 16 U/L (ref 5–54)
ANION GAP SERPL CALCULATED.3IONS-SCNC: 7 MMOL/L (ref 4–13)
AST SERPL W P-5'-P-CCNC: 30 U/L (ref 15–41)
BASOPHILS # BLD AUTO: 0.02 THOUSANDS/ΜL (ref 0–0.1)
BASOPHILS NFR BLD AUTO: 1 % (ref 0–1)
BILIRUB SERPL-MCNC: 1.18 MG/DL (ref 0.3–1.2)
BUN SERPL-MCNC: 13 MG/DL (ref 6–20)
CALCIUM SERPL-MCNC: 9.2 MG/DL (ref 8.4–10.2)
CHLORIDE SERPL-SCNC: 106 MMOL/L (ref 96–108)
CHOLEST SERPL-MCNC: 169 MG/DL
CO2 SERPL-SCNC: 27 MMOL/L (ref 22–33)
CREAT SERPL-MCNC: 0.55 MG/DL (ref 0.4–1.1)
EOSINOPHIL # BLD AUTO: 0.14 THOUSAND/ΜL (ref 0–0.61)
EOSINOPHIL NFR BLD AUTO: 4 % (ref 0–6)
ERYTHROCYTE [DISTWIDTH] IN BLOOD BY AUTOMATED COUNT: 14.1 % (ref 11.6–15.1)
GFR SERPL CREATININE-BSD FRML MDRD: 102 ML/MIN/1.73SQ M
GLUCOSE P FAST SERPL-MCNC: 135 MG/DL (ref 70–105)
HCT VFR BLD AUTO: 45.5 % (ref 34.8–46.1)
HDLC SERPL-MCNC: 41 MG/DL
HGB BLD-MCNC: 14.8 G/DL (ref 11.5–15.4)
IMM GRANULOCYTES # BLD AUTO: 0.01 THOUSAND/UL (ref 0–0.2)
IMM GRANULOCYTES NFR BLD AUTO: 0 % (ref 0–2)
LDLC SERPL CALC-MCNC: 85 MG/DL (ref 0–100)
LYMPHOCYTES # BLD AUTO: 1.1 THOUSANDS/ΜL (ref 0.6–4.47)
LYMPHOCYTES NFR BLD AUTO: 28 % (ref 14–44)
MCH RBC QN AUTO: 30.3 PG (ref 26.8–34.3)
MCHC RBC AUTO-ENTMCNC: 32.5 G/DL (ref 31.4–37.4)
MCV RBC AUTO: 93 FL (ref 82–98)
MONOCYTES # BLD AUTO: 0.27 THOUSAND/ΜL (ref 0.17–1.22)
MONOCYTES NFR BLD AUTO: 7 % (ref 4–12)
NEUTROPHILS # BLD AUTO: 2.4 THOUSANDS/ΜL (ref 1.85–7.62)
NEUTS SEG NFR BLD AUTO: 60 % (ref 43–75)
NONHDLC SERPL-MCNC: 128 MG/DL
NRBC BLD AUTO-RTO: 0 /100 WBCS
PLATELET # BLD AUTO: 153 THOUSANDS/UL (ref 149–390)
PMV BLD AUTO: 10 FL (ref 8.9–12.7)
POTASSIUM SERPL-SCNC: 3.5 MMOL/L (ref 3.5–5)
PROT SERPL-MCNC: 7.6 G/DL (ref 6.4–8.3)
RBC # BLD AUTO: 4.88 MILLION/UL (ref 3.81–5.12)
SODIUM SERPL-SCNC: 140 MMOL/L (ref 133–145)
TRIGL SERPL-MCNC: 213.6 MG/DL
TSH SERPL DL<=0.05 MIU/L-ACNC: 2.26 UIU/ML (ref 0.34–5.6)
WBC # BLD AUTO: 3.94 THOUSAND/UL (ref 4.31–10.16)

## 2022-02-18 PROCEDURE — 80053 COMPREHEN METABOLIC PANEL: CPT

## 2022-02-18 PROCEDURE — 80061 LIPID PANEL: CPT

## 2022-02-18 PROCEDURE — 84443 ASSAY THYROID STIM HORMONE: CPT

## 2022-02-18 PROCEDURE — 85025 COMPLETE CBC W/AUTO DIFF WBC: CPT

## 2022-02-21 ENCOUNTER — PATIENT OUTREACH (OUTPATIENT)
Dept: CASE MANAGEMENT | Facility: OTHER | Age: 62
End: 2022-02-21

## 2022-02-22 ENCOUNTER — PATIENT OUTREACH (OUTPATIENT)
Dept: CASE MANAGEMENT | Facility: OTHER | Age: 62
End: 2022-02-22

## 2022-02-22 NOTE — PROGRESS NOTES
Outreach attempted  I left a message on patient's voicemail with my contact information requesting a return call

## 2022-02-23 ENCOUNTER — TELEPHONE (OUTPATIENT)
Dept: FAMILY MEDICINE CLINIC | Facility: CLINIC | Age: 62
End: 2022-02-23

## 2022-02-23 ENCOUNTER — PATIENT OUTREACH (OUTPATIENT)
Dept: CASE MANAGEMENT | Facility: OTHER | Age: 62
End: 2022-02-23

## 2022-02-23 DIAGNOSIS — Z59.9 HOUSING PROBLEMS: Primary | ICD-10-CM

## 2022-02-23 SDOH — ECONOMIC STABILITY - INCOME SECURITY: PROBLEM RELATED TO HOUSING AND ECONOMIC CIRCUMSTANCES, UNSPECIFIED: Z59.9

## 2022-02-23 NOTE — TELEPHONE ENCOUNTER
Carolina Vargas said her meter (to test bs) is broke, BUT doesn't want a new one because she's bruising like crazy  Said she will go back to sticking her finger to test blood sugars

## 2022-02-23 NOTE — TELEPHONE ENCOUNTER
When she checks her sugars with her Dexcom ,  she said it bleeds and bruises really bad  She would like someone to call her      Patient ph # [phone]

## 2022-02-23 NOTE — TELEPHONE ENCOUNTER
I'm not exactly sure what's going on with her Dexcom and where she has bruising etc-can you ask her?  Maybe it's placed wrong

## 2022-02-23 NOTE — PROGRESS NOTES
I received a return call from Grecia Palomares  She reports she is having a reaction to the sensor for her CGM  She has "bruising" under the sensor so she has stopped using it  She is doing blood sugars using her old glucometer  Her HgbA1C was done at the office during her annual wellness exam with her PCP on 2/10 and it was 6 which is down a point  She is taking Trulicity and has lost some weight per PCP note  Grecia Marielle reports her diarrhea has become manageable  She is no longer having incontinence  She has a new landlord who has increased the rent and also is requiring an additional charge for having a dog  She feels she will have to move and needs guidance  I asked if I could refer her to a social work CM and she is agreeable

## 2022-02-24 ENCOUNTER — PATIENT OUTREACH (OUTPATIENT)
Dept: FAMILY MEDICINE CLINIC | Facility: CLINIC | Age: 62
End: 2022-02-24

## 2022-02-24 ENCOUNTER — TELEPHONE (OUTPATIENT)
Dept: FAMILY MEDICINE CLINIC | Facility: CLINIC | Age: 62
End: 2022-02-24

## 2022-02-24 DIAGNOSIS — E11.9 TYPE 2 DIABETES MELLITUS WITHOUT COMPLICATION, WITHOUT LONG-TERM CURRENT USE OF INSULIN (HCC): ICD-10-CM

## 2022-02-24 RX ORDER — BLOOD SUGAR DIAGNOSTIC
1 STRIP MISCELLANEOUS 3 TIMES DAILY
Qty: 300 EACH | Refills: 5 | Status: SHIPPED | OUTPATIENT
Start: 2022-02-24 | End: 2022-03-07 | Stop reason: SDUPTHER

## 2022-02-24 RX ORDER — LANCING DEVICE
EACH MISCELLANEOUS
Qty: 200 EACH | Refills: 5 | Status: SHIPPED | OUTPATIENT
Start: 2022-02-24 | End: 2022-03-07 | Stop reason: SDUPTHER

## 2022-02-24 NOTE — PROGRESS NOTES
SW spoke with JOSE Teranchjose about pt  Valentino Shavers explained pt's circumstances and what she is looking for help with  Pt's sister reportedly advocates for pt  Pt has difficulties due to her bipolar disorder, but has been contact by [facility]; they were to assess pt but pt is hesitant to follow with them due to self-reports of having difficulty trusting people  [facility] apparently said they will help  Pt with locating housing, as well, and look to find pt a mentor  SW will reach out to pt and assess

## 2022-02-27 DIAGNOSIS — Z86.718 PERSONAL HISTORY OF DVT (DEEP VEIN THROMBOSIS): ICD-10-CM

## 2022-02-27 DIAGNOSIS — Z95.2 HISTORY OF MECHANICAL AORTIC VALVE REPLACEMENT: ICD-10-CM

## 2022-02-28 ENCOUNTER — PATIENT OUTREACH (OUTPATIENT)
Dept: FAMILY MEDICINE CLINIC | Facility: CLINIC | Age: 62
End: 2022-02-28

## 2022-02-28 RX ORDER — WARFARIN SODIUM 3 MG/1
TABLET ORAL
Qty: 60 TABLET | Refills: 0 | Status: SHIPPED | OUTPATIENT
Start: 2022-02-28 | End: 2022-04-01

## 2022-03-04 ENCOUNTER — ANTICOAG VISIT (OUTPATIENT)
Dept: CARDIOLOGY CLINIC | Facility: CLINIC | Age: 62
End: 2022-03-04

## 2022-03-04 ENCOUNTER — APPOINTMENT (OUTPATIENT)
Dept: LAB | Facility: HOSPITAL | Age: 62
End: 2022-03-04
Payer: MEDICARE

## 2022-03-04 DIAGNOSIS — Z95.2 STATUS POST MECHANICAL AORTIC VALVE REPLACEMENT: Primary | ICD-10-CM

## 2022-03-07 ENCOUNTER — TELEPHONE (OUTPATIENT)
Dept: FAMILY MEDICINE CLINIC | Facility: CLINIC | Age: 62
End: 2022-03-07

## 2022-03-07 DIAGNOSIS — E11.9 TYPE 2 DIABETES MELLITUS WITHOUT COMPLICATION, WITHOUT LONG-TERM CURRENT USE OF INSULIN (HCC): ICD-10-CM

## 2022-03-07 RX ORDER — LANCING DEVICE
EACH MISCELLANEOUS
Qty: 300 EACH | Refills: 5 | Status: SHIPPED | OUTPATIENT
Start: 2022-03-07

## 2022-03-07 RX ORDER — BLOOD-GLUCOSE METER
EACH MISCELLANEOUS
Qty: 1 KIT | Refills: 1 | Status: SHIPPED | OUTPATIENT
Start: 2022-03-07

## 2022-03-07 RX ORDER — BLOOD SUGAR DIAGNOSTIC
1 STRIP MISCELLANEOUS 3 TIMES DAILY
Qty: 300 EACH | Refills: 5 | Status: SHIPPED | OUTPATIENT
Start: 2022-03-07

## 2022-03-07 NOTE — TELEPHONE ENCOUNTER
Thinks her glucometer is broken  Wants to know if we could send over a script for a One Touch Meter to [facility]

## 2022-03-14 ENCOUNTER — PATIENT OUTREACH (OUTPATIENT)
Dept: FAMILY MEDICINE CLINIC | Facility: CLINIC | Age: 62
End: 2022-03-14

## 2022-03-14 NOTE — PROGRESS NOTES
2nd outreach    SW called and spoke with pt  SW informed pt that SW received referral to outreach pt, as pt is reportedly having trouble locating housing  Pt stated she is okay for now  She is working with [facility]  She goes there every Tuesday to speak to her counselor, Jerson Peña  Pt said she was hooked up with a mentor, but that mentor left [facility]  They then got pt another mentor  Pt said she has to call him  SW asked pt if pt needs anything additional that this SW can help with  Pt said no, she does not need anything right now, and appreciated the call  Pt is aware SW is available if needed for any concerns

## 2022-03-18 ENCOUNTER — PATIENT OUTREACH (OUTPATIENT)
Dept: CASE MANAGEMENT | Facility: OTHER | Age: 62
End: 2022-03-18

## 2022-03-23 ENCOUNTER — APPOINTMENT (OUTPATIENT)
Dept: LAB | Facility: HOSPITAL | Age: 62
End: 2022-03-23
Payer: MEDICARE

## 2022-03-23 ENCOUNTER — ANTICOAG VISIT (OUTPATIENT)
Dept: CARDIOLOGY CLINIC | Facility: CLINIC | Age: 62
End: 2022-03-23

## 2022-03-23 DIAGNOSIS — Z95.2 STATUS POST MECHANICAL AORTIC VALVE REPLACEMENT: Primary | ICD-10-CM

## 2022-03-28 ENCOUNTER — PATIENT OUTREACH (OUTPATIENT)
Dept: CASE MANAGEMENT | Facility: OTHER | Age: 62
End: 2022-03-28

## 2022-03-28 ENCOUNTER — PATIENT OUTREACH (OUTPATIENT)
Dept: FAMILY MEDICINE CLINIC | Facility: CLINIC | Age: 62
End: 2022-03-28

## 2022-03-28 NOTE — PROGRESS NOTES
I spoke with Jez Guerrero today  She spoke with the social work [address] and was advised to talk to her contact at [facility]  Jez Guerrero did speak with Gary Gardiner at [facility] and she needs more information from the [facility]  I advised Basilia Xiong will message Rudy Guerrero is feeling well a this time and has no health related needs

## 2022-03-28 NOTE — PROGRESS NOTES
SW received inbasket message from JOSE Guan in regards to pt reportedly in need of some assistance with social needs again  Pt spoke with her counselor at [facility], and pt reportedly made Alonso Bynum aware that this SW needed information to help the pt  SW informed Anna Lock of SW last encounter with pt, and pt said she did not need any assistance at the time with housing and has a mentor at [employer] that she was going to call  Anna Lock told SW that pt now is upset, as her rent has increased and additionally, because she now has a pet, the rate went up  SW made Anna Lock aware I will reach out to pt  SHANICE called pt, reached voicemail and left message with reason for call  Requested call back

## 2022-04-12 ENCOUNTER — APPOINTMENT (OUTPATIENT)
Dept: LAB | Facility: HOSPITAL | Age: 62
End: 2022-04-12
Payer: MEDICARE

## 2022-04-12 ENCOUNTER — ANTICOAG VISIT (OUTPATIENT)
Dept: CARDIOLOGY CLINIC | Facility: CLINIC | Age: 62
End: 2022-04-12

## 2022-04-12 DIAGNOSIS — Z95.2 STATUS POST MECHANICAL AORTIC VALVE REPLACEMENT: Primary | ICD-10-CM

## 2022-04-21 ENCOUNTER — ANTICOAG VISIT (OUTPATIENT)
Dept: CARDIOLOGY CLINIC | Facility: CLINIC | Age: 62
End: 2022-04-21

## 2022-04-21 ENCOUNTER — APPOINTMENT (OUTPATIENT)
Dept: LAB | Facility: HOSPITAL | Age: 62
End: 2022-04-21
Payer: MEDICARE

## 2022-04-21 DIAGNOSIS — Z95.2 STATUS POST MECHANICAL AORTIC VALVE REPLACEMENT: Primary | ICD-10-CM

## 2022-04-27 ENCOUNTER — PATIENT OUTREACH (OUTPATIENT)
Dept: CASE MANAGEMENT | Facility: OTHER | Age: 62
End: 2022-04-27

## 2022-04-28 DIAGNOSIS — E66.9 DIABETES MELLITUS TYPE 2 IN OBESE (HCC): ICD-10-CM

## 2022-04-28 DIAGNOSIS — E11.69 DIABETES MELLITUS TYPE 2 IN OBESE (HCC): ICD-10-CM

## 2022-04-28 RX ORDER — DULAGLUTIDE 1.5 MG/.5ML
1.5 INJECTION, SOLUTION SUBCUTANEOUS WEEKLY
Qty: 2 ML | Refills: 5 | Status: SHIPPED | OUTPATIENT
Start: 2022-04-28

## 2022-05-05 ENCOUNTER — ANTICOAG VISIT (OUTPATIENT)
Dept: CARDIOLOGY CLINIC | Facility: CLINIC | Age: 62
End: 2022-05-05

## 2022-05-05 ENCOUNTER — PATIENT OUTREACH (OUTPATIENT)
Dept: CASE MANAGEMENT | Facility: OTHER | Age: 62
End: 2022-05-05

## 2022-05-05 ENCOUNTER — APPOINTMENT (OUTPATIENT)
Dept: LAB | Facility: HOSPITAL | Age: 62
End: 2022-05-05
Payer: MEDICARE

## 2022-05-05 DIAGNOSIS — Z95.2 STATUS POST MECHANICAL AORTIC VALVE REPLACEMENT: Primary | ICD-10-CM

## 2022-05-06 RX ORDER — DOXEPIN HYDROCHLORIDE 100 MG/1
CAPSULE ORAL
COMMUNITY
Start: 2022-04-28

## 2022-05-06 RX ORDER — OXCARBAZEPINE 300 MG/1
TABLET, FILM COATED ORAL
COMMUNITY
Start: 2022-04-28

## 2022-05-06 RX ORDER — DOXEPIN HYDROCHLORIDE 75 MG/1
CAPSULE ORAL
COMMUNITY
Start: 2022-04-01

## 2022-05-10 ENCOUNTER — OFFICE VISIT (OUTPATIENT)
Dept: FAMILY MEDICINE CLINIC | Facility: CLINIC | Age: 62
End: 2022-05-10
Payer: MEDICARE

## 2022-05-10 VITALS
BODY MASS INDEX: 48.82 KG/M2 | HEIGHT: 65 IN | OXYGEN SATURATION: 97 % | HEART RATE: 95 BPM | SYSTOLIC BLOOD PRESSURE: 120 MMHG | TEMPERATURE: 96.9 F | WEIGHT: 293 LBS | RESPIRATION RATE: 16 BRPM | DIASTOLIC BLOOD PRESSURE: 70 MMHG

## 2022-05-10 DIAGNOSIS — M79.7 FIBROMYALGIA: ICD-10-CM

## 2022-05-10 DIAGNOSIS — R32 URINARY INCONTINENCE, UNSPECIFIED TYPE: ICD-10-CM

## 2022-05-10 DIAGNOSIS — E66.9 DIABETES MELLITUS TYPE 2 IN OBESE (HCC): ICD-10-CM

## 2022-05-10 DIAGNOSIS — Z95.2 S/P AORTIC VALVE REPLACEMENT WITH PROSTHETIC VALVE: ICD-10-CM

## 2022-05-10 DIAGNOSIS — E11.9 TYPE 2 DIABETES MELLITUS WITHOUT COMPLICATION, WITHOUT LONG-TERM CURRENT USE OF INSULIN (HCC): Primary | ICD-10-CM

## 2022-05-10 DIAGNOSIS — E11.69 DIABETES MELLITUS TYPE 2 IN OBESE (HCC): ICD-10-CM

## 2022-05-10 DIAGNOSIS — Z12.31 BREAST CANCER SCREENING BY MAMMOGRAM: ICD-10-CM

## 2022-05-10 DIAGNOSIS — I50.30 HEART FAILURE WITH PRESERVED EJECTION FRACTION, BORDERLINE, CLASS III (HCC): ICD-10-CM

## 2022-05-10 DIAGNOSIS — Z72.0 TOBACCO USE: ICD-10-CM

## 2022-05-10 DIAGNOSIS — Z86.718 PERSONAL HISTORY OF DVT (DEEP VEIN THROMBOSIS): ICD-10-CM

## 2022-05-10 DIAGNOSIS — E03.9 HYPOTHYROIDISM, UNSPECIFIED TYPE: ICD-10-CM

## 2022-05-10 LAB — SL AMB POCT HEMOGLOBIN AIC: 5.7 (ref ?–6.5)

## 2022-05-10 PROCEDURE — 99214 OFFICE O/P EST MOD 30 MIN: CPT | Performed by: INTERNAL MEDICINE

## 2022-05-10 PROCEDURE — 83036 HEMOGLOBIN GLYCOSYLATED A1C: CPT | Performed by: INTERNAL MEDICINE

## 2022-05-10 NOTE — ASSESSMENT & PLAN NOTE
Seems to have slowed down quite a bit-she had colonoscopy in 2021 and it was ok-just a few polyps-recommend a follow up in 3 years

## 2022-05-10 NOTE — ASSESSMENT & PLAN NOTE
Fadia Kelsey says despite 90 mg of duloxetine she's not feeling well and hurts everywhere so she's amenable to a rheumatology referral

## 2022-05-10 NOTE — PROGRESS NOTES
Assessment/Plan:         Problem List Items Addressed This Visit        Endocrine    Hypothyroidism    Diabetes mellitus type 2 in obese [facility])       Lab Results   Component Value Date    HGBA1C 5 7 05/10/2022   A1c went down further to 5 7, which is quite low-I was going to adjust her Trulicity down to 8 35 weekly but she just got another supply of the 1 5 so will wait till she goes through these-will give eye exam paperwork to her eye             Cardiovascular and Mediastinum    S/P aortic valve replacement with prosthetic valve     S/p AVR maintained on coumadin         Heart failure with preserved ejection fraction, borderline, class III ([address] )       Other    Personal history of DVT (deep vein thrombosis)    Fibromyalgia     Carmelita Holland says despite 90 mg of duloxetine she's not feeling well and hurts everywhere so she's amenable to a rheumatology referral         Relevant Orders    Ambulatory Referral to Rheumatology    Tobacco use     Has LDCT ordered           Other Visit Diagnoses     Type 2 diabetes mellitus without complication, without long-term current use of insulin ([facility] )    -  Primary    Relevant Orders    POCT hemoglobin A1c (Completed)    Urinary incontinence, unspecified type        Relevant Orders    Urinalysis with microscopic    Urine culture    Ambulatory Referral to Urology    Breast cancer screening by mammogram        Relevant Orders    Mammo screening bilateral w 3d & cad            Subjective:      Patient ID: Cami Lyons is a 63 y o  female  Carmelita Holland here for follow up on her many chronic issues:   Morbid obesity, DM II, hx of mechanical AVR, bipolar depression, hx of polysubstance abuse, HL, hx of uterine cancer-just checking in-says she's now having a lot of issues with urinary incontinence-doesn't realize that she has to urinate, and is incontient of urine at times- used to have diarrhea chronically but that's improved now-A1c today is 5 7%      The following portions of the patient's history were reviewed and updated as appropriate:   Past Medical History:  She has a past medical history of Anemia, Anxiety, Aortic valve disorder, Back pain, Chronic pain syndrome, Cirrhosis ([facility] ), Constipation, Degenerative arthritis of thoracic spine, Degenerative joint disease involving multiple joints, Depression, DVT (deep venous thrombosis) ([place] [address] ), Edema, Endometrial adenocarcinoma ([place] [address] ), Fibromyalgia, Ganglion of right wrist, GERD (gastroesophageal reflux disease), Heart murmur, Hematoma, History of mechanical aortic valve replacement, Hypothyroidism (acquired), Laboratory confirmed diagnosis of COVID-19 (2021), Low blood pressure, Malignant neoplasm of corpus uteri, except isthmus ([place] [address] ), Mixed hyperlipidemia, Morbid obesity ([place] [address] ), Obstructive sleep apnea syndrome, Pulmonary embolism ([place] [address] ), Tobacco dependence syndrome, Transient cerebral ischemia, Urinary incontinence, Viral hepatitis C, and Vitamin D deficiency  ,  _______________________________________________________________________  Medical Problems:  does not have any pertinent problems on file ,  _______________________________________________________________________  Past Surgical History:   has a past surgical history that includes Ankle surgery; Cardiac surgery (); Hysterectomy (2011);  section; Cholecystectomy; Colonoscopy (2019); and Colonoscopy  ,  _______________________________________________________________________  Family History:  family history includes Cirrhosis in her father; Coronary artery disease in her father and mother ,  _______________________________________________________________________  Social History:   reports that she has been smoking cigarettes  She has a 20 00 pack-year smoking history  She has never used smokeless tobacco  She reports current alcohol use  She reports current drug use  Drug: Marijuana  ,  _______________________________________________________________________  Allergies:  is allergic to bactrim [sulfamethoxazole-trimethoprim], penicillins, tramadol, and rocephin [ceftriaxone]     _______________________________________________________________________  Current Outpatient Medications   Medication Sig Dispense Refill    Alcohol Swabs (Pharmacist Choice Alcohol) PADS Apply topically 3 (three) times a day 100 each 1    baclofen 10 mg tablet Take 1 tablet (10 mg total) by mouth 3 (three) times a day as needed for muscle spasms 90 tablet 3    cholestyramine (QUESTRAN) 4 g packet TAKE 1 PACKET (4 G TOTAL) BY MOUTH 2 (TWO) TIMES A DAY WITH MEALS DO NOT TAKE WITHIN 2 HOURS OF OTHER ORAL MEDICATIONS 60 each 5    clonazePAM (KlonoPIN) 0 5 mg tablet Take 0 5 mg by mouth daily at bedtime        clonazePAM (KlonoPIN) 1 mg tablet Take 1 mg by mouth daily as needed      DULoxetine (Cymbalta) 30 mg delayed release capsule Take 1 capsule (30 mg total) by mouth daily 30 capsule 5    DULoxetine (CYMBALTA) 60 mg delayed release capsule Take 1 capsule (60 mg total) by mouth daily Pt to take a 60 mg cymbalta and a 30 mg cymbalta to make 90 mg 30 capsule 5    escitalopram (LEXAPRO) 10 mg tablet Take 10 mg by mouth daily        mirtazapine (REMERON) 45 MG tablet Take 45 mg by mouth daily at bedtime        nystatin powder Apply topically 2 (two) times a day 60 g 5    OXcarbazepine (TRILEPTAL) 300 mg tablet       ProAir RespiClick 008 (90 Base) MCG/ACT AEPB INHALE 1 PUFF EVERY 4 (FOUR) HOURS AS NEEDED (WHEEZING OR SHORTNESS OF BREATH)   Trulicity 1 5 WF/7 7ON SOPN INJECT 0 5 ML (1 5 MG TOTAL) UNDER THE SKIN ONCE A WEEK 2 mL 5    warfarin (Jantoven) 3 mg tablet TAKE TWO TABLETS (6MG) TUE/WED/THUR/SAT/SUN AND TWO AND "ONE-HALF" TABS (9MG) MON AND FRI AS DIRECTED BY YOUR DOCTOR 60 tablet 5    ziprasidone (GEODON) 60 mg capsule Take 60 mg by mouth 2 (two) times a day with meals        BD Pen Needle Mary 2nd Gen 32G X 4 MM MISC USE 3-4 TIMES A DAY AS DIRECTED        Blood Glucose Monitoring Suppl (OneTouch Verio IQ System) w/Device KIT Use to check blood sugar daily 1 kit 1    doxepin (SINEquan) 100 mg capsule       doxepin (SINEquan) 25 mg capsule Take 25 mg by mouth daily at bedtime        doxepin (SINEquan) 75 MG capsule       glucose blood (OneTouch Verio) test strip Use 1 each 3 (three) times a day Use as instructed 300 each 5    Pharmacist Choice Lancets MISC USE TO TEST GLUCOSE UP TO 3 TIMES A DAY - ONETOUCH VERIO LANCETS 300 each 5     No current facility-administered medications for this visit      _______________________________________________________________________  Review of Systems   HENT: Negative  Respiratory: Negative  Cardiovascular: Negative  Gastrointestinal: Positive for diarrhea  Genitourinary:        Incontinence   Musculoskeletal: Positive for back pain, gait problem and myalgias  Objective:  Vitals:    05/10/22 1047   BP: 120/70   BP Location: Right arm   Patient Position: Sitting   Cuff Size: Large   Pulse: 95   Resp: 16   Temp: (!) 96 9 °F (36 1 °C)   TempSrc: Temporal   SpO2: 97%   Weight: (!) 152 kg (336 lb)   Height: 5' 5" (1 651 m)     Body mass index is 55 91 kg/m²  Physical Exam  Constitutional:       Appearance: She is obese  HENT:      Head: Normocephalic and atraumatic  Right Ear: External ear normal       Left Ear: External ear normal       Nose: Nose normal       Mouth/Throat:      Mouth: Mucous membranes are moist    Eyes:      Extraocular Movements: Extraocular movements intact  Cardiovascular:      Rate and Rhythm: Normal rate and regular rhythm  Pulses: no weak pulses          Dorsalis pedis pulses are 2+ on the right side and 2+ on the left side  Posterior tibial pulses are 2+ on the right side and 2+ on the left side  Pulmonary:      Effort: Pulmonary effort is normal    Abdominal:      General: Abdomen is flat  Musculoskeletal:         General: Normal range of motion  Cervical back: Neck supple     Feet: Right foot:      Skin integrity: No ulcer, skin breakdown, erythema, warmth, callus or dry skin  Left foot:      Skin integrity: No ulcer, skin breakdown, erythema, warmth, callus or dry skin  Skin:     General: Skin is warm  Capillary Refill: Capillary refill takes less than 2 seconds  Neurological:      General: No focal deficit present  Mental Status: She is alert and oriented to person, place, and time  Psychiatric:         Mood and Affect: Mood normal        Patient's shoes and socks removed  Right Foot/Ankle   Right Foot Inspection  Skin Exam: skin normal and skin intact  No dry skin, no warmth, no callus, no erythema, no maceration, no abnormal color, no pre-ulcer, no ulcer and no callus  Toe Exam: ROM and strength within normal limits  Sensory   Monofilament testing: intact    Vascular  Capillary refills: < 3 seconds  The right DP pulse is 2+  The right PT pulse is 2+  Left Foot/Ankle  Left Foot Inspection  Skin Exam: skin normal and skin intact  No dry skin, no warmth, no erythema, no maceration, normal color, no pre-ulcer, no ulcer and no callus  Toe Exam: ROM and strength within normal limits  Sensory   Monofilament testing: intact    Vascular  Capillary refills: < 3 seconds  The left DP pulse is 2+  The left PT pulse is 2+       Assign Risk Category  No deformity present  No loss of protective sensation  No weak pulses  Risk: 0

## 2022-05-10 NOTE — ASSESSMENT & PLAN NOTE
Lab Results   Component Value Date    HGBA1C 5 7 05/10/2022   A1c went down further to 5 7, which is quite low-I was going to adjust her Trulicity down to 5 80 weekly but she just got another supply of the 1 5 so will wait till she goes through these-will give eye exam paperwork to her eye

## 2022-05-23 ENCOUNTER — PATIENT OUTREACH (OUTPATIENT)
Dept: CASE MANAGEMENT | Facility: OTHER | Age: 62
End: 2022-05-23

## 2022-05-23 NOTE — PROGRESS NOTES
I called patient's only listed number and there was no answer or voicemail  Patient was seen by her PCP 5/10  Her A1C is down to 5 7 after starting Trulicity  She was referred to rheumatology for her myalgia  She was referred to urology for urinary incontinence

## 2022-06-03 ENCOUNTER — APPOINTMENT (OUTPATIENT)
Dept: LAB | Facility: HOSPITAL | Age: 62
End: 2022-06-03
Payer: MEDICARE

## 2022-06-03 ENCOUNTER — ANTICOAG VISIT (OUTPATIENT)
Dept: CARDIOLOGY CLINIC | Facility: CLINIC | Age: 62
End: 2022-06-03

## 2022-06-03 DIAGNOSIS — R32 URINARY INCONTINENCE, UNSPECIFIED TYPE: ICD-10-CM

## 2022-06-03 DIAGNOSIS — N39.0 URINARY TRACT INFECTION WITH HEMATURIA, SITE UNSPECIFIED: Primary | ICD-10-CM

## 2022-06-03 DIAGNOSIS — Z95.2 STATUS POST MECHANICAL AORTIC VALVE REPLACEMENT: Primary | ICD-10-CM

## 2022-06-03 DIAGNOSIS — R31.9 URINARY TRACT INFECTION WITH HEMATURIA, SITE UNSPECIFIED: Primary | ICD-10-CM

## 2022-06-03 LAB
BACTERIA UR QL AUTO: ABNORMAL /HPF
BILIRUB UR QL STRIP: NEGATIVE
CLARITY UR: ABNORMAL
COLOR UR: ABNORMAL
GLUCOSE UR STRIP-MCNC: NEGATIVE MG/DL
HGB UR QL STRIP.AUTO: NEGATIVE
KETONES UR STRIP-MCNC: NEGATIVE MG/DL
LEUKOCYTE ESTERASE UR QL STRIP: ABNORMAL
NITRITE UR QL STRIP: POSITIVE
NON-SQ EPI CELLS URNS QL MICRO: ABNORMAL /HPF
PH UR STRIP.AUTO: 6 [PH]
PROT UR STRIP-MCNC: ABNORMAL MG/DL
RBC #/AREA URNS AUTO: ABNORMAL /HPF
SP GR UR STRIP.AUTO: >=1.03 (ref 1–1.03)
UROBILINOGEN UR QL STRIP.AUTO: 1 E.U./DL
WBC #/AREA URNS AUTO: ABNORMAL /HPF

## 2022-06-03 PROCEDURE — 81001 URINALYSIS AUTO W/SCOPE: CPT | Performed by: INTERNAL MEDICINE

## 2022-06-03 PROCEDURE — 87086 URINE CULTURE/COLONY COUNT: CPT

## 2022-06-03 PROCEDURE — 87186 SC STD MICRODIL/AGAR DIL: CPT

## 2022-06-03 PROCEDURE — 87077 CULTURE AEROBIC IDENTIFY: CPT

## 2022-06-03 RX ORDER — CIPROFLOXACIN 250 MG/1
250 TABLET, FILM COATED ORAL 2 TIMES DAILY
Qty: 10 TABLET | Refills: 0 | Status: SHIPPED | OUTPATIENT
Start: 2022-06-03 | End: 2022-06-08

## 2022-06-05 LAB — BACTERIA UR CULT: ABNORMAL

## 2022-06-07 ENCOUNTER — PATIENT OUTREACH (OUTPATIENT)
Dept: CASE MANAGEMENT | Facility: OTHER | Age: 62
End: 2022-06-07

## 2022-06-22 DIAGNOSIS — M79.7 FIBROMYALGIA: ICD-10-CM

## 2022-06-22 RX ORDER — BACLOFEN 10 MG/1
10 TABLET ORAL 3 TIMES DAILY PRN
Qty: 90 TABLET | Refills: 3 | Status: SHIPPED | OUTPATIENT
Start: 2022-06-22

## 2022-06-23 DIAGNOSIS — M79.7 FIBROMYALGIA: ICD-10-CM

## 2022-06-23 DIAGNOSIS — M54.40 CHRONIC BILATERAL LOW BACK PAIN WITH SCIATICA, SCIATICA LATERALITY UNSPECIFIED: ICD-10-CM

## 2022-06-23 DIAGNOSIS — G89.29 CHRONIC BILATERAL LOW BACK PAIN WITH SCIATICA, SCIATICA LATERALITY UNSPECIFIED: ICD-10-CM

## 2022-06-23 RX ORDER — DULOXETIN HYDROCHLORIDE 30 MG/1
30 CAPSULE, DELAYED RELEASE ORAL DAILY
Qty: 30 CAPSULE | Refills: 5 | Status: SHIPPED | OUTPATIENT
Start: 2022-06-23 | End: 2022-07-22

## 2022-07-07 ENCOUNTER — PATIENT OUTREACH (OUTPATIENT)
Dept: CASE MANAGEMENT | Facility: OTHER | Age: 62
End: 2022-07-07

## 2022-07-07 NOTE — PROGRESS NOTES
Chart reviewed  Patient was assigned to this  thru high risk for readmission report  She has no admissions in 2022

## 2022-07-14 ENCOUNTER — ANTICOAG VISIT (OUTPATIENT)
Dept: CARDIOLOGY CLINIC | Facility: CLINIC | Age: 62
End: 2022-07-14

## 2022-07-14 ENCOUNTER — APPOINTMENT (OUTPATIENT)
Dept: LAB | Facility: HOSPITAL | Age: 62
End: 2022-07-14
Payer: MEDICARE

## 2022-07-14 DIAGNOSIS — Z95.2 STATUS POST MECHANICAL AORTIC VALVE REPLACEMENT: Primary | ICD-10-CM

## 2022-07-22 DIAGNOSIS — M54.40 CHRONIC BILATERAL LOW BACK PAIN WITH SCIATICA, SCIATICA LATERALITY UNSPECIFIED: ICD-10-CM

## 2022-07-22 DIAGNOSIS — G89.29 CHRONIC BILATERAL LOW BACK PAIN WITH SCIATICA, SCIATICA LATERALITY UNSPECIFIED: ICD-10-CM

## 2022-07-22 DIAGNOSIS — M79.7 FIBROMYALGIA: ICD-10-CM

## 2022-07-22 RX ORDER — DULOXETIN HYDROCHLORIDE 60 MG/1
60 CAPSULE, DELAYED RELEASE ORAL DAILY
Qty: 30 CAPSULE | Refills: 5 | Status: SHIPPED | OUTPATIENT
Start: 2022-07-22

## 2022-07-22 RX ORDER — DULOXETIN HYDROCHLORIDE 30 MG/1
30 CAPSULE, DELAYED RELEASE ORAL DAILY
Qty: 30 CAPSULE | Refills: 5 | Status: SHIPPED | OUTPATIENT
Start: 2022-07-22

## 2022-08-08 ENCOUNTER — PATIENT OUTREACH (OUTPATIENT)
Dept: CASE MANAGEMENT | Facility: OTHER | Age: 62
End: 2022-08-08

## 2022-08-08 NOTE — PROGRESS NOTES
Unable to engage patient  No response to last 4 outreach attempts   I removed myself from the care team

## 2022-08-09 ENCOUNTER — ANTICOAG VISIT (OUTPATIENT)
Dept: CARDIOLOGY CLINIC | Facility: CLINIC | Age: 62
End: 2022-08-09

## 2022-08-09 ENCOUNTER — APPOINTMENT (OUTPATIENT)
Dept: LAB | Facility: HOSPITAL | Age: 62
End: 2022-08-09
Payer: MEDICARE

## 2022-08-09 DIAGNOSIS — Z95.2 STATUS POST MECHANICAL AORTIC VALVE REPLACEMENT: Primary | ICD-10-CM

## 2022-08-23 ENCOUNTER — ANTICOAG VISIT (OUTPATIENT)
Dept: CARDIOLOGY CLINIC | Facility: CLINIC | Age: 62
End: 2022-08-23

## 2022-08-23 ENCOUNTER — APPOINTMENT (OUTPATIENT)
Dept: LAB | Facility: HOSPITAL | Age: 62
End: 2022-08-23
Attending: INTERNAL MEDICINE
Payer: MEDICARE

## 2022-08-23 ENCOUNTER — HOSPITAL ENCOUNTER (EMERGENCY)
Facility: HOSPITAL | Age: 62
Discharge: HOME | End: 2022-08-23
Attending: EMERGENCY MEDICINE
Payer: MEDICARE

## 2022-08-23 VITALS
RESPIRATION RATE: 18 BRPM | WEIGHT: 293 LBS | BODY MASS INDEX: 47.09 KG/M2 | OXYGEN SATURATION: 95 % | DIASTOLIC BLOOD PRESSURE: 81 MMHG | HEIGHT: 66 IN | SYSTOLIC BLOOD PRESSURE: 156 MMHG | HEART RATE: 96 BPM | TEMPERATURE: 99.3 F

## 2022-08-23 DIAGNOSIS — R21 RASH AND NONSPECIFIC SKIN ERUPTION: Primary | ICD-10-CM

## 2022-08-23 DIAGNOSIS — B35.8 TINEA FACIALE: ICD-10-CM

## 2022-08-23 DIAGNOSIS — Z95.2 STATUS POST MECHANICAL AORTIC VALVE REPLACEMENT: Primary | ICD-10-CM

## 2022-08-23 PROCEDURE — 99284 EMERGENCY DEPT VISIT MOD MDM: CPT | Performed by: PHYSICIAN ASSISTANT

## 2022-08-23 PROCEDURE — 99282 EMERGENCY DEPT VISIT SF MDM: CPT

## 2022-08-23 RX ORDER — CLOTRIMAZOLE AND BETAMETHASONE DIPROPIONATE 10; .64 MG/G; MG/G
CREAM TOPICAL
Qty: 45 G | Refills: 0 | Status: SHIPPED | OUTPATIENT
Start: 2022-08-23 | End: 2022-10-10

## 2022-08-23 NOTE — ED PROVIDER NOTES
History  Chief Complaint   Patient presents with    Rash     Patient here with c/o a rash that started a few days ago  Patient denies itching or irritation  Redness present  Past Medical History: Anemia, Anxiety, Aortic valve disorder, Back pain, Chronic pain syndrome, Cirrhosis, DJD - involving multiple joints, Depression, DVT, Endometrial adenocarcinoma, Fibromyalgia, Ganglion of right wrist, GERD, Heart murmur, Hypothyroidism, Mixed hyperlipidemia, Morbid obesity, Obstructive sleep apnea syndrome, Pulmonary embolism,  Tobacco dependence syndrome, Transient cerebral ischemia, Urinary incontinence, Viral hepatitis C,   Past Surgical History:  Mechanical AVR, ANKLE SURGERY,  SECTION, CHOLECYSTECTOMY, Total HYSTERECTOMY-2011     Presents to ED c/o few day h/o Intermittent, dry, red rash to face that patient states comes and goes, last few days and then goes away for several months and then returns with no known pattern, no new allergens noted, patient denies fever, CP, SOB, throat closing, sore throat  Prior to Admission Medications   Prescriptions Last Dose Informant Patient Reported? Taking? Alcohol Swabs (Pharmacist Choice Alcohol) PADS Not Taking at Unknown time  No No   Sig: Apply topically 3 (three) times a day   Patient not taking: Reported on 2022   BD Pen Needle Mary 2nd Gen 32G X 4 MM MISC Not Taking at Unknown time Self Yes No   Sig: USE 3-4 TIMES A DAY AS DIRECTED     Patient not taking: Reported on 2022   Blood Glucose Monitoring Suppl (OneTouch Verio IQ System) w/Device KIT Not Taking at Unknown time  No No   Sig: Use to check blood sugar daily   Patient not taking: Reported on 2022   DULoxetine (CYMBALTA) 30 mg delayed release capsule 2022 at Unknown time  No Yes   Sig: TAKE 1 CAPSULE (30 MG TOTAL) BY MOUTH DAILY   DULoxetine (CYMBALTA) 60 mg delayed release capsule   No No   Sig: TAKE 1 CAPSULE (60 MG TOTAL) BY MOUTH DAILY PT TO TAKE A 60 MG CYMBALTA AND A 30 MG CYMBALTA TO MAKE 90 MG   OXcarbazepine (TRILEPTAL) 300 mg tablet Not Taking at Unknown time  Yes No   Patient not taking: Reported on 8/23/2022   Pharmacist Choice Lancets MISC   No No   Sig: USE TO TEST GLUCOSE UP TO 3 TIMES A DAY - ONETOUCH VERIO LANCETS   ProAir RespiClick 430 (90 Base) MCG/ACT AEPB  Self Yes No   Sig: INHALE 1 PUFF EVERY 4 (FOUR) HOURS AS NEEDED (WHEEZING OR SHORTNESS OF BREATH)     Trulicity 1 5 JL/9 7IQ SOPN Past Week at Unknown time  No Yes   Sig: INJECT 0 5 ML (1 5 MG TOTAL) UNDER THE SKIN ONCE A WEEK   baclofen 10 mg tablet 8/22/2022 at Unknown time  No Yes   Sig: TAKE 1 TABLET (10 MG TOTAL) BY MOUTH 3 (THREE) TIMES A DAY AS NEEDED FOR MUSCLE SPASMS   cholestyramine (QUESTRAN) 4 g packet   No No   Sig: TAKE 1 PACKET (4 G TOTAL) BY MOUTH 2 (TWO) TIMES A DAY WITH MEALS DO NOT TAKE WITHIN 2 HOURS OF OTHER ORAL MEDICATIONS   clonazePAM (KlonoPIN) 0 5 mg tablet 8/22/2022 at Unknown time  Yes Yes   Sig: Take 0 5 mg by mouth daily at bedtime     clonazePAM (KlonoPIN) 1 mg tablet 8/22/2022 at Unknown time Self Yes Yes   Sig: Take 1 mg by mouth daily as needed   doxepin (SINEquan) 100 mg capsule 8/22/2022 at Unknown time  Yes Yes   doxepin (SINEquan) 25 mg capsule   Yes No   Sig: Take 25 mg by mouth daily at bedtime     doxepin (SINEquan) 75 MG capsule   Yes No   escitalopram (LEXAPRO) 10 mg tablet 8/22/2022 at Unknown time  Yes Yes   Sig: Take 10 mg by mouth daily     glucose blood (OneTouch Verio) test strip 8/22/2022 at Unknown time  No Yes   Sig: Use 1 each 3 (three) times a day Use as instructed   mirtazapine (REMERON) 45 MG tablet Past Month at Unknown time Self Yes Yes   Sig: Take 45 mg by mouth daily at bedtime     nystatin powder Not Taking at Unknown time  No No   Sig: Apply topically 2 (two) times a day   Patient not taking: Reported on 8/23/2022   warfarin (Jantoven) 3 mg tablet 8/22/2022 at Unknown time  No Yes   Sig: TAKE TWO TABLETS (6MG) TUE/WED/THUR/SAT/SUN AND TWO AND "ONE-HALF" TABS (9MG) MON AND FRI AS DIRECTED BY YOUR DOCTOR   ziprasidone (GEODON) 60 mg capsule Not Taking at Unknown time Self Yes No   Sig: Take 60 mg by mouth 2 (two) times a day with meals     Patient not taking: Reported on 2022      Facility-Administered Medications: None       Past Medical History:   Diagnosis Date    Anemia     Anxiety     Aortic valve disorder     Back pain     Chronic pain syndrome     Cirrhosis (HCC)     Constipation     Degenerative arthritis of thoracic spine     Degenerative joint disease involving multiple joints     Depression     DVT (deep venous thrombosis) (HCC)     Bilateral    Edema     Endometrial adenocarcinoma (HCC)     Fibromyalgia     Ganglion of right wrist     GERD (gastroesophageal reflux disease)     Heart murmur     Hematoma     History of mechanical aortic valve replacement     Hypothyroidism (acquired)     Laboratory confirmed diagnosis of COVID-19 2021    Low blood pressure     Malignant neoplasm of corpus uteri, except isthmus (HCC)     Mixed hyperlipidemia     Morbid obesity (HCC)     Obstructive sleep apnea syndrome     Pulmonary embolism (HCC)     Tobacco dependence syndrome     Transient cerebral ischemia     Urinary incontinence     Viral hepatitis C     Vitamin D deficiency        Past Surgical History:   Procedure Laterality Date    ANKLE SURGERY      CARDIAC SURGERY  2015    VALVE REPLACEMENT     SECTION      X3    CHOLECYSTECTOMY      COLONOSCOPY  2019    COLONOSCOPY      HYSTERECTOMY  2011    TOTAL       Family History   Problem Relation Age of Onset    Coronary artery disease Mother     Coronary artery disease Father     Cirrhosis Father      I have reviewed and agree with the history as documented      E-Cigarette/Vaping    E-Cigarette Use Never User      E-Cigarette/Vaping Substances    Nicotine No     THC No     CBD No     Flavoring No     Other No     Unknown No Social History     Tobacco Use    Smoking status: Current Some Day Smoker     Packs/day: 0 50     Years: 40 00     Pack years: 20 00     Types: Cigarettes    Smokeless tobacco: Never Used   Vaping Use    Vaping Use: Never used   Substance Use Topics    Alcohol use: Yes     Comment: once in awhile    Drug use: Yes     Types: Marijuana       Review of Systems   Constitutional: Negative for fever  HENT: Negative for hearing loss and sore throat  Eyes: Negative for visual disturbance  Respiratory: Negative for cough and shortness of breath  Cardiovascular: Negative for chest pain  Gastrointestinal: Negative for abdominal pain, nausea and vomiting  Musculoskeletal: Negative for arthralgias and myalgias  Skin: Positive for rash  Negative for pallor  Neurological: Negative for weakness and headaches  All other systems reviewed and are negative  Physical Exam  Physical Exam  Vitals and nursing note reviewed  Constitutional:       General: She is not in acute distress  Appearance: She is well-developed  She is obese  HENT:      Head: Normocephalic and atraumatic  Right Ear: External ear normal       Left Ear: External ear normal       Nose: Nose normal       Mouth/Throat:      Mouth: Mucous membranes are moist       Pharynx: Oropharynx is clear  Eyes:      Conjunctiva/sclera: Conjunctivae normal    Cardiovascular:      Rate and Rhythm: Normal rate  Pulmonary:      Effort: Pulmonary effort is normal  No respiratory distress  Musculoskeletal:         General: Normal range of motion  Cervical back: Normal range of motion  Skin:     General: Skin is warm and dry  Findings: Rash present  Comments: Slightly raised plaques some with clear borders, flaky centers noted to right temple, which appears completely circular, raised, like tinea, and along B/L nasolabial folds, chin, see pic   Neurological:      General: No focal deficit present        Mental Status: She is alert and oriented to person, place, and time  Motor: No weakness  Psychiatric:         Behavior: Behavior normal              Vital Signs  ED Triage Vitals [08/23/22 1038]   Temperature Pulse Respirations Blood Pressure SpO2   99 3 °F (37 4 °C) 96 18 156/81 95 %      Temp Source Heart Rate Source Patient Position - Orthostatic VS BP Location FiO2 (%)   Oral Monitor Sitting Right arm --      Pain Score       No Pain           Vitals:    08/23/22 1038   BP: 156/81   Pulse: 96   Patient Position - Orthostatic VS: Sitting         Visual Acuity      ED Medications  Medications - No data to display    Diagnostic Studies  Results Reviewed     None                 No orders to display              Procedures  Procedures         ED Course                               SBIRT 20yo+    Flowsheet Row Most Recent Value   SBIRT (25 yo +)    In order to provide better care to our patients, we are screening all of our patients for alcohol and drug use  Would it be okay to ask you these screening questions? Yes Filed at: 08/23/2022 1041   Initial Alcohol Screen: US AUDIT-C     1  How often do you have a drink containing alcohol? 2 Filed at: 08/23/2022 1041   2  How many drinks containing alcohol do you have on a typical day you are drinking? 0 Filed at: 08/23/2022 1041   3a  Male UNDER 65: How often do you have five or more drinks on one occasion? 0 Filed at: 08/23/2022 1041   3b  FEMALE Any Age, or MALE 65+: How often do you have 4 or more drinks on one occassion? 0 Filed at: 08/23/2022 1041   Audit-C Score 2 Filed at: 08/23/2022 1041   AVELINA: How many times in the past year have you    Used an illegal drug or used a prescription medication for non-medical reasons?  Never Filed at: 08/23/2022 1041                    MDM  Number of Diagnoses or Management Options  Diagnosis management comments: Considered tinea infection, irritant contact dermatitis sub retic dermatitis psoriasis, dry skin  Will cover with topical antifungal, steroid follow-up with dermatology as needed      Disposition  Final diagnoses:   Rash and nonspecific skin eruption   Tinea faciale     Time reflects when diagnosis was documented in both MDM as applicable and the Disposition within this note     Time User Action Codes Description Comment    8/23/2022 11:31 AM Mago Altamirano Add [R21] Rash and nonspecific skin eruption     8/23/2022 11:31 AM Mago Altamirano Add [B35 8] Tinea faciale       ED Disposition     ED Disposition   Discharge    Condition   Stable    Date/Time   Tue Aug 23, 2022 11:31 AM    Comment   James Mcduffie discharge to home/self care  Follow-up Information     Follow up With Specialties Details Why Contact Info Additional Information    [facility]   [address] [address] [phone] [facility] [place], [address] [place], [place], [id number] [phone]          Discharge Medication List as of 8/23/2022 11:33 AM      START taking these medications    Details   clotrimazole-betamethasone (LOTRISONE) 1-0 05 % cream Apply to affected area sparingly 2 times daily prn, Normal         CONTINUE these medications which have NOT CHANGED    Details   baclofen 10 mg tablet TAKE 1 TABLET (10 MG TOTAL) BY MOUTH 3 (THREE) TIMES A DAY AS NEEDED FOR MUSCLE SPASMS, Starting Wed 6/22/2022, Normal      !! clonazePAM (KlonoPIN) 0 5 mg tablet Take 0 5 mg by mouth daily at bedtime  , Starting Mon 12/20/2021, Historical Med      !! clonazePAM (KlonoPIN) 1 mg tablet Take 1 mg by mouth daily as needed, Starting Fri 9/3/2021, Historical Med      !! doxepin (SINEquan) 100 mg capsule Starting Thu 4/28/2022, Historical Med      !! DULoxetine (CYMBALTA) 30 mg delayed release capsule TAKE 1 CAPSULE (30 MG TOTAL) BY MOUTH DAILY, Starting Fri 7/22/2022, Normal      !!  DULoxetine (CYMBALTA) 60 mg delayed release capsule TAKE 1 CAPSULE (60 MG TOTAL) BY MOUTH DAILY PT TO TAKE A 60 MG CYMBALTA AND A 30 MG CYMBALTA TO MAKE 90 MG, Starting Fri 7/22/2022, Normal      escitalopram (LEXAPRO) 10 mg tablet Take 10 mg by mouth daily  , Starting Mon 12/20/2021, Historical Med      glucose blood (OneTouch Verio) test strip Use 1 each 3 (three) times a day Use as instructed, Starting Mon 3/7/2022, Normal      mirtazapine (REMERON) 45 MG tablet Take 45 mg by mouth daily at bedtime  , Starting Wed 7/28/2021, Historical Med      Trulicity 1 5 SK/9 5XS SOPN INJECT 0 5 ML (1 5 MG TOTAL) UNDER THE SKIN ONCE A WEEK, Starting Thu 4/28/2022, Normal      warfarin (Jantoven) 3 mg tablet TAKE TWO TABLETS (6MG) TUE/WED/THUR/SAT/SUN AND TWO AND "ONE-HALF" TABS (9MG) MON AND FRI AS DIRECTED BY YOUR DOCTOR, Normal      Alcohol Swabs (Pharmacist Choice Alcohol) PADS Apply topically 3 (three) times a day, Starting Wed 2/2/2022, Normal      BD Pen Needle Mary 2nd Gen 32G X 4 MM MISC USE 3-4 TIMES A DAY AS DIRECTED , Historical Med      Blood Glucose Monitoring Suppl (OneTouch Verio IQ System) w/Device KIT Use to check blood sugar daily, Normal      cholestyramine (QUESTRAN) 4 g packet TAKE 1 PACKET (4 G TOTAL) BY MOUTH 2 (TWO) TIMES A DAY WITH MEALS DO NOT TAKE WITHIN 2 HOURS OF OTHER ORAL MEDICATIONS, Starting Thu 4/28/2022, Normal      !! doxepin (SINEquan) 25 mg capsule Take 25 mg by mouth daily at bedtime  , Starting Mon 12/20/2021, Historical Med      !! doxepin (SINEquan) 75 MG capsule Starting Fri 4/1/2022, Historical Med      nystatin powder Apply topically 2 (two) times a day, Starting Tue 2/1/2022, Normal      OXcarbazepine (TRILEPTAL) 300 mg tablet Starting Thu 4/28/2022, Historical Med      Pharmacist Choice Lancets MISC USE TO TEST GLUCOSE UP TO 3 TIMES A DAY - ONETOUCH VERIO LANCETS, Normal      ProAir RespiClick 602 (90 Base) MCG/ACT AEPB INHALE 1 PUFF EVERY 4 (FOUR) HOURS AS NEEDED (WHEEZING OR SHORTNESS OF BREATH)  , Historical Med      ziprasidone (GEODON) 60 mg capsule Take 60 mg by mouth 2 (two) times a day with meals  , Starting Wed 7/28/2021, Historical Med       !! - Potential duplicate medications found  Please discuss with provider  No discharge procedures on file      PDMP Review     None          ED Provider  Electronically Signed by           Sravanthi Caceres PA-C  08/23/22 8486

## 2022-08-25 ENCOUNTER — TELEPHONE (OUTPATIENT)
Dept: FAMILY MEDICINE CLINIC | Facility: CLINIC | Age: 62
End: 2022-08-25

## 2022-08-29 DIAGNOSIS — Z95.2 HISTORY OF MECHANICAL AORTIC VALVE REPLACEMENT: ICD-10-CM

## 2022-08-29 DIAGNOSIS — Z86.718 PERSONAL HISTORY OF DVT (DEEP VEIN THROMBOSIS): ICD-10-CM

## 2022-08-29 RX ORDER — WARFARIN SODIUM 3 MG/1
TABLET ORAL
Qty: 75 TABLET | Refills: 5 | Status: SHIPPED | OUTPATIENT
Start: 2022-08-29 | End: 2022-10-14 | Stop reason: SDUPTHER

## 2022-09-12 ENCOUNTER — APPOINTMENT (OUTPATIENT)
Dept: LAB | Facility: HOSPITAL | Age: 62
End: 2022-09-12
Attending: INTERNAL MEDICINE
Payer: MEDICARE

## 2022-09-12 ENCOUNTER — ANTICOAG VISIT (OUTPATIENT)
Dept: CARDIOLOGY CLINIC | Facility: CLINIC | Age: 62
End: 2022-09-12

## 2022-09-12 DIAGNOSIS — Z95.2 STATUS POST MECHANICAL AORTIC VALVE REPLACEMENT: Primary | ICD-10-CM

## 2022-10-06 RX ORDER — MIRTAZAPINE 15 MG/1
TABLET, FILM COATED ORAL
COMMUNITY
Start: 2022-08-25 | End: 2023-01-05

## 2022-10-10 ENCOUNTER — OFFICE VISIT (OUTPATIENT)
Dept: FAMILY MEDICINE CLINIC | Facility: CLINIC | Age: 62
End: 2022-10-10
Payer: MEDICARE

## 2022-10-10 VITALS
OXYGEN SATURATION: 96 % | HEIGHT: 66 IN | SYSTOLIC BLOOD PRESSURE: 122 MMHG | WEIGHT: 293 LBS | TEMPERATURE: 96.8 F | BODY MASS INDEX: 47.09 KG/M2 | HEART RATE: 72 BPM | DIASTOLIC BLOOD PRESSURE: 80 MMHG

## 2022-10-10 DIAGNOSIS — Z23 NEED FOR PNEUMOCOCCAL VACCINATION: ICD-10-CM

## 2022-10-10 DIAGNOSIS — E66.01 MORBID OBESITY WITH BMI OF 50.0-59.9, ADULT (HCC): ICD-10-CM

## 2022-10-10 DIAGNOSIS — F17.210 TOBACCO DEPENDENCE DUE TO CIGARETTES: ICD-10-CM

## 2022-10-10 DIAGNOSIS — E78.5 HYPERLIPIDEMIA, UNSPECIFIED HYPERLIPIDEMIA TYPE: ICD-10-CM

## 2022-10-10 DIAGNOSIS — Z28.21 INFLUENZA VACCINATION DECLINED BY PATIENT: ICD-10-CM

## 2022-10-10 DIAGNOSIS — R15.9 INCONTINENCE OF FECES, UNSPECIFIED FECAL INCONTINENCE TYPE: ICD-10-CM

## 2022-10-10 DIAGNOSIS — Z12.2 SCREENING FOR LUNG CANCER: ICD-10-CM

## 2022-10-10 DIAGNOSIS — E66.01 MORBID OBESITY (HCC): ICD-10-CM

## 2022-10-10 DIAGNOSIS — I50.30 HEART FAILURE WITH PRESERVED EJECTION FRACTION, BORDERLINE, CLASS III (HCC): ICD-10-CM

## 2022-10-10 DIAGNOSIS — M79.7 FIBROMYALGIA: ICD-10-CM

## 2022-10-10 DIAGNOSIS — Z95.2 S/P AORTIC VALVE REPLACEMENT WITH PROSTHETIC VALVE: ICD-10-CM

## 2022-10-10 DIAGNOSIS — K59.1 FUNCTIONAL DIARRHEA: ICD-10-CM

## 2022-10-10 DIAGNOSIS — M25.50 ARTHRALGIA, UNSPECIFIED JOINT: Primary | ICD-10-CM

## 2022-10-10 DIAGNOSIS — E11.69 DIABETES MELLITUS TYPE 2 IN OBESE (HCC): ICD-10-CM

## 2022-10-10 DIAGNOSIS — E66.9 DIABETES MELLITUS TYPE 2 IN OBESE (HCC): ICD-10-CM

## 2022-10-10 DIAGNOSIS — Z72.0 TOBACCO USE: ICD-10-CM

## 2022-10-10 LAB — SL AMB POCT HEMOGLOBIN AIC: 5.6 (ref ?–6.5)

## 2022-10-10 PROCEDURE — 83036 HEMOGLOBIN GLYCOSYLATED A1C: CPT | Performed by: INTERNAL MEDICINE

## 2022-10-10 PROCEDURE — 99214 OFFICE O/P EST MOD 30 MIN: CPT | Performed by: INTERNAL MEDICINE

## 2022-10-10 RX ORDER — PREGABALIN 75 MG/1
75 CAPSULE ORAL 3 TIMES DAILY
Qty: 90 CAPSULE | Refills: 0 | Status: SHIPPED | OUTPATIENT
Start: 2022-10-10

## 2022-10-10 NOTE — ASSESSMENT & PLAN NOTE
Pt says her current cymbalta regimen isn't helping her so she is willing to wean off of it and try lyrica (if we can get it through the insurance company)-lyrica ordered today

## 2022-10-10 NOTE — PROGRESS NOTES
Assessment/Plan:         Problem List Items Addressed This Visit        Digestive    Functional diarrhea     Says the diarrhea has gotten a bit better-still has episodes of going even though she doesn't know it-sometimes goes on the floor etc-not sure if it's her weight or related to it or if she has some DDD in the back that causes her not to feel going            Endocrine    Diabetes mellitus type 2 in obese [facility])       Lab Results   Component Value Date    HGBA1C 5 6 10/10/2022   A1c is well controlled at 5 6%         Relevant Orders    POCT hemoglobin A1c (Completed)       Cardiovascular and Mediastinum    Heart failure with preserved ejection fraction, borderline, class III ([facility] [address] )       Other    Hyperlipidemia    Relevant Orders    Lipid panel    Tobacco dependence due to cigarettes     LDCT ordered         Morbid obesity ([facility] [address] )    Morbid obesity with BMI of 50 0-59 9, adult ([facility] )    Relevant Orders    Comprehensive metabolic panel    Lipid panel    TSH, 3rd generation    S/P aortic valve replacement with prosthetic valve    Fibromyalgia     Pt says her current cymbalta regimen isn't helping her so she is willing to wean off of it and try lyrica (if we can get it through the insurance company)-lyrica ordered today         Relevant Medications    pregabalin (LYRICA) 75 mg capsule    Tobacco use     Counseled on cessation and LDCT was ordered           Other Visit Diagnoses     Arthralgia, unspecified joint    -  Primary    Likely related to her FM but will do some labwork as ordered to look into a couple other things    Relevant Orders    CBC and differential    Sedimentation rate, automated    SKYLER Comprehensive Panel    Rheumatoid Factor    Lyme Total Antibody Profile with reflex to WB    XR spine lumbar minimum 4 views non injury    Incontinence of feces, unspecified fecal incontinence type        Relevant Orders    XR spine lumbar minimum 4 views non injury    Screening for lung cancer        Relevant Orders CT lung screening program    Need for pneumococcal vaccination        Pt will get this but does not want it today due to worry over side effects and upcoming     Influenza vaccination declined by patient                Subjective:      Patient ID: Mirta Krishnamurthy is a 63 y o  female  Malia Cox here with a hx of morbid obesity, fibromyalgia, DM II, HTN, HL, s/p mechanical AVR, chronic diarrhea, DJD, depression-she's complaining of all over pain (hands, back, fingers, shoulders, knees) that is not better on cymbalta (she's been taking 90 mg daily, provided she's been compliant)-just says she hurts all over all the time  Still has issues with chronic diarrhea-sometimes she says she does not know when she has to go and that it just "pours out of her" necessitating clean up by her daughter  The following portions of the patient's history were reviewed and updated as appropriate:   Past Medical History:  She has a past medical history of Anemia, Anxiety, Aortic valve disorder, Back pain, Chronic pain syndrome, Cirrhosis ([address] ), Constipation, Degenerative arthritis of thoracic spine, Degenerative joint disease involving multiple joints, Depression, DVT (deep venous thrombosis) ([address] ), Edema, Endometrial adenocarcinoma ([address] ), Fibromyalgia, Ganglion of right wrist, GERD (gastroesophageal reflux disease), Heart murmur, Hematoma, History of mechanical aortic valve replacement, Hypothyroidism (acquired), Laboratory confirmed diagnosis of COVID-19 (2021), Low blood pressure, Malignant neoplasm of corpus uteri, except isthmus ([address] ), Mixed hyperlipidemia, Morbid obesity ([address] ), Obstructive sleep apnea syndrome, Pulmonary embolism ([address] ), Tobacco dependence syndrome, Transient cerebral ischemia, Urinary incontinence, Viral hepatitis C, and Vitamin D deficiency  ,  _______________________________________________________________________  Medical Problems:  does not have any pertinent problems on file ,  _______________________________________________________________________  Past Surgical History:   has a past surgical history that includes Ankle surgery; Cardiac surgery (); Hysterectomy (2011);  section; Cholecystectomy; Colonoscopy (2019); and Colonoscopy  ,  _______________________________________________________________________  Family History:  family history includes Cirrhosis in her father; Coronary artery disease in her father and mother ,  _______________________________________________________________________  Social History:   reports that she has been smoking cigarettes  She has a 20 00 pack-year smoking history  She has never used smokeless tobacco  She reports current alcohol use  She reports current drug use  Drug: Marijuana  ,  _______________________________________________________________________  Allergies:  is allergic to bactrim [sulfamethoxazole-trimethoprim], penicillins, tramadol, and rocephin [ceftriaxone]     _______________________________________________________________________  Current Outpatient Medications   Medication Sig Dispense Refill   • Alcohol Swabs (Pharmacist Choice Alcohol) PADS Apply topically 3 (three) times a day 100 each 5   • baclofen 10 mg tablet TAKE 1 TABLET (10 MG TOTAL) BY MOUTH 3 (THREE) TIMES A DAY AS NEEDED FOR MUSCLE SPASMS 90 tablet 3   • BD Pen Needle Mary 2nd Gen 32G X 4 MM MISC      • Blood Glucose Monitoring Suppl (OneTouch Verio IQ System) w/Device KIT Use to check blood sugar daily 1 kit 1   • cholestyramine (QUESTRAN) 4 g packet TAKE 1 PACKET (4 G TOTAL) BY MOUTH 2 (TWO) TIMES A DAY WITH MEALS DO NOT TAKE WITHIN 2 HOURS OF OTHER ORAL MEDICATIONS 60 each 5   • clonazePAM (KlonoPIN) 0 5 mg tablet Take 0 5 mg by mouth daily at bedtime       • clonazePAM (KlonoPIN) 1 mg tablet Take 1 mg by mouth daily as needed     • doxepin (SINEquan) 75 MG capsule      • DULoxetine (CYMBALTA) 30 mg delayed release capsule TAKE 1 CAPSULE (30 MG TOTAL) BY MOUTH DAILY 30 capsule 5   • DULoxetine (CYMBALTA) 60 mg delayed release capsule TAKE 1 CAPSULE (60 MG TOTAL) BY MOUTH DAILY PT TO TAKE A 60 MG CYMBALTA AND A 30 MG CYMBALTA TO MAKE 90 MG 30 capsule 5   • escitalopram (LEXAPRO) 10 mg tablet Take 10 mg by mouth daily       • glucose blood (OneTouch Verio) test strip Use 1 each 3 (three) times a day Use as instructed 300 each 5   • mirtazapine (REMERON) 15 mg tablet      • mirtazapine (REMERON) 45 MG tablet Take 45 mg by mouth daily at bedtime       • nystatin powder Apply topically 2 (two) times a day 60 g 5   • OXcarbazepine (TRILEPTAL) 300 mg tablet      • Pharmacist Choice Lancets MISC USE TO TEST GLUCOSE UP TO 3 TIMES A DAY - ONETOUCH VERIO LANCETS 300 each 5   • pregabalin (LYRICA) 75 mg capsule Take 1 capsule (75 mg total) by mouth 3 (three) times a day 90 capsule 0   • ProAir RespiClick 012 (90 Base) MCG/ACT AEPB INHALE 1 PUFF EVERY 4 (FOUR) HOURS AS NEEDED (WHEEZING OR SHORTNESS OF BREATH)  • Trulicity 1 5 GO/5 0QE SOPN INJECT 0 5 ML (1 5 MG TOTAL) UNDER THE SKIN ONCE A WEEK 2 mL 5   • warfarin (Jantoven) 3 mg tablet TAKE 2 TO 2 1/2 TABS BY MOUTH DAILY OR AS DIRECTED BY PHYSICIAN 75 tablet 5   • ziprasidone (GEODON) 60 mg capsule Take 60 mg by mouth 2 (two) times a day with meals       No current facility-administered medications for this visit      _______________________________________________________________________  Review of Systems   Constitutional: Negative  Respiratory: Negative  Cardiovascular: Negative  Gastrointestinal: Positive for diarrhea  Musculoskeletal: Positive for arthralgias, back pain, gait problem and myalgias  Hematological: Negative  Psychiatric/Behavioral: Positive for dysphoric mood and sleep disturbance           Objective:  Vitals:    10/10/22 1058   BP: 122/80   BP Location: Left arm   Patient Position: Sitting   Cuff Size: Large   Pulse: 72   Temp: (!) 96 8 °F (36 °C)   TempSrc: Temporal   SpO2: 96% Weight: (!) 149 kg (328 lb)   Height: 5' 6" (1 676 m)     Body mass index is 52 94 kg/m²  Physical Exam  Constitutional:       Appearance: She is obese  HENT:      Head: Normocephalic and atraumatic  Right Ear: External ear normal       Left Ear: External ear normal       Nose: Nose normal       Mouth/Throat:      Pharynx: Oropharynx is clear  Cardiovascular:      Rate and Rhythm: Normal rate and regular rhythm  Pulmonary:      Effort: Pulmonary effort is normal       Breath sounds: Normal breath sounds  Abdominal:      General: Abdomen is flat  Palpations: Abdomen is soft  Musculoskeletal:         General: Tenderness present  Cervical back: Normal range of motion and neck supple  Skin:     General: Skin is warm  Capillary Refill: Capillary refill takes less than 2 seconds  Neurological:      General: No focal deficit present  Mental Status: She is alert and oriented to person, place, and time  Mental status is at baseline     Psychiatric:         Behavior: Behavior normal

## 2022-10-10 NOTE — ASSESSMENT & PLAN NOTE
Says the diarrhea has gotten a bit better-still has episodes of going even though she doesn't know it-sometimes goes on the floor etc-not sure if it's her weight or related to it or if she has some DDD in the back that causes her not to feel going

## 2022-10-12 PROBLEM — N39.0 UTI (URINARY TRACT INFECTION): Status: RESOLVED | Noted: 2021-11-28 | Resolved: 2022-10-12

## 2022-10-17 ENCOUNTER — ANTICOAG VISIT (OUTPATIENT)
Dept: CARDIOLOGY CLINIC | Facility: CLINIC | Age: 62
End: 2022-10-17

## 2022-10-17 ENCOUNTER — APPOINTMENT (OUTPATIENT)
Dept: LAB | Facility: HOSPITAL | Age: 62
End: 2022-10-17
Attending: INTERNAL MEDICINE
Payer: MEDICARE

## 2022-10-17 DIAGNOSIS — E78.5 HYPERLIPIDEMIA, UNSPECIFIED HYPERLIPIDEMIA TYPE: ICD-10-CM

## 2022-10-17 DIAGNOSIS — Z95.2 STATUS POST MECHANICAL AORTIC VALVE REPLACEMENT: Primary | ICD-10-CM

## 2022-10-17 DIAGNOSIS — E66.01 MORBID OBESITY WITH BMI OF 50.0-59.9, ADULT (HCC): ICD-10-CM

## 2022-10-17 DIAGNOSIS — M25.50 ARTHRALGIA, UNSPECIFIED JOINT: ICD-10-CM

## 2022-10-17 LAB
ALBUMIN SERPL BCP-MCNC: 3.7 G/DL (ref 3.5–5)
ALP SERPL-CCNC: 82 U/L (ref 34–104)
ALT SERPL W P-5'-P-CCNC: 9 U/L (ref 7–52)
ANION GAP SERPL CALCULATED.3IONS-SCNC: 9 MMOL/L (ref 4–13)
AST SERPL W P-5'-P-CCNC: 21 U/L (ref 13–39)
B BURGDOR IGG+IGM SER-ACNC: 0.2 AI
BASOPHILS # BLD AUTO: 0.02 THOUSANDS/ΜL (ref 0–0.1)
BASOPHILS NFR BLD AUTO: 1 % (ref 0–1)
BILIRUB SERPL-MCNC: 0.68 MG/DL (ref 0.2–1)
BUN SERPL-MCNC: 14 MG/DL (ref 5–25)
CALCIUM SERPL-MCNC: 8.8 MG/DL (ref 8.4–10.2)
CHLORIDE SERPL-SCNC: 106 MMOL/L (ref 96–108)
CHOLEST SERPL-MCNC: 160 MG/DL
CO2 SERPL-SCNC: 24 MMOL/L (ref 21–32)
CREAT SERPL-MCNC: 0.58 MG/DL (ref 0.6–1.3)
CREAT UR-MCNC: 272 MG/DL
EOSINOPHIL # BLD AUTO: 0.14 THOUSAND/ΜL (ref 0–0.61)
EOSINOPHIL NFR BLD AUTO: 4 % (ref 0–6)
ERYTHROCYTE [DISTWIDTH] IN BLOOD BY AUTOMATED COUNT: 13.2 % (ref 11.6–15.1)
ERYTHROCYTE [SEDIMENTATION RATE] IN BLOOD: 33 MM/HOUR (ref 0–30)
GFR SERPL CREATININE-BSD FRML MDRD: 100 ML/MIN/1.73SQ M
GLUCOSE P FAST SERPL-MCNC: 126 MG/DL (ref 65–99)
HCT VFR BLD AUTO: 42.7 % (ref 34.8–46.1)
HDLC SERPL-MCNC: 42 MG/DL
HGB BLD-MCNC: 14.6 G/DL (ref 11.5–15.4)
IMM GRANULOCYTES # BLD AUTO: 0.01 THOUSAND/UL (ref 0–0.2)
IMM GRANULOCYTES NFR BLD AUTO: 0 % (ref 0–2)
LDLC SERPL CALC-MCNC: 70 MG/DL (ref 0–100)
LYMPHOCYTES # BLD AUTO: 1.2 THOUSANDS/ΜL (ref 0.6–4.47)
LYMPHOCYTES NFR BLD AUTO: 31 % (ref 14–44)
MCH RBC QN AUTO: 31.5 PG (ref 26.8–34.3)
MCHC RBC AUTO-ENTMCNC: 34.2 G/DL (ref 31.4–37.4)
MCV RBC AUTO: 92 FL (ref 82–98)
MICROALBUMIN UR-MCNC: 117 MG/L (ref 0–20)
MICROALBUMIN/CREAT 24H UR: 43 MG/G CREATININE (ref 0–30)
MONOCYTES # BLD AUTO: 0.33 THOUSAND/ΜL (ref 0.17–1.22)
MONOCYTES NFR BLD AUTO: 9 % (ref 4–12)
NEUTROPHILS # BLD AUTO: 2.12 THOUSANDS/ΜL (ref 1.85–7.62)
NEUTS SEG NFR BLD AUTO: 55 % (ref 43–75)
NONHDLC SERPL-MCNC: 118 MG/DL
NRBC BLD AUTO-RTO: 0 /100 WBCS
PLATELET # BLD AUTO: 173 THOUSANDS/UL (ref 149–390)
PMV BLD AUTO: 10.2 FL (ref 8.9–12.7)
POTASSIUM SERPL-SCNC: 4 MMOL/L (ref 3.5–5.3)
PROT SERPL-MCNC: 7.3 G/DL (ref 6.4–8.4)
RBC # BLD AUTO: 4.64 MILLION/UL (ref 3.81–5.12)
SODIUM SERPL-SCNC: 139 MMOL/L (ref 135–147)
TRIGL SERPL-MCNC: 240 MG/DL
TSH SERPL DL<=0.05 MIU/L-ACNC: 5.21 UIU/ML (ref 0.45–4.5)
WBC # BLD AUTO: 3.82 THOUSAND/UL (ref 4.31–10.16)

## 2022-10-17 PROCEDURE — 85025 COMPLETE CBC W/AUTO DIFF WBC: CPT

## 2022-10-17 PROCEDURE — 86430 RHEUMATOID FACTOR TEST QUAL: CPT

## 2022-10-17 PROCEDURE — 86618 LYME DISEASE ANTIBODY: CPT

## 2022-10-17 PROCEDURE — 80061 LIPID PANEL: CPT

## 2022-10-17 PROCEDURE — 86038 ANTINUCLEAR ANTIBODIES: CPT

## 2022-10-17 PROCEDURE — 85652 RBC SED RATE AUTOMATED: CPT

## 2022-10-17 PROCEDURE — 84443 ASSAY THYROID STIM HORMONE: CPT

## 2022-10-17 PROCEDURE — 80053 COMPREHEN METABOLIC PANEL: CPT

## 2022-10-18 DIAGNOSIS — R79.89 ELEVATED TSH: Primary | ICD-10-CM

## 2022-10-18 LAB
RHEUMATOID FACT SER QL LA: NEGATIVE
RYE IGE QN: NEGATIVE

## 2022-10-20 DIAGNOSIS — E66.9 DIABETES MELLITUS TYPE 2 IN OBESE (HCC): ICD-10-CM

## 2022-10-20 DIAGNOSIS — E11.69 DIABETES MELLITUS TYPE 2 IN OBESE (HCC): ICD-10-CM

## 2022-10-20 RX ORDER — DULAGLUTIDE 1.5 MG/.5ML
1.5 INJECTION, SOLUTION SUBCUTANEOUS WEEKLY
Qty: 2 ML | Refills: 5 | Status: SHIPPED | OUTPATIENT
Start: 2022-10-20

## 2022-10-24 DIAGNOSIS — E66.01 MORBID OBESITY (HCC): Primary | ICD-10-CM

## 2022-10-27 ENCOUNTER — PATIENT OUTREACH (OUTPATIENT)
Dept: CASE MANAGEMENT | Facility: OTHER | Age: 62
End: 2022-10-27

## 2022-10-27 NOTE — PROGRESS NOTES
Patient called me yesterday and asked for assistance finding an insurance plan  I spoke with her today and explained there is an RN care manager and social work care manager assigned to Dr Good Tee office and I would be referring her to them for assistance  She understands and agrees

## 2022-10-28 ENCOUNTER — PATIENT OUTREACH (OUTPATIENT)
Dept: FAMILY MEDICINE CLINIC | Facility: CLINIC | Age: 62
End: 2022-10-28

## 2022-10-28 DIAGNOSIS — Z78.9 NEEDS ASSISTANCE WITH COMMUNITY RESOURCES: Primary | ICD-10-CM

## 2022-10-28 DIAGNOSIS — M79.7 FIBROMYALGIA: ICD-10-CM

## 2022-10-28 RX ORDER — BACLOFEN 10 MG/1
10 TABLET ORAL 3 TIMES DAILY PRN
Qty: 90 TABLET | Refills: 3 | Status: SHIPPED | OUTPATIENT
Start: 2022-10-28

## 2022-10-28 NOTE — PROGRESS NOTES
This OPCM RN received a call from [address] [place] [address] stating she has worked with patient in the past when she was covering this area  She states patient called her a few days ago requesting help with getting onto a new health insurance plan  Patient was struggling in the past with trying to pay rent as well  This OPCM RN attempted to call patient and there was no answer  A message was left with a request for a call back  Order placed to [facility]  Note routed to [facility] OPCM SW

## 2022-11-11 ENCOUNTER — PATIENT OUTREACH (OUTPATIENT)
Dept: FAMILY MEDICINE CLINIC | Facility: CLINIC | Age: 62
End: 2022-11-11

## 2022-11-11 NOTE — PROGRESS NOTES
SHANICE called and spoke with pt  SHANICE informed pt that SW and pt spoke months prior  SHANICE asked pt what she is looking for assistance with and this SW may be able to help her  Pt explained she has medical insurance, Medicaid, but she has trouble with transportation  She said her friend's insurance provides transportation  SHANICE advised pt to call her insurance company to inquire about this  Pt said she was planning on doing this  However, she is looking to get help with housing  SW asked pt if she has contacted housing authority  Pt said she already submitted housing application  SW told pt to contact SHANICE if she needs any further assistance  Pt understood

## 2022-12-05 ENCOUNTER — VBI (OUTPATIENT)
Dept: ADMINISTRATIVE | Facility: OTHER | Age: 62
End: 2022-12-05

## 2022-12-08 DIAGNOSIS — E11.69 DIABETES MELLITUS TYPE 2 IN OBESE (HCC): ICD-10-CM

## 2022-12-08 DIAGNOSIS — E66.9 DIABETES MELLITUS TYPE 2 IN OBESE (HCC): ICD-10-CM

## 2022-12-08 DIAGNOSIS — Z86.718 PERSONAL HISTORY OF DVT (DEEP VEIN THROMBOSIS): ICD-10-CM

## 2022-12-08 DIAGNOSIS — Z95.2 HISTORY OF MECHANICAL AORTIC VALVE REPLACEMENT: ICD-10-CM

## 2022-12-09 ENCOUNTER — TELEPHONE (OUTPATIENT)
Dept: FAMILY MEDICINE CLINIC | Facility: CLINIC | Age: 62
End: 2022-12-09

## 2022-12-09 DIAGNOSIS — Z95.2 HISTORY OF MECHANICAL AORTIC VALVE REPLACEMENT: ICD-10-CM

## 2022-12-09 DIAGNOSIS — Z86.718 PERSONAL HISTORY OF DVT (DEEP VEIN THROMBOSIS): ICD-10-CM

## 2022-12-09 RX ORDER — WARFARIN SODIUM 3 MG/1
TABLET ORAL
Qty: 75 TABLET | Refills: 5 | Status: SHIPPED | OUTPATIENT
Start: 2022-12-09 | End: 2023-01-15 | Stop reason: SDUPTHER

## 2022-12-09 RX ORDER — WARFARIN SODIUM 3 MG/1
TABLET ORAL
Qty: 75 TABLET | Refills: 0 | Status: SHIPPED | OUTPATIENT
Start: 2022-12-09 | End: 2022-12-09 | Stop reason: SDUPTHER

## 2022-12-09 RX ORDER — DULAGLUTIDE 1.5 MG/.5ML
1.5 INJECTION, SOLUTION SUBCUTANEOUS WEEKLY
Qty: 2 ML | Refills: 0 | Status: SHIPPED | OUTPATIENT
Start: 2022-12-09

## 2022-12-09 NOTE — TELEPHONE ENCOUNTER
She is quartantined, because of covid  Her sister just came down with covid and now she has to [facility] for another 7 days    Grecia Palomares made an appt   With Dr Andreina Giles for January 5, but needs refills for:    Warfarin 3mg,     [address]    Patient ph # [phone]

## 2023-01-05 ENCOUNTER — APPOINTMENT (OUTPATIENT)
Dept: LAB | Facility: HOSPITAL | Age: 63
End: 2023-01-05
Attending: INTERNAL MEDICINE

## 2023-01-05 ENCOUNTER — OFFICE VISIT (OUTPATIENT)
Dept: FAMILY MEDICINE CLINIC | Facility: CLINIC | Age: 63
End: 2023-01-05

## 2023-01-05 ENCOUNTER — ANTICOAG VISIT (OUTPATIENT)
Dept: CARDIOLOGY CLINIC | Facility: CLINIC | Age: 63
End: 2023-01-05

## 2023-01-05 VITALS
BODY MASS INDEX: 47.09 KG/M2 | WEIGHT: 293 LBS | OXYGEN SATURATION: 97 % | RESPIRATION RATE: 16 BRPM | HEIGHT: 66 IN | SYSTOLIC BLOOD PRESSURE: 124 MMHG | HEART RATE: 95 BPM | DIASTOLIC BLOOD PRESSURE: 70 MMHG

## 2023-01-05 DIAGNOSIS — Z95.2 STATUS POST MECHANICAL AORTIC VALVE REPLACEMENT: Primary | ICD-10-CM

## 2023-01-05 DIAGNOSIS — E11.9 TYPE 2 DIABETES MELLITUS WITHOUT COMPLICATION, WITHOUT LONG-TERM CURRENT USE OF INSULIN (HCC): ICD-10-CM

## 2023-01-05 DIAGNOSIS — Z86.718 PERSONAL HISTORY OF DVT (DEEP VEIN THROMBOSIS): ICD-10-CM

## 2023-01-05 DIAGNOSIS — E55.9 VITAMIN D DEFICIENCY: ICD-10-CM

## 2023-01-05 DIAGNOSIS — M54.42 ACUTE BILATERAL LOW BACK PAIN WITH BILATERAL SCIATICA: ICD-10-CM

## 2023-01-05 DIAGNOSIS — F17.210 TOBACCO DEPENDENCE DUE TO CIGARETTES: ICD-10-CM

## 2023-01-05 DIAGNOSIS — E11.9 TYPE 2 DIABETES MELLITUS WITHOUT COMPLICATION, WITHOUT LONG-TERM CURRENT USE OF INSULIN (HCC): Primary | ICD-10-CM

## 2023-01-05 DIAGNOSIS — Z28.21 PNEUMOCOCCAL VACCINATION DECLINED: ICD-10-CM

## 2023-01-05 DIAGNOSIS — Z72.0 TOBACCO USE: ICD-10-CM

## 2023-01-05 DIAGNOSIS — E78.5 HYPERLIPIDEMIA, UNSPECIFIED HYPERLIPIDEMIA TYPE: ICD-10-CM

## 2023-01-05 DIAGNOSIS — F31.9 BIPOLAR DEPRESSION (HCC): ICD-10-CM

## 2023-01-05 DIAGNOSIS — M79.7 FIBROMYALGIA: ICD-10-CM

## 2023-01-05 DIAGNOSIS — Z95.2 STATUS POST MECHANICAL AORTIC VALVE REPLACEMENT: ICD-10-CM

## 2023-01-05 DIAGNOSIS — M54.41 ACUTE BILATERAL LOW BACK PAIN WITH BILATERAL SCIATICA: ICD-10-CM

## 2023-01-05 DIAGNOSIS — I50.30 HEART FAILURE WITH PRESERVED EJECTION FRACTION, BORDERLINE, CLASS III (HCC): ICD-10-CM

## 2023-01-05 DIAGNOSIS — Z79.01 ANTICOAGULATED ON COUMADIN: ICD-10-CM

## 2023-01-05 DIAGNOSIS — Z95.2 HISTORY OF MECHANICAL AORTIC VALVE REPLACEMENT: ICD-10-CM

## 2023-01-05 DIAGNOSIS — E66.01 MORBID OBESITY (HCC): ICD-10-CM

## 2023-01-05 DIAGNOSIS — Z28.21 INFLUENZA VACCINATION DECLINED BY PATIENT: ICD-10-CM

## 2023-01-05 DIAGNOSIS — Z71.6 ENCOUNTER FOR TOBACCO USE CESSATION COUNSELING: ICD-10-CM

## 2023-01-05 LAB
25(OH)D3 SERPL-MCNC: 7 NG/ML (ref 30–100)
ALBUMIN SERPL BCP-MCNC: 4 G/DL (ref 3.5–5)
ALP SERPL-CCNC: 96 U/L (ref 34–104)
ALT SERPL W P-5'-P-CCNC: 15 U/L (ref 7–52)
ANION GAP SERPL CALCULATED.3IONS-SCNC: 9 MMOL/L (ref 4–13)
AST SERPL W P-5'-P-CCNC: 31 U/L (ref 13–39)
BASOPHILS # BLD AUTO: 0.01 THOUSANDS/ÂΜL (ref 0–0.1)
BASOPHILS NFR BLD AUTO: 0 % (ref 0–1)
BILIRUB SERPL-MCNC: 0.99 MG/DL (ref 0.2–1)
BUN SERPL-MCNC: 14 MG/DL (ref 5–25)
CALCIUM SERPL-MCNC: 9.3 MG/DL (ref 8.4–10.2)
CHLORIDE SERPL-SCNC: 104 MMOL/L (ref 96–108)
CHOLEST SERPL-MCNC: 173 MG/DL
CO2 SERPL-SCNC: 27 MMOL/L (ref 21–32)
CREAT SERPL-MCNC: 0.68 MG/DL (ref 0.6–1.3)
EOSINOPHIL # BLD AUTO: 0.15 THOUSAND/ÂΜL (ref 0–0.61)
EOSINOPHIL NFR BLD AUTO: 4 % (ref 0–6)
ERYTHROCYTE [DISTWIDTH] IN BLOOD BY AUTOMATED COUNT: 12.8 % (ref 11.6–15.1)
GFR SERPL CREATININE-BSD FRML MDRD: 95 ML/MIN/1.73SQ M
GLUCOSE P FAST SERPL-MCNC: 150 MG/DL (ref 65–99)
HCT VFR BLD AUTO: 44.8 % (ref 34.8–46.1)
HDLC SERPL-MCNC: 53 MG/DL
HGB BLD-MCNC: 15.1 G/DL (ref 11.5–15.4)
IMM GRANULOCYTES # BLD AUTO: 0.01 THOUSAND/UL (ref 0–0.2)
IMM GRANULOCYTES NFR BLD AUTO: 0 % (ref 0–2)
INR PPP: 2.7 (ref 0.84–1.19)
LDLC SERPL CALC-MCNC: 79 MG/DL (ref 0–100)
LYMPHOCYTES # BLD AUTO: 1.25 THOUSANDS/ÂΜL (ref 0.6–4.47)
LYMPHOCYTES NFR BLD AUTO: 33 % (ref 14–44)
MCH RBC QN AUTO: 32.1 PG (ref 26.8–34.3)
MCHC RBC AUTO-ENTMCNC: 33.7 G/DL (ref 31.4–37.4)
MCV RBC AUTO: 95 FL (ref 82–98)
MONOCYTES # BLD AUTO: 0.25 THOUSAND/ÂΜL (ref 0.17–1.22)
MONOCYTES NFR BLD AUTO: 7 % (ref 4–12)
NEUTROPHILS # BLD AUTO: 2.14 THOUSANDS/ÂΜL (ref 1.85–7.62)
NEUTS SEG NFR BLD AUTO: 56 % (ref 43–75)
NONHDLC SERPL-MCNC: 120 MG/DL
NRBC BLD AUTO-RTO: 0 /100 WBCS
PLATELET # BLD AUTO: 168 THOUSANDS/UL (ref 149–390)
PMV BLD AUTO: 10.1 FL (ref 8.9–12.7)
POTASSIUM SERPL-SCNC: 3.6 MMOL/L (ref 3.5–5.3)
PROT SERPL-MCNC: 7.8 G/DL (ref 6.4–8.4)
RBC # BLD AUTO: 4.71 MILLION/UL (ref 3.81–5.12)
SL AMB POCT HEMOGLOBIN AIC: 6.6 (ref ?–6.5)
SODIUM SERPL-SCNC: 140 MMOL/L (ref 135–147)
TRIGL SERPL-MCNC: 205 MG/DL
TSH SERPL DL<=0.05 MIU/L-ACNC: 3.64 UIU/ML (ref 0.45–4.5)
WBC # BLD AUTO: 3.81 THOUSAND/UL (ref 4.31–10.16)

## 2023-01-05 RX ORDER — HYDROXYZINE PAMOATE 50 MG/1
CAPSULE ORAL
COMMUNITY
Start: 2022-12-15 | End: 2023-01-05

## 2023-01-05 RX ORDER — PREDNISONE 20 MG/1
TABLET ORAL
Qty: 12 TABLET | Refills: 0 | Status: SHIPPED | OUTPATIENT
Start: 2023-01-05

## 2023-01-05 RX ORDER — DULAGLUTIDE 1.5 MG/.5ML
1.5 INJECTION, SOLUTION SUBCUTANEOUS WEEKLY
Qty: 2 ML | Refills: 5 | Status: SHIPPED | OUTPATIENT
Start: 2023-01-05

## 2023-01-05 NOTE — PATIENT INSTRUCTIONS
Breast Cancer Screening    Breast cancer is the most common cancer in females in the [place] and the second most common cause of cancer death in women    The risk of breast cancer from birth to death is 12 9 percent (1 in 6 women)    Approximately 43,000 women will die in the  annually from breast cancer    Mammography    A mammogram is an x-ray of your breasts to screen for breast cancer    It uses the least amount of radiation necessary to provide the highest quality image able to detect early signs of breast cancer  For most women, we recommend getting your first mammogram at the age of 36 and repeating it yearly         Signs of Breast Cancer    Lumps  Thickening or swelling of parts of the breast  Irritation or dimpling of breast skin  Red or flaky skin in the nipple area  Pain in the nipple area  Pulling in of the nipple  Change in size or shape of the breast  Pain in any area of the breast     Did you know by getting a mammogram, doctors can find breast cancer 3 YEARS before a lump is even felt! Early detection is BEST! Schedule your mammogram TODAY!      To schedule this appointment with [facility], please contact Central Scheduling at [phone]

## 2023-01-05 NOTE — ASSESSMENT & PLAN NOTE
Counseled on smoking cessation-only smoking 5-6 cigarettes per day-will check to see if she had LDCT

## 2023-01-05 NOTE — PROGRESS NOTES
Assessment/Plan:Lauren here for chronic management of issues-will do updated labs and will take over management of coumadin for her aortic valve-A1c pretty good today at 6 6%-needs to find a new psychiatrist-also needs to get a lot of previously ordered screening tests done now that she knows how to get a ride to various things-Lauren also expresses an interest in weight loss program so will address that next visit too-provided her with eye exam form to take to diabetic eye exam provider         Problem List Items Addressed This Visit        Cardiovascular and Mediastinum    Heart failure with preserved ejection fraction, borderline, class III ([place] [address] )       Other    Hyperlipidemia    Relevant Orders    Lipid panel    TSH, 3rd generation    Tobacco dependence due to cigarettes     Counseled on smoking cessation-only smoking 5-6 cigarettes per day-will check to see if she had LDCT         Morbid obesity ([place] [address] )    Status post mechanical aortic valve replacement    Anticoagulated on Coumadin    Bipolar depression ([place] [address] )    Personal history of DVT (deep vein thrombosis)    Back pain    Relevant Medications    predniSONE 20 mg tablet    Fibromyalgia    Tobacco use   Other Visit Diagnoses     Type 2 diabetes mellitus without complication, without long-term current use of insulin ([place] [address] )    -  Primary    On Trulicity and C6C went up a little bit to 6 6% but not too bad    Relevant Medications    Dulaglutide (Trulicity) 1 5 TA/4 2VY SOPN    Other Relevant Orders    POCT hemoglobin A1c    CBC and differential    Comprehensive metabolic panel    Lipid panel    TSH, 3rd generation    Microalbumin / creatinine urine ratio    Influenza vaccination declined by patient        Pneumococcal vaccination declined        pt will hold off on PCV vaccine today- states she will get it her next visit    BMI 50 0-59 9, adult ([place] [address] )        Encounter for tobacco use cessation counseling        Vitamin D deficiency        Relevant Orders    Vitamin D 25 hydroxy            Subjective:      Patient ID: Mariusz Cunha is a 63 y o  female  Carolina Harms here for follow up-hx of obesity, DM II, chronic diarrhea, hx of mechanical aortic valve, fibromyalgia, HL-currently off of most of her psychiatric meds because her psychiatrist spoke with her for 2 minutes, didn't explain anything and she got frustrated-her coumadin clinic is saying that they no longer need to follow her and that we can do the management her goal is 2 5 to 3 5      The following portions of the patient's history were reviewed and updated as appropriate:   Past Medical History:  She has a past medical history of Anemia, Anxiety, Aortic valve disorder, Back pain, Chronic pain syndrome, Cirrhosis ([address] ), Constipation, Degenerative arthritis of thoracic spine, Degenerative joint disease involving multiple joints, Depression, DVT (deep venous thrombosis) ([address] ), Edema, Endometrial adenocarcinoma ([address] ), Fibromyalgia, Ganglion of right wrist, GERD (gastroesophageal reflux disease), Heart murmur, Hematoma, History of mechanical aortic valve replacement, Hypothyroidism (acquired), Laboratory confirmed diagnosis of COVID-19 (2021), Low blood pressure, Malignant neoplasm of corpus uteri, except isthmus ([address] ), Mixed hyperlipidemia, Morbid obesity ([address] ), Obstructive sleep apnea syndrome, Pulmonary embolism ([address] ), Tobacco dependence syndrome, Transient cerebral ischemia, Urinary incontinence, Viral hepatitis C, and Vitamin D deficiency  ,  _______________________________________________________________________  Medical Problems:  does not have any pertinent problems on file ,  _______________________________________________________________________  Past Surgical History:   has a past surgical history that includes Ankle surgery; Cardiac surgery (); Hysterectomy (2011);  section;  Cholecystectomy; Colonoscopy (2019); and Colonoscopy  ,  _______________________________________________________________________  Family History:  family history includes Cirrhosis in her father; Coronary artery disease in her father and mother ,  _______________________________________________________________________  Social History:   reports that she has been smoking cigarettes  She has a 20 00 pack-year smoking history  She has never used smokeless tobacco  She reports current alcohol use  She reports current drug use  Drugs: Marijuana and LSD ,  _______________________________________________________________________  Allergies:  is allergic to bactrim [sulfamethoxazole-trimethoprim], penicillins, tramadol, and rocephin [ceftriaxone]     _______________________________________________________________________  Current Outpatient Medications   Medication Sig Dispense Refill   • baclofen 10 mg tablet TAKE 1 TABLET (10 MG TOTAL) BY MOUTH 3 (THREE) TIMES A DAY AS NEEDED FOR MUSCLE SPASMS 90 tablet 3   • cholestyramine (QUESTRAN) 4 g packet TAKE 1 PACKET (4 G TOTAL) BY MOUTH 2 (TWO) TIMES A DAY WITH MEALS DO NOT TAKE WITHIN 2 HOURS OF OTHER ORAL MEDICATIONS 60 each 5   • Dulaglutide (Trulicity) 1 5 FJ/4 8QL SOPN Inject 0 5 mL (1 5 mg total) under the skin once a week 2 mL 5   • DULoxetine (CYMBALTA) 30 mg delayed release capsule TAKE 1 CAPSULE (30 MG TOTAL) BY MOUTH DAILY 30 capsule 5   • DULoxetine (CYMBALTA) 60 mg delayed release capsule TAKE 1 CAPSULE (60 MG TOTAL) BY MOUTH DAILY PT TO TAKE A 60 MG CYMBALTA AND A 30 MG CYMBALTA TO MAKE 90 MG 30 capsule 5   • mirtazapine (REMERON) 45 MG tablet Take 45 mg by mouth daily at bedtime       • nystatin powder Apply topically 2 (two) times a day 60 g 5   • predniSONE 20 mg tablet Take 2 tablets daily for 2 days, 1 tablet daily for 4 days, and then 1/2 tablet daily for 4 days-sugars will likely go up while on this 12 tablet 0   • ProAir RespiClick 919 (90 Base) MCG/ACT AEPB INHALE 1 PUFF EVERY 4 (FOUR) HOURS AS NEEDED (WHEEZING OR SHORTNESS OF BREATH)  • warfarin (Jantoven) 3 mg tablet TAKE 2 TO 2 1/2 TABS BY MOUTH DAILY OR AS DIRECTED BY PHYSICIAN (Patient taking differently: TAKE 2 TO 2 1/2 TABS EVERYDAY Except Thursday and Sunday takes 2 tabs) 75 tablet 5   • Alcohol Swabs (Pharmacist Choice Alcohol) PADS Apply topically 3 (three) times a day 100 each 5   • BD Pen Needle Mary 2nd Gen 32G X 4 MM MISC      • Blood Glucose Monitoring Suppl (OneTouch Verio IQ System) w/Device KIT Use to check blood sugar daily 1 kit 1   • clonazePAM (KlonoPIN) 1 mg tablet Take 1 mg by mouth daily as needed (Patient not taking: Reported on 1/5/2023)     • glucose blood (OneTouch Verio) test strip Use 1 each 3 (three) times a day Use as instructed 300 each 5   • Pharmacist Choice Lancets MISC USE TO TEST GLUCOSE UP TO 3 TIMES A DAY - ONETOUCH VERIO LANCETS 300 each 5     No current facility-administered medications for this visit      _______________________________________________________________________  Review of Systems   Respiratory: Positive for shortness of breath  Cardiovascular: Positive for chest pain  Thinks its bone related   Musculoskeletal: Positive for back pain  Psychiatric/Behavioral: Positive for sleep disturbance  The patient is nervous/anxious  Objective:  Vitals:    01/05/23 1022   BP: 124/70   BP Location: Left arm   Patient Position: Sitting   Cuff Size: Large   Pulse: 95   Resp: 16   SpO2: 97%   Weight: (!) 154 kg (340 lb)   Height: 5' 6" (1 676 m)     Body mass index is 54 88 kg/m²  Physical Exam  Vitals reviewed  Constitutional:       Appearance: She is obese  HENT:      Head: Normocephalic and atraumatic  Right Ear: External ear normal       Left Ear: External ear normal       Nose: Nose normal       Mouth/Throat:      Mouth: Mucous membranes are moist    Eyes:      Extraocular Movements: Extraocular movements intact  Neck:      Vascular: No carotid bruit     Cardiovascular: Rate and Rhythm: Normal rate and regular rhythm  Pulses: no weak pulses          Dorsalis pedis pulses are 2+ on the right side and 2+ on the left side  Posterior tibial pulses are 2+ on the right side  Comments: Aprtic area click  Pulmonary:      Effort: Pulmonary effort is normal       Breath sounds: Normal breath sounds  Abdominal:      General: Abdomen is flat  Palpations: Abdomen is soft  Comments: distended   Musculoskeletal:         General: Normal range of motion  Cervical back: Normal range of motion and neck supple  Right lower leg: No edema  Left lower leg: No edema  Feet:      Right foot:      Skin integrity: No ulcer, skin breakdown, erythema, warmth, callus or dry skin  Left foot:      Skin integrity: No ulcer, skin breakdown, erythema, warmth, callus or dry skin  Skin:     General: Skin is warm  Capillary Refill: Capillary refill takes less than 2 seconds  Neurological:      General: No focal deficit present  Mental Status: She is alert and oriented to person, place, and time  Patient's shoes and socks removed  Right Foot/Ankle   Right Foot Inspection  Skin Exam: skin normal and skin intact  No dry skin, no warmth, no callus, no erythema, no maceration, no abnormal color, no pre-ulcer, no ulcer and no callus  Toe Exam: ROM and strength within normal limits  Sensory   Monofilament testing: intact    Vascular  Capillary refills: < 3 seconds  The right DP pulse is 2+  The right PT pulse is 2+  Left Foot/Ankle  Left Foot Inspection  Skin Exam: skin normal and skin intact  No dry skin, no warmth, no erythema, no maceration, normal color, no pre-ulcer, no ulcer and no callus  Toe Exam: ROM and strength within normal limits  Sensory   Monofilament testing: intact    Vascular  Capillary refills: < 3 seconds  The left DP pulse is 2+       Assign Risk Category  No deformity present  No loss of protective sensation  No weak pulses  Risk: 0

## 2023-01-06 ENCOUNTER — ANTICOAG VISIT (OUTPATIENT)
Dept: FAMILY MEDICINE CLINIC | Facility: CLINIC | Age: 63
End: 2023-01-06

## 2023-01-06 DIAGNOSIS — Z95.2 STATUS POST MECHANICAL AORTIC VALVE REPLACEMENT: Primary | ICD-10-CM

## 2023-01-06 NOTE — PROGRESS NOTES
I LM for pt making her aware or PT-INR results and to continue her current dosing regimen and re-check in 6 weeks  Told her to give the office a call back to let me know she received my message

## 2023-01-09 ENCOUNTER — TELEPHONE (OUTPATIENT)
Dept: FAMILY MEDICINE CLINIC | Facility: CLINIC | Age: 63
End: 2023-01-09

## 2023-01-09 DIAGNOSIS — E55.9 VITAMIN D DEFICIENCY: ICD-10-CM

## 2023-01-09 DIAGNOSIS — E55.9 VITAMIN D DEFICIENCY: Primary | ICD-10-CM

## 2023-01-09 RX ORDER — ERGOCALCIFEROL 1.25 MG/1
CAPSULE ORAL
Qty: 12 CAPSULE | Refills: 0 | Status: SHIPPED | OUTPATIENT
Start: 2023-01-09 | End: 2023-01-09 | Stop reason: SDUPTHER

## 2023-01-09 RX ORDER — ERGOCALCIFEROL 1.25 MG/1
CAPSULE ORAL
Qty: 12 CAPSULE | Refills: 0 | Status: SHIPPED | OUTPATIENT
Start: 2023-01-09

## 2023-01-09 NOTE — TELEPHONE ENCOUNTER
[place] Punch w/[facility] calling, he's asking for clarification on Vit D directions  Says Vit D 50,000 1 daily for 12 weeks  Said it's usually 1 Weekly for 12 weeks  And #12 pills was requested  He just wants to make sure

## 2023-01-15 DIAGNOSIS — Z86.718 PERSONAL HISTORY OF DVT (DEEP VEIN THROMBOSIS): ICD-10-CM

## 2023-01-15 DIAGNOSIS — Z95.2 HISTORY OF MECHANICAL AORTIC VALVE REPLACEMENT: ICD-10-CM

## 2023-01-16 DIAGNOSIS — G89.29 CHRONIC BILATERAL LOW BACK PAIN WITH SCIATICA, SCIATICA LATERALITY UNSPECIFIED: ICD-10-CM

## 2023-01-16 DIAGNOSIS — M79.7 FIBROMYALGIA: ICD-10-CM

## 2023-01-16 DIAGNOSIS — M54.40 CHRONIC BILATERAL LOW BACK PAIN WITH SCIATICA, SCIATICA LATERALITY UNSPECIFIED: ICD-10-CM

## 2023-01-16 RX ORDER — DULOXETIN HYDROCHLORIDE 30 MG/1
30 CAPSULE, DELAYED RELEASE ORAL DAILY
Qty: 30 CAPSULE | Refills: 5 | Status: SHIPPED | OUTPATIENT
Start: 2023-01-16

## 2023-01-16 RX ORDER — WARFARIN SODIUM 3 MG/1
TABLET ORAL
Qty: 75 TABLET | Refills: 0 | Status: SHIPPED | OUTPATIENT
Start: 2023-01-16

## 2023-01-16 RX ORDER — DULOXETIN HYDROCHLORIDE 60 MG/1
60 CAPSULE, DELAYED RELEASE ORAL DAILY
Qty: 30 CAPSULE | Refills: 5 | Status: SHIPPED | OUTPATIENT
Start: 2023-01-16

## 2023-02-13 ENCOUNTER — PATIENT MESSAGE (OUTPATIENT)
Dept: FAMILY MEDICINE CLINIC | Facility: CLINIC | Age: 63
End: 2023-02-13

## 2023-02-13 DIAGNOSIS — R39.9 UTI SYMPTOMS: Primary | ICD-10-CM

## 2023-02-14 NOTE — PATIENT COMMUNICATION
I called and spoke to pt  She thinks she has a UTI so would like an order put in for a urine to be done  She will be going to the lab tomorrow  UA and C/S ordered for pt

## 2023-02-16 ENCOUNTER — APPOINTMENT (OUTPATIENT)
Dept: LAB | Facility: HOSPITAL | Age: 63
End: 2023-02-16
Attending: INTERNAL MEDICINE

## 2023-02-16 ENCOUNTER — ANTICOAG VISIT (OUTPATIENT)
Dept: FAMILY MEDICINE CLINIC | Facility: CLINIC | Age: 63
End: 2023-02-16

## 2023-02-16 DIAGNOSIS — E55.9 VITAMIN D DEFICIENCY: ICD-10-CM

## 2023-02-16 DIAGNOSIS — Z95.2 STATUS POST MECHANICAL AORTIC VALVE REPLACEMENT: Primary | ICD-10-CM

## 2023-02-16 DIAGNOSIS — R39.9 UTI SYMPTOMS: ICD-10-CM

## 2023-02-16 LAB
25(OH)D3 SERPL-MCNC: 18.7 NG/ML (ref 30–100)
BACTERIA UR QL AUTO: ABNORMAL /HPF
BILIRUB UR QL STRIP: NEGATIVE
CAOX CRY URNS QL MICRO: ABNORMAL /HPF
CLARITY UR: CLEAR
COLOR UR: YELLOW
CREAT UR-MCNC: 139 MG/DL
GLUCOSE UR STRIP-MCNC: NEGATIVE MG/DL
HGB UR QL STRIP.AUTO: NEGATIVE
KETONES UR STRIP-MCNC: NEGATIVE MG/DL
LEUKOCYTE ESTERASE UR QL STRIP: NEGATIVE
MICROALBUMIN UR-MCNC: 10.4 MG/L (ref 0–20)
MICROALBUMIN/CREAT 24H UR: 7 MG/G CREATININE (ref 0–30)
NITRITE UR QL STRIP: NEGATIVE
NON-SQ EPI CELLS URNS QL MICRO: ABNORMAL /HPF
PH UR STRIP.AUTO: 6 [PH]
PROT UR STRIP-MCNC: NEGATIVE MG/DL
RBC #/AREA URNS AUTO: ABNORMAL /HPF
SP GR UR STRIP.AUTO: >=1.03 (ref 1–1.03)
UROBILINOGEN UR QL STRIP.AUTO: 0.2 E.U./DL
WBC #/AREA URNS AUTO: ABNORMAL /HPF

## 2023-02-17 DIAGNOSIS — N39.0 URINARY TRACT INFECTION WITHOUT HEMATURIA, SITE UNSPECIFIED: Primary | ICD-10-CM

## 2023-02-17 RX ORDER — LEVOFLOXACIN 500 MG/1
500 TABLET, FILM COATED ORAL EVERY 24 HOURS
Qty: 10 TABLET | Refills: 0 | Status: SHIPPED | OUTPATIENT
Start: 2023-02-17 | End: 2023-02-19

## 2023-02-18 LAB
BACTERIA UR CULT: ABNORMAL
BACTERIA UR CULT: ABNORMAL

## 2023-02-19 DIAGNOSIS — N39.0 URINARY TRACT INFECTION WITHOUT HEMATURIA, SITE UNSPECIFIED: Primary | ICD-10-CM

## 2023-02-19 RX ORDER — NITROFURANTOIN 25; 75 MG/1; MG/1
100 CAPSULE ORAL 2 TIMES DAILY
Qty: 10 CAPSULE | Refills: 0 | Status: SHIPPED | OUTPATIENT
Start: 2023-02-19 | End: 2023-02-24

## 2023-02-24 ENCOUNTER — APPOINTMENT (EMERGENCY)
Dept: CT IMAGING | Facility: HOSPITAL | Age: 63
End: 2023-02-24

## 2023-02-24 ENCOUNTER — APPOINTMENT (EMERGENCY)
Dept: RADIOLOGY | Facility: HOSPITAL | Age: 63
End: 2023-02-24

## 2023-02-24 ENCOUNTER — HOSPITAL ENCOUNTER (EMERGENCY)
Facility: HOSPITAL | Age: 63
Discharge: HOME | End: 2023-02-24
Attending: INTERNAL MEDICINE

## 2023-02-24 VITALS
RESPIRATION RATE: 19 BRPM | HEART RATE: 76 BPM | WEIGHT: 293 LBS | BODY MASS INDEX: 55.85 KG/M2 | DIASTOLIC BLOOD PRESSURE: 79 MMHG | TEMPERATURE: 97.7 F | OXYGEN SATURATION: 95 % | SYSTOLIC BLOOD PRESSURE: 147 MMHG

## 2023-02-24 DIAGNOSIS — R52 BODY ACHES: Primary | ICD-10-CM

## 2023-02-24 DIAGNOSIS — R42 DIZZINESS: ICD-10-CM

## 2023-02-24 LAB
ALBUMIN SERPL BCP-MCNC: 3.7 G/DL (ref 3.5–5)
ALP SERPL-CCNC: 91 U/L (ref 34–104)
ALT SERPL W P-5'-P-CCNC: 20 U/L (ref 7–52)
ANION GAP SERPL CALCULATED.3IONS-SCNC: 8 MMOL/L (ref 4–13)
AST SERPL W P-5'-P-CCNC: 35 U/L (ref 13–39)
BASOPHILS # BLD AUTO: 0.02 THOUSANDS/ÂΜL (ref 0–0.1)
BASOPHILS NFR BLD AUTO: 1 % (ref 0–1)
BILIRUB SERPL-MCNC: 0.54 MG/DL (ref 0.2–1)
BILIRUB UR QL STRIP: NEGATIVE
BUN SERPL-MCNC: 16 MG/DL (ref 5–25)
CALCIUM SERPL-MCNC: 8.7 MG/DL (ref 8.4–10.2)
CARDIAC TROPONIN I PNL SERPL HS: 6 NG/L (ref 8–18)
CHLORIDE SERPL-SCNC: 104 MMOL/L (ref 96–108)
CLARITY UR: CLEAR
CO2 SERPL-SCNC: 26 MMOL/L (ref 21–32)
COLOR UR: ABNORMAL
CREAT SERPL-MCNC: 0.49 MG/DL (ref 0.6–1.3)
D DIMER PPP FEU-MCNC: 0.5 UG/ML FEU
EOSINOPHIL # BLD AUTO: 0.13 THOUSAND/ÂΜL (ref 0–0.61)
EOSINOPHIL NFR BLD AUTO: 4 % (ref 0–6)
ERYTHROCYTE [DISTWIDTH] IN BLOOD BY AUTOMATED COUNT: 13.4 % (ref 11.6–15.1)
FLUAV RNA RESP QL NAA+PROBE: NEGATIVE
FLUBV RNA RESP QL NAA+PROBE: NEGATIVE
GFR SERPL CREATININE-BSD FRML MDRD: 106 ML/MIN/1.73SQ M
GLUCOSE SERPL-MCNC: 238 MG/DL (ref 65–140)
GLUCOSE UR STRIP-MCNC: ABNORMAL MG/DL
HCT VFR BLD AUTO: 40.9 % (ref 34.8–46.1)
HGB BLD-MCNC: 13.6 G/DL (ref 11.5–15.4)
HGB UR QL STRIP.AUTO: NEGATIVE
IMM GRANULOCYTES # BLD AUTO: 0.01 THOUSAND/UL (ref 0–0.2)
IMM GRANULOCYTES NFR BLD AUTO: 0 % (ref 0–2)
INR PPP: 2.01 (ref 0.84–1.19)
KETONES UR STRIP-MCNC: ABNORMAL MG/DL
LEUKOCYTE ESTERASE UR QL STRIP: NEGATIVE
LYMPHOCYTES # BLD AUTO: 0.74 THOUSANDS/ÂΜL (ref 0.6–4.47)
LYMPHOCYTES NFR BLD AUTO: 24 % (ref 14–44)
MCH RBC QN AUTO: 31.9 PG (ref 26.8–34.3)
MCHC RBC AUTO-ENTMCNC: 33.3 G/DL (ref 31.4–37.4)
MCV RBC AUTO: 96 FL (ref 82–98)
MONOCYTES # BLD AUTO: 0.29 THOUSAND/ÂΜL (ref 0.17–1.22)
MONOCYTES NFR BLD AUTO: 9 % (ref 4–12)
NEUTROPHILS # BLD AUTO: 1.92 THOUSANDS/ÂΜL (ref 1.85–7.62)
NEUTS SEG NFR BLD AUTO: 62 % (ref 43–75)
NITRITE UR QL STRIP: NEGATIVE
NRBC BLD AUTO-RTO: 0 /100 WBCS
PH UR STRIP.AUTO: 6 [PH]
PLATELET # BLD AUTO: 174 THOUSANDS/UL (ref 149–390)
PMV BLD AUTO: 11.5 FL (ref 8.9–12.7)
POTASSIUM SERPL-SCNC: 3.7 MMOL/L (ref 3.5–5.3)
PROT SERPL-MCNC: 7.2 G/DL (ref 6.4–8.4)
PROT UR STRIP-MCNC: NEGATIVE MG/DL
PROTHROMBIN TIME: 23.2 SECONDS (ref 11.6–14.5)
RBC # BLD AUTO: 4.26 MILLION/UL (ref 3.81–5.12)
RSV RNA RESP QL NAA+PROBE: NEGATIVE
SARS-COV-2 RNA RESP QL NAA+PROBE: NEGATIVE
SODIUM SERPL-SCNC: 138 MMOL/L (ref 135–147)
SP GR UR STRIP.AUTO: >=1.03 (ref 1–1.03)
UROBILINOGEN UR QL STRIP.AUTO: 1 E.U./DL
WBC # BLD AUTO: 3.11 THOUSAND/UL (ref 4.31–10.16)

## 2023-02-24 RX ORDER — SODIUM CHLORIDE 9 MG/ML
125 INJECTION, SOLUTION INTRAVENOUS CONTINUOUS
Status: DISCONTINUED | OUTPATIENT
Start: 2023-02-24 | End: 2023-02-24 | Stop reason: HOSPADM

## 2023-02-24 RX ADMIN — SODIUM CHLORIDE 125 ML/HR: 0.9 INJECTION, SOLUTION INTRAVENOUS at 18:40

## 2023-02-24 RX ADMIN — IOHEXOL 100 ML: 350 INJECTION, SOLUTION INTRAVENOUS at 19:04

## 2023-02-24 NOTE — ED PROVIDER NOTES
History  Chief Complaint   Patient presents with   • Possible UTI     Patient arrives to the ed with complain of worsening symptoms of uti, states she has been taking antibiotics prescribed by her pcp for the past 5 days, today is her last day and she feels worse  Can not sleep, urinating a lot, body aches  " I feel delirious from not being able to sleep"     This is a 63years old came for having multiple symptoms  Patient was treated for UTI with nitrofurantoin and today was his last day  Patient does not feel well she feels dizzy and having SOB  Patient has history of mechanical aortic valve replacement and she is on Coumadin  Patient denies any chest pain, cough  Patient denies any vomiting  Patient stated that she has diarrhea for almost 2 years since she received the COVID vaccination and it did not go away  Patient was seen by her PMD who told her that she has UTI  Patient has extensive medical history and she is morbidly obese  Patient stated that she cannot sleep for 2 days  Patient has pulse ox 95% on room air  The vital signs came back stable  Patient also having lower abdominal cramps, when she has history of hysterectomy  Patient did not point out hip popping but she stated that she does not feel well  Patient also having history of fibromyalgia  Prior to Admission Medications   Prescriptions Last Dose Informant Patient Reported? Taking?    Alcohol Swabs (Pharmacist Choice Alcohol) PADS   No Yes   Sig: Apply topically 3 (three) times a day   BD Pen Needle Mary 2nd Gen 32G X 4 MM MISC   Yes Yes   Blood Glucose Monitoring Suppl (OneTouch Verio IQ System) w/Device KIT   No Yes   Sig: Use to check blood sugar daily   DULoxetine (CYMBALTA) 30 mg delayed release capsule   No Yes   Sig: TAKE 1 CAPSULE (30 MG TOTAL) BY MOUTH DAILY   DULoxetine (CYMBALTA) 60 mg delayed release capsule   No Yes   Sig: TAKE 1 CAPSULE (60 MG TOTAL) BY MOUTH DAILY PT TO TAKE A 60 MG CYMBALTA AND A 30 MG CYMBALTA TO MAKE 90 MG   Dulaglutide (Trulicity) 1 5 KP/0 3CC SOPN   No Yes   Sig: Inject 0 5 mL (1 5 mg total) under the skin once a week   Pharmacist Choice Lancets MISC   No Yes   Sig: USE TO TEST GLUCOSE UP TO 3 TIMES A DAY - ONETOUCH VERIO LANCETS   ProAir RespiClick 374 (90 Base) MCG/ACT AEPB   Yes No   Sig: INHALE 1 PUFF EVERY 4 (FOUR) HOURS AS NEEDED (WHEEZING OR SHORTNESS OF BREATH)     Vitamin D, Ergocalciferol, 46593 units CAPS   No No   Sig: Take 1 capsule weekly for 12 weeks   baclofen 10 mg tablet   No Yes   Sig: TAKE 1 TABLET (10 MG TOTAL) BY MOUTH 3 (THREE) TIMES A DAY AS NEEDED FOR MUSCLE SPASMS   cholestyramine (QUESTRAN) 4 g packet   No Yes   Sig: TAKE 1 PACKET (4 G TOTAL) BY MOUTH 2 (TWO) TIMES A DAY WITH MEALS DO NOT TAKE WITHIN 2 HOURS OF OTHER ORAL MEDICATIONS   clonazePAM (KlonoPIN) 1 mg tablet   Yes No   Sig: Take 1 mg by mouth daily as needed   Patient not taking: Reported on 1/5/2023   glucose blood (OneTouch Verio) test strip   No Yes   Sig: Use 1 each 3 (three) times a day Use as instructed   mirtazapine (REMERON) 45 MG tablet Not Taking  Yes No   Sig: Take 45 mg by mouth daily at bedtime     Patient not taking: Reported on 2/24/2023   nitrofurantoin (MACROBID) 100 mg capsule   No Yes   Sig: Take 1 capsule (100 mg total) by mouth 2 (two) times a day for 5 days   nystatin powder   No No   Sig: Apply topically 2 (two) times a day   predniSONE 20 mg tablet Not Taking  No No   Sig: Take 2 tablets daily for 2 days, 1 tablet daily for 4 days, and then 1/2 tablet daily for 4 days-sugars will likely go up while on this   Patient not taking: Reported on 2/24/2023   warfarin (Jantoven) 3 mg tablet   No No   Sig: TAKE 2 TO 2 1/2 TABS BY MOUTH DAILY OR AS DIRECTED BY PHYSICIAN   Patient taking differently: TAKE 2 TO 2 1/2 TABS BY MOUTH DAILY OR AS DIRECTED BY PHYSICIAN  2 5 tablets on Tu/Th      Facility-Administered Medications: None       Past Medical History:   Diagnosis Date   • Anemia    • Anxiety    • Aortic valve disorder    • Back pain    • Chronic pain syndrome    • Cirrhosis (HCC)    • Constipation    • Degenerative arthritis of thoracic spine    • Degenerative joint disease involving multiple joints    • Depression    • DVT (deep venous thrombosis) (HCC)     Bilateral   • Edema    • Endometrial adenocarcinoma (HCC)    • Fibromyalgia    • Ganglion of right wrist    • GERD (gastroesophageal reflux disease)    • Heart murmur    • Hematoma    • History of mechanical aortic valve replacement    • Hypothyroidism (acquired)    • Laboratory confirmed diagnosis of COVID-19 2021   • Low blood pressure    • Malignant neoplasm of corpus uteri, except isthmus (HCC)    • Mixed hyperlipidemia    • Morbid obesity (HCC)    • Obstructive sleep apnea syndrome    • Pulmonary embolism (HCC)    • Tobacco dependence syndrome    • Transient cerebral ischemia    • Urinary incontinence    • Viral hepatitis C    • Vitamin D deficiency        Past Surgical History:   Procedure Laterality Date   • ANKLE SURGERY     • CARDIAC SURGERY  2015    VALVE REPLACEMENT   •  SECTION      X3   • CHOLECYSTECTOMY     • COLONOSCOPY  2019   • COLONOSCOPY     • HYSTERECTOMY  2011    TOTAL       Family History   Problem Relation Age of Onset   • Coronary artery disease Mother    • Coronary artery disease Father    • Cirrhosis Father      I have reviewed and agree with the history as documented      E-Cigarette/Vaping   • E-Cigarette Use Never User      E-Cigarette/Vaping Substances   • Nicotine No    • THC No    • CBD No    • Flavoring No    • Other No    • Unknown No      Social History     Tobacco Use   • Smoking status: Some Days     Packs/day: 0 50     Years: 40 00     Pack years: 20 00     Types: Cigarettes   • Smokeless tobacco: Never   Vaping Use   • Vaping Use: Never used   Substance Use Topics   • Alcohol use: Yes     Comment: once in awhile   • Drug use: Yes     Types: Marijuana, LSD       Review of Systems Constitutional: Positive for fatigue  Negative for fever  HENT: Negative for congestion, ear pain, facial swelling, rhinorrhea, sinus pressure, sore throat and tinnitus  Respiratory: Negative for cough, shortness of breath and wheezing  Cardiovascular: Negative for chest pain and palpitations  Gastrointestinal: Negative for abdominal pain, diarrhea, nausea and vomiting  Endocrine: Negative for polydipsia, polyphagia and polyuria  Genitourinary: Negative for difficulty urinating, dysuria, flank pain and frequency  Musculoskeletal: Negative for back pain, myalgias, neck pain and neck stiffness  Skin: Negative for color change, pallor and rash  Neurological: Positive for dizziness  Negative for tremors, seizures, syncope, speech difficulty, weakness, light-headedness, numbness and headaches  Hematological: Negative for adenopathy  Does not bruise/bleed easily  Psychiatric/Behavioral: Negative for agitation and behavioral problems  Physical Exam  Physical Exam  Vitals and nursing note reviewed  Constitutional:       General: She is not in acute distress  Appearance: She is well-developed  She is not ill-appearing, toxic-appearing or diaphoretic  HENT:      Head: Normocephalic and atraumatic  Right Ear: Ear canal normal       Left Ear: Ear canal normal       Nose: Nose normal  No congestion or rhinorrhea  Mouth/Throat:      Pharynx: No oropharyngeal exudate or posterior oropharyngeal erythema  Eyes:      Extraocular Movements: Extraocular movements intact  Pupils: Pupils are equal, round, and reactive to light  Neck:      Vascular: No carotid bruit  Cardiovascular:      Rate and Rhythm: Normal rate and regular rhythm  Heart sounds: Normal heart sounds  No murmur heard  No friction rub  No gallop  Pulmonary:      Effort: Pulmonary effort is normal  No respiratory distress  Breath sounds: Normal breath sounds  No stridor   No wheezing, rhonchi or rales    Chest:      Chest wall: No tenderness  Abdominal:      General: Bowel sounds are normal  There is no distension  Palpations: Abdomen is soft  Tenderness: There is no abdominal tenderness  There is no right CVA tenderness, left CVA tenderness or guarding  Musculoskeletal:         General: No swelling, tenderness, deformity or signs of injury  Normal range of motion  Cervical back: Normal range of motion and neck supple  No rigidity or tenderness  Right lower leg: No edema  Left lower leg: No edema  Lymphadenopathy:      Cervical: No cervical adenopathy  Skin:     General: Skin is warm and dry  Capillary Refill: Capillary refill takes less than 2 seconds  Coloration: Skin is not jaundiced or pale  Findings: No bruising, erythema, lesion or rash  Neurological:      General: No focal deficit present  Mental Status: She is alert and oriented to person, place, and time     Psychiatric:         Behavior: Behavior normal          Vital Signs  ED Triage Vitals [02/24/23 1726]   Temperature Pulse Respirations Blood Pressure SpO2   97 7 °F (36 5 °C) 76 19 147/79 95 %      Temp Source Heart Rate Source Patient Position - Orthostatic VS BP Location FiO2 (%)   Oral Monitor Sitting Left arm --      Pain Score       6           Vitals:    02/24/23 1726   BP: 147/79   Pulse: 76   Patient Position - Orthostatic VS: Sitting         Visual Acuity      ED Medications  Medications   sodium chloride 0 9 % infusion (0 mL/hr Intravenous Stopped 2/24/23 1955)   iohexol (OMNIPAQUE) 350 MG/ML injection (SINGLE-DOSE) 100 mL (100 mL Intravenous Given 2/24/23 1904)       Diagnostic Studies  Results Reviewed     Procedure Component Value Units Date/Time    High Sensitivity Troponin I Random [[id number]]  (Abnormal) Collected: 02/24/23 1831    Lab Status: Final result Specimen: Blood from Arm, Left Updated: 02/24/23 1858     HS TnI random 6 ng/L     Comprehensive metabolic panel [[id number]]  (Abnormal) Collected: 02/24/23 1831    Lab Status: Final result Specimen: Blood from Arm, Left Updated: 02/24/23 1850     Sodium 138 mmol/L      Potassium 3 7 mmol/L      Chloride 104 mmol/L      CO2 26 mmol/L      ANION GAP 8 mmol/L      BUN 16 mg/dL      Creatinine 0 49 mg/dL      Glucose 238 mg/dL      Calcium 8 7 mg/dL      AST 35 U/L      ALT 20 U/L      Alkaline Phosphatase 91 U/L      Total Protein 7 2 g/dL      Albumin 3 7 g/dL      Total Bilirubin 0 54 mg/dL      eGFR 106 ml/min/1 73sq m     Narrative:      [facility] guidelines for Chronic Kidney Disease (CKD):   •  Stage 1 with normal or high GFR (GFR > 90 mL/min/1 73 square meters)  •  Stage 2 Mild CKD (GFR = 60-89 mL/min/1 73 square meters)  •  Stage 3A Moderate CKD (GFR = 45-59 mL/min/1 73 square meters)  •  Stage 3B Moderate CKD (GFR = 30-44 mL/min/1 73 square meters)  •  Stage 4 Severe CKD (GFR = 15-29 mL/min/1 73 square meters)  •  Stage 5 End Stage CKD (GFR <15 mL/min/1 73 square meters)  Note: GFR calculation is accurate only with a steady state creatinine    FLU/RSV/COVID - if FLU/RSV clinically relevant [[id number]]  (Normal) Collected: 02/24/23 1754    Lab Status: Final result Specimen: Nares from Nose Updated: 02/24/23 1840     SARS-CoV-2 Negative     INFLUENZA A PCR Negative     INFLUENZA B PCR Negative     RSV PCR Negative    Narrative:      FOR PEDIATRIC PATIENTS - copy/paste COVID Guidelines URL to browser: [email or web address] [email or web address]  ashx    SARS-CoV-2 assay is a Nucleic Acid Amplification assay intended for the  qualitative detection of nucleic acid from SARS-CoV-2 in nasopharyngeal  swabs  Results are for the presumptive identification of SARS-CoV-2 RNA  Positive results are indicative of infection with SARS-CoV-2, the virus  causing COVID-19, but do not rule out bacterial infection or co-infection  with other viruses   Laboratories within the [place] and its  territories are required to report all positive results to the appropriate  public health authorities  Negative results do not preclude SARS-CoV-2  infection and should not be used as the sole basis for treatment or other  patient management decisions  Negative results must be combined with  clinical observations, patient history, and epidemiological information  This test has not been FDA cleared or approved  This test has been authorized by FDA under an Emergency Use Authorization  (EUA)  This test is only authorized for the duration of time the  declaration that circumstances exist justifying the authorization of the  emergency use of an in vitro diagnostic tests for detection of SARS-CoV-2  virus and/or diagnosis of COVID-19 infection under section 564(b)(1) of  the Act, 21 U  S C  097TDA-0(H)(7), unless the authorization is terminated  or revoked sooner  The test has been validated but independent review by FDA  and CLIA is pending  Test performed using [employer] GeneXpert: This RT-PCR assay targets N2,  a region unique to SARS-CoV-2  A conserved region in the E-gene was chosen  for pan-Sarbecovirus detection which includes SARS-CoV-2  According to CMS-2020-01-R, this platform meets the definition of high-throughput technology  D-dimer, quantitative [[id number]]  (Abnormal) Collected: 02/24/23 1754    Lab Status: Final result Specimen: Blood from Arm, Right Updated: 02/24/23 1815     D-Dimer, Quant 0 50 ug/ml FEU     Narrative: In the evaluation for possible pulmonary embolism, in the appropriate (Well's Score of 4 or less) patient, the age adjusted d-dimer cutoff for this patient can be calculated as:    Age x 0 01 (in ug/mL) for Age-adjusted D-dimer exclusion threshold for a patient over 50 years      UA w Reflex to Microscopic w Reflex to Culture [[id number]]  (Abnormal) Collected: 02/24/23 1810    Lab Status: Final result Specimen: Urine, Clean Catch Updated: 02/24/23 1814     Color, UA [place]     Clarity, UA Clear     Specific Gravity, UA >=1 030     pH, UA 6 0     Leukocytes, UA Negative     Nitrite, UA Negative     Protein, UA Negative mg/dl      Glucose, UA 3+ mg/dl      Ketones, UA Trace mg/dl      Urobilinogen, UA 1 0 E U /dl      Bilirubin, UA Negative     Occult Blood, UA Negative    Protime-INR [[id number]]  (Abnormal) Collected: 02/24/23 1754    Lab Status: Final result Specimen: Blood from Arm, Right Updated: 02/24/23 1814     Protime 23 2 seconds      INR 2 01    CBC and differential [[id number]]  (Abnormal) Collected: 02/24/23 1754    Lab Status: Final result Specimen: Blood from Arm, Right Updated: 02/24/23 1801     WBC 3 11 Thousand/uL      RBC 4 26 Million/uL      Hemoglobin 13 6 g/dL      Hematocrit 40 9 %      MCV 96 fL      MCH 31 9 pg      MCHC 33 3 g/dL      RDW 13 4 %      MPV 11 5 fL      Platelets 608 Thousands/uL      nRBC 0 /100 WBCs      Neutrophils Relative 62 %      Immat GRANS % 0 %      Lymphocytes Relative 24 %      Monocytes Relative 9 %      Eosinophils Relative 4 %      Basophils Relative 1 %      Neutrophils Absolute 1 92 Thousands/µL      Immature Grans Absolute 0 01 Thousand/uL      Lymphocytes Absolute 0 74 Thousands/µL      Monocytes Absolute 0 29 Thousand/µL      Eosinophils Absolute 0 13 Thousand/µL      Basophils Absolute 0 02 Thousands/µL                  CTA ED chest PE Study   Final Result by Maxi Vargas MD (02/24 1933)      No definite acute pulmonary emboli but interpretation is compromised by suboptimal opacification of the pulmonary arteries,  respiratory motion and large body habitus  No acute pulmonary disease               Workstation performed: [id number]         XR chest portable   ED Interpretation by Sherita Cruz MD (02/24 1829)   Cxr; no acute cardiopulmonary findings                 Procedures  ECG 12 Lead Documentation Only    Date/Time: 2/24/2023 5:58 PM  Performed by: Sherita Cruz MD  Authorized by: Mekhi Stinson Bijal Butt MD     Indications / Diagnosis:  Sob  ECG reviewed by me, the ED Provider: yes    Patient location:  ED and bedside  Rate:     ECG rate:  74    ECG rate assessment: normal    Rhythm:     Rhythm: sinus rhythm    Ectopy:     Ectopy: none    QRS:     QRS axis:  Normal    QRS intervals:  Normal  Conduction:     Conduction: normal    ST segments:     ST segments:  Normal  T waves:     T waves: normal    Comments:      Prolonged QT interval             ED Course                                             Medical Decision Making  This is a 63years old came for having multiple symptoms  Patient stated she feels dizzy she does not feel well and she was treated for UTI with nitrofurantoin and today was her last day and she is wondering whether nitrofurantoin causing that  Patient also complained that she cannot sleep although she use melatonin  Patient is morbidly obese with history of 346 pounds  While patient at the ER started to have some SOB although the chest exam is normal   Patient has pulse ox 95% on the room air  We did CXR came back negative no cardiopulmonary findings  CTA chest no PE  Patient also on Coumadin for having mechanical aortic valve  Patient also tested for COVID/flu/RSV all came back negative  Patient has no nausea no vomiting  Patient is diabetic and she stated that she is compliant with her medication  The physical exam on the patient does not reveal any positive findings  Labs came back acceptable with   EKG came back normal   The case discussed with the patient and told him about the lab and imaging findings and at this point regular discharge home and follow-up with her PMD   Patient ported that the UTI is resolved with  Amount and/or Complexity of Data Reviewed  Labs: ordered  Details:  d-dIMER 0 5  Radiology: ordered and independent interpretation performed       Details: cxr no cardiopulmonary findings  ECG/medicine tests: ordered and independent interpretation performed  Details: EKG; NSR, No ischemic changes  Risk  Prescription drug management  Disposition  Final diagnoses: Body aches   Dizziness     Time reflects when diagnosis was documented in both MDM as applicable and the Disposition within this note     Time User Action Codes Description Comment    2/24/2023  7:46 PM Jose Lowe Add [R52] Body aches     2/24/2023  7:47 PM Jose Lowe Add [R42] Dizziness       ED Disposition     ED Disposition   Discharge    Condition   Stable    Date/Time   Fri Feb 24, 2023  7:49 PM    Comment   Flores Skyler discharge to home/self care  Follow-up Information     Follow up With Specialties Details Why Contact Info    Deana Good MD Internal Medicine, Pediatrics In 3 days  [address]  [id number] [place] [address]  [phone]            Discharge Medication List as of 2/24/2023  7:49 PM      CONTINUE these medications which have NOT CHANGED    Details   Alcohol Swabs (Pharmacist Choice Alcohol) PADS Apply topically 3 (three) times a day, Starting Mon 9/19/2022, Normal      baclofen 10 mg tablet TAKE 1 TABLET (10 MG TOTAL) BY MOUTH 3 (THREE) TIMES A DAY AS NEEDED FOR MUSCLE SPASMS, Starting Fri 10/28/2022, Normal      BD Pen Needle Mary 2nd Gen 32G X 4 MM MISC Historical Med      Blood Glucose Monitoring Suppl (OneTouch Verio IQ System) w/Device KIT Use to check blood sugar daily, Normal      cholestyramine (QUESTRAN) 4 g packet TAKE 1 PACKET (4 G TOTAL) BY MOUTH 2 (TWO) TIMES A DAY WITH MEALS DO NOT TAKE WITHIN 2 HOURS OF OTHER ORAL MEDICATIONS, Starting Thu 4/28/2022, Normal      Dulaglutide (Trulicity) 1 5 QF/0 8WO SOPN Inject 0 5 mL (1 5 mg total) under the skin once a week, Starting Thu 1/5/2023, Normal      !! DULoxetine (CYMBALTA) 30 mg delayed release capsule TAKE 1 CAPSULE (30 MG TOTAL) BY MOUTH DAILY, Starting Mon 1/16/2023, Normal      !!  DULoxetine (CYMBALTA) 60 mg delayed release capsule TAKE 1 CAPSULE (60 MG TOTAL) BY MOUTH DAILY PT TO TAKE A 60 MG CYMBALTA AND A 30 MG CYMBALTA TO MAKE 90 MG, Starting Mon 1/16/2023, Normal      glucose blood (OneTouch Verio) test strip Use 1 each 3 (three) times a day Use as instructed, Starting Mon 3/7/2022, Normal      nitrofurantoin (MACROBID) 100 mg capsule Take 1 capsule (100 mg total) by mouth 2 (two) times a day for 5 days, Starting Sun 2/19/2023, Until Fri 2/24/2023, Normal      Pharmacist Choice Lancets MISC USE TO TEST GLUCOSE UP TO 3 TIMES A DAY - ONETOUCH VERIO LANCETS, Normal      clonazePAM (KlonoPIN) 1 mg tablet Take 1 mg by mouth daily as needed, Starting Fri 9/3/2021, Historical Med      mirtazapine (REMERON) 45 MG tablet Take 45 mg by mouth daily at bedtime  , Starting Wed 7/28/2021, Historical Med      nystatin powder Apply topically 2 (two) times a day, Starting Mon 9/19/2022, Normal      predniSONE 20 mg tablet Take 2 tablets daily for 2 days, 1 tablet daily for 4 days, and then 1/2 tablet daily for 4 days-sugars will likely go up while on this, Normal      ProAir RespiClick 529 (90 Base) MCG/ACT AEPB INHALE 1 PUFF EVERY 4 (FOUR) HOURS AS NEEDED (WHEEZING OR SHORTNESS OF BREATH)  , Historical Med      Vitamin D, Ergocalciferol, 06244 units CAPS Take 1 capsule weekly for 12 weeks, Normal      warfarin (Jantoven) 3 mg tablet TAKE 2 TO 2 1/2 TABS BY MOUTH DAILY OR AS DIRECTED BY PHYSICIAN, Normal       !! - Potential duplicate medications found  Please discuss with provider  No discharge procedures on file      PDMP Review     None          ED Provider  Electronically Signed by           Gideon Cruz MD  02/24/23 2027

## 2023-02-25 NOTE — DISCHARGE INSTRUCTIONS
Follow up with your PMD  TAKE YOUR meds as instructed   INR 2 1 is low need to check with your PMD to increase coumadin level  Labs Reviewed   CBC AND DIFFERENTIAL - Abnormal       Result Value Ref Range Status    WBC 3 11 (*) 4 31 - 10 16 Thousand/uL Final    RBC 4 26  3 81 - 5 12 Million/uL Final    Hemoglobin 13 6  11 5 - 15 4 g/dL Final    Hematocrit 40 9  34 8 - 46 1 % Final    MCV 96  82 - 98 fL Final    MCH 31 9  26 8 - 34 3 pg Final    MCHC 33 3  31 4 - 37 4 g/dL Final    RDW 13 4  11 6 - 15 1 % Final    MPV 11 5  8 9 - 12 7 fL Final    Platelets 134  301 - 390 Thousands/uL Final    nRBC 0  /100 WBCs Final    Neutrophils Relative 62  43 - 75 % Final    Immat GRANS % 0  0 - 2 % Final    Lymphocytes Relative 24  14 - 44 % Final    Monocytes Relative 9  4 - 12 % Final    Eosinophils Relative 4  0 - 6 % Final    Basophils Relative 1  0 - 1 % Final    Neutrophils Absolute 1 92  1 85 - 7 62 Thousands/µL Final    Immature Grans Absolute 0 01  0 00 - 0 20 Thousand/uL Final    Lymphocytes Absolute 0 74  0 60 - 4 47 Thousands/µL Final    Monocytes Absolute 0 29  0 17 - 1 22 Thousand/µL Final    Eosinophils Absolute 0 13  0 00 - 0 61 Thousand/µL Final    Basophils Absolute 0 02  0 00 - 0 10 Thousands/µL Final   COMPREHENSIVE METABOLIC PANEL - Abnormal    Sodium 138  135 - 147 mmol/L Final    Potassium 3 7  3 5 - 5 3 mmol/L Final    Chloride 104  96 - 108 mmol/L Final    CO2 26  21 - 32 mmol/L Final    ANION GAP 8  4 - 13 mmol/L Final    BUN 16  5 - 25 mg/dL Final    Creatinine 0 49 (*) 0 60 - 1 30 mg/dL Final    Comment: Standardized to IDMS reference method    Glucose 238 (*) 65 - 140 mg/dL Final    Comment: If the patient is fasting, the ADA then defines impaired fasting glucose as > 100 mg/dL and diabetes as > or equal to 123 mg/dL  Specimen collection should occur prior to Sulfasalazine administration due to the potential for falsely depressed results   Specimen collection should occur prior to Sulfapyridine administration due to the potential for falsely elevated results  Calcium 8 7  8 4 - 10 2 mg/dL Final    AST 35  13 - 39 U/L Final    Comment: Specimen collection should occur prior to Sulfasalazine administration due to the potential for falsely depressed results  ALT 20  7 - 52 U/L Final    Comment: Specimen collection should occur prior to Sulfasalazine administration due to the potential for falsely depressed results  Alkaline Phosphatase 91  34 - 104 U/L Final    Total Protein 7 2  6 4 - 8 4 g/dL Final    Albumin 3 7  3 5 - 5 0 g/dL Final    Total Bilirubin 0 54  0 20 - 1 00 mg/dL Final    eGFR 106  ml/min/1 73sq m Final    Narrative:     [place] guidelines for Chronic Kidney Disease (CKD):     Stage 1 with normal or high GFR (GFR > 90 mL/min/1 73 square meters)    Stage 2 Mild CKD (GFR = 60-89 mL/min/1 73 square meters)    Stage 3A Moderate CKD (GFR = 45-59 mL/min/1 73 square meters)    Stage 3B Moderate CKD (GFR = 30-44 mL/min/1 73 square meters)    Stage 4 Severe CKD (GFR = 15-29 mL/min/1 73 square meters)    Stage 5 End Stage CKD (GFR <15 mL/min/1 73 square meters)  Note: GFR calculation is accurate only with a steady state creatinine   HIGH SENSITIVITY TROPONIN I RANDOM - Abnormal    HS TnI random 6 (*) 8 - 18 ng/L Final    Comment:                                              Initial (time 0) result  If >=50 ng/L, Myocardial injury suggested ;  Type of myocardial injury and treatment strategy  to be determined  If 5-49 ng/L, a delta result at 2 hours and or 4 hours will be needed to further evaluate  If <4 ng/L, and chest pain has been >3 hours since onset, patient may qualify for discharge based on the HEART score in the ED  If <5 ng/L and <3hours since onset of chest pain, a delta result at 2 hours will be needed to further evaluate  HS Troponin 99th Percentile URL of a Health Population=12 ng/L with a 95% Confidence Interval of 8-18 ng/L      Second Troponin (time 2 hours)  If calculated delta >= 20 ng/L,  Myocardial injury suggested ; Type of myocardial injury and treatment strategy to be determined  If 5-49 ng/L and the calculated delta is 5-19 ng/L, consult medical service for evaluation  Continue evaluation for ischemia on ecg and other possible etiology and repeat hs troponin at 4 hours  If delta is <5 ng/L at 2 hours, consider discharge based on risk stratification via the HEART score (if in ED), or LAYO risk score in IP/Observation  HS Troponin 99th Percentile URL of a Health Population=12 ng/L with a 95% Confidence Interval of 8-18 ng/L    UA W REFLEX TO MICROSCOPIC WITH REFLEX TO CULTURE - Abnormal    Color, UA Orange (*) Yellow Final    Clarity, UA Clear  Clear Final    Specific Gravity, UA >=1 030  1 001 - 1 030 Final    pH, UA 6 0  5 0, 5 5, 6 0, 6 5, 7 0, 7 5, 8 0 Final    Leukocytes, UA Negative  Negative Final    Nitrite, UA Negative  Negative Final    Protein, UA Negative  Negative, Interference- unable to analyze mg/dl Final    Glucose, UA 3+ (*) Negative mg/dl Final    Ketones, UA Trace (*) Negative mg/dl Final    Urobilinogen, UA 1 0  0 2, 1 0 E U /dl E U /dl Final    Bilirubin, UA Negative  Negative Final    Occult Blood, UA Negative  Negative Final   PROTIME-INR - Abnormal    Protime 23 2 (*) 11 6 - 14 5 seconds Final    INR 2 01 (*) 0 84 - 1 19 Final   D-DIMER, QUANTITATIVE - Abnormal    D-Dimer, Quant 0 50 (*) <0 50 ug/ml FEU Final    Comment: Reference and upper limits to exclude DVT and PE are the same  Do not use to exclude if clinical symptoms are present  Pregnant women:  1st trimester:  <0 22 - 1 06 ug/ml FEU  2nd trimester:  <0 22 - 1 88 ug/ml FEU  3rd trimester:   0 24 - 3 28 ug/ml FEU    Note: Normal ranges may not apply to patients who are transgender, non-binary, or whose legal sex, sex at birth, and gender identity differ  Narrative:      In the evaluation for possible pulmonary embolism, in the appropriate (Well's Score of 4 or less) patient, the age adjusted d-dimer cutoff for this patient can be calculated as:    Age x 0 01 (in ug/mL) for Age-adjusted D-dimer exclusion threshold for a patient over 50 years  COVID19, INFLUENZA A/B, RSV PCR, [facility] - Normal    SARS-CoV-2 Negative  Negative Final    INFLUENZA A PCR Negative  Negative Final    INFLUENZA B PCR Negative  Negative Final    RSV PCR Negative  Negative Final    Narrative:     FOR PEDIATRIC PATIENTS - copy/paste COVID Guidelines URL to browser: https://[employer]/  [employer]    SARS-CoV-2 assay is a Nucleic Acid Amplification assay intended for the  qualitative detection of nucleic acid from SARS-CoV-2 in nasopharyngeal  swabs  Results are for the presumptive identification of SARS-CoV-2 RNA  Positive results are indicative of infection with SARS-CoV-2, the virus  causing COVID-19, but do not rule out bacterial infection or co-infection  with other viruses  Laboratories within the [place] and its  territories are required to report all positive results to the appropriate  public health authorities  Negative results do not preclude SARS-CoV-2  infection and should not be used as the sole basis for treatment or other  patient management decisions  Negative results must be combined with  clinical observations, patient history, and epidemiological information  This test has not been FDA cleared or approved  This test has been authorized by FDA under an Emergency Use Authorization  (EUA)  This test is only authorized for the duration of time the  declaration that circumstances exist justifying the authorization of the  emergency use of an in vitro diagnostic tests for detection of SARS-CoV-2  virus and/or diagnosis of COVID-19 infection under section 564(b)(1) of  the Act, 21 U  S C  379TKA-1(R)(8), unless the authorization is terminated  or revoked sooner   The test has been validated but independent review by FDA  and CLIA is pending  Test performed using [employer]: This RT-PCR assay targets N2,  a region unique to SARS-CoV-2  A conserved region in the E-gene was chosen  for pan-Sarbecovirus detection which includes SARS-CoV-2  According to CMS-2020-01-R, this platform meets the definition of high-throughput technology  CTA ED chest PE Study   Final Result      No definite acute pulmonary emboli but interpretation is compromised by suboptimal opacification of the pulmonary arteries,  respiratory motion and large body habitus  No acute pulmonary disease               Workstation performed: [id number]         XR chest portable   ED Interpretation   Cxr; no acute cardiopulmonary findings

## 2023-02-26 LAB
ATRIAL RATE: 74 BPM
P AXIS: 51 DEGREES
PR INTERVAL: 208 MS
QRS AXIS: 17 DEGREES
QRSD INTERVAL: 98 MS
QT INTERVAL: 440 MS
QTC INTERVAL: 488 MS
T WAVE AXIS: 89 DEGREES
VENTRICULAR RATE: 74 BPM

## 2023-03-08 RX ORDER — OXCARBAZEPINE 300 MG/1
TABLET, FILM COATED ORAL
COMMUNITY
Start: 2023-01-16 | End: 2023-03-13

## 2023-03-08 RX ORDER — ESCITALOPRAM OXALATE 20 MG/1
TABLET ORAL
COMMUNITY
Start: 2023-01-16 | End: 2023-03-13

## 2023-03-08 RX ORDER — CLONAZEPAM 0.5 MG/1
TABLET ORAL
COMMUNITY
Start: 2023-01-16 | End: 2023-03-13

## 2023-03-13 ENCOUNTER — TELEPHONE (OUTPATIENT)
Dept: FAMILY MEDICINE CLINIC | Facility: CLINIC | Age: 63
End: 2023-03-13

## 2023-03-13 ENCOUNTER — OFFICE VISIT (OUTPATIENT)
Dept: FAMILY MEDICINE CLINIC | Facility: CLINIC | Age: 63
End: 2023-03-13

## 2023-03-13 VITALS
HEIGHT: 66 IN | TEMPERATURE: 98.1 F | SYSTOLIC BLOOD PRESSURE: 110 MMHG | DIASTOLIC BLOOD PRESSURE: 70 MMHG | WEIGHT: 293 LBS | HEART RATE: 102 BPM | BODY MASS INDEX: 47.09 KG/M2 | OXYGEN SATURATION: 98 %

## 2023-03-13 DIAGNOSIS — R35.0 URINARY FREQUENCY: ICD-10-CM

## 2023-03-13 DIAGNOSIS — G47.00 INSOMNIA, UNSPECIFIED TYPE: ICD-10-CM

## 2023-03-13 DIAGNOSIS — E66.01 MORBID OBESITY (HCC): Primary | ICD-10-CM

## 2023-03-13 LAB
BACTERIA UR QL AUTO: ABNORMAL /HPF
BILIRUB UR QL STRIP: NEGATIVE
CAOX CRY URNS QL MICRO: ABNORMAL /HPF
CLARITY UR: ABNORMAL
COLOR UR: YELLOW
GLUCOSE UR STRIP-MCNC: ABNORMAL MG/DL
HGB UR QL STRIP.AUTO: NEGATIVE
KETONES UR STRIP-MCNC: NEGATIVE MG/DL
LEUKOCYTE ESTERASE UR QL STRIP: NEGATIVE
MUCOUS THREADS UR QL AUTO: ABNORMAL
NITRITE UR QL STRIP: NEGATIVE
NON-SQ EPI CELLS URNS QL MICRO: ABNORMAL /HPF
PH UR STRIP.AUTO: 5.5 [PH]
PROT UR STRIP-MCNC: ABNORMAL MG/DL
RBC #/AREA URNS AUTO: ABNORMAL /HPF
SL AMB  POCT GLUCOSE, UA: NEGATIVE
SL AMB LEUKOCYTE ESTERASE,UA: NEGATIVE
SL AMB POCT BILIRUBIN,UA: NEGATIVE
SL AMB POCT BLOOD,UA: NEGATIVE
SL AMB POCT CLARITY,UA: NORMAL
SL AMB POCT COLOR,UA: NORMAL
SL AMB POCT KETONES,UA: NEGATIVE
SL AMB POCT NITRITE,UA: NEGATIVE
SL AMB POCT PH,UA: 5
SL AMB POCT SPECIFIC GRAVITY,UA: 1.03
SL AMB POCT URINE PROTEIN: NEGATIVE
SL AMB POCT UROBILINOGEN: NEGATIVE
SP GR UR STRIP.AUTO: 1.03 (ref 1–1.03)
UROBILINOGEN UR STRIP-ACNC: <2 MG/DL
WBC #/AREA URNS AUTO: ABNORMAL /HPF

## 2023-03-13 RX ORDER — ZOLPIDEM TARTRATE 12.5 MG/1
12.5 TABLET, FILM COATED, EXTENDED RELEASE ORAL
Qty: 30 TABLET | Refills: 0 | Status: SHIPPED | OUTPATIENT
Start: 2023-03-13

## 2023-03-13 RX ORDER — SOLIFENACIN SUCCINATE 10 MG/1
10 TABLET, FILM COATED ORAL DAILY
Qty: 30 TABLET | Refills: 5 | Status: SHIPPED | OUTPATIENT
Start: 2023-03-13

## 2023-03-13 NOTE — PROGRESS NOTES
Assessment/Plan:Lauren here complaining of not being able to sleep-says she can't fall asleep-then says she's up all night urinating -did a urine dip here today and it's negative-will send it out for u/a and culture-will try vesicare or an OAB agent at night and will order some labs too, including serum osmolaltiy-may end up needing to see a Urologist- stressed the importance of sleep hygiene and schedule and how it's important for her to be on one to sleep-will try Ambien CR although I did tell her it may not be covered-a sleep study was also ordered-I'll have her come back in 2 months for her Annual Physical and to address these issues in follow up         Problem List Items Addressed This Visit        Other    Morbid obesity ([facility] [address] ) - Primary    Relevant Orders    Diagnostic Sleep Study   Other Visit Diagnoses     Urinary frequency        Relevant Medications    solifenacin (VESICARE) 10 MG tablet    Other Relevant Orders    Urinalysis with microscopic    Urine culture    POCT urine dip (Completed)    Comprehensive metabolic panel    CBC and differential    Osmolality-"If this is regarding a toxic alcohol, STOP  Test is not routinely indicated  Please consult medical  on call for further guidance "    Insomnia, unspecified type        Relevant Medications    zolpidem (AMBIEN CR) 12 5 MG CR tablet            Subjective:      Patient ID: Maria L Car is a 63 y o  female      Pt was supposed to be here for annual physical, but has too many other issues going on-she is telling me she can't sleep, can't fall asleep and is up all night urinating-she has a hx of morbid obesity, DM II (A1c in Jan was 6 6%), Hfpef, mechanical valve on coumadin, HL-says she ultimately falls asleep at 5 in the morning and sleeps all day, and then trys to go to bed at 830 pm-can't fall asleep-also concerned about her recent INR being low-has morbid obesity and is going to hopefully get a sleeve      The following portions of the patient's history were reviewed and updated as appropriate:   Past Medical History:  She has a past medical history of Anemia, Anxiety, Aortic valve disorder, Back pain, Chronic pain syndrome, [facility] ), Constipation, Degenerative arthritis of thoracic spine, Degenerative joint disease involving multiple joints, Depression, DVT (deep venous thrombosis) ([facility] ), Edema, Endometrial adenocarcinoma ([facility] ), Fibromyalgia, Ganglion of right wrist, GERD (gastroesophageal reflux disease), Heart murmur, Hematoma, History of mechanical aortic valve replacement, Hypothyroidism (acquired), Laboratory confirmed diagnosis of COVID-19 (2021), Low blood pressure, Malignant neoplasm of corpus uteri, except isthmus ([place] [address] ), Mixed hyperlipidemia, Morbid obesity ([place] [address] ), Obstructive sleep apnea syndrome, Pulmonary embolism ([place] [address] ), Tobacco dependence syndrome, Transient cerebral ischemia, Urinary incontinence, Viral hepatitis C, and Vitamin D deficiency  ,  _______________________________________________________________________  Medical Problems:  does not have any pertinent problems on file ,  _______________________________________________________________________  Past Surgical History:   has a past surgical history that includes Ankle surgery; Cardiac surgery (); Hysterectomy (2011);  section; Cholecystectomy; Colonoscopy (2019); and Colonoscopy  ,  _______________________________________________________________________  Family History:  family history includes Cirrhosis in her father; Coronary artery disease in her father and mother ,  _______________________________________________________________________  Social History:   reports that she has been smoking cigarettes  She has a 20 00 pack-year smoking history  She has never used smokeless tobacco  She reports current alcohol use  She reports current drug use   Drugs: Marijuana and LSD ,  _______________________________________________________________________  Allergies:  is allergic to bactrim [sulfamethoxazole-trimethoprim], penicillins, tramadol, and rocephin [ceftriaxone]     _______________________________________________________________________  Current Outpatient Medications   Medication Sig Dispense Refill   • Alcohol Swabs (Pharmacist Choice Alcohol) PADS Apply topically 3 (three) times a day 100 each 5   • baclofen 10 mg tablet TAKE 1 TABLET (10 MG TOTAL) BY MOUTH 3 (THREE) TIMES A DAY AS NEEDED FOR MUSCLE SPASMS 90 tablet 3   • BD Pen Needle Mary 2nd Gen 32G X 4 MM MISC      • Blood Glucose Monitoring Suppl (OneTouch Verio IQ System) w/Device KIT Use to check blood sugar daily 1 kit 1   • cholestyramine (QUESTRAN) 4 g packet TAKE 1 PACKET (4 G TOTAL) BY MOUTH 2 (TWO) TIMES A DAY WITH MEALS DO NOT TAKE WITHIN 2 HOURS OF OTHER ORAL MEDICATIONS 60 each 5   • Dulaglutide (Trulicity) 1 5 BE/9 4TZ SOPN Inject 0 5 mL (1 5 mg total) under the skin once a week 2 mL 5   • DULoxetine (CYMBALTA) 30 mg delayed release capsule TAKE 1 CAPSULE (30 MG TOTAL) BY MOUTH DAILY 30 capsule 5   • DULoxetine (CYMBALTA) 60 mg delayed release capsule TAKE 1 CAPSULE (60 MG TOTAL) BY MOUTH DAILY PT TO TAKE A 60 MG CYMBALTA AND A 30 MG CYMBALTA TO MAKE 90 MG 30 capsule 5   • glucose blood (OneTouch Verio) test strip Use 1 each 3 (three) times a day Use as instructed 300 each 5   • nystatin powder Apply topically 2 (two) times a day 60 g 5   • ProAir RespiClick 328 (90 Base) MCG/ACT AEPB INHALE 1 PUFF EVERY 4 (FOUR) HOURS AS NEEDED (WHEEZING OR SHORTNESS OF BREATH)       • solifenacin (VESICARE) 10 MG tablet Take 1 tablet (10 mg total) by mouth daily 30 tablet 5   • Vitamin D, Ergocalciferol, 00259 units CAPS Take 1 capsule weekly for 12 weeks 12 capsule 0   • warfarin (Jantoven) 3 mg tablet TAKE 2 TO 2 1/2 TABS BY MOUTH DAILY OR AS DIRECTED BY PHYSICIAN 75 tablet 5   • zolpidem (AMBIEN CR) 12 5 MG CR tablet Take 1 tablet (12 5 mg total) by mouth daily at bedtime as needed for sleep 30 tablet 0   • Pharmacist Choice Lancets MISC USE TO TEST GLUCOSE UP TO 3 TIMES A DAY - ONETOUCH VERIO LANCETS 300 each 5     No current facility-administered medications for this visit      _______________________________________________________________________  Review of Systems   HENT: Negative  Respiratory: Negative  Cardiovascular: Negative  Genitourinary: Positive for frequency  Neurological: Negative  Psychiatric/Behavioral: Positive for sleep disturbance  Objective:  Vitals:    03/13/23 0937   BP: 110/70   BP Location: Right arm   Patient Position: Sitting   Cuff Size: Large   Pulse: 102   Temp: 98 1 °F (36 7 °C)   TempSrc: Tympanic   SpO2: 98%   Weight: (!) 156 kg (343 lb)   Height: 5' 6" (1 676 m)     Body mass index is 55 36 kg/m²  Physical Exam  Constitutional:       Appearance: She is obese  HENT:      Head: Normocephalic and atraumatic  Right Ear: External ear normal       Left Ear: External ear normal       Nose: Nose normal       Mouth/Throat:      Mouth: Mucous membranes are moist    Eyes:      Extraocular Movements: Extraocular movements intact  Cardiovascular:      Rate and Rhythm: Normal rate and regular rhythm  Heart sounds: Normal heart sounds  Pulmonary:      Effort: Pulmonary effort is normal       Breath sounds: Normal breath sounds  Abdominal:      General: There is distension  Palpations: Abdomen is soft  Musculoskeletal:         General: Normal range of motion  Cervical back: Normal range of motion and neck supple  Skin:     General: Skin is warm  Neurological:      General: No focal deficit present  Mental Status: She is alert  Psychiatric:         Mood and Affect: Mood normal          Thought Content:  Thought content normal

## 2023-03-13 NOTE — TELEPHONE ENCOUNTER
Lauren called she said the medicine you said you didn't think was going to be covered isn't covered BUT she is going to pay for it out of pocket.

## 2023-03-14 LAB — BACTERIA UR CULT: NORMAL

## 2023-03-16 ENCOUNTER — TELEPHONE (OUTPATIENT)
Dept: FAMILY MEDICINE CLINIC | Facility: CLINIC | Age: 63
End: 2023-03-16

## 2023-03-16 NOTE — TELEPHONE ENCOUNTER
Getachew from [facility] called to find out what other meds Josefina Blas has tried and failed before being prescribed:    zolpidem (AMBIEN CR) 12 5 MG CR tablet Take 1 tablet (12 5 mg total) by mouth daily at bedtime as needed for sleep     Says she can take a verbal or you can respond to the fax that was sent over and fax it back to the number on the form    [employer] # [phone]

## 2023-03-20 ENCOUNTER — PATIENT MESSAGE (OUTPATIENT)
Dept: FAMILY MEDICINE CLINIC | Facility: CLINIC | Age: 63
End: 2023-03-20

## 2023-03-21 ENCOUNTER — TELEMEDICINE (OUTPATIENT)
Dept: FAMILY MEDICINE CLINIC | Facility: CLINIC | Age: 63
End: 2023-03-21

## 2023-03-21 VITALS — HEIGHT: 66 IN | WEIGHT: 293 LBS | BODY MASS INDEX: 47.09 KG/M2

## 2023-03-21 DIAGNOSIS — M79.602 LEFT ARM PAIN: ICD-10-CM

## 2023-03-21 DIAGNOSIS — M25.512 LEFT SHOULDER PAIN, UNSPECIFIED CHRONICITY: Primary | ICD-10-CM

## 2023-03-21 NOTE — PROGRESS NOTES
Virtual Brief Visit    Patient is located in the following state in which I hold an active license [place]      Assessment/Plan:I called Jez Guerrero because Sunday night she had an episode of feeling weak in her arms and legs and she fell and bumped her head in the bathroom-did not pass out-I guess she thought it may have been from the Ambien CR she started taking to help her with her terrible insomnia issue-now she doesn't think so-says she took the med last night and she went right to sleep and feels rested-is also taking vesicare to help with some OAB symptoms and that too seems to be working-she asked for an Xray of her left arm and shoulder and told her we'd do some labs, CBC, CMP, and CK levels-will contact her with results    Problem List Items Addressed This Visit    None  Visit Diagnoses     Left shoulder pain, unspecified chronicity    -  Primary    Relevant Orders    XR shoulder 2+ vw left    XR humerus left    Left arm pain        Relevant Orders    XR shoulder 2+ vw left    XR humerus left          Recent Visits  Date Type Provider Dept   03/16/23 Telephone Yaneli Owens MD [facility] [address] recent visits within past 7 days and meeting all other requirements  Today's Visits  Date Type Provider Dept   03/21/23 Richard Sanchez MD [facility] [address] today's visits and meeting all other requirements  Future Appointments  No visits were found meeting these conditions    Showing future appointments within next 150 days and meeting all other requirements         Visit Time    Visit Start Time: 5247  Visit Stop Time: 4898  Total Visit Duration: 15 minutes

## 2023-03-23 ENCOUNTER — ANTICOAG VISIT (OUTPATIENT)
Dept: FAMILY MEDICINE CLINIC | Facility: CLINIC | Age: 63
End: 2023-03-23

## 2023-03-23 ENCOUNTER — APPOINTMENT (OUTPATIENT)
Dept: LAB | Facility: HOSPITAL | Age: 63
End: 2023-03-23
Attending: INTERNAL MEDICINE

## 2023-03-23 DIAGNOSIS — Z95.2 HISTORY OF MECHANICAL AORTIC VALVE REPLACEMENT: ICD-10-CM

## 2023-03-23 DIAGNOSIS — Z95.2 STATUS POST MECHANICAL AORTIC VALVE REPLACEMENT: Primary | ICD-10-CM

## 2023-03-23 DIAGNOSIS — R35.0 URINARY FREQUENCY: ICD-10-CM

## 2023-03-23 LAB
ALBUMIN SERPL BCP-MCNC: 3.6 G/DL (ref 3.5–5)
ALP SERPL-CCNC: 79 U/L (ref 34–104)
ALT SERPL W P-5'-P-CCNC: 23 U/L (ref 7–52)
ANION GAP SERPL CALCULATED.3IONS-SCNC: 9 MMOL/L (ref 4–13)
AST SERPL W P-5'-P-CCNC: 78 U/L (ref 13–39)
BASOPHILS # BLD AUTO: 0.02 THOUSANDS/ÂΜL (ref 0–0.1)
BASOPHILS NFR BLD AUTO: 1 % (ref 0–1)
BILIRUB SERPL-MCNC: 0.91 MG/DL (ref 0.2–1)
BUN SERPL-MCNC: 13 MG/DL (ref 5–25)
CALCIUM SERPL-MCNC: 9 MG/DL (ref 8.4–10.2)
CHLORIDE SERPL-SCNC: 104 MMOL/L (ref 96–108)
CO2 SERPL-SCNC: 27 MMOL/L (ref 21–32)
CREAT SERPL-MCNC: 0.51 MG/DL (ref 0.6–1.3)
EOSINOPHIL # BLD AUTO: 0.17 THOUSAND/ÂΜL (ref 0–0.61)
EOSINOPHIL NFR BLD AUTO: 5 % (ref 0–6)
ERYTHROCYTE [DISTWIDTH] IN BLOOD BY AUTOMATED COUNT: 13.2 % (ref 11.6–15.1)
GFR SERPL CREATININE-BSD FRML MDRD: 104 ML/MIN/1.73SQ M
GLUCOSE SERPL-MCNC: 134 MG/DL (ref 65–140)
HCT VFR BLD AUTO: 41.1 % (ref 34.8–46.1)
HGB BLD-MCNC: 13.7 G/DL (ref 11.5–15.4)
IMM GRANULOCYTES # BLD AUTO: 0.01 THOUSAND/UL (ref 0–0.2)
IMM GRANULOCYTES NFR BLD AUTO: 0 % (ref 0–2)
INR PPP: 2.73 (ref 0.84–1.19)
LYMPHOCYTES # BLD AUTO: 0.96 THOUSANDS/ÂΜL (ref 0.6–4.47)
LYMPHOCYTES NFR BLD AUTO: 26 % (ref 14–44)
MCH RBC QN AUTO: 31.7 PG (ref 26.8–34.3)
MCHC RBC AUTO-ENTMCNC: 33.3 G/DL (ref 31.4–37.4)
MCV RBC AUTO: 95 FL (ref 82–98)
MONOCYTES # BLD AUTO: 0.3 THOUSAND/ÂΜL (ref 0.17–1.22)
MONOCYTES NFR BLD AUTO: 8 % (ref 4–12)
NEUTROPHILS # BLD AUTO: 2.23 THOUSANDS/ÂΜL (ref 1.85–7.62)
NEUTS SEG NFR BLD AUTO: 60 % (ref 43–75)
NRBC BLD AUTO-RTO: 0 /100 WBCS
OSMOLALITY UR/SERPL-RTO: 301 MMOL/KG (ref 282–298)
PLATELET # BLD AUTO: 135 THOUSANDS/UL (ref 149–390)
PMV BLD AUTO: 9.7 FL (ref 8.9–12.7)
POTASSIUM SERPL-SCNC: 3.8 MMOL/L (ref 3.5–5.3)
PROT SERPL-MCNC: 7.1 G/DL (ref 6.4–8.4)
PROTHROMBIN TIME: 29.4 SECONDS (ref 11.6–14.5)
RBC # BLD AUTO: 4.32 MILLION/UL (ref 3.81–5.12)
SODIUM SERPL-SCNC: 140 MMOL/L (ref 135–147)
WBC # BLD AUTO: 3.69 THOUSAND/UL (ref 4.31–10.16)

## 2023-04-26 DIAGNOSIS — E11.9 TYPE 2 DIABETES MELLITUS WITHOUT COMPLICATION, WITHOUT LONG-TERM CURRENT USE OF INSULIN (HCC): ICD-10-CM

## 2023-04-26 RX ORDER — DULAGLUTIDE 1.5 MG/.5ML
1.5 INJECTION, SOLUTION SUBCUTANEOUS WEEKLY
Qty: 2 ML | Refills: 0 | Status: SHIPPED | OUTPATIENT
Start: 2023-04-26

## 2023-04-27 DIAGNOSIS — E55.9 VITAMIN D DEFICIENCY: Primary | ICD-10-CM

## 2023-05-04 ENCOUNTER — ANTICOAG VISIT (OUTPATIENT)
Dept: FAMILY MEDICINE CLINIC | Facility: CLINIC | Age: 63
End: 2023-05-04

## 2023-05-04 ENCOUNTER — OFFICE VISIT (OUTPATIENT)
Dept: FAMILY MEDICINE CLINIC | Facility: CLINIC | Age: 63
End: 2023-05-04

## 2023-05-04 ENCOUNTER — APPOINTMENT (OUTPATIENT)
Dept: LAB | Facility: HOSPITAL | Age: 63
End: 2023-05-04
Attending: INTERNAL MEDICINE

## 2023-05-04 VITALS
WEIGHT: 293 LBS | DIASTOLIC BLOOD PRESSURE: 82 MMHG | TEMPERATURE: 97.6 F | SYSTOLIC BLOOD PRESSURE: 120 MMHG | OXYGEN SATURATION: 97 % | HEIGHT: 66 IN | BODY MASS INDEX: 47.09 KG/M2 | RESPIRATION RATE: 18 BRPM | HEART RATE: 92 BPM

## 2023-05-04 DIAGNOSIS — Z79.01 ANTICOAGULATED ON COUMADIN: ICD-10-CM

## 2023-05-04 DIAGNOSIS — E78.5 HYPERLIPIDEMIA, UNSPECIFIED HYPERLIPIDEMIA TYPE: ICD-10-CM

## 2023-05-04 DIAGNOSIS — K59.1 FUNCTIONAL DIARRHEA: ICD-10-CM

## 2023-05-04 DIAGNOSIS — E11.69 DIABETES MELLITUS TYPE 2 IN OBESE (HCC): ICD-10-CM

## 2023-05-04 DIAGNOSIS — Z53.20 MAMMOGRAM DECLINED: ICD-10-CM

## 2023-05-04 DIAGNOSIS — E66.9 DIABETES MELLITUS TYPE 2 IN OBESE (HCC): ICD-10-CM

## 2023-05-04 DIAGNOSIS — Z95.2 HISTORY OF MECHANICAL AORTIC VALVE REPLACEMENT: ICD-10-CM

## 2023-05-04 DIAGNOSIS — I50.30 HEART FAILURE WITH PRESERVED EJECTION FRACTION, BORDERLINE, CLASS III (HCC): ICD-10-CM

## 2023-05-04 DIAGNOSIS — Z72.0 TOBACCO USE: ICD-10-CM

## 2023-05-04 DIAGNOSIS — E11.9 TYPE 2 DIABETES MELLITUS WITHOUT COMPLICATION, WITHOUT LONG-TERM CURRENT USE OF INSULIN (HCC): ICD-10-CM

## 2023-05-04 DIAGNOSIS — M79.7 FIBROMYALGIA: ICD-10-CM

## 2023-05-04 DIAGNOSIS — E55.9 VITAMIN D DEFICIENCY: Primary | ICD-10-CM

## 2023-05-04 DIAGNOSIS — E55.9 VITAMIN D DEFICIENCY: ICD-10-CM

## 2023-05-04 DIAGNOSIS — E66.01 MORBID OBESITY (HCC): ICD-10-CM

## 2023-05-04 DIAGNOSIS — Z95.2 STATUS POST MECHANICAL AORTIC VALVE REPLACEMENT: Primary | ICD-10-CM

## 2023-05-04 DIAGNOSIS — Z95.2 STATUS POST MECHANICAL AORTIC VALVE REPLACEMENT: ICD-10-CM

## 2023-05-04 DIAGNOSIS — Z85.43 HISTORY OF OVARIAN CANCER: ICD-10-CM

## 2023-05-04 DIAGNOSIS — Z12.11 COLON CANCER SCREENING: ICD-10-CM

## 2023-05-04 DIAGNOSIS — E03.9 HYPOTHYROIDISM, UNSPECIFIED TYPE: ICD-10-CM

## 2023-05-04 DIAGNOSIS — Z00.00 ANNUAL PHYSICAL EXAM: Primary | ICD-10-CM

## 2023-05-04 LAB
25(OH)D3 SERPL-MCNC: 20.8 NG/ML (ref 30–100)
INR PPP: 1.73 (ref 0.84–1.19)
PROTHROMBIN TIME: 20.7 SECONDS (ref 11.6–14.5)
SL AMB POCT HEMOGLOBIN AIC: 5.6 (ref ?–6.5)

## 2023-05-04 PROCEDURE — 82306 VITAMIN D 25 HYDROXY: CPT

## 2023-05-04 RX ORDER — MULTIVIT-MIN/IRON/FOLIC ACID/K 18-600-40
1 CAPSULE ORAL DAILY
Qty: 30 CAPSULE | Refills: 5 | Status: SHIPPED | OUTPATIENT
Start: 2023-05-04

## 2023-05-04 NOTE — ASSESSMENT & PLAN NOTE
Has cut back to 3-5 cigarettes per day-discussed other smoking aids but holding off-discussed doing LDCT for lung cancer screening

## 2023-05-04 NOTE — PROGRESS NOTES
pt made aware of her INR at her appt today with Dr Fidelina Garrett  Pt made aware  to take 2 1/2 tablets daily (7 5 mg) daily and to check another inr in 7-10 days

## 2023-05-04 NOTE — ASSESSMENT & PLAN NOTE
On warfarin and had INR this am-INR came back low at 1 73 so advised Dennis Bhaskar to increase to 2 1/2 tablets daily (7 5mg) daily and we'll check another INR in 7-10 days

## 2023-05-04 NOTE — ASSESSMENT & PLAN NOTE
Lab Results   Component Value Date    HGBA1C 5 6 23/45/0983   Using Trulicity once a week and A1c is 5 6%-has to go see eye dr

## 2023-05-04 NOTE — PROGRESS NOTES
[facility]    NAME: Patle Stokes  AGE: 63 y o  SEX: female  : 1960     DATE: 2023     Assessment and Plan:     Problem List Items Addressed This Visit        Digestive    Functional diarrhea     Still has this but better on Questran            Endocrine    Hypothyroidism    Diabetes mellitus type 2 in obese [facility])       Lab Results   Component Value Date    HGBA1C 5 6    Using Trulicity once a week and A1c is 5 6%-has to go see eye dr            Cardiovascular and Mediastinum    Heart failure with preserved ejection fraction, borderline, class III ([facility] [address] )       Other    History of mechanical aortic valve replacement     On warfarin and had INR this am         Hyperlipidemia    Morbid obesity ([facility] [address] )    Relevant Orders    Ambulatory Referral to Weight Management    Status post mechanical aortic valve replacement    Anticoagulated on Coumadin    History of ovarian cancer     Hx of either ovarian cervical or uterine cancer-s/p hysterectomy         Fibromyalgia     Is on cymbalta-would do well to lose weight and feel better         Tobacco use     Has cut back to 3-5 cigarettes per day-discussed other smoking aids but holding off-discussed doing LDCT for lung cancer screening        Other Visit Diagnoses     Annual physical exam    -  Primary    Type 2 diabetes mellitus without complication, without long-term current use of insulin ([facility] [address] )        Relevant Orders    POCT hemoglobin A1c (Completed)    Colon cancer screening        Had scope in -needs another in     Mammogram declined              Immunizations and preventive care screenings were discussed with patient today  Appropriate education was printed on patient's after visit summary  Counseling:  Alcohol/drug use: discussed moderation in alcohol intake, the recommendations for healthy alcohol use, and avoidance of illicit drug use    Dental Health: discussed importance of regular tooth brushing, flossing, and dental visits  · Exercise: the importance of regular exercise/physical activity was discussed  Recommend exercise 3-5 times per week for at least 30 minutes  Return in about 6 months (around 11/4/2023) for Next scheduled follow up  Chief Complaint:     Chief Complaint   Patient presents with    Physical Exam      History of Present Illness:     Adult Annual Physical   Patient here for a comprehensive physical exam  The patient reports problems - weight, myalgias  Diet and Physical Activity  · Diet/Nutrition: diabetic diet  · Exercise: no formal exercise  Depression Screening  PHQ-2/9 Depression Screening    Little interest or pleasure in doing things: 0 - not at all  Feeling down, depressed, or hopeless: 0 - not at all  PHQ-2 Score: 0  PHQ-2 Interpretation: Negative depression screen       General Health  · Sleep: sleeps poorly  · Hearing: normal - bilateral   · Vision: no vision problems  · Dental: no dental visits for >1 year  /GYN Health  · Patient is: postmenopausal  · Last menstrual period: 15 years ago for cervical cancer  · Contraceptive method: none  Review of Systems:     Review of Systems   HENT: Negative  Cardiovascular: Negative  Musculoskeletal: Positive for arthralgias and myalgias  Psychiatric/Behavioral: Positive for dysphoric mood and sleep disturbance  The patient is nervous/anxious         Past Medical History:     Past Medical History:   Diagnosis Date    Anemia     Anxiety     Aortic valve disorder     Back pain     Chronic pain syndrome     Cirrhosis (HCC)     Constipation     Degenerative arthritis of thoracic spine     Degenerative joint disease involving multiple joints     Depression     DVT (deep venous thrombosis) (HCC)     Bilateral    Edema     Endometrial adenocarcinoma (HCC)     Fibromyalgia     Ganglion of right wrist     GERD (gastroesophageal reflux disease)  Heart murmur     Hematoma     History of mechanical aortic valve replacement     Hypothyroidism (acquired)     Laboratory confirmed diagnosis of COVID-19 2021    Low blood pressure     Malignant neoplasm of corpus uteri, except isthmus (HCC)     Mixed hyperlipidemia     Morbid obesity (HCC)     Obstructive sleep apnea syndrome     Pulmonary embolism (HCC)     Tobacco dependence syndrome     Transient cerebral ischemia     Urinary incontinence     Viral hepatitis C     Vitamin D deficiency       Past Surgical History:     Past Surgical History:   Procedure Laterality Date    ANKLE SURGERY      CARDIAC SURGERY  2015    VALVE REPLACEMENT     SECTION      X3    CHOLECYSTECTOMY      COLONOSCOPY  2019    COLONOSCOPY      HYSTERECTOMY  2011    TOTAL      Social History:     Social History     Socioeconomic History    Marital status:      Spouse name: None    Number of children: None    Years of education: 15    Highest education level: GED or equivalent   Occupational History    None   Tobacco Use    Smoking status: Some Days     Packs/day: 0 50     Years: 40 00     Pack years: 20 00     Types: Cigarettes    Smokeless tobacco: Never   Vaping Use    Vaping Use: Never used   Substance and Sexual Activity    Alcohol use: Yes     Comment: once in [place] Drug use: Yes     Types: Marijuana, LSD    Sexual activity: Not Currently   Other Topics Concern    None   Social History Narrative    Do you currently or have you served in [employer]: No    Were you activated, into active duty, as a member of the [employer] or as a Reservist: No    Occupation: unemployed    Education: 15 ged    Marital status:     Exercise level: None    Diet: Regular    General stress level: High    Has smoked since age: 17    Alcohol intake: None    Caffeine intake: Heavy    Chewing tobacco: none    Illicit drugs: none    Guns present in home: Yes    Seat belts used routinely: No    Sunscreen used routinely: No    Smoke alarm in home: Yes    Advance directive: Yes living will    Live alone or with others: with others    Are there stairs in your home: Yes    Pets: Yes 1 dog    Deaf or serious difficulty hearing: No    Blind or serious difficulty seeing: No    Difficulty concentrating, remembering or making decisions: No    Difficulty walking or climbing stairs: Yes    Difficulty dressing or bathing: Yes    Difficulty doing errands alone: Yes    can't put socks on     Social Determinants of Health     Financial Resource Strain: Not on file   Food Insecurity: Not on file   Transportation Needs: Not on file   Physical Activity: Not on file   Stress: Not on file   Social Connections: Not on file   Intimate Partner Violence: Not on file   Housing Stability: Not on file      Family History:     Family History   Problem Relation Age of Onset    Coronary artery disease Mother     Coronary artery disease Father     Cirrhosis Father       Current Medications:     Current Outpatient Medications   Medication Sig Dispense Refill    Alcohol Swabs (Pharmacist Choice Alcohol) PADS Apply topically 3 (three) times a day 100 each 5    baclofen 10 mg tablet TAKE 1 TABLET (10 MG TOTAL) BY MOUTH 3 (THREE) TIMES A DAY AS NEEDED FOR MUSCLE SPASMS 90 tablet 3    BD Pen Needle Mary 2nd Gen 32G X 4 MM MISC       Blood Glucose Monitoring Suppl (OneTouch Verio IQ System) w/Device KIT Use to check blood sugar daily 1 kit 1    cholestyramine (QUESTRAN) 4 g packet TAKE 1 PACKET (4 G TOTAL) BY MOUTH 2 (TWO) TIMES A DAY WITH MEALS DO NOT TAKE WITHIN 2 HOURS OF OTHER ORAL MEDICATIONS 60 each 5    dulaglutide (Trulicity) 1 5 RD/4 8MU injection Inject 0 5 mL (1 5 mg total) under the skin once a week 2 mL 0    DULoxetine (CYMBALTA) 30 mg delayed release capsule TAKE 1 CAPSULE (30 MG TOTAL) BY MOUTH DAILY 30 capsule 5    DULoxetine (CYMBALTA) 60 mg delayed release capsule TAKE 1 CAPSULE (60 MG TOTAL) "BY MOUTH DAILY PT TO TAKE A 60 MG CYMBALTA AND A 30 MG CYMBALTA TO MAKE 90 MG 30 capsule 5    glucose blood (OneTouch Verio) test strip Use 1 each 3 (three) times a day Use as instructed 300 each 5    nystatin powder Apply topically 2 (two) times a day 60 g 5    Pharmacist Choice Lancets MISC USE TO TEST GLUCOSE UP TO 3 TIMES A DAY - ONETOUCH VERIO LANCETS 300 each 5    ProAir RespiClick 882 (90 Base) MCG/ACT AEPB INHALE 1 PUFF EVERY 4 (FOUR) HOURS AS NEEDED (WHEEZING OR SHORTNESS OF BREATH)   solifenacin (VESICARE) 10 MG tablet Take 1 tablet (10 mg total) by mouth daily 30 tablet 5    Vitamin D, Ergocalciferol, 63288 units CAPS Take 1 capsule weekly for 12 weeks 12 capsule 0    warfarin (Jantoven) 3 mg tablet TAKE 2 TO 2 1/2 TABS BY MOUTH DAILY OR AS DIRECTED BY PHYSICIAN 75 tablet 5    zolpidem (AMBIEN CR) 12 5 MG CR tablet TAKE 1 TABLET (12 5 MG TOTAL) BY MOUTH DAILY AT BEDTIME AS NEEDED FOR SLEEP 30 tablet 2     No current facility-administered medications for this visit  Allergies: Allergies   Allergen Reactions    Bactrim [Sulfamethoxazole-Trimethoprim] Other (See Comments)     Patient is unaware of why this allergy is recorded, denies knowledge of it    Penicillins Hives     Patient is unaware of why this allergy is recorded, denies knowledge of it    Tramadol GI Intolerance and Vomiting     Could not function - felt very ill     Rocephin [Ceftriaxone] Dizziness      Physical Exam:     /82 (BP Location: Left arm, Patient Position: Sitting, Cuff Size: Large)   Pulse 92   Temp 97 6 °F (36 4 °C) (Tympanic)   Resp 18   Ht 5' 6\" (1 676 m)   Wt (!) 155 kg (341 lb 4 oz)   SpO2 97%   BMI 55 08 kg/m²     Physical Exam  Vitals reviewed  Constitutional:       Appearance: She is obese  HENT:      Head: Normocephalic and atraumatic        Right Ear: External ear normal       Left Ear: External ear normal       Nose: Nose normal       Mouth/Throat:      Mouth: Mucous membranes are " moist    Cardiovascular:      Rate and Rhythm: Regular rhythm  Pulmonary:      Effort: Pulmonary effort is normal       Breath sounds: Normal breath sounds  Abdominal:      General: There is distension  Tenderness: There is no abdominal tenderness  Musculoskeletal:         General: Normal range of motion  Cervical back: Normal range of motion and neck supple  Skin:     General: Skin is warm  Capillary Refill: Capillary refill takes less than 2 seconds  Neurological:      General: No focal deficit present  Mental Status: She is alert and oriented to person, place, and time  Li Decker MD  [facility]     BMI Counseling: Body mass index is 55 08 kg/m²  The BMI is above normal  Nutrition recommendations include reducing portion sizes, decreasing overall calorie intake, 3-5 servings of fruits/vegetables daily, reducing fast food intake, consuming healthier snacks, decreasing soda and/or juice intake, moderation in carbohydrate intake, increasing intake of lean protein, reducing intake of saturated fat and trans fat and reducing intake of cholesterol  Exercise recommendations include exercising 3-5 times per week and strength training exercises  Patient referred to bariatric surgery due to patient being morbidly obese

## 2023-05-10 ENCOUNTER — TELEPHONE (OUTPATIENT)
Dept: FAMILY MEDICINE CLINIC | Facility: CLINIC | Age: 63
End: 2023-05-10

## 2023-05-10 NOTE — TELEPHONE ENCOUNTER
zolpidem (AMBIEN CR) 12 5 MG CR tablet TAKE 1 TABLET (12 5 MG TOTAL) BY MOUTH DAILY AT BEDTIME AS NEEDED FOR SLEEP     David from [facility] called stating a prior Ariadna Forman is need for the above med,    Says this is the number that can be called to expedite the request     # [phone]

## 2023-05-11 NOTE — TELEPHONE ENCOUNTER
No insurance on file for pt - per Farheen Merida she is going to look into what insurance pt has and will update her chart  Will complete prior auth once that is done

## 2023-05-12 ENCOUNTER — TELEPHONE (OUTPATIENT)
Dept: FAMILY MEDICINE CLINIC | Facility: CLINIC | Age: 63
End: 2023-05-12

## 2023-05-12 NOTE — TELEPHONE ENCOUNTER
Pt was given a written scrip for pull ups. She is house bound. She was unable to get the scrip to [facility] by car. Can we send in a order.

## 2023-05-15 DIAGNOSIS — R35.0 URINARY FREQUENCY: Primary | ICD-10-CM

## 2023-05-16 ENCOUNTER — TELEPHONE (OUTPATIENT)
Dept: FAMILY MEDICINE CLINIC | Facility: CLINIC | Age: 63
End: 2023-05-16

## 2023-05-18 ENCOUNTER — DOCUMENTATION (OUTPATIENT)
Dept: FAMILY MEDICINE CLINIC | Facility: CLINIC | Age: 63
End: 2023-05-18

## 2023-05-24 ENCOUNTER — TELEPHONE (OUTPATIENT)
Dept: BARIATRICS | Facility: CLINIC | Age: 63
End: 2023-05-24

## 2023-05-26 ENCOUNTER — TELEPHONE (OUTPATIENT)
Dept: BARIATRICS | Facility: CLINIC | Age: 63
End: 2023-05-26

## 2023-05-26 NOTE — TELEPHONE ENCOUNTER
Spoke with pt and she stated she is unable to schedule an appointment until her sister is back in town  She will call us when she is ready

## 2023-06-13 NOTE — TELEPHONE ENCOUNTER
Misc Anticonvulsant Refill Protocol - 12 Month Protocol Passed         topiramate (TOPAMAX) 25 MG tablet 90 tablet 1 10/6/2022     Sig - Route: Take 1 tablet by mouth nightly      Confirmed dosage and directions, epic system has approved, filled per protocols.    Thank you Refill Center Staff     Done Mercedes Flap Text: The defect edges were debeveled with a #15 scalpel blade.  Given the location of the defect, shape of the defect and the proximity to free margins a Mercedes flap was deemed most appropriate.  Using a sterile surgical marker, an appropriate advancement flap was drawn incorporating the defect and placing the expected incisions within the relaxed skin tension lines where possible. The area thus outlined was incised deep to adipose tissue with a #15 scalpel blade.  The skin margins were undermined to an appropriate distance in all directions utilizing iris scissors.

## 2023-06-14 DIAGNOSIS — G47.00 INSOMNIA, UNSPECIFIED TYPE: ICD-10-CM

## 2023-06-14 RX ORDER — ZOLPIDEM TARTRATE 12.5 MG/1
12.5 TABLET, FILM COATED, EXTENDED RELEASE ORAL
Qty: 30 TABLET | Refills: 2 | Status: SHIPPED | OUTPATIENT
Start: 2023-06-14

## 2023-06-26 ENCOUNTER — TELEPHONE (OUTPATIENT)
Dept: BARIATRICS | Facility: CLINIC | Age: 63
End: 2023-06-26

## 2023-06-26 NOTE — TELEPHONE ENCOUNTER
Tried calling this pt a few times in reference to info seminar  Today the phone number is no longer a working number 
no known allergies

## 2023-07-04 DIAGNOSIS — R35.0 URINARY FREQUENCY: ICD-10-CM

## 2023-07-31 RX ORDER — CHOLECALCIFEROL (VITAMIN D3) 125 MCG
CAPSULE ORAL
COMMUNITY
Start: 2023-06-14

## 2023-08-02 ENCOUNTER — OFFICE VISIT (OUTPATIENT)
Dept: FAMILY MEDICINE CLINIC | Facility: CLINIC | Age: 63
End: 2023-08-02
Payer: COMMERCIAL

## 2023-08-02 ENCOUNTER — APPOINTMENT (OUTPATIENT)
Dept: LAB | Facility: HOSPITAL | Age: 63
End: 2023-08-02
Payer: COMMERCIAL

## 2023-08-02 VITALS
TEMPERATURE: 97.1 F | HEART RATE: 82 BPM | HEIGHT: 66 IN | RESPIRATION RATE: 18 BRPM | DIASTOLIC BLOOD PRESSURE: 80 MMHG | SYSTOLIC BLOOD PRESSURE: 122 MMHG | OXYGEN SATURATION: 98 % | WEIGHT: 293 LBS | BODY MASS INDEX: 47.09 KG/M2

## 2023-08-02 DIAGNOSIS — E78.5 HYPERLIPIDEMIA, UNSPECIFIED HYPERLIPIDEMIA TYPE: ICD-10-CM

## 2023-08-02 DIAGNOSIS — M62.838 MUSCLE SPASMS OF BOTH LOWER EXTREMITIES: ICD-10-CM

## 2023-08-02 DIAGNOSIS — R15.9 INCONTINENCE OF FECES, UNSPECIFIED FECAL INCONTINENCE TYPE: ICD-10-CM

## 2023-08-02 DIAGNOSIS — F32.A DEPRESSION, UNSPECIFIED DEPRESSION TYPE: ICD-10-CM

## 2023-08-02 DIAGNOSIS — Z72.0 TOBACCO USE: ICD-10-CM

## 2023-08-02 DIAGNOSIS — Z95.2 HISTORY OF HEART VALVE REPLACEMENT: ICD-10-CM

## 2023-08-02 DIAGNOSIS — R07.81 RIB PAIN: ICD-10-CM

## 2023-08-02 DIAGNOSIS — Z53.20 MAMMOGRAM DECLINED: ICD-10-CM

## 2023-08-02 DIAGNOSIS — F31.9 BIPOLAR DEPRESSION (HCC): ICD-10-CM

## 2023-08-02 DIAGNOSIS — M79.7 FIBROMYALGIA: ICD-10-CM

## 2023-08-02 DIAGNOSIS — Z95.2 S/P AORTIC VALVE REPLACEMENT WITH PROSTHETIC VALVE: ICD-10-CM

## 2023-08-02 DIAGNOSIS — F41.9 ANXIETY: ICD-10-CM

## 2023-08-02 DIAGNOSIS — R19.7 DIARRHEA, UNSPECIFIED TYPE: ICD-10-CM

## 2023-08-02 DIAGNOSIS — E03.9 HYPOTHYROIDISM, UNSPECIFIED TYPE: ICD-10-CM

## 2023-08-02 DIAGNOSIS — E11.9 TYPE 2 DIABETES MELLITUS WITHOUT COMPLICATION, WITHOUT LONG-TERM CURRENT USE OF INSULIN (HCC): Primary | ICD-10-CM

## 2023-08-02 LAB — SL AMB POCT HEMOGLOBIN AIC: 5.2 (ref ?–6.5)

## 2023-08-02 PROCEDURE — 83036 HEMOGLOBIN GLYCOSYLATED A1C: CPT | Performed by: INTERNAL MEDICINE

## 2023-08-02 PROCEDURE — 99214 OFFICE O/P EST MOD 30 MIN: CPT | Performed by: INTERNAL MEDICINE

## 2023-08-02 RX ORDER — CYCLOBENZAPRINE HCL 10 MG
10 TABLET ORAL 2 TIMES DAILY PRN
Qty: 60 TABLET | Refills: 3 | Status: SHIPPED | OUTPATIENT
Start: 2023-08-02

## 2023-08-02 NOTE — PROGRESS NOTES
Assessment/Plan:Lauren is doing ok-still having several episodes of watery diarrhea that she cannot control.   Don't think it' s a diabetic neuropathy as her A1c% is 5.2%, very well controlled--she insists that it is related to reception of the covid vaccine-I told her I'd do a literature search to see how many cases of covid vaccine induced diarrhea there are -it's very possible that she has disc degenerative disease there and there is pressure on the nerves that supply the pevis-will try and order a CT lS spine to see (hard to get an mRI with Lauren's hx of mechanical aortic valve repalcement)-labs ordered and discussed tobacco cessation-a lot of other tests and screening tests that were ordered last time were'nt done yet-also referred her to Colorectal surgery, suggested low foodmap diet          Problem List Items Addressed This Visit        Endocrine    Hypothyroidism    Relevant Orders    TSH, 3rd generation       Other    Hyperlipidemia    Relevant Orders    Lipid panel    TSH, 3rd generation    S/P aortic valve replacement with prosthetic valve    Bipolar depression ([address])    Fibromyalgia    Tobacco use   Other Visit Diagnoses     Type 2 diabetes mellitus without complication, without long-term current use of insulin ([address])    -  Primary    Relevant Orders    POCT hemoglobin A1c (Completed)    CBC and differential    Comprehensive metabolic panel    Albumin / creatinine urine ratio    Diarrhea, unspecified type        Relevant Orders    CT spine lumbar w contrast    Ambulatory Referral to Colorectal Surgery    Mammogram declined        Anxiety        Relevant Orders    Ambulatory Referral to Psychology    Depression, unspecified depression type        Relevant Orders    Ambulatory Referral to Psychology    Incontinence of feces, unspecified fecal incontinence type        Relevant Orders    CT spine lumbar w contrast    Ambulatory Referral to Colorectal Surgery    History of heart valve replacement        Relevant Orders    XR chest pa & lateral    XR ribs bilateral 3 vw    Rib pain        Relevant Orders    XR chest pa & lateral    XR ribs bilateral 3 vw    Muscle spasms of both lower extremities        Relevant Medications    cyclobenzaprine (FLEXERIL) 10 mg tablet            Subjective:      Patient ID: Holland Sanderson is a 66 y.o. female. Sheridan Retort here today with her daughter (and caregiver) to touch base on multiple issues-morbid obesity, DM II, fecal incontinence, chronic back pain, fibromyalgia, hx of bicuspid aortic valve s/p metal valve replacement on Coumadin, anxiety, depression, hx of polysubstance use-still having episodes of watery fecal incontinence that she says she doesn't realize that she has to go to the bathroom-had colonoscopy done in 2021, and it was ok, it was suggested she get repeat scope done in 2024.   Gets up a lot at night to urinate -her A1c% today is 5.2%, which is excellent-she's on Trulicity-Lauren is also very insistent that it was the second covid vaccine dose that she ref      The following portions of the patient's history were reviewed and updated as appropriate:   Past Medical History:  She has a past medical history of Anemia, Anxiety, Aortic valve disorder, Back pain, Chronic pain syndrome, Cirrhosis ([address]), Constipation, Degenerative arthritis of thoracic spine, Degenerative joint disease involving multiple joints, Depression, DVT (deep venous thrombosis) ([address]), Edema, Endometrial adenocarcinoma ([address]), Fibromyalgia, Ganglion of right wrist, GERD (gastroesophageal reflux disease), Heart murmur, Hematoma, History of mechanical aortic valve replacement, Hypothyroidism (acquired), Laboratory confirmed diagnosis of COVID-19 (03/16/2021), Low blood pressure, Malignant neoplasm of corpus uteri, except isthmus ([address]), Mixed hyperlipidemia, Morbid obesity ([address]), Obstructive sleep apnea syndrome, Pulmonary embolism ([address]), Tobacco dependence syndrome, Transient cerebral ischemia, Urinary incontinence, Viral hepatitis C, and Vitamin D deficiency. ,  _______________________________________________________________________  Medical Problems:  does not have any pertinent problems on file.,  _______________________________________________________________________  Past Surgical History:   has a past surgical history that includes Ankle surgery; Cardiac surgery (); Hysterectomy (2011);  section; Cholecystectomy; Colonoscopy (2019); and Colonoscopy. ,  _______________________________________________________________________  Family History:  family history includes Cirrhosis in her father; Coronary artery disease in her father and mother.,  _______________________________________________________________________  Social History:   reports that she has been smoking cigarettes. She has a 20.00 pack-year smoking history. She has never used smokeless tobacco. She reports current alcohol use. She reports current drug use. Drugs: Marijuana and LSD.,  _______________________________________________________________________  Allergies:  is allergic to bactrim [sulfamethoxazole-trimethoprim], penicillins, tramadol, and rocephin [ceftriaxone]. .  _______________________________________________________________________  Current Outpatient Medications   Medication Sig Dispense Refill   • Cholecalciferol (Vitamin D) 50 MCG (2000 UT) CAPS Take 1 capsule (2,000 Units total) by mouth in the morning 30 capsule 5   • cyclobenzaprine (FLEXERIL) 10 mg tablet Take 1 tablet (10 mg total) by mouth 2 (two) times a day as needed for muscle spasms 60 tablet 3   • Trulicity 1.5 QD/4.8QY injection INJECT 0.5 ML (1.5 MG TOTAL) UNDER THE SKIN ONCE A WEEK 2 mL 5   • warfarin (Jantoven) 3 mg tablet TAKE 2 TO 2 1/2 TABS BY MOUTH DAILY OR AS DIRECTED BY PHYSICIAN 75 tablet 5   • zolpidem (AMBIEN CR) 12.5 MG CR tablet TAKE 1 TABLET (12.5 MG TOTAL) BY MOUTH DAILY AT BEDTIME AS NEEDED FOR SLEEP 30 tablet 2   • Alcohol Swabs (Pharmacist Choice Alcohol) PADS Apply topically 3 (three) times a day 100 each 5   • baclofen 10 mg tablet TAKE 1 TABLET (10 MG TOTAL) BY MOUTH 3 (THREE) TIMES A DAY AS NEEDED FOR MUSCLE SPASMS 90 tablet 3   • BD Pen Needle Mary 2nd Gen 32G X 4 MM MISC      • Blood Glucose Monitoring Suppl (OneTouch Verio IQ System) w/Device KIT Use to check blood sugar daily 1 kit 1   • Cholecalciferol (Vitamin D3) 50 MCG (2000 UT) TABS      • cholestyramine (QUESTRAN) 4 g packet TAKE 1 PACKET (4 G TOTAL) BY MOUTH 2 (TWO) TIMES A DAY WITH MEALS DO NOT TAKE WITHIN 2 HOURS OF OTHER ORAL MEDICATIONS 60 each 5   • DULoxetine (CYMBALTA) 30 mg delayed release capsule TAKE 1 CAPSULE (30 MG TOTAL) BY MOUTH DAILY 30 capsule 5   • DULoxetine (CYMBALTA) 60 mg delayed release capsule TAKE 1 CAPSULE (60 MG TOTAL) BY MOUTH DAILY PT TO TAKE A 60 MG CYMBALTA AND A 30 MG CYMBALTA TO MAKE 90 MG 30 capsule 5   • glucose blood (OneTouch Verio) test strip Use 1 each 3 (three) times a day Use as instructed 300 each 5   • Incontinence Supplies MISC Use 3 (three) times a day 100 each 0   • nystatin powder Apply topically 2 (two) times a day 60 g 5   • Pharmacist Choice Lancets MISC USE TO TEST GLUCOSE UP TO 3 TIMES A DAY - ONETOUCH VERIO LANCETS 300 each 5   • ProAir RespiClick 563 (90 Base) MCG/ACT AEPB INHALE 1 PUFF EVERY 4 (FOUR) HOURS AS NEEDED (WHEEZING OR SHORTNESS OF BREATH). • solifenacin (VESICARE) 10 MG tablet Take 1 tablet (10 mg total) by mouth daily 30 tablet 5     No current facility-administered medications for this visit.     _______________________________________________________________________  Review of Systems   Constitutional: Negative. HENT: Negative. Respiratory: Negative. Gastrointestinal: Positive for diarrhea. Genitourinary: Positive for frequency and urgency. Musculoskeletal: Positive for back pain. Psychiatric/Behavioral: Negative.           Objective:  Vitals:    08/02/23 1311   BP: 122/80   BP Location: Left arm   Patient Position: Sitting   Cuff Size: Large   Pulse: 82   Resp: 18   Temp: (!) 97.1 °F (36.2 °C)   TempSrc: Tympanic   SpO2: 98%   Weight: (!) 154 kg (340 lb)   Height: 5' 6" (1.676 m)     Body mass index is 54.88 kg/m². Physical Exam  Constitutional:       Appearance: She is obese. HENT:      Head: Normocephalic and atraumatic. Right Ear: External ear normal.      Left Ear: External ear normal.      Nose: Nose normal.      Mouth/Throat:      Mouth: Mucous membranes are moist.   Eyes:      Extraocular Movements: Extraocular movements intact. Cardiovascular:      Rate and Rhythm: Normal rate and regular rhythm. Pulses: no weak pulses          Dorsalis pedis pulses are 2+ on the right side and 2+ on the left side. Comments: click  Pulmonary:      Effort: Pulmonary effort is normal.      Breath sounds: Normal breath sounds. Musculoskeletal:         General: Normal range of motion. Cervical back: Normal range of motion and neck supple. Feet:      Right foot:      Skin integrity: No ulcer, skin breakdown, erythema, warmth, callus or dry skin. Left foot:      Skin integrity: No ulcer, skin breakdown, erythema, warmth, callus or dry skin. Skin:     General: Skin is warm. Capillary Refill: Capillary refill takes less than 2 seconds. Neurological:      General: No focal deficit present. Mental Status: She is alert and oriented to person, place, and time. Psychiatric:         Mood and Affect: Mood normal.         Behavior: Behavior normal.         Thought Content: Thought content normal.       Patient's shoes and socks removed. Right Foot/Ankle   Right Foot Inspection  Skin Exam: skin normal and skin intact. No dry skin, no warmth, no callus, no erythema, no maceration, no abnormal color, no pre-ulcer, no ulcer and no callus. Toe Exam: ROM and strength within normal limits.      Sensory   Monofilament testing: intact    Vascular  Capillary refills: < 3 seconds  The right DP pulse is 2+. Left Foot/Ankle  Left Foot Inspection  Skin Exam: skin normal and skin intact. No dry skin, no warmth, no erythema, no maceration, normal color, no pre-ulcer, no ulcer and no callus. Toe Exam: ROM and strength within normal limits. Sensory   Monofilament testing: intact    Vascular  Capillary refills: < 3 seconds  The left DP pulse is 2+.      Assign Risk Category  No deformity present  No loss of protective sensation  No weak pulses  Risk: 0

## 2023-08-03 DIAGNOSIS — R35.0 URINARY FREQUENCY: ICD-10-CM

## 2023-08-04 RX ORDER — SOLIFENACIN SUCCINATE 10 MG/1
10 TABLET, FILM COATED ORAL DAILY
Qty: 30 TABLET | Refills: 3 | Status: SHIPPED | OUTPATIENT
Start: 2023-08-04

## 2023-08-23 DIAGNOSIS — Z95.2 HISTORY OF MECHANICAL AORTIC VALVE REPLACEMENT: ICD-10-CM

## 2023-08-23 DIAGNOSIS — Z86.718 PERSONAL HISTORY OF DVT (DEEP VEIN THROMBOSIS): ICD-10-CM

## 2023-08-23 RX ORDER — WARFARIN SODIUM 3 MG/1
TABLET ORAL
Qty: 75 TABLET | Refills: 5 | Status: SHIPPED | OUTPATIENT
Start: 2023-08-23

## 2023-09-14 NOTE — ASSESSMENT & PLAN NOTE
Has LDCT ordered Notes from [facility] inst of the [facility] - 8/2/2023    CAC 8    12 MET ETT 1 mm horizontal ST depr 2,3, AVF, [place] 7    Dr. Melendez - RTC 1 year - con't current meds, [statin not added]

## 2023-09-15 DIAGNOSIS — G47.00 INSOMNIA, UNSPECIFIED TYPE: ICD-10-CM

## 2023-09-15 RX ORDER — ZOLPIDEM TARTRATE 12.5 MG/1
12.5 TABLET, FILM COATED, EXTENDED RELEASE ORAL
Qty: 30 TABLET | Refills: 2 | Status: SHIPPED | OUTPATIENT
Start: 2023-09-15

## 2023-10-09 ENCOUNTER — APPOINTMENT (EMERGENCY)
Dept: CT IMAGING | Facility: HOSPITAL | Age: 63
End: 2023-10-09
Payer: COMMERCIAL

## 2023-10-09 ENCOUNTER — HOSPITAL ENCOUNTER (EMERGENCY)
Facility: HOSPITAL | Age: 63
Discharge: HOME | End: 2023-10-09
Attending: EMERGENCY MEDICINE
Payer: COMMERCIAL

## 2023-10-09 VITALS
HEART RATE: 86 BPM | DIASTOLIC BLOOD PRESSURE: 95 MMHG | SYSTOLIC BLOOD PRESSURE: 147 MMHG | TEMPERATURE: 98 F | RESPIRATION RATE: 20 BRPM | OXYGEN SATURATION: 97 %

## 2023-10-09 DIAGNOSIS — S30.1XXA CONTUSION OF ABDOMINAL WALL, INITIAL ENCOUNTER: ICD-10-CM
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
## 2023-10-09 LAB
ALBUMIN SERPL BCP-MCNC: 3.7 G/DL (ref 3.5–5)
ALP SERPL-CCNC: 84 U/L (ref 34–104)
ALT SERPL W P-5'-P-CCNC: 14 U/L (ref 7–52)
ANION GAP SERPL CALCULATED.3IONS-SCNC: 7 MMOL/L
APTT PPP: 57 SECONDS (ref 23–37)
AST SERPL W P-5'-P-CCNC: 26 U/L (ref 13–39)
BASOPHILS # BLD AUTO: 0.01 THOUSANDS/ÂΜL (ref 0–0.1)
BASOPHILS NFR BLD AUTO: 0 % (ref 0–1)
BILIRUB SERPL-MCNC: 0.81 MG/DL (ref 0.2–1)
BUN SERPL-MCNC: 16 MG/DL (ref 5–25)
CALCIUM SERPL-MCNC: 8.6 MG/DL (ref 8.4–10.2)
CHLORIDE SERPL-SCNC: 101 MMOL/L (ref 96–108)
CO2 SERPL-SCNC: 30 MMOL/L (ref 21–32)
CREAT SERPL-MCNC: 0.59 MG/DL (ref 0.6–1.3)
EOSINOPHIL # BLD AUTO: 0.19 THOUSAND/ÂΜL (ref 0–0.61)
EOSINOPHIL NFR BLD AUTO: 6 % (ref 0–6)
ERYTHROCYTE [DISTWIDTH] IN BLOOD BY AUTOMATED COUNT: 12.7 % (ref 11.6–15.1)
GFR SERPL CREATININE-BSD FRML MDRD: 99 ML/MIN/1.73SQ M
GLUCOSE SERPL-MCNC: 163 MG/DL (ref 65–140)
HCT VFR BLD AUTO: 41.9 % (ref 34.8–46.1)
HGB BLD-MCNC: 13.7 G/DL (ref 11.5–15.4)
IMM GRANULOCYTES # BLD AUTO: 0.01 THOUSAND/UL (ref 0–0.2)
IMM GRANULOCYTES NFR BLD AUTO: 0 % (ref 0–2)
INR PPP: 2.79 (ref 0.84–1.19)
LYMPHOCYTES # BLD AUTO: 0.77 THOUSANDS/ÂΜL (ref 0.6–4.47)
LYMPHOCYTES NFR BLD AUTO: 24 % (ref 14–44)
MCH RBC QN AUTO: 32.1 PG (ref 26.8–34.3)
MCHC RBC AUTO-ENTMCNC: 32.7 G/DL (ref 31.4–37.4)
MCV RBC AUTO: 98 FL (ref 82–98)
MONOCYTES # BLD AUTO: 0.26 THOUSAND/ÂΜL (ref 0.17–1.22)
MONOCYTES NFR BLD AUTO: 8 % (ref 4–12)
NEUTROPHILS # BLD AUTO: 1.94 THOUSANDS/ÂΜL (ref 1.85–7.62)
NEUTS SEG NFR BLD AUTO: 62 % (ref 43–75)
NRBC BLD AUTO-RTO: 0 /100 WBCS
PLATELET # BLD AUTO: 134 THOUSANDS/UL (ref 149–390)
PMV BLD AUTO: 9.6 FL (ref 8.9–12.7)
POTASSIUM SERPL-SCNC: 3.9 MMOL/L (ref 3.5–5.3)
PROT SERPL-MCNC: 7.2 G/DL (ref 6.4–8.4)
PROTHROMBIN TIME: 29.9 SECONDS (ref 11.6–14.5)
RBC # BLD AUTO: 4.27 MILLION/UL (ref 3.81–5.12)
SODIUM SERPL-SCNC: 138 MMOL/L (ref 135–147)
WBC # BLD AUTO: 3.18 THOUSAND/UL (ref 4.31–10.16)

## 2023-10-09 PROCEDURE — 74177 CT ABD & PELVIS W/CONTRAST: CPT

## 2023-10-09 PROCEDURE — 85730 THROMBOPLASTIN TIME PARTIAL: CPT | Performed by: PHYSICIAN ASSISTANT

## 2023-10-09 PROCEDURE — 85610 PROTHROMBIN TIME: CPT | Performed by: PHYSICIAN ASSISTANT

## 2023-10-09 PROCEDURE — 96374 THER/PROPH/DIAG INJ IV PUSH: CPT

## 2023-10-09 PROCEDURE — G1004 CDSM NDSC: HCPCS

## 2023-10-09 PROCEDURE — 99284 EMERGENCY DEPT VISIT MOD MDM: CPT

## 2023-10-09 PROCEDURE — 80053 COMPREHEN METABOLIC PANEL: CPT | Performed by: PHYSICIAN ASSISTANT

## 2023-10-09 PROCEDURE — 85025 COMPLETE CBC W/AUTO DIFF WBC: CPT | Performed by: PHYSICIAN ASSISTANT

## 2023-10-09 PROCEDURE — 70450 CT HEAD/BRAIN W/O DYE: CPT

## 2023-10-09 PROCEDURE — 96361 HYDRATE IV INFUSION ADD-ON: CPT

## 2023-10-09 PROCEDURE — 71260 CT THORAX DX C+: CPT

## 2023-10-09 PROCEDURE — 99285 EMERGENCY DEPT VISIT HI MDM: CPT | Performed by: PHYSICIAN ASSISTANT

## 2023-10-09 PROCEDURE — 36415 COLL VENOUS BLD VENIPUNCTURE: CPT | Performed by: PHYSICIAN ASSISTANT

## 2023-10-09 RX ORDER — MORPHINE SULFATE 4 MG/ML
4 INJECTION, SOLUTION INTRAMUSCULAR; INTRAVENOUS ONCE
Status: COMPLETED | OUTPATIENT
Start: 2023-10-09 | End: 2023-10-09

## 2023-10-09 RX ORDER — OXYCODONE HYDROCHLORIDE 5 MG/1
5 TABLET ORAL EVERY 4 HOURS PRN
Qty: 5 TABLET | Refills: 0 | Status: SHIPPED | OUTPATIENT
Start: 2023-10-09 | End: 2023-10-19

## 2023-10-09 RX ADMIN — IOHEXOL 100 ML: 350 INJECTION, SOLUTION INTRAVENOUS at 13:58

## 2023-10-09 RX ADMIN — SODIUM CHLORIDE 500 ML: 0.9 INJECTION, SOLUTION INTRAVENOUS at 14:33

## 2023-10-09 RX ADMIN — MORPHINE SULFATE 4 MG: 4 INJECTION INTRAVENOUS at 14:33

## 2023-10-09 NOTE — ED PROVIDER NOTES
History  Chief Complaint   Patient presents with   • Fall     Reports she fell out of her bed onto her end table. Complaining of L rib pain/ back pain and shoulder pain. Patient is a 68-year-old white female with extensive past medical history including cirrhosis, aortic valve replacement on Coumadin PE, sleep apnea, obesity who reports accidentally rolling out of bed yesterday morning. States she struck the end table. Chief complaint is pain and bruising to the left abdomen radiating around to her left back. She also reports pain to left upper chest and clavicular area. Does not believe she hit her head. No loss conscious. No neck pain. No shortness of breath. No extremity pain, numbness or tingling. No other complaints          Prior to Admission Medications   Prescriptions Last Dose Informant Patient Reported? Taking? Alcohol Swabs (Pharmacist Choice Alcohol) PADS   No No   Sig: Apply topically 3 (three) times a day   BD Pen Needle Mary 2nd Gen 32G X 4 MM MISC   Yes No   Blood Glucose Monitoring Suppl (OneTouch Verio IQ System) w/Device KIT   No No   Sig: Use to check blood sugar daily   Cholecalciferol (Vitamin D) 50 MCG (2000 UT) CAPS   No No   Sig: Take 1 capsule (2,000 Units total) by mouth in the morning   Cholecalciferol (Vitamin D3) 50 MCG (2000 UT) TABS   Yes No   DULoxetine (CYMBALTA) 30 mg delayed release capsule   No No   Sig: TAKE 1 CAPSULE (30 MG TOTAL) BY MOUTH DAILY   DULoxetine (CYMBALTA) 60 mg delayed release capsule   No No   Sig: TAKE 1 CAPSULE (60 MG TOTAL) BY MOUTH DAILY PT TO TAKE A 60 MG CYMBALTA AND A 30 MG CYMBALTA TO MAKE 90 MG   Incontinence Supplies MISC   No No   Sig: Use 3 (three) times a day   Pharmacist Choice Lancets MISC   No No   Sig: USE TO TEST GLUCOSE UP TO 3 TIMES A DAY - ONETOUCH VERIO LANCETS   ProAir RespiClick 045 (90 Base) MCG/ACT AEPB  Self Yes No   Sig: INHALE 1 PUFF EVERY 4 (FOUR) HOURS AS NEEDED (WHEEZING OR SHORTNESS OF BREATH).    Trulicity 1.5 MG/0.5ML injection   No No   Sig: INJECT 0.5 ML (1.5 MG TOTAL) UNDER THE SKIN ONCE A WEEK   baclofen 10 mg tablet   No No   Sig: TAKE 1 TABLET (10 MG TOTAL) BY MOUTH 3 (THREE) TIMES A DAY AS NEEDED FOR MUSCLE SPASMS   cholestyramine (QUESTRAN) 4 g packet   No No   Sig: TAKE 1 PACKET (4 G TOTAL) BY MOUTH 2 (TWO) TIMES A DAY WITH MEALS DO NOT TAKE WITHIN 2 HOURS OF OTHER ORAL MEDICATIONS   cyclobenzaprine (FLEXERIL) 10 mg tablet   No No   Sig: Take 1 tablet (10 mg total) by mouth 2 (two) times a day as needed for muscle spasms   glucose blood (OneTouch Verio) test strip   No No   Sig: Use 1 each 3 (three) times a day Use as instructed   nystatin powder   No No   Sig: Apply topically 2 (two) times a day   solifenacin (VESICARE) 10 MG tablet   No No   Sig: Take 1 tablet (10 mg total) by mouth daily   warfarin (Jantoven) 3 mg tablet   No No   Sig: TAKE 2 TO 2 1/2 TABS BY MOUTH DAILY OR AS DIRECTED BY PHYSICIAN   zolpidem (AMBIEN CR) 12.5 MG CR tablet   No No   Sig: TAKE 1 TABLET (12.5 MG TOTAL) BY MOUTH DAILY AT BEDTIME AS NEEDED FOR SLEEP      Facility-Administered Medications: None       Past Medical History:   Diagnosis Date   • Anemia    • Anxiety    • Aortic valve disorder    • Back pain    • Chronic pain syndrome    • Cirrhosis (HCC)    • Constipation    • Degenerative arthritis of thoracic spine    • Degenerative joint disease involving multiple joints    • Depression    • DVT (deep venous thrombosis) ([facility])     Bilateral   • Edema    • Endometrial adenocarcinoma (HCC)    • Fibromyalgia    • Ganglion of right wrist    • GERD (gastroesophageal reflux disease)    • Heart murmur    • Hematoma    • History of mechanical aortic valve replacement    • Hypothyroidism (acquired)    • Laboratory confirmed diagnosis of COVID-19 03/16/2021   • Low blood pressure    • Malignant neoplasm of corpus uteri, except isthmus (HCC)    • Mixed hyperlipidemia    • Morbid obesity (HCC)    • Obstructive sleep apnea syndrome    • Pulmonary embolism ([address])    • Tobacco dependence syndrome    • Transient cerebral ischemia    • Urinary incontinence    • Viral hepatitis C    • Vitamin D deficiency        Past Surgical History:   Procedure Laterality Date   • ANKLE SURGERY     • CARDIAC SURGERY  2015    VALVE REPLACEMENT   •  SECTION      X3   • CHOLECYSTECTOMY     • COLONOSCOPY  2019   • COLONOSCOPY     • HYSTERECTOMY  2011    TOTAL       Family History   Problem Relation Age of Onset   • Coronary artery disease Mother    • Coronary artery disease Father    • Cirrhosis Father      I have reviewed and agree with the history as documented. E-Cigarette/Vaping   • E-Cigarette Use Never User      E-Cigarette/Vaping Substances   • Nicotine No    • THC No    • CBD No    • Flavoring No    • Other No    • Unknown No      Social History     Tobacco Use   • Smoking status: Some Days     Packs/day: 0.50     Years: 40.00     Total pack years: 20.00     Types: Cigarettes   • Smokeless tobacco: Never   Vaping Use   • Vaping Use: Never used   Substance Use Topics   • Alcohol use: Yes     Comment: once in awhile   • Drug use: Yes     Types: Marijuana, LSD       Review of Systems   Constitutional: Negative for chills and fever. HENT: Negative for ear pain and sore throat. Respiratory: Negative for cough and shortness of breath. Cardiovascular: Negative for chest pain and palpitations. Gastrointestinal: Positive for abdominal pain. Negative for diarrhea, nausea and vomiting. Genitourinary: Negative for dysuria and hematuria. Musculoskeletal: Positive for back pain. Negative for arthralgias and neck pain. Skin: Negative for color change and rash. Neurological: Negative for dizziness, syncope and headaches. All other systems reviewed and are negative. Physical Exam  Physical Exam  Vitals and nursing note reviewed. Constitutional:       General: She is not in acute distress. Appearance: She is obese.  She is not ill-appearing, toxic-appearing or diaphoretic. HENT:      Head: Atraumatic. Right Ear: Tympanic membrane, ear canal and external ear normal.      Left Ear: Tympanic membrane, ear canal and external ear normal.      Nose: Nose normal.      Mouth/Throat:      Mouth: Mucous membranes are moist.      Pharynx: Oropharynx is clear. Eyes:      Extraocular Movements: Extraocular movements intact. Conjunctiva/sclera: Conjunctivae normal.      Pupils: Pupils are equal, round, and reactive to light. Cardiovascular:      Rate and Rhythm: Normal rate and regular rhythm. Pulses: Normal pulses. Heart sounds: Normal heart sounds. Comments: Mechanical heart valve sounds  Sternotomy scar  Surgical scar left anterior shoulder  Pulmonary:      Effort: Pulmonary effort is normal.      Breath sounds: Normal breath sounds. Abdominal:      General: Abdomen is flat. Bowel sounds are normal.      Palpations: Abdomen is soft. Comments: Bruising to the left anterolateral abdomen. Musculoskeletal:         General: Normal range of motion. Cervical back: Normal range of motion and neck supple. No tenderness. Skin:     General: Skin is warm and dry. Capillary Refill: Capillary refill takes less than 2 seconds. Neurological:      General: No focal deficit present. Mental Status: She is alert and oriented to person, place, and time. Mental status is at baseline.          Vital Signs  ED Triage Vitals [10/09/23 1238]   Temperature Pulse Respirations Blood Pressure SpO2   98 °F (36.7 °C) 86 20 147/95 97 %      Temp Source Heart Rate Source Patient Position - Orthostatic VS BP Location FiO2 (%)   Oral -- Lying Right arm --      Pain Score       10 - Worst Possible Pain           Vitals:    10/09/23 1238   BP: 147/95   Pulse: 86   Patient Position - Orthostatic VS: Lying         Visual Acuity      ED Medications  Medications   sodium chloride 0.9 % bolus 500 mL (0 mL Intravenous Stopped 10/9/23 1532)   iohexol (OMNIPAQUE) 350 MG/ML injection (SINGLE-DOSE) 100 mL (100 mL Intravenous Given 10/9/23 1358)   morphine injection 4 mg (4 mg Intravenous Given 10/9/23 1433)       Diagnostic Studies  Results Reviewed     Procedure Component Value Units Date/Time    Comprehensive metabolic panel [[id number]]  (Abnormal) Collected: 10/09/23 1311    Lab Status: Final result Specimen: Blood from Arm, Right Updated: 10/09/23 1332     Sodium 138 mmol/L      Potassium 3.9 mmol/L      Chloride 101 mmol/L      CO2 30 mmol/L      ANION GAP 7 mmol/L      BUN 16 mg/dL      Creatinine 0.59 mg/dL      Glucose 163 mg/dL      Calcium 8.6 mg/dL      AST 26 U/L      ALT 14 U/L      Alkaline Phosphatase 84 U/L      Total Protein 7.2 g/dL      Albumin 3.7 g/dL      Total Bilirubin 0.81 mg/dL      eGFR 99 ml/min/1.73sq m     Narrative:      [facility] Kid[facility] Disease (CKD):   •  Stage 1 with normal or high GFR (GFR > 90 mL/min/1.73 square meters)  •  Stage 2 Mild CKD (GFR = 60-89 mL/min/1.73 square meters)  •  Stage 3A Moderate CKD (GFR = 45-59 mL/min/1.73 square meters)  •  Stage 3B Moderate CKD (GFR = 30-44 mL/min/1.73 square meters)  •  Stage 4 Severe CKD (GFR = 15-29 mL/min/1.73 square meters)  •  Stage 5 End Stage CKD (GFR <15 mL/min/1.73 square meters)  Note: GFR calculation is accurate only with a steady state creatinine    Protime-INR [[id number]]  (Abnormal) Collected: 10/09/23 1311    Lab Status: Final result Specimen: Blood from Arm, Right Updated: 10/09/23 1328     Protime 29.9 seconds      INR 2.79    APTT [[id number]]  (Abnormal) Collected: 10/09/23 1311    Lab Status: Final result Specimen: Blood from Arm, Right Updated: 10/09/23 1328     PTT 57 seconds     CBC and differential [[id number]]  (Abnormal) Collected: 10/09/23 1311    Lab Status: Final result Specimen: Blood from Arm, Right Updated: 10/09/23 1316     WBC 3.18 Thousand/uL      RBC 4.27 Million/uL      Hemoglobin 13.7 g/dL      Hematocrit 41.9 %      MCV 98 fL      MCH 32.1 pg      MCHC 32.7 g/dL      RDW 12.7 %      MPV 9.6 fL      Platelets 842 Thousands/uL      nRBC 0 /100 WBCs      Neutrophils Relative 62 %      Immat GRANS % 0 %      Lymphocytes Relative 24 %      Monocytes Relative 8 %      Eosinophils Relative 6 %      Basophils Relative 0 %      Neutrophils Absolute 1.94 Thousands/µL      Immature Grans Absolute 0.01 Thousand/uL      Lymphocytes Absolute 0.77 Thousands/µL      Monocytes Absolute 0.26 Thousand/µL      Eosinophils Absolute 0.19 Thousand/µL      Basophils Absolute 0.01 Thousands/µL                  CT chest abdomen pelvis w contrast   Final Result by Sheri Calderon MD (10/09 1503)      No acute intrathoracic or intra-abdominal pathology. No acute fracture identified. Cirrhosis. Hepatosplenomegaly. No focal hepatic lesion identified. No ascites. Other findings, as per the body of the report. Workstation performed: [id number]         CT head without contrast   Final Result by Sheri Calderon MD (10/09 6659)      No acute intracranial abnormality. Chronic findings, as per the body of the report. Workstation performed: [id number]                    Procedures  Procedures         ED Course                               SBIRT 20yo+    Flowsheet Row Most Recent Value   Initial Alcohol Screen: US AUDIT-C     1. How often do you have a drink containing alcohol? 1 Filed at: 10/09/2023 1238   2. How many drinks containing alcohol do you have on a typical day you are drinking? 0 Filed at: 10/09/2023 1238   3a. Male UNDER 65: How often do you have five or more drinks on one occasion? 0 Filed at: 10/09/2023 1238   3b. FEMALE Any Age, or MALE 65+: How often do you have 4 or more drinks on one occassion? 0 Filed at: 10/09/2023 1238   Audit-C Score 1 Filed at: 10/09/2023 1238   AVELINA: How many times in the past year have you. ..     Used an illegal drug or used a prescription medication for non-medical reasons? Never Filed at: 10/09/2023 1238                    Medical Decision Making  65-year-old white female on Coumadin with mechanical fall after rolling out of bed yesterday morning. I considered abdominal contusion, visceral injury, intracerebral injury on my differential diagnosis. Labs reviewed. Patient with an INR of 2.79. CT chest abdomen and pelvis shows cirrhosis and hepatosplenomegaly which is known. There is no acute visceral injury. There is no hematoma. CT head shows no intracranial injury. Patient reports Tylenol is not covering her pain and asking for something stronger for pain. Unable to give NSAIDs due to patient being on Coumadin. Will give prescription for 5 tablets of oxycodone to take only for moderate-severe pain not helped by Tylenol. Risks of sedation and constipation discussed with patient. No driving advised. She was advised follow-up with her primary care provider next 2 to 3 days for recheck. Return precautions given, including worsening pain or symptoms    Amount and/or Complexity of Data Reviewed  Labs: ordered. Radiology: ordered. Risk  Prescription drug management. Disposition  Final diagnoses:   Fall, initial encounter   Contusion of abdominal wall, initial encounter     Time reflects when diagnosis was documented in both MDM as applicable and the Disposition within this note     Time User Action Codes Description Comment    10/9/2023  3:11 PM Lawyer Lopez Add [F67. [employer]ards Fall, initial encounter     10/9/2023  3:11 PM Ted Estrella Add [S30.1XXA] Contusion of abdominal wall, initial encounter       ED Disposition     ED Disposition   Discharge    Condition   Stable    Date/Time   Mon Oct 9, 2023  3:11 PM    [address] [place] [address] discharge to home/self care.                Follow-up Information     Follow up With Specialties Details Why [address], MD Internal Medicine, Pediatrics   [facility]            Patient's Medications   Discharge Prescriptions    OXYCODONE (ROXICODONE) 5 IMMEDIATE RELEASE TABLET    Take 1 tablet (5 mg total) by mouth every 4 (four) hours as needed for moderate pain for up to 5 doses Max Daily Amount: 30 mg       Start Date: 10/9/2023 End Date: --       Order Dose: 5 mg       Quantity: 5 tablet    Refills: 0       No discharge procedures on file.     PDMP Review     None          ED Provider  Electronically Signed by           Ben Ryder PA-C  10/09/23 [id number]

## 2023-10-09 NOTE — DISCHARGE INSTRUCTIONS
Tylenol every 6 hours for mild pain  Oxycodone for moderate to severe pain not helped by tylenol  No driving as can make drowsy and take stool softener as can constipate  Follow up with your primary care provider next 2-3 days for recheck  Return to ED for increased pain, worsening symptoms

## 2023-10-10 ENCOUNTER — NURSE TRIAGE (OUTPATIENT)
Age: 63
End: 2023-10-10

## 2023-10-10 NOTE — TELEPHONE ENCOUNTER
Reason for Disposition  • Constant abdominal pain lasting > 2 hours    Answer Assessment - Initial Assessment Questions  1. LOCATION: "Where does it hurt?"       Right sided rib/abdominal pain   2. RADIATION: "Does the pain shoot anywhere else?" (e.g., chest, back)      no  3. ONSET: "When did the pain begin?" (e.g., minutes, hours or days ago)       Yesterday, it is worse than when I was evaluated at the ED. 4. SUDDEN: "Gradual or sudden onset?"      *No Answer*  5. PATTERN "Does the pain come and go, or is it constant?"     - If constant: "Is it getting better, staying the same, or worsening?"       (Note: Constant means the pain never goes away completely; most serious pain is constant and it progresses)      - If intermittent: "How long does it last?" "Do you have pain now?"      (Note: Intermittent means the pain goes away completely between bouts)      Constant   6. SEVERITY: "How bad is the pain?"  (e.g., Scale 1-10; mild, moderate, or severe)     - MILD (1-3): doesn't interfere with normal activities, abdomen soft and not tender to touch      - MODERATE (4-7): interferes with normal activities or awakens from sleep, tender to touch      - SEVERE (8-10): excruciating pain, doubled over, unable to do any normal activities        10/10  7. RECURRENT SYMPTOM: "Have you ever had this type of stomach pain before?" If Yes, ask: "When was the last time?" and "What happened that time?"       no  8. AGGRAVATING FACTORS: "Does anything seem to cause this pain?" (e.g., foods, stress, alcohol)      n/a  9. CARDIAC SYMPTOMS: "Do you have any of the following symptoms: chest pain, difficulty breathing, sweating, nausea?"     no  10. OTHER SYMPTOMS: "Do you have any other symptoms?" (e.g., fever, vomiting, diarrhea)        no  11.  PREGNANCY: "Is there any chance you are pregnant?" "When was your last menstrual period?"        no    Protocols used: ABDOMINAL PAIN - UPPER-ADULT-OH

## 2023-10-10 NOTE — TELEPHONE ENCOUNTER
Regarding: triage  ----- Message from Monique Williamstabitha sent at 10/10/2023  1:56 PM EDT -----  Pt was in the ER yesterday. She was told she had an enlarged spleen and bruised ribs and she has pain when she moves. She said her symptoms are getting worse. She fell on Sunday. And she can barely move. If she moves, it feels like she is stabbed with a knife.

## 2023-10-11 ENCOUNTER — TELEPHONE (OUTPATIENT)
Age: 63
End: 2023-10-11

## 2023-10-11 DIAGNOSIS — G89.29 CHRONIC BILATERAL LOW BACK PAIN WITH SCIATICA, SCIATICA LATERALITY UNSPECIFIED: ICD-10-CM

## 2023-10-11 DIAGNOSIS — M54.40 CHRONIC BILATERAL LOW BACK PAIN WITH SCIATICA, SCIATICA LATERALITY UNSPECIFIED: ICD-10-CM

## 2023-10-11 DIAGNOSIS — E11.9 TYPE 2 DIABETES MELLITUS WITHOUT COMPLICATION, WITHOUT LONG-TERM CURRENT USE OF INSULIN (HCC): ICD-10-CM

## 2023-10-11 DIAGNOSIS — M79.7 FIBROMYALGIA: ICD-10-CM

## 2023-10-11 RX ORDER — DULOXETIN HYDROCHLORIDE 30 MG/1
30 CAPSULE, DELAYED RELEASE ORAL DAILY
Qty: 30 CAPSULE | Refills: 5 | Status: SHIPPED | OUTPATIENT
Start: 2023-10-11

## 2023-10-11 RX ORDER — DULOXETIN HYDROCHLORIDE 60 MG/1
60 CAPSULE, DELAYED RELEASE ORAL DAILY
Qty: 30 CAPSULE | Refills: 5 | Status: SHIPPED | OUTPATIENT
Start: 2023-10-11

## 2023-10-11 RX ORDER — DULAGLUTIDE 1.5 MG/.5ML
1.5 INJECTION, SOLUTION SUBCUTANEOUS WEEKLY
Qty: 2 ML | Refills: 5 | Status: SHIPPED | OUTPATIENT
Start: 2023-10-11

## 2023-10-19 ENCOUNTER — OFFICE VISIT (OUTPATIENT)
Dept: FAMILY MEDICINE CLINIC | Facility: CLINIC | Age: 63
End: 2023-10-19
Payer: COMMERCIAL

## 2023-10-19 VITALS
OXYGEN SATURATION: 96 % | SYSTOLIC BLOOD PRESSURE: 110 MMHG | HEART RATE: 72 BPM | TEMPERATURE: 98.2 F | DIASTOLIC BLOOD PRESSURE: 60 MMHG

## 2023-10-19 DIAGNOSIS — R58 ECCHYMOSIS: ICD-10-CM

## 2023-10-19 DIAGNOSIS — R52 PAIN: Primary | ICD-10-CM

## 2023-10-19 DIAGNOSIS — E11.69 DIABETES MELLITUS TYPE 2 IN OBESE: Primary | ICD-10-CM

## 2023-10-19 DIAGNOSIS — E66.9 DIABETES MELLITUS TYPE 2 IN OBESE: Primary | ICD-10-CM

## 2023-10-19 DIAGNOSIS — Z95.2 STATUS POST MECHANICAL AORTIC VALVE REPLACEMENT: ICD-10-CM
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
## 2023-10-19 DIAGNOSIS — E66.01 MORBID OBESITY WITH BMI OF 50.0-59.9, ADULT (HCC): ICD-10-CM

## 2023-10-19 DIAGNOSIS — I50.30 HEART FAILURE WITH PRESERVED EJECTION FRACTION, BORDERLINE, CLASS III (HCC): ICD-10-CM

## 2023-10-19 PROCEDURE — 99214 OFFICE O/P EST MOD 30 MIN: CPT | Performed by: INTERNAL MEDICINE

## 2023-10-19 RX ORDER — ACETAMINOPHEN AND CODEINE PHOSPHATE 300; 30 MG/1; MG/1
1 TABLET ORAL 2 TIMES DAILY PRN
Qty: 60 TABLET | Refills: 0 | Status: SHIPPED | OUTPATIENT
Start: 2023-10-19

## 2023-10-19 RX ORDER — CODEINE SULFATE 30 MG/1
30 TABLET ORAL EVERY 8 HOURS PRN
Qty: 40 TABLET | Refills: 0 | Status: SHIPPED | OUTPATIENT
Start: 2023-10-19 | End: 2023-10-19

## 2023-10-19 NOTE — TELEPHONE ENCOUNTER
Patient called in stating she is not able to find medication codeine at any local pharmacies within walking distance. She is asking for a phone call to discuss other options.

## 2023-10-19 NOTE — TELEPHONE ENCOUNTER
Please resend to [facility] as [facility] does not have this medication in stock.

## 2023-10-19 NOTE — PROGRESS NOTES
Assessment/Plan:I think Kandis Mcclellan has a contusion/bad bruise there that bothers her-recommend rest, icing off and on and she would like some codeine to use because she says it really hurts-I told her I'd give her a supply of it but not to overuse it-naturally, I think with her being on the Eliquis she's predisposed to bad bruising and contusions          Problem List Items Addressed This Visit          Endocrine    Diabetes mellitus type 2 in obese  - Primary       Cardiovascular and Mediastinum    Heart failure with preserved ejection fraction, borderline, class III ([address])       Other    Status post mechanical aortic valve replacement    Morbid obesity with BMI of 50.0-59.9, adult [facility])     Other Visit Diagnoses       Fall, sequela        Ecchymosis                  Subjective:      Patient ID: Isadore Boas is a 66 y.o. female. Kandis Mcclellan with a hx of morbid obesity, DM ii, chronic diarrhea, hx of alcohol abuse and cirrhosis of the liver, s/p aortic valve replacement with metal valve, maintained on Coumadin-she actually tells me she fell out of her bed 2 weeks ago or so and ended up going to the ER-had a CT of her head to rule out bleed but it was normal, and had CT chest, abd, and pelvis-just showed some bruising over the left lower quadrant abdominal wall-now she has a larger bruise there and says it hurts a lot, hurts to move etc    Fall  Associated symptoms include abdominal pain.        The following portions of the patient's history were reviewed and updated as appropriate:   Past Medical History:  She has a past medical history of Anemia, Anxiety, Aortic valve disorder, Back pain, Chronic pain syndrome, Cirrhosis ([address]), Constipation, Degenerative arthritis of thoracic spine, Degenerative joint disease involving multiple joints, Depression, DVT (deep venous thrombosis) ([address]), Edema, Endometrial adenocarcinoma ([address]), Fibromyalgia, Ganglion of right wrist, GERD (gastroesophageal reflux disease), Heart murmur, Hematoma, History of mechanical aortic valve replacement, Hypothyroidism (acquired), Laboratory confirmed diagnosis of COVID-19 (2021), Low blood pressure, Malignant neoplasm of corpus uteri, except isthmus ([address]), Mixed hyperlipidemia, Morbid obesity ([address]), Obstructive sleep apnea syndrome, Pulmonary embolism ([address]), Tobacco dependence syndrome, Transient cerebral ischemia, Urinary incontinence, Viral hepatitis C, and Vitamin D deficiency. ,  _______________________________________________________________________  Medical Problems:  does not have any pertinent problems on file.,  _______________________________________________________________________  Past Surgical History:   has a past surgical history that includes Ankle surgery; Cardiac surgery (); Hysterectomy (2011);  section; Cholecystectomy; Colonoscopy (2019); and Colonoscopy. ,  _______________________________________________________________________  Family History:  family history includes Cirrhosis in her father; Coronary artery disease in her father and mother.,  _______________________________________________________________________  Social History:   reports that she has been smoking cigarettes. She has a 20.00 pack-year smoking history. She has never used smokeless tobacco. She reports current alcohol use. She reports current drug use. Drugs: Marijuana and LSD.,  _______________________________________________________________________  Allergies:  is allergic to bactrim [sulfamethoxazole-trimethoprim], penicillins, tramadol, and rocephin [ceftriaxone]. .  _______________________________________________________________________  Current Outpatient Medications   Medication Sig Dispense Refill    Alcohol Swabs (Pharmacist Choice Alcohol) PADS Apply topically 3 (three) times a day 100 each 5    baclofen 10 mg tablet TAKE 1 TABLET (10 MG TOTAL) BY MOUTH 3 (THREE) TIMES A DAY AS NEEDED FOR MUSCLE SPASMS 90 tablet 3    BD Pen Needle Mary 2nd Gen 32G X 4 MM MISC       Blood Glucose Monitoring Suppl (OneTouch Verio IQ System) w/Device KIT Use to check blood sugar daily 1 kit 1    Cholecalciferol (Vitamin D3) 50 MCG (2000 UT) TABS       cholestyramine (QUESTRAN) 4 g packet TAKE 1 PACKET (4 G TOTAL) BY MOUTH 2 (TWO) TIMES A DAY WITH MEALS DO NOT TAKE WITHIN 2 HOURS OF OTHER ORAL MEDICATIONS 60 each 5    cyclobenzaprine (FLEXERIL) 10 mg tablet Take 1 tablet (10 mg total) by mouth 2 (two) times a day as needed for muscle spasms 60 tablet 3    DULoxetine (CYMBALTA) 30 mg delayed release capsule TAKE 1 CAPSULE (30 MG TOTAL) BY MOUTH DAILY 30 capsule 5    DULoxetine (CYMBALTA) 60 mg delayed release capsule TAKE 1 CAPSULE (60 MG TOTAL) BY MOUTH DAILY PT TO TAKE A 60 MG CYMBALTA AND A 30 MG CYMBALTA TO MAKE 90 MG 30 capsule 5    glucose blood (OneTouch Verio) test strip Use 1 each 3 (three) times a day Use as instructed 300 each 5    Incontinence Supplies MISC Use 3 (three) times a day 100 each 0    nystatin powder Apply topically 2 (two) times a day 60 g 5    Pharmacist Choice Lancets MISC USE TO TEST GLUCOSE UP TO 3 TIMES A DAY - ONETOUCH VERIO LANCETS 300 each 5    ProAir RespiClick 540 (90 Base) MCG/ACT AEPB INHALE 1 PUFF EVERY 4 (FOUR) HOURS AS NEEDED (WHEEZING OR SHORTNESS OF BREATH). solifenacin (VESICARE) 10 MG tablet Take 1 tablet (10 mg total) by mouth daily 30 tablet 3    Trulicity 1.5 UL/0.9WW injection INJECT 0.5 ML (1.5 MG TOTAL) UNDER THE SKIN ONCE A WEEK 2 mL 5    warfarin (Jantoven) 3 mg tablet TAKE 2 TO 2 1/2 TABS BY MOUTH DAILY OR AS DIRECTED BY PHYSICIAN 75 tablet 5    zolpidem (AMBIEN CR) 12.5 MG CR tablet TAKE 1 TABLET (12.5 MG TOTAL) BY MOUTH DAILY AT BEDTIME AS NEEDED FOR SLEEP 30 tablet 2     No current facility-administered medications for this visit.     _______________________________________________________________________  Review of Systems   Constitutional: Negative. HENT: Negative. Respiratory: Negative. Cardiovascular: Negative. Gastrointestinal:  Positive for abdominal pain. Neurological: Negative. Hematological: Negative. Psychiatric/Behavioral: Negative. Objective:  Vitals:    10/19/23 1050   BP: 110/60   BP Location: Left arm   Patient Position: Sitting   Cuff Size: Large   Pulse: 72   Temp: 98.2 °F (36.8 °C)   TempSrc: Tympanic   SpO2: 96%     There is no height or weight on file to calculate BMI. Physical Exam  Constitutional:       Appearance: She is obese. HENT:      Head: Normocephalic and atraumatic. Right Ear: External ear normal.      Left Ear: External ear normal.   Cardiovascular:      Rate and Rhythm: Normal rate and regular rhythm. Heart sounds: Normal heart sounds. Pulmonary:      Effort: Pulmonary effort is normal.      Breath sounds: Normal breath sounds. Abdominal:      Tenderness: There is abdominal tenderness. Comments: Left lower quadrant large bruise, tender to touch   Skin:     Findings: Bruising present. Neurological:      General: No focal deficit present. Mental Status: She is alert and oriented to person, place, and time.

## 2023-10-20 ENCOUNTER — TELEPHONE (OUTPATIENT)
Age: 63
End: 2023-10-20

## 2023-10-20 RX ORDER — TRAMADOL HYDROCHLORIDE 50 MG/1
50 TABLET ORAL EVERY 6 HOURS PRN
Qty: 10 TABLET | Refills: 0 | Status: CANCELLED | OUTPATIENT
Start: 2023-10-20

## 2023-10-20 NOTE — TELEPHONE ENCOUNTER
Called pt to make aware prescribing tramadol, but its look like she has an allergy warning. Please advice !

## 2023-10-20 NOTE — TELEPHONE ENCOUNTER
Then patient can take tylenol ES 2 tabs every 6 hours for the weekend until Dr Genna Squires back on [facility]

## 2023-10-20 NOTE — TELEPHONE ENCOUNTER
Spoke with Danis Ace and she stated she does not want tramadol and will take tylenol instead. She said her pain is getting worse she can't walk I told her I will mention to  on Monday.

## 2023-10-20 NOTE — TELEPHONE ENCOUNTER
Patient called and stated she has been to several pharmacy's and no one has the  tylenol with codeine. Patient wanted to know can the provider call in a different medication for pain. Please advise.

## 2023-10-24 ENCOUNTER — TELEPHONE (OUTPATIENT)
Age: 63
End: 2023-10-24

## 2023-10-24 NOTE — TELEPHONE ENCOUNTER
Pharmacist called and stated the insurance will only cover 10 Tylenol with codeine and after that she  will need a prior auth. Please advise.

## 2023-10-25 NOTE — TELEPHONE ENCOUNTER
10 should be all that she needs-she has a hematoma on her left lower abdomen that should be healing by now

## 2023-10-30 DIAGNOSIS — R35.0 URINARY FREQUENCY: ICD-10-CM

## 2023-10-30 RX ORDER — SOLIFENACIN SUCCINATE 10 MG/1
10 TABLET, FILM COATED ORAL DAILY
Qty: 30 TABLET | Refills: 3 | Status: SHIPPED | OUTPATIENT
Start: 2023-10-30

## 2023-12-01 ENCOUNTER — TELEPHONE (OUTPATIENT)
Age: 63
End: 2023-12-01

## 2023-12-01 NOTE — TELEPHONE ENCOUNTER
Received call from [address] [place] [address] to follow up on form requiring sinature from PCP  for order for incontinence supplies. First form faxed to office on 10/31/23. RN transferred call to office to confirm receipt of form.

## 2023-12-15 RX ORDER — CODEINE SULFATE 30 MG/1
30 TABLET ORAL EVERY 8 HOURS PRN
Qty: 40 TABLET | Refills: 0 | OUTPATIENT
Start: 2023-12-15

## 2023-12-18 DIAGNOSIS — G47.00 INSOMNIA, UNSPECIFIED TYPE: ICD-10-CM

## 2023-12-19 RX ORDER — ZOLPIDEM TARTRATE 12.5 MG/1
12.5 TABLET, FILM COATED, EXTENDED RELEASE ORAL
Qty: 30 TABLET | Refills: 2 | Status: SHIPPED | OUTPATIENT
Start: 2023-12-19

## 2024-02-01 DIAGNOSIS — M62.838 MUSCLE SPASMS OF BOTH LOWER EXTREMITIES: ICD-10-CM

## 2024-02-01 RX ORDER — CYCLOBENZAPRINE HCL 10 MG
10 TABLET ORAL 2 TIMES DAILY PRN
Qty: 60 TABLET | Refills: 3 | Status: SHIPPED | OUTPATIENT
Start: 2024-02-01

## 2024-02-26 DIAGNOSIS — R35.0 URINARY FREQUENCY: ICD-10-CM

## 2024-02-26 DIAGNOSIS — Z95.2 HISTORY OF MECHANICAL AORTIC VALVE REPLACEMENT: ICD-10-CM

## 2024-02-26 DIAGNOSIS — Z86.718 PERSONAL HISTORY OF DVT (DEEP VEIN THROMBOSIS): ICD-10-CM

## 2024-02-27 ENCOUNTER — TELEPHONE (OUTPATIENT)
Age: 64
End: 2024-02-27

## 2024-02-27 RX ORDER — WARFARIN SODIUM 3 MG/1
TABLET ORAL
Qty: 75 TABLET | Refills: 5 | Status: SHIPPED | OUTPATIENT
Start: 2024-02-27

## 2024-02-27 RX ORDER — SOLIFENACIN SUCCINATE 10 MG/1
10 TABLET, FILM COATED ORAL DAILY
Qty: 30 TABLET | Refills: 5 | Status: SHIPPED | OUTPATIENT
Start: 2024-02-27

## 2024-02-27 NOTE — TELEPHONE ENCOUNTER
PA for solifenacin (VESICARE) 10 MG tablet    Submitted via    []CMM-KEY   [x][employer]-Case ID # [id number]  []Faxed to plan   []Other website   []Phone call Case ID #     Office notes sent, clinical questions answered. Awaiting determination    Turnaround time for your insurance to make a decision on your Prior Authorization can take 7-21 business days.

## 2024-02-28 DIAGNOSIS — Z76.0 MEDICATION REFILL: Primary | ICD-10-CM

## 2024-02-28 NOTE — TELEPHONE ENCOUNTER
I mean, all of the GLP1 are back ordered-I guess Ozempic can be used, which is weekly, or Victoza which is daily

## 2024-02-28 NOTE — TELEPHONE ENCOUNTER
Ciarra (daughter) called and states mom take Trulicity on Wednesday's. Called a couple pharmacies and is on back order. Would like to know what other medication is in its place, would like sent to [facility] and would like a call at [phone] when medication sent to pharmacy

## 2024-02-29 NOTE — TELEPHONE ENCOUNTER
Spoke to patient's daughter. They would like if we sent Ozempic to [facility]. Can you send in or send me dosage instructions?

## 2024-03-07 ENCOUNTER — TELEPHONE (OUTPATIENT)
Age: 64
End: 2024-03-07

## 2024-03-07 DIAGNOSIS — Z20.2 ENCOUNTER FOR ASSESSMENT OF SEXUALLY TRANSMITTED DISEASE EXPOSURE: Primary | ICD-10-CM

## 2024-03-07 NOTE — TELEPHONE ENCOUNTER
Per call to [facility] PA for  solifenacin (VESICARE) 10 MG tablet is not required.

## 2024-03-07 NOTE — TELEPHONE ENCOUNTER
Lauren called in requesting labs to be added to her current labs. Lauren would like to know if Dr. Chen could add HIV and STD testing. Please advise.

## 2024-03-20 DIAGNOSIS — G47.00 INSOMNIA, UNSPECIFIED TYPE: ICD-10-CM

## 2024-03-20 RX ORDER — ZOLPIDEM TARTRATE 12.5 MG/1
12.5 TABLET, FILM COATED, EXTENDED RELEASE ORAL
Qty: 30 TABLET | Refills: 2 | Status: SHIPPED | OUTPATIENT
Start: 2024-03-20

## 2024-03-21 ENCOUNTER — APPOINTMENT (OUTPATIENT)
Dept: LAB | Facility: HOSPITAL | Age: 64
End: 2024-03-21
Payer: COMMERCIAL

## 2024-03-21 DIAGNOSIS — E78.5 HYPERLIPIDEMIA, UNSPECIFIED HYPERLIPIDEMIA TYPE: ICD-10-CM

## 2024-03-21 DIAGNOSIS — E11.9 TYPE 2 DIABETES MELLITUS WITHOUT COMPLICATION, WITHOUT LONG-TERM CURRENT USE OF INSULIN (HCC): ICD-10-CM

## 2024-03-21 DIAGNOSIS — Z20.2 ENCOUNTER FOR ASSESSMENT OF SEXUALLY TRANSMITTED DISEASE EXPOSURE: ICD-10-CM

## 2024-03-21 DIAGNOSIS — E03.9 HYPOTHYROIDISM, UNSPECIFIED TYPE: ICD-10-CM

## 2024-03-21 LAB
ALBUMIN SERPL BCP-MCNC: 3.7 G/DL (ref 3.5–5)
ALP SERPL-CCNC: 92 U/L (ref 34–104)
ALT SERPL W P-5'-P-CCNC: 17 U/L (ref 7–52)
ANION GAP SERPL CALCULATED.3IONS-SCNC: 8 MMOL/L (ref 4–13)
AST SERPL W P-5'-P-CCNC: 27 U/L (ref 13–39)
BASOPHILS # BLD AUTO: 0.02 THOUSANDS/ÂΜL (ref 0–0.1)
BASOPHILS NFR BLD AUTO: 1 % (ref 0–1)
BILIRUB SERPL-MCNC: 0.59 MG/DL (ref 0.2–1)
BUN SERPL-MCNC: 14 MG/DL (ref 5–25)
CALCIUM SERPL-MCNC: 8.4 MG/DL (ref 8.4–10.2)
CHLORIDE SERPL-SCNC: 103 MMOL/L (ref 96–108)
CHOLEST SERPL-MCNC: 168 MG/DL
CO2 SERPL-SCNC: 28 MMOL/L (ref 21–32)
CREAT SERPL-MCNC: 0.59 MG/DL (ref 0.6–1.3)
CREAT UR-MCNC: 225.5 MG/DL
EOSINOPHIL # BLD AUTO: 0.17 THOUSAND/ÂΜL (ref 0–0.61)
EOSINOPHIL NFR BLD AUTO: 5 % (ref 0–6)
ERYTHROCYTE [DISTWIDTH] IN BLOOD BY AUTOMATED COUNT: 12.2 % (ref 11.6–15.1)
GFR SERPL CREATININE-BSD FRML MDRD: 99 ML/MIN/1.73SQ M
GLUCOSE P FAST SERPL-MCNC: 183 MG/DL (ref 65–99)
HCT VFR BLD AUTO: 43.6 % (ref 34.8–46.1)
HDLC SERPL-MCNC: 44 MG/DL
HGB BLD-MCNC: 14.2 G/DL (ref 11.5–15.4)
HIV 1+2 AB+HIV1 P24 AG SERPL QL IA: NORMAL
HIV 2 AB SERPL QL IA: NORMAL
HIV1 AB SERPL QL IA: NORMAL
HIV1 P24 AG SERPL QL IA: NORMAL
IMM GRANULOCYTES # BLD AUTO: 0 THOUSAND/UL (ref 0–0.2)
IMM GRANULOCYTES NFR BLD AUTO: 0 % (ref 0–2)
LDLC SERPL CALC-MCNC: 75 MG/DL (ref 0–100)
LYMPHOCYTES # BLD AUTO: 1.07 THOUSANDS/ÂΜL (ref 0.6–4.47)
LYMPHOCYTES NFR BLD AUTO: 29 % (ref 14–44)
MCH RBC QN AUTO: 32.3 PG (ref 26.8–34.3)
MCHC RBC AUTO-ENTMCNC: 32.6 G/DL (ref 31.4–37.4)
MCV RBC AUTO: 99 FL (ref 82–98)
MICROALBUMIN UR-MCNC: 176.3 MG/L
MICROALBUMIN/CREAT 24H UR: 78 MG/G CREATININE (ref 0–30)
MONOCYTES # BLD AUTO: 0.29 THOUSAND/ÂΜL (ref 0.17–1.22)
MONOCYTES NFR BLD AUTO: 8 % (ref 4–12)
NEUTROPHILS # BLD AUTO: 2.11 THOUSANDS/ÂΜL (ref 1.85–7.62)
NEUTS SEG NFR BLD AUTO: 57 % (ref 43–75)
NONHDLC SERPL-MCNC: 124 MG/DL
NRBC BLD AUTO-RTO: 0 /100 WBCS
PLATELET # BLD AUTO: 142 THOUSANDS/UL (ref 149–390)
PMV BLD AUTO: 10 FL (ref 8.9–12.7)
POTASSIUM SERPL-SCNC: 3.5 MMOL/L (ref 3.5–5.3)
PROT SERPL-MCNC: 7.4 G/DL (ref 6.4–8.4)
RBC # BLD AUTO: 4.4 MILLION/UL (ref 3.81–5.12)
SODIUM SERPL-SCNC: 139 MMOL/L (ref 135–147)
TREPONEMA PALLIDUM IGG+IGM AB [PRESENCE] IN SERUM OR PLASMA BY IMMUNOASSAY: NORMAL
TRIGL SERPL-MCNC: 247 MG/DL
TSH SERPL DL<=0.05 MIU/L-ACNC: 3.62 UIU/ML (ref 0.45–4.5)
WBC # BLD AUTO: 3.66 THOUSAND/UL (ref 4.31–10.16)

## 2024-03-21 PROCEDURE — 82570 ASSAY OF URINE CREATININE: CPT

## 2024-03-21 PROCEDURE — 87389 HIV-1 AG W/HIV-1&-2 AB AG IA: CPT

## 2024-03-21 PROCEDURE — 80053 COMPREHEN METABOLIC PANEL: CPT

## 2024-03-21 PROCEDURE — 87591 N.GONORRHOEAE DNA AMP PROB: CPT

## 2024-03-21 PROCEDURE — 85025 COMPLETE CBC W/AUTO DIFF WBC: CPT

## 2024-03-21 PROCEDURE — 80061 LIPID PANEL: CPT

## 2024-03-21 PROCEDURE — 84443 ASSAY THYROID STIM HORMONE: CPT

## 2024-03-21 PROCEDURE — 87491 CHLMYD TRACH DNA AMP PROBE: CPT

## 2024-03-21 PROCEDURE — 86780 TREPONEMA PALLIDUM: CPT

## 2024-03-21 PROCEDURE — 82043 UR ALBUMIN QUANTITATIVE: CPT

## 2024-03-22 LAB
C TRACH DNA SPEC QL NAA+PROBE: NEGATIVE
N GONORRHOEA DNA SPEC QL NAA+PROBE: NEGATIVE

## 2024-03-27 ENCOUNTER — TELEPHONE (OUTPATIENT)
Dept: FAMILY MEDICINE CLINIC | Facility: CLINIC | Age: 64
End: 2024-03-27

## 2024-03-27 NOTE — TELEPHONE ENCOUNTER
----- Message from Sylvia Chen MD sent at 3/21/2024 12:23 PM EDT -----  Lauren's inr was a bit elevated out of range at 3.9-she can hold for 1 day and then restart same dose and recheck in 4 weeks

## 2024-03-27 NOTE — TELEPHONE ENCOUNTER
Also Lauren's labs look pretty good thus far-can you let her know-she does have a low platelet count which she;has had for awhile

## 2024-04-23 DIAGNOSIS — M79.7 FIBROMYALGIA: ICD-10-CM

## 2024-04-23 DIAGNOSIS — G89.29 CHRONIC BILATERAL LOW BACK PAIN WITH SCIATICA, SCIATICA LATERALITY UNSPECIFIED: ICD-10-CM

## 2024-04-23 DIAGNOSIS — M54.40 CHRONIC BILATERAL LOW BACK PAIN WITH SCIATICA, SCIATICA LATERALITY UNSPECIFIED: ICD-10-CM

## 2024-04-24 RX ORDER — DULOXETIN HYDROCHLORIDE 60 MG/1
60 CAPSULE, DELAYED RELEASE ORAL DAILY
Qty: 30 CAPSULE | Refills: 5 | Status: SHIPPED | OUTPATIENT
Start: 2024-04-24

## 2024-04-24 RX ORDER — DULOXETIN HYDROCHLORIDE 30 MG/1
30 CAPSULE, DELAYED RELEASE ORAL DAILY
Qty: 30 CAPSULE | Refills: 5 | Status: SHIPPED | OUTPATIENT
Start: 2024-04-24

## 2024-05-06 ENCOUNTER — OFFICE VISIT (OUTPATIENT)
Dept: FAMILY MEDICINE CLINIC | Facility: CLINIC | Age: 64
End: 2024-05-06
Payer: COMMERCIAL

## 2024-05-06 VITALS
RESPIRATION RATE: 16 BRPM | WEIGHT: 293 LBS | HEART RATE: 65 BPM | DIASTOLIC BLOOD PRESSURE: 64 MMHG | TEMPERATURE: 97.6 F | OXYGEN SATURATION: 96 % | BODY MASS INDEX: 50.02 KG/M2 | HEIGHT: 64 IN | SYSTOLIC BLOOD PRESSURE: 100 MMHG

## 2024-05-06 DIAGNOSIS — E11.69 TYPE 2 DIABETES MELLITUS WITH OBESITY  (HCC): Primary | ICD-10-CM

## 2024-05-06 DIAGNOSIS — Z86.711 HISTORY OF PULMONARY EMBOLUS (PE): ICD-10-CM

## 2024-05-06 DIAGNOSIS — Z79.01 ANTICOAGULATED ON COUMADIN: ICD-10-CM

## 2024-05-06 DIAGNOSIS — Z86.718 PERSONAL HISTORY OF DVT (DEEP VEIN THROMBOSIS): ICD-10-CM

## 2024-05-06 DIAGNOSIS — I50.30 HEART FAILURE WITH PRESERVED EJECTION FRACTION, BORDERLINE, CLASS III (HCC): ICD-10-CM

## 2024-05-06 DIAGNOSIS — Z12.11 COLON CANCER SCREENING: ICD-10-CM

## 2024-05-06 DIAGNOSIS — Z53.20 MAMMOGRAM DECLINED: ICD-10-CM

## 2024-05-06 DIAGNOSIS — E66.9 TYPE 2 DIABETES MELLITUS WITH OBESITY  (HCC): Primary | ICD-10-CM

## 2024-05-06 DIAGNOSIS — Z72.0 TOBACCO USE: ICD-10-CM

## 2024-05-06 DIAGNOSIS — K70.30 ALCOHOLIC CIRRHOSIS OF LIVER WITHOUT ASCITES (HCC): ICD-10-CM

## 2024-05-06 DIAGNOSIS — F31.9 BIPOLAR DEPRESSION (HCC): ICD-10-CM

## 2024-05-06 DIAGNOSIS — R07.9 CHEST PAIN, UNSPECIFIED TYPE: ICD-10-CM

## 2024-05-06 DIAGNOSIS — E66.01 MORBID OBESITY (HCC): ICD-10-CM

## 2024-05-06 DIAGNOSIS — Z23 ENCOUNTER FOR IMMUNIZATION: ICD-10-CM

## 2024-05-06 DIAGNOSIS — E03.9 HYPOTHYROIDISM, UNSPECIFIED TYPE: ICD-10-CM

## 2024-05-06 DIAGNOSIS — Z95.2 HISTORY OF MECHANICAL AORTIC VALVE REPLACEMENT: ICD-10-CM

## 2024-05-06 LAB
LEFT EYE DIABETIC RETINOPATHY: NORMAL
LEFT EYE IMAGE QUALITY: NORMAL
LEFT EYE MACULAR EDEMA: NORMAL
LEFT EYE OTHER RETINOPATHY: NORMAL
RIGHT EYE DIABETIC RETINOPATHY: NORMAL
RIGHT EYE IMAGE QUALITY: NORMAL
RIGHT EYE MACULAR EDEMA: NORMAL
RIGHT EYE OTHER RETINOPATHY: NORMAL
SEVERITY (EYE EXAM): NORMAL
SL AMB POCT HEMOGLOBIN AIC: 6.3 (ref ?–6.5)

## 2024-05-06 PROCEDURE — 90677 PCV20 VACCINE IM: CPT

## 2024-05-06 PROCEDURE — 99214 OFFICE O/P EST MOD 30 MIN: CPT | Performed by: INTERNAL MEDICINE

## 2024-05-06 PROCEDURE — 83036 HEMOGLOBIN GLYCOSYLATED A1C: CPT | Performed by: INTERNAL MEDICINE

## 2024-05-06 PROCEDURE — 90471 IMMUNIZATION ADMIN: CPT

## 2024-05-06 RX ORDER — SEMAGLUTIDE 0.68 MG/ML
INJECTION, SOLUTION SUBCUTANEOUS
COMMUNITY
Start: 2024-04-23

## 2024-05-06 NOTE — PROGRESS NOTES
"Assessment/Plan:Lauren doing ok, actually thinks she lost some weight by using Ozempic and eating relatively healthy-still smokes, but we will wait until Oct 24 to order a LDCT, has a hx of mechanical aortic valve replacement and she's on coumadin and monitored-says she wants a CXR because when they did the surgery they cut her wrong (I ordered a CXR for her)-bp good today, she did fall once at home and had some bruising which is now resolving on the left side of abdomen-I do think her weight plays a major role in her ambulation issues-she would like a referral to bariatrics so I did do that today-still smokes, says she \"buys\" alcohol but doesn't drink it-echo reordered to see where that is at and referral to GI as she needs repeat colonoscopy this year due to her hx of polyps-should also get EGD with her hx of alcohol cirrhosis of the liver. Foot exam, eye exam and prevnar 20 done today         Problem List Items Addressed This Visit       History of mechanical aortic valve replacement    Hypothyroidism    Morbid obesity (HCC)    Relevant Orders    Ambulatory Referral to Weight Management    Type 2 diabetes mellitus with obesity  (HCC) - Primary    Relevant Medications    Ozempic, 0.25 or 0.5 MG/DOSE, 2 MG/3ML injection pen    Other Relevant Orders    POCT hemoglobin A1c    IRIS Diabetic eye exam    Ambulatory Referral to Weight Management    Anticoagulated on Coumadin    Bipolar depression (HCC)    Heart failure with preserved ejection fraction, borderline, class III (HCC)    Relevant Orders    Echo complete w/ contrast if indicated    Personal history of DVT (deep vein thrombosis)    History of pulmonary embolus (PE)    Alcoholic cirrhosis of liver without ascites (HCC)    Tobacco use     Other Visit Diagnoses       Colon cancer screening        Relevant Orders    Ambulatory Referral to Gastroenterology    Encounter for immunization        Relevant Orders    Pneumococcal Conjugate Vaccine 20-valent (Pcv20)    " "Chest pain, unspecified type        Relevant Orders    XR chest pa & lateral              Subjective:      Patient ID: Lauren García is a 63 y.o. female.    Lauren here for follow up on her hx of morbid obesity, mechanical aortic valve replacement, HTN, DL, tobacco use, hx of heavy alcohol use, her A1c% today is 6.3%, she's on Ozempic and says she's following a healthier diet-she wants to get a gastric sleeve-says she fell recently and really bruised the left side of her abdomen, she is on Coumadin-also tells me when she had her valve replacement surgery years ago she was \"cut wrong\" and it causes her to ache in the sternum,chest area      The following portions of the patient's history were reviewed and updated as appropriate:   Past Medical History:  She has a past medical history of Anemia, Anxiety, Aortic valve disorder, Back pain, Chronic pain syndrome, Cirrhosis (HCC), Constipation, Degenerative arthritis of thoracic spine, Degenerative joint disease involving multiple joints, Depression, DVT (deep venous thrombosis) (HCC), Edema, Endometrial adenocarcinoma (HCC), Fibromyalgia, Ganglion of right wrist, GERD (gastroesophageal reflux disease), Heart murmur, Hematoma, History of mechanical aortic valve replacement, Hypothyroidism (acquired), Laboratory confirmed diagnosis of COVID-19 (03/16/2021), Low blood pressure, Malignant neoplasm of corpus uteri, except isthmus (HCC), Mixed hyperlipidemia, Morbid obesity (HCC), Obstructive sleep apnea syndrome, Pulmonary embolism (HCC), Tobacco dependence syndrome, Transient cerebral ischemia, Urinary incontinence, Viral hepatitis C, and Vitamin D deficiency.,  _______________________________________________________________________  Medical Problems:  does not have any pertinent problems on file.,  _______________________________________________________________________  Past Surgical History:   has a past surgical history that includes Ankle surgery; Cardiac surgery " (); Hysterectomy (2011);  section; Cholecystectomy; Colonoscopy (2019); and Colonoscopy.,  _______________________________________________________________________  Family History:  family history includes Cirrhosis in her father; Coronary artery disease in her father and mother.,  _______________________________________________________________________  Social History:   reports that she has been smoking cigarettes. She has a 20 pack-year smoking history. She has never used smokeless tobacco. She reports current alcohol use. She reports current drug use. Drugs: Marijuana and LSD.,  _______________________________________________________________________  Allergies:  is allergic to bactrim [sulfamethoxazole-trimethoprim], penicillins, tramadol, and rocephin [ceftriaxone]..  _______________________________________________________________________  Current Outpatient Medications   Medication Sig Dispense Refill   • BD Pen Needle Mary 2nd Gen 32G X 4 MM MISC      • Blood Glucose Monitoring Suppl (OneTouch Verio IQ System) w/Device KIT Use to check blood sugar daily 1 kit 1   • Cholecalciferol (Vitamin D3) 50 MCG (2000 UT) TABS TAKE ONE TABLET BY MOUTH IN THE MORNING 30 tablet 5   • cyclobenzaprine (FLEXERIL) 10 mg tablet TAKE 1 TABLET (10 MG TOTAL) BY MOUTH 2 (TWO) TIMES A DAY AS NEEDED FOR MUSCLE SPASMS 60 tablet 3   • DULoxetine (CYMBALTA) 30 mg delayed release capsule TAKE 1 CAPSULE (30 MG TOTAL) BY MOUTH DAILY 30 capsule 5   • DULoxetine (CYMBALTA) 60 mg delayed release capsule TAKE 1 CAPSULE (60 MG TOTAL) BY MOUTH DAILY PT TO TAKE A 60 MG CYMBALTA AND A 30 MG CYMBALTA TO MAKE 90 MG 30 capsule 5   • glucose blood (OneTouch Verio) test strip Use 1 each 3 (three) times a day Use as instructed 300 each 5   • Incontinence Supplies MISC Use 3 (three) times a day 100 each 0   • nystatin powder Apply topically 2 (two) times a day 60 g 5   • Ozempic, 0.25 or 0.5 MG/DOSE, 2 MG/3ML injection pen      •  "Pharmacist Choice Lancets MISC USE TO TEST GLUCOSE UP TO 3 TIMES A DAY - ONETOUCH VERIO LANCETS 300 each 5   • ProAir RespiClick 108 (90 Base) MCG/ACT AEPB INHALE 1 PUFF EVERY 4 (FOUR) HOURS AS NEEDED (WHEEZING OR SHORTNESS OF BREATH).     • warfarin (Jantoven) 3 mg tablet TAKE 2 TO 2 1/2 TABS BY MOUTH DAILY OR AS DIRECTED BY PHYSICIAN 75 tablet 5   • zolpidem (AMBIEN CR) 12.5 MG CR tablet TAKE 1 TABLET (12.5 MG TOTAL) BY MOUTH DAILY AT BEDTIME AS NEEDED FOR SLEEP 30 tablet 2     No current facility-administered medications for this visit.     _______________________________________________________________________  Review of Systems   Constitutional: Negative.    HENT: Negative.     Respiratory: Negative.     Cardiovascular: Negative.    Gastrointestinal:  Positive for abdominal distention.   Genitourinary: Negative.    Musculoskeletal:  Positive for arthralgias, back pain and gait problem.   Psychiatric/Behavioral:  Positive for dysphoric mood and sleep disturbance. The patient is nervous/anxious.          Objective:  Vitals:    05/06/24 0951   BP: 100/64   BP Location: Left arm   Patient Position: Sitting   Cuff Size: Large   Pulse: 65   Resp: 16   Temp: 97.6 °F (36.4 °C)   TempSrc: Tympanic   SpO2: 96%   Weight: (!) 166 kg (367 lb)   Height: 5' 4\" (1.626 m)     Body mass index is 63 kg/m².     Physical Exam  Constitutional:       Appearance: She is obese.   HENT:      Head: Normocephalic and atraumatic.      Right Ear: External ear normal.      Left Ear: External ear normal.      Nose: Nose normal.      Mouth/Throat:      Mouth: Mucous membranes are moist.   Eyes:      Pupils: Pupils are equal, round, and reactive to light.   Cardiovascular:      Rate and Rhythm: Normal rate and regular rhythm.      Pulses: no weak pulses.           Dorsalis pedis pulses are 2+ on the right side and 2+ on the left side.   Pulmonary:      Effort: Pulmonary effort is normal.      Breath sounds: Normal breath sounds.   Abdominal:    "   General: There is distension.      Tenderness: There is no abdominal tenderness.   Musculoskeletal:         General: Normal range of motion.      Cervical back: Normal range of motion and neck supple.   Feet:      Right foot:      Skin integrity: No ulcer, skin breakdown, erythema, warmth, callus or dry skin.      Left foot:      Skin integrity: No ulcer, skin breakdown, erythema, warmth, callus or dry skin.   Skin:     Findings: Bruising present.   Neurological:      General: No focal deficit present.      Mental Status: She is alert. Mental status is at baseline.   Psychiatric:         Thought Content: Thought content normal.         Judgment: Judgment normal.     Patient's shoes and socks removed.    Right Foot/Ankle   Right Foot Inspection  Skin Exam: skin normal and skin intact. No dry skin, no warmth, no callus, no erythema, no maceration, no abnormal color, no pre-ulcer, no ulcer and no callus.     Toe Exam: ROM and strength within normal limits.     Sensory   Proprioception: intact  Monofilament testing: intact    Vascular  The right DP pulse is 2+.     Left Foot/Ankle  Left Foot Inspection  Skin Exam: skin normal and skin intact. No dry skin, no warmth, no erythema, no maceration, normal color, no pre-ulcer, no ulcer and no callus.     Toe Exam: ROM and strength within normal limits.     Sensory   Proprioception: intact  Monofilament testing: intact    Vascular  The left DP pulse is 2+.     Assign Risk Category  No deformity present  No loss of protective sensation  No weak pulses  Risk: 0

## 2024-05-23 ENCOUNTER — TELEPHONE (OUTPATIENT)
Dept: OTHER | Facility: OTHER | Age: 64
End: 2024-05-23

## 2024-05-23 NOTE — TELEPHONE ENCOUNTER
Patient called saying that she got a call from pharmacy stating that she need a prior  authorization for her medication zolpidem (AMBIEN CR) 12.5 MG. Patient also stated that her insurance is not going to pay for her colonoscopy. Please advise.

## 2024-06-02 NOTE — TELEPHONE ENCOUNTER
PA for zolpidem (AMBIEN CR) 12.5 MG CR tablet     Submitted via    []CMM-KEY   [][employer]-Case ID #   []Faxed to plan   [x]Other website coverymymeds [id number]  []Phone call Case ID #     Office notes sent, clinical questions answered. Awaiting determination    Turnaround time for your insurance to make a decision on your Prior Authorization can take 7-21 business days.

## 2024-06-03 DIAGNOSIS — G47.00 INSOMNIA, UNSPECIFIED TYPE: ICD-10-CM

## 2024-06-03 RX ORDER — ZOLPIDEM TARTRATE 12.5 MG/1
12.5 TABLET, FILM COATED, EXTENDED RELEASE ORAL
Qty: 30 TABLET | Refills: 0 | Status: CANCELLED | OUTPATIENT
Start: 2024-06-03

## 2024-06-04 RX ORDER — ZOLPIDEM TARTRATE 10 MG/1
10 TABLET ORAL
Qty: 30 TABLET | Refills: 0 | Status: SHIPPED | OUTPATIENT
Start: 2024-06-04

## 2024-06-12 DIAGNOSIS — Z76.0 MEDICATION REFILL: ICD-10-CM

## 2024-06-12 RX ORDER — CHOLECALCIFEROL (VITAMIN D3) 125 MCG
2000 CAPSULE ORAL EVERY MORNING
Qty: 30 TABLET | Refills: 5 | Status: SHIPPED | OUTPATIENT
Start: 2024-06-12

## 2024-06-18 ENCOUNTER — TELEPHONE (OUTPATIENT)
Dept: FAMILY MEDICINE CLINIC | Facility: CLINIC | Age: 64
End: 2024-06-18

## 2024-06-18 NOTE — TELEPHONE ENCOUNTER
[facility] 0.25 or 0.5 mg  Key:  [id number]

## 2024-06-19 NOTE — TELEPHONE ENCOUNTER
Ozempic is not an active on med list. It is listed as historical. Please send script to pharmacy and route back to prior auth pod for processing .

## 2024-06-20 DIAGNOSIS — E11.69 TYPE 2 DIABETES MELLITUS WITH OBESITY  (HCC): Primary | ICD-10-CM

## 2024-06-20 DIAGNOSIS — E66.9 TYPE 2 DIABETES MELLITUS WITH OBESITY  (HCC): Primary | ICD-10-CM

## 2024-06-20 RX ORDER — SEMAGLUTIDE 0.68 MG/ML
0.5 INJECTION, SOLUTION SUBCUTANEOUS
Qty: 2 ML | Refills: 3 | Status: SHIPPED | OUTPATIENT
Start: 2024-06-20 | End: 2024-06-27 | Stop reason: SDUPTHER

## 2024-06-27 DIAGNOSIS — E11.69 TYPE 2 DIABETES MELLITUS WITH OBESITY  (HCC): ICD-10-CM

## 2024-06-27 DIAGNOSIS — E66.9 TYPE 2 DIABETES MELLITUS WITH OBESITY  (HCC): ICD-10-CM

## 2024-06-27 RX ORDER — SEMAGLUTIDE 0.68 MG/ML
0.5 INJECTION, SOLUTION SUBCUTANEOUS
Qty: 2 ML | Refills: 3 | Status: SHIPPED | OUTPATIENT
Start: 2024-06-27

## 2024-07-08 DIAGNOSIS — G47.00 INSOMNIA, UNSPECIFIED TYPE: ICD-10-CM

## 2024-07-08 RX ORDER — ZOLPIDEM TARTRATE 10 MG/1
10 TABLET ORAL
Qty: 30 TABLET | Refills: 0 | Status: SHIPPED | OUTPATIENT
Start: 2024-07-08

## 2024-08-01 DIAGNOSIS — G47.00 INSOMNIA, UNSPECIFIED TYPE: ICD-10-CM

## 2024-08-01 RX ORDER — ZOLPIDEM TARTRATE 10 MG/1
10 TABLET ORAL
Qty: 30 TABLET | Refills: 0 | Status: SHIPPED | OUTPATIENT
Start: 2024-08-01

## 2024-08-05 DIAGNOSIS — M62.838 MUSCLE SPASMS OF BOTH LOWER EXTREMITIES: ICD-10-CM

## 2024-08-05 RX ORDER — CYCLOBENZAPRINE HCL 10 MG
10 TABLET ORAL 2 TIMES DAILY PRN
Qty: 60 TABLET | Refills: 3 | Status: SHIPPED | OUTPATIENT
Start: 2024-08-05

## 2024-08-12 ENCOUNTER — TELEPHONE (OUTPATIENT)
Age: 64
End: 2024-08-12

## 2024-08-12 DIAGNOSIS — R39.89 DARK YELLOW-COLORED URINE: Primary | ICD-10-CM

## 2024-08-12 NOTE — TELEPHONE ENCOUNTER
Pt is going to have blood work completed tomorrow. She is requesting a urinalysis be ordered. She has been dealing with dark colored urine as well as frequency for a few days now.

## 2024-08-27 ENCOUNTER — APPOINTMENT (OUTPATIENT)
Dept: LAB | Facility: HOSPITAL | Age: 64
End: 2024-08-27
Attending: INTERNAL MEDICINE
Payer: COMMERCIAL

## 2024-08-27 ENCOUNTER — TELEPHONE (OUTPATIENT)
Dept: FAMILY MEDICINE CLINIC | Facility: CLINIC | Age: 64
End: 2024-08-27

## 2024-08-27 DIAGNOSIS — R39.89 DARK YELLOW-COLORED URINE: ICD-10-CM

## 2024-08-27 DIAGNOSIS — Z79.01 ANTICOAGULATED ON COUMADIN: Primary | ICD-10-CM

## 2024-08-27 DIAGNOSIS — E11.9 TYPE 2 DIABETES MELLITUS WITHOUT COMPLICATION, WITHOUT LONG-TERM CURRENT USE OF INSULIN (HCC): ICD-10-CM

## 2024-08-27 LAB
BACTERIA UR QL AUTO: ABNORMAL /HPF
BILIRUB UR QL STRIP: NEGATIVE
CLARITY UR: CLEAR
COLOR UR: YELLOW
GLUCOSE UR STRIP-MCNC: NEGATIVE MG/DL
HGB UR QL STRIP.AUTO: NEGATIVE
KETONES UR STRIP-MCNC: NEGATIVE MG/DL
LEUKOCYTE ESTERASE UR QL STRIP: NEGATIVE
NITRITE UR QL STRIP: POSITIVE
NON-SQ EPI CELLS URNS QL MICRO: ABNORMAL /HPF
PH UR STRIP.AUTO: 6 [PH]
PROT UR STRIP-MCNC: NEGATIVE MG/DL
RBC #/AREA URNS AUTO: ABNORMAL /HPF
SP GR UR STRIP.AUTO: >=1.03 (ref 1–1.03)
UROBILINOGEN UR QL STRIP.AUTO: 4 E.U./DL
WBC #/AREA URNS AUTO: ABNORMAL /HPF

## 2024-08-27 PROCEDURE — 87086 URINE CULTURE/COLONY COUNT: CPT

## 2024-08-27 PROCEDURE — 81001 URINALYSIS AUTO W/SCOPE: CPT

## 2024-08-27 PROCEDURE — 87186 SC STD MICRODIL/AGAR DIL: CPT

## 2024-08-27 PROCEDURE — 87077 CULTURE AEROBIC IDENTIFY: CPT

## 2024-08-27 NOTE — TELEPHONE ENCOUNTER
Access center called, stating they had  from  Lab on hold requesting a PT/INR script.  Patient there now.  Gets these occasionally.  Can we please put order in for this.

## 2024-08-28 DIAGNOSIS — N39.0 URINARY TRACT INFECTION WITHOUT HEMATURIA, SITE UNSPECIFIED: Primary | ICD-10-CM

## 2024-08-28 RX ORDER — BLOOD SUGAR DIAGNOSTIC
1 STRIP MISCELLANEOUS 3 TIMES DAILY
Qty: 270 EACH | Refills: 1 | Status: SHIPPED | OUTPATIENT
Start: 2024-08-28

## 2024-08-28 RX ORDER — NITROFURANTOIN 25; 75 MG/1; MG/1
100 CAPSULE ORAL 2 TIMES DAILY
Qty: 14 CAPSULE | Refills: 0 | Status: SHIPPED | OUTPATIENT
Start: 2024-08-28 | End: 2024-09-04

## 2024-08-29 LAB — BACTERIA UR CULT: ABNORMAL

## 2024-09-03 DIAGNOSIS — G47.00 INSOMNIA, UNSPECIFIED TYPE: ICD-10-CM

## 2024-09-03 DIAGNOSIS — Z95.2 HISTORY OF MECHANICAL AORTIC VALVE REPLACEMENT: ICD-10-CM

## 2024-09-03 DIAGNOSIS — Z86.718 PERSONAL HISTORY OF DVT (DEEP VEIN THROMBOSIS): ICD-10-CM

## 2024-09-04 RX ORDER — ZOLPIDEM TARTRATE 10 MG/1
10 TABLET ORAL
Qty: 30 TABLET | Refills: 0 | Status: SHIPPED | OUTPATIENT
Start: 2024-09-04

## 2024-09-04 RX ORDER — WARFARIN SODIUM 3 MG/1
TABLET ORAL
Qty: 75 TABLET | Refills: 5 | Status: SHIPPED | OUTPATIENT
Start: 2024-09-04

## 2024-10-02 DIAGNOSIS — G47.00 INSOMNIA, UNSPECIFIED TYPE: ICD-10-CM

## 2024-10-02 RX ORDER — ZOLPIDEM TARTRATE 10 MG/1
10 TABLET ORAL
Qty: 30 TABLET | Refills: 0 | Status: SHIPPED | OUTPATIENT
Start: 2024-10-02

## 2024-10-11 ENCOUNTER — APPOINTMENT (OUTPATIENT)
Dept: LAB | Facility: HOSPITAL | Age: 64
End: 2024-10-11
Attending: INTERNAL MEDICINE
Payer: COMMERCIAL

## 2024-10-11 DIAGNOSIS — Z79.01 ANTICOAGULATED ON COUMADIN: ICD-10-CM

## 2024-10-11 LAB
INR PPP: 4.61 (ref 0.85–1.19)
PROTHROMBIN TIME: 42.9 SECONDS (ref 12.3–15)

## 2024-10-11 PROCEDURE — 36415 COLL VENOUS BLD VENIPUNCTURE: CPT

## 2024-10-11 PROCEDURE — 85610 PROTHROMBIN TIME: CPT

## 2024-10-14 NOTE — RESULT ENCOUNTER NOTE
Tried calling pt x2 times on Friday phone kept ringing no voice mail and left detailed voice message to call back today

## 2024-10-23 ENCOUNTER — HOSPITAL ENCOUNTER (OUTPATIENT)
Dept: NON INVASIVE DIAGNOSTICS | Facility: HOSPITAL | Age: 64
Discharge: HOME | End: 2024-10-23
Attending: INTERNAL MEDICINE
Payer: COMMERCIAL

## 2024-10-23 VITALS
HEIGHT: 64 IN | HEART RATE: 87 BPM | BODY MASS INDEX: 50.02 KG/M2 | WEIGHT: 293 LBS | DIASTOLIC BLOOD PRESSURE: 64 MMHG | SYSTOLIC BLOOD PRESSURE: 100 MMHG

## 2024-10-23 DIAGNOSIS — I50.30 HEART FAILURE WITH PRESERVED EJECTION FRACTION, BORDERLINE, CLASS III (HCC): ICD-10-CM

## 2024-10-23 PROCEDURE — 93306 TTE W/DOPPLER COMPLETE: CPT | Performed by: INTERNAL MEDICINE

## 2024-10-23 PROCEDURE — 93306 TTE W/DOPPLER COMPLETE: CPT

## 2024-10-24 ENCOUNTER — TELEPHONE (OUTPATIENT)
Age: 64
End: 2024-10-24

## 2024-10-24 LAB
AORTIC ROOT: 2.3 CM
AORTIC VALVE MEAN VELOCITY: 18.8 M/S
APICAL FOUR CHAMBER EJECTION FRACTION: 69 %
ASCENDING AORTA: 3.7 CM
AV AREA BY CONTINUOUS VTI: 1.7 CM2
AV AREA PEAK VELOCITY: 1.8 CM2
AV LVOT MEAN GRADIENT: 5 MMHG
AV LVOT PEAK GRADIENT: 10 MMHG
AV MEAN GRADIENT: 16 MMHG
AV PEAK GRADIENT: 29 MMHG
AV VALVE AREA: 1.7 CM2
AV VELOCITY RATIO: 0.58
BSA FOR ECHO PROCEDURE: 2.53 M2
DOP CALC AO PEAK VEL: 2.7 M/S
DOP CALC AO VTI: 57.93 CM
DOP CALC LVOT AREA: 3.14 CM2
DOP CALC LVOT CARDIAC INDEX: 3.32 L/MIN/M2
DOP CALC LVOT CARDIAC OUTPUT: 8.41 L/MIN
DOP CALC LVOT DIAMETER: 2 CM
DOP CALC LVOT PEAK VEL VTI: 31.31 CM
DOP CALC LVOT PEAK VEL: 1.56 M/S
DOP CALC LVOT STROKE INDEX: 38.7 ML/M2
DOP CALC LVOT STROKE VOLUME: 98.31
E WAVE DECELERATION TIME: 193 MS
E/A RATIO: 1.28
FRACTIONAL SHORTENING: 24 (ref 28–44)
INTERVENTRICULAR SEPTUM IN DIASTOLE (PARASTERNAL SHORT AXIS VIEW): 1.3 CM
INTERVENTRICULAR SEPTUM: 1.3 CM (ref 0.6–1.1)
LAAS-AP2: 20.1 CM2
LAAS-AP4: 19.6 CM2
LEFT ATRIUM SIZE: 4.2 CM
LEFT ATRIUM VOLUME (MOD BIPLANE): 61 ML
LEFT ATRIUM VOLUME INDEX (MOD BIPLANE): 24.1 ML/M2
LEFT INTERNAL DIMENSION IN SYSTOLE: 3.2 CM (ref 2.1–4)
LEFT VENTRICULAR INTERNAL DIMENSION IN DIASTOLE: 4.2 CM (ref 3.5–6)
LEFT VENTRICULAR POSTERIOR WALL IN END DIASTOLE: 1.2 CM
LEFT VENTRICULAR STROKE VOLUME: 37 ML
LVSV (TEICH): 37 ML
MV E'TISSUE VEL-SEP: 10 CM/S
MV PEAK A VEL: 0.98 M/S
MV PEAK E VEL: 125 CM/S
MV STENOSIS PRESSURE HALF TIME: 56 MS
MV VALVE AREA P 1/2 METHOD: 3.93
RIGHT ATRIUM AREA SYSTOLE A4C: 14.2 CM2
RIGHT VENTRICLE ID DIMENSION: 2.8 CM
SL CV LEFT ATRIUM LENGTH A2C: 5.5 CM
SL CV LV EF: 60
SL CV PED ECHO LEFT VENTRICLE DIASTOLIC VOLUME (MOD BIPLANE) 2D: 77 ML
SL CV PED ECHO LEFT VENTRICLE SYSTOLIC VOLUME (MOD BIPLANE) 2D: 40 ML
TRICUSPID ANNULAR PLANE SYSTOLIC EXCURSION: 1.9 CM

## 2024-10-24 NOTE — TELEPHONE ENCOUNTER
Echo looks good, ejection fraction is normal as is diastolic function-prosthetic aortic valve looks good

## 2024-10-24 NOTE — TELEPHONE ENCOUNTER
Patient had an echo yesterday and said she received results but does not know what the results mean.  Please review and advise.  Thank you.

## 2024-10-30 ENCOUNTER — TELEPHONE (OUTPATIENT)
Dept: GASTROENTEROLOGY | Facility: CLINIC | Age: 64
End: 2024-10-30

## 2024-10-30 DIAGNOSIS — G89.29 CHRONIC BILATERAL LOW BACK PAIN WITH SCIATICA, SCIATICA LATERALITY UNSPECIFIED: ICD-10-CM

## 2024-10-30 DIAGNOSIS — M54.40 CHRONIC BILATERAL LOW BACK PAIN WITH SCIATICA, SCIATICA LATERALITY UNSPECIFIED: ICD-10-CM

## 2024-10-30 DIAGNOSIS — M79.7 FIBROMYALGIA: ICD-10-CM

## 2024-10-30 RX ORDER — DULOXETIN HYDROCHLORIDE 30 MG/1
30 CAPSULE, DELAYED RELEASE ORAL DAILY
Qty: 30 CAPSULE | Refills: 5 | Status: SHIPPED | OUTPATIENT
Start: 2024-10-30

## 2024-10-30 RX ORDER — DULOXETIN HYDROCHLORIDE 60 MG/1
60 CAPSULE, DELAYED RELEASE ORAL DAILY
Qty: 30 CAPSULE | Refills: 5 | Status: SHIPPED | OUTPATIENT
Start: 2024-10-30

## 2024-10-30 NOTE — TELEPHONE ENCOUNTER
Pt is due for a colonoscopy with Dr Salmeron for hx of tubular adenoma polyps. Called and spoke to pt whom failed OA due to being on Warfarin.

## 2024-10-31 DIAGNOSIS — G47.00 INSOMNIA, UNSPECIFIED TYPE: ICD-10-CM

## 2024-10-31 RX ORDER — ZOLPIDEM TARTRATE 10 MG/1
10 TABLET ORAL
Qty: 30 TABLET | Refills: 0 | Status: SHIPPED | OUTPATIENT
Start: 2024-10-31

## 2024-11-13 ENCOUNTER — APPOINTMENT (EMERGENCY)
Dept: CT IMAGING | Facility: HOSPITAL | Age: 64
End: 2024-11-13
Payer: COMMERCIAL

## 2024-11-13 ENCOUNTER — HOSPITAL ENCOUNTER (EMERGENCY)
Facility: HOSPITAL | Age: 64
Discharge: HOME | End: 2024-11-13
Attending: INTERNAL MEDICINE | Admitting: INTERNAL MEDICINE
Payer: COMMERCIAL

## 2024-11-13 ENCOUNTER — TELEPHONE (OUTPATIENT)
Age: 64
End: 2024-11-13

## 2024-11-13 VITALS
BODY MASS INDEX: 63 KG/M2 | DIASTOLIC BLOOD PRESSURE: 96 MMHG | SYSTOLIC BLOOD PRESSURE: 115 MMHG | HEART RATE: 94 BPM | WEIGHT: 293 LBS | RESPIRATION RATE: 18 BRPM | OXYGEN SATURATION: 95 % | TEMPERATURE: 98.2 F

## 2024-11-13 DIAGNOSIS — T07.XXXA MULTIPLE BRUISES: ICD-10-CM

## 2024-11-13 DIAGNOSIS — D68.9 COAGULOPATHY (HCC): Primary | ICD-10-CM

## 2024-11-13 LAB
ALBUMIN SERPL BCG-MCNC: 3.5 G/DL (ref 3.5–5)
ALP SERPL-CCNC: 94 U/L (ref 34–104)
ALT SERPL W P-5'-P-CCNC: 14 U/L (ref 7–52)
ANION GAP SERPL CALCULATED.3IONS-SCNC: 5 MMOL/L (ref 4–13)
AST SERPL W P-5'-P-CCNC: 29 U/L (ref 13–39)
BASOPHILS # BLD AUTO: 0.02 THOUSANDS/ÂΜL (ref 0–0.1)
BASOPHILS NFR BLD AUTO: 1 % (ref 0–1)
BILIRUB SERPL-MCNC: 1.08 MG/DL (ref 0.2–1)
BUN SERPL-MCNC: 11 MG/DL (ref 5–25)
CALCIUM SERPL-MCNC: 8.7 MG/DL (ref 8.4–10.2)
CHLORIDE SERPL-SCNC: 104 MMOL/L (ref 96–108)
CO2 SERPL-SCNC: 30 MMOL/L (ref 21–32)
CREAT SERPL-MCNC: 0.62 MG/DL (ref 0.6–1.3)
EOSINOPHIL # BLD AUTO: 0.16 THOUSAND/ÂΜL (ref 0–0.61)
EOSINOPHIL NFR BLD AUTO: 5 % (ref 0–6)
ERYTHROCYTE [DISTWIDTH] IN BLOOD BY AUTOMATED COUNT: 14 % (ref 11.6–15.1)
GFR SERPL CREATININE-BSD FRML MDRD: 96 ML/MIN/1.73SQ M
GLUCOSE SERPL-MCNC: 243 MG/DL (ref 65–140)
HCT VFR BLD AUTO: 37 % (ref 34.8–46.1)
HGB BLD-MCNC: 12 G/DL (ref 11.5–15.4)
IMM GRANULOCYTES # BLD AUTO: 0.01 THOUSAND/UL (ref 0–0.2)
IMM GRANULOCYTES NFR BLD AUTO: 0 % (ref 0–2)
INR PPP: 5.57 (ref 0.85–1.19)
LYMPHOCYTES # BLD AUTO: 0.76 THOUSANDS/ÂΜL (ref 0.6–4.47)
LYMPHOCYTES NFR BLD AUTO: 23 % (ref 14–44)
MCH RBC QN AUTO: 32.5 PG (ref 26.8–34.3)
MCHC RBC AUTO-ENTMCNC: 32.4 G/DL (ref 31.4–37.4)
MCV RBC AUTO: 100 FL (ref 82–98)
MONOCYTES # BLD AUTO: 0.28 THOUSAND/ÂΜL (ref 0.17–1.22)
MONOCYTES NFR BLD AUTO: 9 % (ref 4–12)
NEUTROPHILS # BLD AUTO: 2.04 THOUSANDS/ÂΜL (ref 1.85–7.62)
NEUTS SEG NFR BLD AUTO: 62 % (ref 43–75)
NRBC BLD AUTO-RTO: 0 /100 WBCS
PLATELET # BLD AUTO: 115 THOUSANDS/UL (ref 149–390)
PMV BLD AUTO: 9.8 FL (ref 8.9–12.7)
POTASSIUM SERPL-SCNC: 3.8 MMOL/L (ref 3.5–5.3)
PROT SERPL-MCNC: 7.1 G/DL (ref 6.4–8.4)
PROTHROMBIN TIME: 49.5 SECONDS (ref 12.3–15)
RBC # BLD AUTO: 3.69 MILLION/UL (ref 3.81–5.12)
SODIUM SERPL-SCNC: 139 MMOL/L (ref 135–147)
WBC # BLD AUTO: 3.27 THOUSAND/UL (ref 4.31–10.16)

## 2024-11-13 PROCEDURE — 85025 COMPLETE CBC W/AUTO DIFF WBC: CPT | Performed by: INTERNAL MEDICINE

## 2024-11-13 PROCEDURE — 99284 EMERGENCY DEPT VISIT MOD MDM: CPT

## 2024-11-13 PROCEDURE — 36415 COLL VENOUS BLD VENIPUNCTURE: CPT | Performed by: INTERNAL MEDICINE

## 2024-11-13 PROCEDURE — 85610 PROTHROMBIN TIME: CPT | Performed by: INTERNAL MEDICINE

## 2024-11-13 PROCEDURE — 80053 COMPREHEN METABOLIC PANEL: CPT | Performed by: INTERNAL MEDICINE

## 2024-11-13 PROCEDURE — 99285 EMERGENCY DEPT VISIT HI MDM: CPT | Performed by: INTERNAL MEDICINE

## 2024-11-13 PROCEDURE — 70450 CT HEAD/BRAIN W/O DYE: CPT

## 2024-11-13 PROCEDURE — 74177 CT ABD & PELVIS W/CONTRAST: CPT

## 2024-11-13 RX ORDER — OXYCODONE AND ACETAMINOPHEN 5; 325 MG/1; MG/1
1 TABLET ORAL ONCE
Refills: 0 | Status: COMPLETED | OUTPATIENT
Start: 2024-11-13 | End: 2024-11-13

## 2024-11-13 RX ADMIN — IOHEXOL 100 ML: 350 INJECTION, SOLUTION INTRAVENOUS at 15:36

## 2024-11-13 RX ADMIN — OXYCODONE HYDROCHLORIDE AND ACETAMINOPHEN 1 TABLET: 5; 325 TABLET ORAL at 14:37

## 2024-11-13 NOTE — ED PROVIDER NOTES
Time reflects when diagnosis was documented in both MDM as applicable and the Disposition within this note       Time User Action Codes Description Comment    11/13/2024  5:30 PM Hallie Baltazar [D68.9] Coagulopathy (HCC)     11/13/2024  5:31 PM Hallie Baltazar Add [T07.XXXA] Multiple bruises           ED Disposition       ED Disposition   Discharge    Condition   Stable    Date/Time   Wed Nov 13, 2024  5:32 PM    Comment   Lauren García discharge to home/self care.                   Assessment & Plan       Medical Decision Making  This is 64 years old, came as she fall at the bathroom , at the shower 2 days ago. Her daughter called 911, but did not come to er. Pt was waiting to see her PMD. When PMD saw her advised to go to ER.  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  Patient denies LOC, head trauma.  Patient fell on the left side of the body and has pain.  Patient is able to move all her extremities.  Patient was on Coumadin before and her PMD found she has high INR so it was stopped for 3 days and then she go back on it.  Physical exam is pertinent for having extensive ecchymosis on the abdominal wall and on the left breast going into the back of the chest wall.  Also there is ecchymosis on the sacral area.  Reviewing the labs having INR 5.57, CMP pertinent for glucose 243, WBC 3.27, H/H 12/37, Platelet 115  Ct abdomen pelvis; . No findings to suggest retroperitoneal hemorrhage. Chronic appearing fracture deformities of the left lateral eighth through 10th ribs.  2. No acute intra-abdominal inflammatory process.  3. Cirrhotic morphology of the liver. Hepatomegaly.  CT head; no acute intracranial findindgs.   Case discussed with hematology dr Abbott and stated ; t looks like the very expected bruises that occur with a fall on coumadin with an INR of 5.5.  Pt advised to hold on  coumadin for at least 3 days , and before re taking coumdin check INR       Amount and/or Complexity of Data Reviewed  Labs: ordered.     Details: Reviewing the labs having INR 5.57, CMP pertinent for glucose 243, WBC 3.27, H/H 12/37, Platelet 115    Radiology: ordered.     Details: Also there is ecchymosis on the sacral area.  Reviewing the labs having INR 5.57, CMP pertinent for glucose 243, WBC 3.27, H/H 12/37, Platelet 115  Ct abdomen pelvis; . No findings to suggest retroperitoneal hemorrhage. Chronic appearing fracture deformities of the left lateral eighth through 10th ribs.  2. No acute intra-abdominal inflammatory process.  3. Cirrhotic morphology of the liver. Hepatomegaly.  CT head; no acute intracranial findindgs.  Discussion of management or test interpretation with external provider(s): Case discussed with hematology dr Abbott and stated ; t looks like the very expected bruises that occur with a fall on coumadin with an INR of 5.5.      Risk  Prescription drug management.             Medications   oxyCODONE-acetaminophen (PERCOCET) 5-325 mg per tablet 1 tablet (1 tablet Oral Given 11/13/24 1437)   iohexol (OMNIPAQUE) 350 MG/ML injection (SINGLE-DOSE) 100 mL (100 mL Intravenous Given 11/13/24 1536)       ED Risk Strat Scores                                         SBIRT 20yo+      Flowsheet Row Most Recent Value   Initial Alcohol Screen: US AUDIT-C     1. How often do you have a drink containing alcohol? 0 Filed at: 11/13/2024 1405   2. How many drinks containing alcohol do you have on a typical day you are drinking?  0 Filed at: 11/13/2024 1405   3a. Male UNDER 65: How often do you have five or more drinks on one occasion? 0 Filed at: 11/13/2024 1405   3b. FEMALE Any Age, or MALE 65+: How often do you have 4 or more drinks on one occassion? 0 Filed at: 11/13/2024 1405   Audit-C Score 0 Filed at: 11/13/2024 1405   AVELINA: How many times in the past year have you...    Used an illegal drug or used a  "prescription medication for non-medical reasons? Never Filed at: 2024 0899                            History of Present Illness       Chief Complaint   Patient presents with    Fall     Tripped over something a few days ago.  No headstrike, no loc.  On coumadin.  Arrives with bruising to left side with pain \"everywhere\"       Past Medical History:   Diagnosis Date    Anemia     Anxiety     Aortic valve disorder     Back pain     Chronic pain syndrome     Cirrhosis (HCC)     Constipation     Degenerative arthritis of thoracic spine     Degenerative joint disease involving multiple joints     Depression     DVT (deep venous thrombosis) (HCC)     Bilateral    Edema     Endometrial adenocarcinoma (HCC)     Fibromyalgia     Ganglion of right wrist     GERD (gastroesophageal reflux disease)     Heart murmur     Hematoma     History of mechanical aortic valve replacement     Hypothyroidism (acquired)     Laboratory confirmed diagnosis of COVID-19 2021    Low blood pressure     Malignant neoplasm of corpus uteri, except isthmus (HCC)     Mixed hyperlipidemia     Morbid obesity (HCC)     Obstructive sleep apnea syndrome     Pulmonary embolism (HCC)     Tobacco dependence syndrome     Transient cerebral ischemia     Urinary incontinence     Viral hepatitis C     Vitamin D deficiency       Past Surgical History:   Procedure Laterality Date    ANKLE SURGERY      CARDIAC SURGERY  2015    VALVE REPLACEMENT     SECTION      X3    CHOLECYSTECTOMY      COLONOSCOPY  2019    COLONOSCOPY      HYSTERECTOMY  2011    TOTAL      Family History   Problem Relation Age of Onset    Coronary artery disease Mother     Coronary artery disease Father     Cirrhosis Father       Social History     Tobacco Use    Smoking status: Some Days     Current packs/day: 0.50     Average packs/day: 0.5 packs/day for 40.0 years (20.0 ttl pk-yrs)     Types: Cigarettes    Smokeless tobacco: Never   Vaping Use    Vaping status: " Never Used   Substance Use Topics    Alcohol use: Yes     Comment: once in awhile    Drug use: Yes     Types: Marijuana, LSD      E-Cigarette/Vaping    E-Cigarette Use Never User       E-Cigarette/Vaping Substances    Nicotine No     THC No     CBD No     Flavoring No     Other No     Unknown No       I have reviewed and agree with the history as documented.     This is a 64 years old came for having a fall 2 days ago.  Patient was in the bathroom and she fell but the patient stated that she does not have she fall.  Patient denies LOC and denies head trauma.  Patient sustained extensive bruises on her abdomen and on the left breast going into the back.  The daughter called 911 but the patient did not come to the emergency room at the time waiting to see her PMD.  Patient saw her PMD who told her to go to the ER.  Patient is on Coumadin for aortic mechanical valve replacement and she is on 7 mg of Coumadin daily,.  Patient has high INR before and her PMD told her to stop Coumadin for 3 days patient did that and she go back now on the same dose of Coumadin.  Patient fell on her left side and she asked for pain medication.  Patient has history of diabetes, ovarian cancer, arthritis, valvular heart disease.  Patient denies vomiting, diarrhea.  Patient movement is difficult because of the arthritis and body habitus.      History provided by:  Patient and relative   used: No        Review of Systems   Constitutional:  Negative for chills and fever.   HENT:  Negative for congestion, drooling, ear discharge, ear pain, rhinorrhea, sinus pressure, sinus pain, sneezing and sore throat.    Eyes:  Negative for pain and visual disturbance.   Respiratory:  Negative for cough and shortness of breath.    Cardiovascular:  Negative for chest pain and palpitations.   Gastrointestinal:  Negative for abdominal pain and vomiting.   Genitourinary:  Negative for difficulty urinating, dysuria and hematuria.    Musculoskeletal:  Negative for arthralgias and back pain.   Skin:  Negative for color change and rash.   Neurological:  Negative for dizziness, tremors, seizures, syncope, facial asymmetry, weakness, light-headedness and headaches.   Hematological:  Negative for adenopathy. Does not bruise/bleed easily.   Psychiatric/Behavioral:  Negative for agitation and behavioral problems.    All other systems reviewed and are negative.          Objective       ED Triage Vitals [11/13/24 1403]   Temperature Pulse Blood Pressure Respirations SpO2 Patient Position - Orthostatic VS   98.2 °F (36.8 °C) (!) 112 (!) 182/96 20 97 % Lying      Temp Source Heart Rate Source BP Location FiO2 (%) Pain Score    Oral Monitor Right arm -- 10 - Worst Possible Pain      Vitals      Date and Time Temp Pulse SpO2 Resp BP Pain Score FACES Pain Rating User   11/13/24 1737 -- 94 95 % 18 115/96 8 -- RR   11/13/24 1437 -- -- -- -- -- 10 - Worst Possible Pain -- RR   11/13/24 1403 98.2 °F (36.8 °C) 112 97 % 20 182/96 10 - Worst Possible Pain -- RR            Physical Exam  Vitals and nursing note reviewed.   Constitutional:       General: She is not in acute distress.     Appearance: She is well-developed. She is obese. She is not ill-appearing, toxic-appearing or diaphoretic.   HENT:      Head: Normocephalic and atraumatic.      Nose: Nose normal. No congestion or rhinorrhea.      Mouth/Throat:      Mouth: Mucous membranes are moist.      Pharynx: No oropharyngeal exudate or posterior oropharyngeal erythema.   Eyes:      General: No scleral icterus.        Right eye: No discharge.         Left eye: No discharge.      Extraocular Movements: Extraocular movements intact.      Conjunctiva/sclera: Conjunctivae normal.      Pupils: Pupils are equal, round, and reactive to light.   Neck:      Vascular: No carotid bruit.   Cardiovascular:      Rate and Rhythm: Normal rate and regular rhythm.      Heart sounds: No murmur heard.     No friction rub. No  gallop.   Pulmonary:      Effort: Pulmonary effort is normal. No respiratory distress.      Breath sounds: Normal breath sounds. No stridor. No wheezing, rhonchi or rales.   Chest:      Chest wall: No tenderness.   Abdominal:      General: Abdomen is flat. There is no distension.      Palpations: Abdomen is soft. There is no mass.      Tenderness: There is no abdominal tenderness. There is no right CVA tenderness, left CVA tenderness, guarding or rebound.      Hernia: No hernia is present.   Musculoskeletal:         General: No swelling, tenderness, deformity or signs of injury.      Cervical back: Normal range of motion and neck supple. No rigidity or tenderness.      Right lower leg: No edema.      Left lower leg: No edema.   Lymphadenopathy:      Cervical: No cervical adenopathy.   Skin:     General: Skin is warm and dry.      Capillary Refill: Capillary refill takes less than 2 seconds.      Coloration: Skin is not jaundiced or pale.      Findings: Bruising present. No erythema, lesion or rash.      Comments: Patient has extensive bruising over the abdominal wall going into her left breast and going into her back and at the sacral area.  Patient complaining of pain on the left side of body and she is able to move all her extremities.   Neurological:      General: No focal deficit present.      Mental Status: She is alert and oriented to person, place, and time.      Cranial Nerves: No cranial nerve deficit.      Sensory: No sensory deficit.      Motor: No weakness.      Coordination: Coordination normal.   Psychiatric:         Mood and Affect: Mood normal.         Results Reviewed       Procedure Component Value Units Date/Time    Protime-INR [[id number]]  (Abnormal) Collected: 11/13/24 1446    Lab Status: Final result Specimen: Blood from Arm, Right Updated: 11/13/24 1513     Protime 49.5 seconds      INR 5.57    Narrative:      INR Therapeutic Range    Indication                                             INR  Range      Atrial Fibrillation                                               2.0-3.0  Hypercoagulable State                                    2.0.2.3  Left Ventricular Asist Device                            2.0-3.0  Mechanical Heart Valve                                  -    Aortic(with afib, MI, embolism, HF, LA enlargement,    and/or coagulopathy)                                     2.0-3.0 (2.5-3.5)     Mitral                                                             2.5-3.5  Prosthetic/Bioprosthetic Heart Valve               2.0-3.0  Venous thromboembolism (VTE: VT, PE        2.0-3.0    Comprehensive metabolic panel [[id number]]  (Abnormal) Collected: 11/13/24 1446    Lab Status: Final result Specimen: Blood from Arm, Right Updated: 11/13/24 1507     Sodium 139 mmol/L      Potassium 3.8 mmol/L      Chloride 104 mmol/L      CO2 30 mmol/L      ANION GAP 5 mmol/L      BUN 11 mg/dL      Creatinine 0.62 mg/dL      Glucose 243 mg/dL      Calcium 8.7 mg/dL      AST 29 U/L      ALT 14 U/L      Alkaline Phosphatase 94 U/L      Total Protein 7.1 g/dL      Albumin 3.5 g/dL      Total Bilirubin 1.08 mg/dL      eGFR 96 ml/min/1.73sq m     Narrative:      National Kidney Disease Foundation guidelines for Chronic Kidney Disease (CKD):     Stage 1 with normal or high GFR (GFR > 90 mL/min/1.73 square meters)    Stage 2 Mild CKD (GFR = 60-89 mL/min/1.73 square meters)    Stage 3A Moderate CKD (GFR = 45-59 mL/min/1.73 square meters)    Stage 3B Moderate CKD (GFR = 30-44 mL/min/1.73 square meters)    Stage 4 Severe CKD (GFR = 15-29 mL/min/1.73 square meters)    Stage 5 End Stage CKD (GFR <15 mL/min/1.73 square meters)  Note: GFR calculation is accurate only with a steady state creatinine    CBC and differential [[id number]]  (Abnormal) Collected: 11/13/24 1445    Lab Status: Final result Specimen: Blood Updated: 11/13/24 1452     WBC 3.27 Thousand/uL      RBC 3.69 Million/uL      Hemoglobin 12.0 g/dL      Hematocrit 37.0 %        fL      MCH 32.5 pg      MCHC 32.4 g/dL      RDW 14.0 %      MPV 9.8 fL      Platelets 115 Thousands/uL      nRBC 0 /100 WBCs      Segmented % 62 %      Immature Grans % 0 %      Lymphocytes % 23 %      Monocytes % 9 %      Eosinophils Relative 5 %      Basophils Relative 1 %      Absolute Neutrophils 2.04 Thousands/µL      Absolute Immature Grans 0.01 Thousand/uL      Absolute Lymphocytes 0.76 Thousands/µL      Absolute Monocytes 0.28 Thousand/µL      Eosinophils Absolute 0.16 Thousand/µL      Basophils Absolute 0.02 Thousands/µL             CT abdomen pelvis with contrast   Final Interpretation by Bradley Landon Kocher, MD (11/13 1627)      1. No findings to suggest retroperitoneal hemorrhage. Chronic appearing fracture deformities of the left lateral eighth through 10th ribs.   2. No acute intra-abdominal inflammatory process.   3. Cirrhotic morphology of the liver. Hepatomegaly.         Workstation performed: [id number]         CT head wo contrast   Final Interpretation by Abdulaziz Magallon MD (11/13 1613)      No acute intracranial abnormality.                  Workstation performed: [id number]             Procedures    ED Medication and Procedure Management   Prior to Admission Medications   Prescriptions Last Dose Informant Patient Reported? Taking?   BD Pen Needle Mary 2nd Gen 32G X 4 MM MISC   Yes No   Blood Glucose Monitoring Suppl (OneTouch Verio IQ System) w/Device KIT   No No   Sig: Use to check blood sugar daily   Cholecalciferol (Vitamin D3) 50 MCG (2000 UT) TABS   No No   Sig: TAKE ONE TABLET BY MOUTH IN THE MORNING   DULoxetine (CYMBALTA) 30 mg delayed release capsule   No No   Sig: TAKE 1 CAPSULE (30 MG TOTAL) BY MOUTH DAILY   DULoxetine (CYMBALTA) 60 mg delayed release capsule   No No   Sig: TAKE 1 CAPSULE (60 MG TOTAL) BY MOUTH DAILY PT TO TAKE A 60 MG CYMBALTA AND A 30 MG CYMBALTA TO MAKE 90 MG   Incontinence Supplies MISC   No No   Sig: Use 3 (three) times a day   Ozempic, 0.25 or  0.5 MG/DOSE, 2 MG/3ML injection pen   No No   Sig: Inject 0.75 mL (0.5 mg total) under the skin every 7 days   Pharmacist Choice Lancets MISC   No No   Sig: USE TO TEST GLUCOSE UP TO 3 TIMES A DAY - ONETOUCH VERIO LANCETS   ProAir RespiClick 108 (90 Base) MCG/ACT AEPB  Self Yes No   Sig: INHALE 1 PUFF EVERY 4 (FOUR) HOURS AS NEEDED (WHEEZING OR SHORTNESS OF BREATH).   cyclobenzaprine (FLEXERIL) 10 mg tablet   No No   Sig: TAKE 1 TABLET (10 MG TOTAL) BY MOUTH 2 (TWO) TIMES A DAY AS NEEDED FOR MUSCLE SPASMS   glucose blood (OneTouch Verio) test strip   No No   Sig: Use 1 each 3 (three) times a day Use as instructed   nystatin powder   No No   Sig: Apply topically 2 (two) times a day   warfarin (Jantoven) 3 mg tablet   No No   Sig: TAKE 2 TO 2 1/2 TABS BY MOUTH DAILY OR AS DIRECTED BY PHYSICIAN   zolpidem (AMBIEN) 10 mg tablet   No No   Sig: TAKE 1 TABLET (10 MG TOTAL) BY MOUTH DAILY AT BEDTIME AS NEEDED FOR SLEEP      Facility-Administered Medications: None     Discharge Medication List as of 11/13/2024  5:32 PM        CONTINUE these medications which have NOT CHANGED    Details   BD Pen Needle Mary 2nd Gen 32G X 4 MM MISC Historical Med      Blood Glucose Monitoring Suppl (OneTouch Verio IQ System) w/Device KIT Use to check blood sugar daily, Normal      Cholecalciferol (Vitamin D3) 50 MCG (2000 UT) TABS TAKE ONE TABLET BY MOUTH IN THE MORNING, Starting Wed 6/12/2024, Normal      cyclobenzaprine (FLEXERIL) 10 mg tablet TAKE 1 TABLET (10 MG TOTAL) BY MOUTH 2 (TWO) TIMES A DAY AS NEEDED FOR MUSCLE SPASMS, Starting Mon 8/5/2024, Normal      !! DULoxetine (CYMBALTA) 30 mg delayed release capsule TAKE 1 CAPSULE (30 MG TOTAL) BY MOUTH DAILY, Starting Wed 10/30/2024, Normal      !! DULoxetine (CYMBALTA) 60 mg delayed release capsule TAKE 1 CAPSULE (60 MG TOTAL) BY MOUTH DAILY PT TO TAKE A 60 MG CYMBALTA AND A 30 MG CYMBALTA TO MAKE 90 MG, Starting Wed 10/30/2024, Normal      glucose blood (OneTouch Verio) test strip Use 1  each 3 (three) times a day Use as instructed, Starting Wed 8/28/2024, Normal      Incontinence Supplies MISC Use 3 (three) times a day, Starting Wed 7/5/2023, Normal      nystatin powder Apply topically 2 (two) times a day, Starting Thu 4/27/2023, Normal      Ozempic, 0.25 or 0.5 MG/DOSE, 2 MG/3ML injection pen Inject 0.75 mL (0.5 mg total) under the skin every 7 days, Starting Thu 6/27/2024, Normal      Pharmacist Choice Lancets MISC USE TO TEST GLUCOSE UP TO 3 TIMES A DAY - ONETOUCH VERIO LANCETS, Normal      ProAir RespiClick 108 (90 Base) MCG/ACT AEPB INHALE 1 PUFF EVERY 4 (FOUR) HOURS AS NEEDED (WHEEZING OR SHORTNESS OF BREATH)., Historical Med      warfarin (Jantoven) 3 mg tablet TAKE 2 TO 2 1/2 TABS BY MOUTH DAILY OR AS DIRECTED BY PHYSICIAN, Normal      zolpidem (AMBIEN) 10 mg tablet TAKE 1 TABLET (10 MG TOTAL) BY MOUTH DAILY AT BEDTIME AS NEEDED FOR SLEEP, Starting Thu 10/31/2024, Normal       !! - Potential duplicate medications found. Please discuss with provider.        No discharge procedures on file.  ED SEPSIS DOCUMENTATION   Time reflects when diagnosis was documented in both MDM as applicable and the Disposition within this note       Time User Action Codes Description Comment    11/13/2024  5:30 PM Hallie Baltazar [D68.9] Coagulopathy (HCC)     11/13/2024  5:31 PM Hallie Baltazar [T07.XXXA] Multiple bruises                  Hallie Baltazar MD  11/14/24 0932

## 2024-11-13 NOTE — TELEPHONE ENCOUNTER
Pt called in stating that she feel getting into the shower 3 days ago. Pt states that she has multiple bruises. One on her side, under her breast bone and on her tail bone. Pt states that they are very painful and warm to touch. Pt is on warfarin. Pt advised to be seen in ED. Pt agreeable.

## 2024-11-13 NOTE — DISCHARGE INSTRUCTIONS
Hold on coumadin for at least 3 days.  Don't take coumadin until you check your INR.  Follow up with your PMD.  Labs Reviewed   CBC AND DIFFERENTIAL - Abnormal       Result Value Ref Range Status    WBC 3.27 (*) 4.31 - 10.16 Thousand/uL Final    RBC 3.69 (*) 3.81 - 5.12 Million/uL Final    Hemoglobin 12.0  11.5 - 15.4 g/dL Final    Hematocrit 37.0  34.8 - 46.1 % Final     (*) 82 - 98 fL Final    MCH 32.5  26.8 - 34.3 pg Final    MCHC 32.4  31.4 - 37.4 g/dL Final    RDW 14.0  11.6 - 15.1 % Final    MPV 9.8  8.9 - 12.7 fL Final    Platelets 115 (*) 149 - 390 Thousands/uL Final    nRBC 0  /100 WBCs Final    Segmented % 62  43 - 75 % Final    Immature Grans % 0  0 - 2 % Final    Lymphocytes % 23  14 - 44 % Final    Monocytes % 9  4 - 12 % Final    Eosinophils Relative 5  0 - 6 % Final    Basophils Relative 1  0 - 1 % Final    Absolute Neutrophils 2.04  1.85 - 7.62 Thousands/µL Final    Absolute Immature Grans 0.01  0.00 - 0.20 Thousand/uL Final    Absolute Lymphocytes 0.76  0.60 - 4.47 Thousands/µL Final    Absolute Monocytes 0.28  0.17 - 1.22 Thousand/µL Final    Eosinophils Absolute 0.16  0.00 - 0.61 Thousand/µL Final    Basophils Absolute 0.02  0.00 - 0.10 Thousands/µL Final   COMPREHENSIVE METABOLIC PANEL - Abnormal    Sodium 139  135 - 147 mmol/L Final    Potassium 3.8  3.5 - 5.3 mmol/L Final    Chloride 104  96 - 108 mmol/L Final    CO2 30  21 - 32 mmol/L Final    ANION GAP 5  4 - 13 mmol/L Final    BUN 11  5 - 25 mg/dL Final    Creatinine 0.62  0.60 - 1.30 mg/dL Final    Comment: Standardized to IDMS reference method    Glucose 243 (*) 65 - 140 mg/dL Final    Comment: If the patient is fasting, the ADA then defines impaired fasting glucose as > 100 mg/dL and diabetes as > or equal to 123 mg/dL.    Calcium 8.7  8.4 - 10.2 mg/dL Final    AST 29  13 - 39 U/L Final    ALT 14  7 - 52 U/L Final    Comment: Specimen collection should occur prior to Sulfasalazine administration due to the potential for falsely  depressed results.     Alkaline Phosphatase 94  34 - 104 U/L Final    Total Protein 7.1  6.4 - 8.4 g/dL Final    Albumin 3.5  3.5 - 5.0 g/dL Final    Total Bilirubin 1.08 (*) 0.20 - 1.00 mg/dL Final    Comment: Use of this assay is not recommended for patients undergoing treatment with eltrombopag due to the potential for falsely elevated results.  N-acetyl-p-benzoquinone imine (metabolite of Acetaminophen) will generate erroneously low results in samples for patients that have taken an overdose of Acetaminophen.    eGFR 96  ml/min/1.73sq m Final    Narrative:     National Kidney Disease Foundation guidelines for Chronic Kidney Disease (CKD):     Stage 1 with normal or high GFR (GFR > 90 mL/min/1.73 square meters)    Stage 2 Mild CKD (GFR = 60-89 mL/min/1.73 square meters)    Stage 3A Moderate CKD (GFR = 45-59 mL/min/1.73 square meters)    Stage 3B Moderate CKD (GFR = 30-44 mL/min/1.73 square meters)    Stage 4 Severe CKD (GFR = 15-29 mL/min/1.73 square meters)    Stage 5 End Stage CKD (GFR <15 mL/min/1.73 square meters)  Note: GFR calculation is accurate only with a steady state creatinine   PROTIME-INR - Abnormal    Protime 49.5 (*) 12.3 - 15.0 seconds Final    INR 5.57 (*) 0.85 - 1.19 Final    Narrative:     INR Therapeutic Range    Indication                                             INR Range      Atrial Fibrillation                                               2.0-3.0  Hypercoagulable State                                    2.0.2.3  Left Ventricular Asist Device                            2.0-3.0  Mechanical Heart Valve                                  -    Aortic(with afib, MI, embolism, HF, LA enlargement,    and/or coagulopathy)                                     2.0-3.0 (2.5-3.5)     Mitral                                                             2.5-3.5  Prosthetic/Bioprosthetic Heart Valve               2.0-3.0  Venous thromboembolism (VTE: VT, PE        2.0-3.0     CT abdomen pelvis with  contrast   Final Result      1. No findings to suggest retroperitoneal hemorrhage. Chronic appearing fracture deformities of the left lateral eighth through 10th ribs.   2. No acute intra-abdominal inflammatory process.   3. Cirrhotic morphology of the liver. Hepatomegaly.         Workstation performed: [id number]         CT head wo contrast   Final Result      No acute intracranial abnormality.                  Workstation performed: [id number]

## 2024-11-18 ENCOUNTER — OFFICE VISIT (OUTPATIENT)
Dept: FAMILY MEDICINE CLINIC | Facility: CLINIC | Age: 64
End: 2024-11-18
Payer: COMMERCIAL

## 2024-11-18 ENCOUNTER — RESULTS FOLLOW-UP (OUTPATIENT)
Dept: FAMILY MEDICINE CLINIC | Facility: CLINIC | Age: 64
End: 2024-11-18

## 2024-11-18 ENCOUNTER — APPOINTMENT (OUTPATIENT)
Dept: LAB | Facility: HOSPITAL | Age: 64
End: 2024-11-18
Attending: INTERNAL MEDICINE
Payer: COMMERCIAL

## 2024-11-18 VITALS
HEIGHT: 64 IN | SYSTOLIC BLOOD PRESSURE: 138 MMHG | WEIGHT: 293 LBS | BODY MASS INDEX: 50.02 KG/M2 | DIASTOLIC BLOOD PRESSURE: 90 MMHG | OXYGEN SATURATION: 95 % | HEART RATE: 102 BPM

## 2024-11-18 DIAGNOSIS — Z12.31 ENCOUNTER FOR SCREENING MAMMOGRAM FOR MALIGNANT NEOPLASM OF BREAST: ICD-10-CM

## 2024-11-18 DIAGNOSIS — Z79.01 ANTICOAGULATED ON COUMADIN: ICD-10-CM

## 2024-11-18 DIAGNOSIS — K70.30 ALCOHOLIC CIRRHOSIS OF LIVER WITHOUT ASCITES (HCC): ICD-10-CM

## 2024-11-18 DIAGNOSIS — Z12.11 COLON CANCER SCREENING: ICD-10-CM

## 2024-11-18 DIAGNOSIS — I50.30 HEART FAILURE WITH PRESERVED EJECTION FRACTION, BORDERLINE, CLASS III (HCC): ICD-10-CM

## 2024-11-18 DIAGNOSIS — E03.9 HYPOTHYROIDISM, UNSPECIFIED TYPE: ICD-10-CM

## 2024-11-18 DIAGNOSIS — Z28.21 INFLUENZA VACCINATION DECLINED: ICD-10-CM

## 2024-11-18 DIAGNOSIS — L68.0 HIRSUTISM: ICD-10-CM

## 2024-11-18 DIAGNOSIS — E11.69 TYPE 2 DIABETES MELLITUS WITH OBESITY  (HCC): ICD-10-CM

## 2024-11-18 DIAGNOSIS — F31.9 BIPOLAR DEPRESSION (HCC): ICD-10-CM

## 2024-11-18 DIAGNOSIS — Z86.711 HISTORY OF PULMONARY EMBOLUS (PE): ICD-10-CM

## 2024-11-18 DIAGNOSIS — S22.49XA CLOSED FRACTURE OF MULTIPLE RIBS, UNSPECIFIED LATERALITY, INITIAL ENCOUNTER: ICD-10-CM

## 2024-11-18 DIAGNOSIS — E66.01 MORBID OBESITY (HCC): ICD-10-CM

## 2024-11-18 DIAGNOSIS — Z95.2 HISTORY OF MECHANICAL AORTIC VALVE REPLACEMENT: ICD-10-CM

## 2024-11-18 DIAGNOSIS — Z95.2 H/O HEART VALVE REPLACEMENT WITH MECHANICAL VALVE: Primary | ICD-10-CM

## 2024-11-18 DIAGNOSIS — E66.9 TYPE 2 DIABETES MELLITUS WITH OBESITY  (HCC): ICD-10-CM

## 2024-11-18 DIAGNOSIS — Z72.0 TOBACCO USE: ICD-10-CM
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
## 2024-11-18 DIAGNOSIS — F17.210 TOBACCO DEPENDENCE DUE TO CIGARETTES: ICD-10-CM

## 2024-11-18 DIAGNOSIS — Z00.00 ANNUAL PHYSICAL EXAM: Primary | ICD-10-CM

## 2024-11-18 DIAGNOSIS — E78.5 HYPERLIPIDEMIA, UNSPECIFIED HYPERLIPIDEMIA TYPE: ICD-10-CM

## 2024-11-18 DIAGNOSIS — M79.7 FIBROMYALGIA: ICD-10-CM

## 2024-11-18 LAB
CHOLEST SERPL-MCNC: 155 MG/DL (ref ?–200)
HDLC SERPL-MCNC: 46 MG/DL
INR PPP: 1.22 (ref 0.85–1.19)
LDLC SERPL CALC-MCNC: 73 MG/DL (ref 0–100)
NONHDLC SERPL-MCNC: 109 MG/DL
PROTHROMBIN TIME: 15.8 SECONDS (ref 12.3–15)
SL AMB POCT HEMOGLOBIN AIC: 5.8 (ref ?–6.5)
TRIGL SERPL-MCNC: 182 MG/DL (ref ?–150)
TSH SERPL DL<=0.05 MIU/L-ACNC: 4.21 UIU/ML (ref 0.45–4.5)

## 2024-11-18 PROCEDURE — 99396 PREV VISIT EST AGE 40-64: CPT | Performed by: INTERNAL MEDICINE

## 2024-11-18 PROCEDURE — 85610 PROTHROMBIN TIME: CPT

## 2024-11-18 PROCEDURE — 80061 LIPID PANEL: CPT

## 2024-11-18 PROCEDURE — 83036 HEMOGLOBIN GLYCOSYLATED A1C: CPT | Performed by: INTERNAL MEDICINE

## 2024-11-18 PROCEDURE — 84443 ASSAY THYROID STIM HORMONE: CPT

## 2024-11-18 PROCEDURE — 99214 OFFICE O/P EST MOD 30 MIN: CPT | Performed by: INTERNAL MEDICINE

## 2024-11-18 PROCEDURE — 36415 COLL VENOUS BLD VENIPUNCTURE: CPT

## 2024-11-18 NOTE — PROGRESS NOTES
Adult Annual Physical  Name: Lauren García      : 1960      MRN: [id number]  Encounter Provider: Sylvia Chen MD  Encounter Date: 2024   Encounter department: [facility] MEDICINE    Assessment & Plan  Heart failure with preserved ejection fraction, borderline, class III (HCC)  Wt Readings from Last 3 Encounters:   24 (!) 166 kg (367 lb)   24 (!) 166 kg (367 lb)   10/23/24 (!) 166 kg (367 lb)     Doing ok               Alcoholic cirrhosis of liver without ascites (HCC)         Hypothyroidism, unspecified type         Type 2 diabetes mellitus with obesity  (HCC)    Lab Results   Component Value Date    HGBA1C 5.8 2024   Very well controlled on Ozempic    Orders:    POCT hemoglobin A1c    Albumin / creatinine urine ratio; Future    Lipid panel; Future    TSH, 3rd generation; Future    Hirsutism         Tobacco dependence due to cigarettes  Counseled on cessation  Orders:    CT lung screening program; Future    Bipolar depression (HCC)         Tobacco use  LDCT prdered       Anticoagulated on Coumadin    Orders:    Protime-INR; Future    History of mechanical aortic valve replacement  On Coumadin and is going today for INR-was 5.57 in the ER and had severe bruising left side, breast and abdomen-will repeat INR and coumadin is currently on hold       Fibromyalgia         Hyperlipidemia, unspecified hyperlipidemia type         Morbid obesity (HCC)           History of pulmonary embolus (PE)         Encounter for screening mammogram for malignant neoplasm of breast    Orders:    Mammo screening bilateral w 3d and cad; Future    Colon cancer screening  Will refer to GI for colon cancer screening but will need bridging for the scope  Orders:    Ambulatory Referral to Gastroenterology; Future    Closed fracture of multiple ribs, unspecified laterality, initial encounter    Orders:    DXA bone density spine hip and pelvis; Future    Annual physical exam          Immunizations and preventive care screenings were discussed with patient today. Appropriate education was printed on patient's after visit summary.    Counseling:  Alcohol/drug use: discussed moderation in alcohol intake, the recommendations for healthy alcohol use, and avoidance of illicit drug use.  Exercise: the importance of regular exercise/physical activity was discussed. Recommend exercise 3-5 times per week for at least 30 minutes.       Tobacco Cessation Counseling: Tobacco cessation counseling was provided. The patient is sincerely urged to quit consumption of tobacco. She is not ready to quit tobacco. Medication options not discussed.         History of Present Illness     Adult Annual Physical:  Patient presents for annual physical.     Diet and Physical Activity:  - Diet/Nutrition: diabetic diet.  - Exercise: no formal exercise.    Review of Systems   Constitutional: Negative.    HENT: Negative.     Respiratory:  Positive for shortness of breath.    Gastrointestinal:  Positive for abdominal distention.   Hematological:  Bruises/bleeds easily.   Psychiatric/Behavioral:  Positive for dysphoric mood.      Medical History Reviewed by provider this encounter:     .  Current Outpatient Medications on File Prior to Visit   Medication Sig Dispense Refill    BD Pen Needle Mary 2nd Gen 32G X 4 MM MISC       Blood Glucose Monitoring Suppl (OneTouch Verio IQ System) w/Device KIT Use to check blood sugar daily 1 kit 1    Cholecalciferol (Vitamin D3) 50 MCG (2000 UT) TABS TAKE ONE TABLET BY MOUTH IN THE MORNING 30 tablet 5    cyclobenzaprine (FLEXERIL) 10 mg tablet TAKE 1 TABLET (10 MG TOTAL) BY MOUTH 2 (TWO) TIMES A DAY AS NEEDED FOR MUSCLE SPASMS 60 tablet 3    DULoxetine (CYMBALTA) 30 mg delayed release capsule TAKE 1 CAPSULE (30 MG TOTAL) BY MOUTH DAILY 30 capsule 5    DULoxetine (CYMBALTA) 60 mg delayed release capsule TAKE 1 CAPSULE (60 MG TOTAL) BY MOUTH DAILY PT TO TAKE A 60 MG CYMBALTA AND A 30 MG  "CYMBALTA TO MAKE 90 MG 30 capsule 5    glucose blood (OneTouch Verio) test strip Use 1 each 3 (three) times a day Use as instructed 270 each 1    Incontinence Supplies MISC Use 3 (three) times a day 100 each 0    nystatin powder Apply topically 2 (two) times a day 60 g 5    Ozempic, 0.25 or 0.5 MG/DOSE, 2 MG/3ML injection pen Inject 0.75 mL (0.5 mg total) under the skin every 7 days 2 mL 3    Pharmacist Choice Lancets MISC USE TO TEST GLUCOSE UP TO 3 TIMES A DAY - ONETOUCH VERIO LANCETS 300 each 5    ProAir RespiClick 108 (90 Base) MCG/ACT AEPB INHALE 1 PUFF EVERY 4 (FOUR) HOURS AS NEEDED (WHEEZING OR SHORTNESS OF BREATH).      warfarin (Jantoven) 3 mg tablet TAKE 2 TO 2 1/2 TABS BY MOUTH DAILY OR AS DIRECTED BY PHYSICIAN 75 tablet 5    zolpidem (AMBIEN) 10 mg tablet TAKE 1 TABLET (10 MG TOTAL) BY MOUTH DAILY AT BEDTIME AS NEEDED FOR SLEEP 30 tablet 0     No current facility-administered medications on file prior to visit.      Social History     Tobacco Use    Smoking status: Some Days     Current packs/day: 0.50     Average packs/day: 0.5 packs/day for 40.0 years (20.0 ttl pk-yrs)     Types: Cigarettes    Smokeless tobacco: Never   Vaping Use    Vaping status: Never Used   Substance and Sexual Activity    Alcohol use: Yes     Comment: once in awhile    Drug use: Yes     Types: Marijuana, LSD    Sexual activity: Not Currently       Objective   /90 (BP Location: Left arm, Patient Position: Sitting, Cuff Size: Standard)   Pulse 102   Ht 5' 4\" (1.626 m)   Wt (!) 166 kg (367 lb)   SpO2 95%   BMI 63.00 kg/m²     Physical Exam  Constitutional:       Appearance: She is obese.   HENT:      Head: Normocephalic and atraumatic.      Right Ear: External ear normal.      Left Ear: External ear normal.      Nose: Nose normal.      Mouth/Throat:      Mouth: Mucous membranes are moist.   Eyes:      Extraocular Movements: Extraocular movements intact.   Cardiovascular:      Rate and Rhythm: Regular rhythm.      Heart " sounds: Murmur heard.   Pulmonary:      Effort: Pulmonary effort is normal.      Breath sounds: Normal breath sounds.   Abdominal:      General: There is distension.   Musculoskeletal:         General: Normal range of motion.      Cervical back: Normal range of motion and neck supple.   Skin:     Findings: Bruising present.   Neurological:      General: No focal deficit present.      Mental Status: She is alert and oriented to person, place, and time. Mental status is at baseline.   Psychiatric:         Mood and Affect: Mood normal.         Thought Content: Thought content normal.

## 2024-11-18 NOTE — ASSESSMENT & PLAN NOTE
Lab Results   Component Value Date    HGBA1C 5.8 11/18/2024   Very well controlled on Ozempic    Orders:    POCT hemoglobin A1c    Albumin / creatinine urine ratio; Future    Lipid panel; Future    TSH, 3rd generation; Future

## 2024-11-18 NOTE — ASSESSMENT & PLAN NOTE
Wt Readings from Last 3 Encounters:   11/18/24 (!) 166 kg (367 lb)   11/13/24 (!) 166 kg (367 lb)   10/23/24 (!) 166 kg (367 lb)     Doing ok

## 2024-11-18 NOTE — ASSESSMENT & PLAN NOTE
On Coumadin and is going today for INR-was 5.57 in the ER and had severe bruising left side, breast and abdomen-will repeat INR and coumadin is currently on hold

## 2024-11-22 NOTE — TELEPHONE ENCOUNTER
Ciarra (daughter) called states patient is to go have INR drawn today and does not want to leave the house. Would like to know what she is to do. Ciarra would like a call back [phone]

## 2024-11-25 ENCOUNTER — TELEPHONE (OUTPATIENT)
Dept: FAMILY MEDICINE CLINIC | Facility: CLINIC | Age: 64
End: 2024-11-25

## 2024-11-25 ENCOUNTER — APPOINTMENT (OUTPATIENT)
Dept: LAB | Facility: HOSPITAL | Age: 64
End: 2024-11-25
Attending: INTERNAL MEDICINE
Payer: COMMERCIAL

## 2024-11-25 ENCOUNTER — RESULTS FOLLOW-UP (OUTPATIENT)
Dept: FAMILY MEDICINE CLINIC | Facility: CLINIC | Age: 64
End: 2024-11-25

## 2024-11-25 ENCOUNTER — TELEPHONE (OUTPATIENT)
Age: 64
End: 2024-11-25

## 2024-11-25 DIAGNOSIS — Z95.2 MECHANICAL HEART VALVE PRESENT: Primary | ICD-10-CM

## 2024-11-25 DIAGNOSIS — Z95.2 H/O HEART VALVE REPLACEMENT WITH MECHANICAL VALVE: ICD-10-CM

## 2024-11-25 LAB
INR PPP: 1.51 (ref 0.85–1.19)
PROTHROMBIN TIME: 18.5 SECONDS (ref 12.3–15)

## 2024-11-25 PROCEDURE — 36415 COLL VENOUS BLD VENIPUNCTURE: CPT

## 2024-11-25 PROCEDURE — 85610 PROTHROMBIN TIME: CPT

## 2024-11-25 RX ORDER — WARFARIN SODIUM 10 MG/1
TABLET ORAL
Qty: 30 TABLET | Refills: 3 | Status: SHIPPED | OUTPATIENT
Start: 2024-11-25

## 2024-11-25 RX ORDER — ENOXAPARIN SODIUM 100 MG/ML
1 INJECTION SUBCUTANEOUS EVERY 12 HOURS
Qty: 108 ML | Refills: 0 | Status: SHIPPED | OUTPATIENT
Start: 2024-11-25 | End: 2024-11-26

## 2024-11-25 RX ORDER — ENOXAPARIN SODIUM 100 MG/ML
60 INJECTION SUBCUTANEOUS EVERY 12 HOURS
Qty: 4.8 ML | Refills: 0 | Status: SHIPPED | OUTPATIENT
Start: 2024-11-25 | End: 2024-11-26 | Stop reason: SDUPTHER

## 2024-11-25 NOTE — TELEPHONE ENCOUNTER
Ok , Soumya just spoke with her daughter and explained the protocol-we have to do bridging because she has a mechanical heart valve and INR is still only 1.5-will send lovenox and 10 mg Coumadin-she'll take 10 mg tomorrow, 10 mg on Wed and then go to 5 mg-will check PT/InR on Friday-send lovenox too

## 2024-11-25 NOTE — TELEPHONE ENCOUNTER
Patients daughter is calling for mom, and they would like a call back because they need things clarified about the PT/INR and the medication.

## 2024-11-25 NOTE — TELEPHONE ENCOUNTER
Patients daughter calls in with questions about recent INR result.  Message below read to the daughter.  Daughter has further questions.  Call transferred to [facility] office clinical line.        11/25/24  2:49 PM  Result Note  I'm thinking her INR didn't move much and she's at high thrombotic risk because of her mechanical heart valve so, we may have to bridge Lauren with subcutaneous Lovenox while the coumadin level is coming back up-she should take 10 mg warfarin tonight and tomorrow and then go to 5 mg daily.   She'll need another PT /INR checked on Friday-we'll also have to send Lovenox (I will send for her) to use while the INR is

## 2024-11-26 DIAGNOSIS — Z95.2 MECHANICAL HEART VALVE PRESENT: ICD-10-CM

## 2024-11-26 RX ORDER — ENOXAPARIN SODIUM 100 MG/ML
60 INJECTION SUBCUTANEOUS EVERY 12 HOURS
Qty: 4.8 ML | Refills: 0 | Status: SHIPPED | OUTPATIENT
Start: 2024-11-26 | End: 2024-11-30

## 2024-11-26 NOTE — TELEPHONE ENCOUNTER
Ciarra (daughter) called would like a call from Dr Chen has questions and concerns she would like to speak to her about directly. States can be reached at [phone] or [phone].

## 2024-11-29 ENCOUNTER — APPOINTMENT (OUTPATIENT)
Dept: LAB | Facility: HOSPITAL | Age: 64
End: 2024-11-29
Attending: INTERNAL MEDICINE
Payer: COMMERCIAL

## 2024-11-29 ENCOUNTER — RESULTS FOLLOW-UP (OUTPATIENT)
Dept: FAMILY MEDICINE CLINIC | Facility: CLINIC | Age: 64
End: 2024-11-29

## 2024-11-29 DIAGNOSIS — Z95.2 MECHANICAL HEART VALVE PRESENT: ICD-10-CM

## 2024-11-29 LAB
INR PPP: 3.69 (ref 0.85–1.19)
PROTHROMBIN TIME: 36.3 SECONDS (ref 12.3–15)

## 2024-11-29 PROCEDURE — 36415 COLL VENOUS BLD VENIPUNCTURE: CPT

## 2024-11-29 PROCEDURE — 85610 PROTHROMBIN TIME: CPT

## 2024-11-29 NOTE — TELEPHONE ENCOUNTER
PT and PT's daughter both informed and acknowledged of Dr Chen's message below regarding INR results.

## 2024-12-02 DIAGNOSIS — M62.838 MUSCLE SPASMS OF BOTH LOWER EXTREMITIES: ICD-10-CM

## 2024-12-02 DIAGNOSIS — G47.00 INSOMNIA, UNSPECIFIED TYPE: ICD-10-CM

## 2024-12-03 RX ORDER — ZOLPIDEM TARTRATE 10 MG/1
10 TABLET ORAL
Qty: 30 TABLET | Refills: 0 | Status: SHIPPED | OUTPATIENT
Start: 2024-12-03

## 2024-12-03 RX ORDER — CYCLOBENZAPRINE HCL 10 MG
10 TABLET ORAL 2 TIMES DAILY PRN
Qty: 60 TABLET | Refills: 3 | Status: SHIPPED | OUTPATIENT
Start: 2024-12-03

## 2024-12-06 ENCOUNTER — TELEPHONE (OUTPATIENT)
Age: 64
End: 2024-12-06

## 2024-12-06 DIAGNOSIS — Z95.2 MECHANICAL HEART VALVE PRESENT: Primary | ICD-10-CM

## 2024-12-06 NOTE — TELEPHONE ENCOUNTER
Patient calls requesting an order for INR be entered so she can complete her blood work today. Please enter order asap.

## 2024-12-06 NOTE — TELEPHONE ENCOUNTER
Patient is at [facility] and needs INR order in chart.  Requesting additional orders for future since patient goes weekly to have done      Please advise

## 2024-12-09 ENCOUNTER — RESULTS FOLLOW-UP (OUTPATIENT)
Dept: FAMILY MEDICINE CLINIC | Facility: CLINIC | Age: 64
End: 2024-12-09

## 2024-12-09 ENCOUNTER — APPOINTMENT (OUTPATIENT)
Dept: LAB | Facility: HOSPITAL | Age: 64
End: 2024-12-09
Attending: INTERNAL MEDICINE
Payer: COMMERCIAL

## 2024-12-09 DIAGNOSIS — Z95.2 MECHANICAL HEART VALVE PRESENT: ICD-10-CM

## 2024-12-09 DIAGNOSIS — Z95.2 MECHANICAL HEART VALVE PRESENT: Primary | ICD-10-CM

## 2024-12-09 LAB
INR PPP: 2.49 (ref 0.85–1.19)
PROTHROMBIN TIME: 27 SECONDS (ref 12.3–15)

## 2024-12-09 PROCEDURE — 36415 COLL VENOUS BLD VENIPUNCTURE: CPT

## 2024-12-09 PROCEDURE — 85610 PROTHROMBIN TIME: CPT

## 2024-12-11 NOTE — TELEPHONE ENCOUNTER
Patient called to check what's dose of medication she's suppose to be taking now after her lab results. I advised her of the previous message from the provider and she understood. She said that if she has to recheck in 10 days she would like the order in her chart before, because last time when she went it was not there. Thank you.      Sylvia Chen MD to [facility] Clinical  result: Protime-INR   12/9/24  2:39 PM  Result Note  Continue same dose and recheck in 10 days

## 2024-12-18 DIAGNOSIS — Z95.2 MECHANICAL HEART VALVE PRESENT: ICD-10-CM

## 2024-12-19 RX ORDER — WARFARIN SODIUM 10 MG/1
TABLET ORAL
Qty: 90 TABLET | Refills: 1 | Status: SHIPPED | OUTPATIENT
Start: 2024-12-19

## 2025-01-06 ENCOUNTER — TELEPHONE (OUTPATIENT)
Dept: FAMILY MEDICINE CLINIC | Facility: CLINIC | Age: 65
End: 2025-01-06

## 2025-01-06 DIAGNOSIS — Z86.718 PERSONAL HISTORY OF DVT (DEEP VEIN THROMBOSIS): ICD-10-CM

## 2025-01-06 DIAGNOSIS — Z95.2 MECHANICAL HEART VALVE PRESENT: ICD-10-CM

## 2025-01-06 DIAGNOSIS — Z95.2 MECHANICAL HEART VALVE PRESENT: Primary | ICD-10-CM

## 2025-01-06 DIAGNOSIS — Z76.0 MEDICATION REFILL: ICD-10-CM

## 2025-01-06 DIAGNOSIS — Z95.2 HISTORY OF MECHANICAL AORTIC VALVE REPLACEMENT: ICD-10-CM

## 2025-01-06 RX ORDER — WARFARIN SODIUM 10 MG/1
TABLET ORAL
Qty: 90 TABLET | Refills: 1 | Status: SHIPPED | OUTPATIENT
Start: 2025-01-06 | End: 2025-01-08 | Stop reason: SDUPTHER

## 2025-01-06 RX ORDER — CHOLECALCIFEROL (VITAMIN D3) 50 MCG
2000 TABLET ORAL EVERY MORNING
Qty: 30 TABLET | Refills: 5 | Status: SHIPPED | OUTPATIENT
Start: 2025-01-06

## 2025-01-06 RX ORDER — WARFARIN SODIUM 3 MG/1
TABLET ORAL
Qty: 75 TABLET | Refills: 1 | Status: SHIPPED | OUTPATIENT
Start: 2025-01-06 | End: 2025-01-08

## 2025-01-06 NOTE — TELEPHONE ENCOUNTER
This is NOT a duplicate. Patient needs medication sent to a different pharmacy.     Reason for call:   [x] Refill   [] Prior Auth  [] Other:     Office:   [x] PCP/Provider -   [] Specialty/Provider -     Medication:   - Vitamin D3 50mcg- take 1 tablet by mouth in the morning  - Warfarin 10mg- take 10mg tomorrow and on 11/27, then go to 5 mg daily  - Warfarin 3 mg- take 2 to 2 1/2 tabs by mouth daily or as directed      Pharmacy: [facility]    Does the patient have enough for 3 days?   [] Yes   [x] No - Send as HP to POD

## 2025-01-06 NOTE — TELEPHONE ENCOUNTER
Pharmacist from [facility] in [place] calling & questions the directions on the Warfarin ordered today.  Can they please get a call back?

## 2025-01-08 DIAGNOSIS — Z95.2 MECHANICAL HEART VALVE PRESENT: ICD-10-CM

## 2025-01-08 RX ORDER — WARFARIN SODIUM 5 MG/1
TABLET ORAL
Qty: 30 TABLET | Refills: 0 | Status: SHIPPED | OUTPATIENT
Start: 2025-01-08

## 2025-01-08 RX ORDER — WARFARIN SODIUM 10 MG/1
TABLET ORAL
Qty: 90 TABLET | Refills: 1 | Status: SHIPPED | OUTPATIENT
Start: 2025-01-08 | End: 2025-01-08

## 2025-01-08 NOTE — TELEPHONE ENCOUNTER
Patient called the RX Refill Line. Message is being forwarded to the office.     Patients  Daughter called and stated that patient is currently taking Coumadin 5 mg once a day. Daughter is concerned Patient only has 1 tablet left. Please contact [facility] pharmacy to clarify the directions so they can refill the prescription.   [facility] [address] [place] [address], [address] [phone]     Please contact patients daughter at [phone]

## 2025-01-16 ENCOUNTER — TELEPHONE (OUTPATIENT)
Dept: LAB | Facility: HOSPITAL | Age: 65
End: 2025-01-16

## 2025-01-24 NOTE — TELEPHONE ENCOUNTER
Patient's daughter Ciarra, states that  was told of new address and phleb came to old address today for draw.    New address [address] [address].  Please reach out to Ciarra.

## 2025-01-28 ENCOUNTER — APPOINTMENT (OUTPATIENT)
Dept: LAB | Facility: HOSPITAL | Age: 65
End: 2025-01-28
Attending: INTERNAL MEDICINE
Payer: COMMERCIAL

## 2025-01-29 ENCOUNTER — RESULTS FOLLOW-UP (OUTPATIENT)
Dept: FAMILY MEDICINE CLINIC | Facility: CLINIC | Age: 65
End: 2025-01-29

## 2025-02-02 DIAGNOSIS — E66.9 TYPE 2 DIABETES MELLITUS WITH OBESITY  (HCC): ICD-10-CM

## 2025-02-02 DIAGNOSIS — E11.69 TYPE 2 DIABETES MELLITUS WITH OBESITY  (HCC): ICD-10-CM

## 2025-02-02 DIAGNOSIS — R21 RASH: ICD-10-CM

## 2025-02-02 DIAGNOSIS — G47.00 INSOMNIA, UNSPECIFIED TYPE: ICD-10-CM

## 2025-02-02 DIAGNOSIS — Z76.0 MEDICATION REFILL: ICD-10-CM

## 2025-02-02 RX ORDER — ZOLPIDEM TARTRATE 10 MG/1
10 TABLET ORAL
Qty: 30 TABLET | Refills: 0 | Status: SHIPPED | OUTPATIENT
Start: 2025-02-02

## 2025-02-03 RX ORDER — NYSTATIN 100000 [USP'U]/G
POWDER TOPICAL 2 TIMES DAILY
Qty: 60 G | Refills: 0 | Status: SHIPPED | OUTPATIENT
Start: 2025-02-03

## 2025-02-03 RX ORDER — CHOLECALCIFEROL (VITAMIN D3) 50 MCG
2000 TABLET ORAL EVERY MORNING
Qty: 30 TABLET | Refills: 5 | Status: SHIPPED | OUTPATIENT
Start: 2025-02-03

## 2025-02-03 RX ORDER — SEMAGLUTIDE 0.68 MG/ML
0.5 INJECTION, SOLUTION SUBCUTANEOUS
Qty: 3 ML | Refills: 5 | Status: SHIPPED | OUTPATIENT
Start: 2025-02-03

## 2025-02-11 ENCOUNTER — TELEPHONE (OUTPATIENT)
Dept: GASTROENTEROLOGY | Facility: CLINIC | Age: 65
End: 2025-02-11

## 2025-02-11 NOTE — TELEPHONE ENCOUNTER
Pt was scheduled on 4/7/25 with Dr Salmeron at [place] office, however, due to schedule change appt needs to be rescheduled.  I lmom for pt to please call back to reschedule and apologized for the inconvenience. Will call again to try to reschedule if do not hear back from pt.

## 2025-02-18 ENCOUNTER — TELEPHONE (OUTPATIENT)
Dept: LAB | Facility: HOSPITAL | Age: 65
End: 2025-02-18

## 2025-02-19 NOTE — TELEPHONE ENCOUNTER
Pt was scheduled on 4/7/25 with Dr Salmeron at [place] office, however, due to schedule change appt needs to be rescheduled.  I lmom for pt to please call back to reschedule and apologized for the inconvenience. Will call again to try to reschedule if do not hear back from pt.

## 2025-02-24 NOTE — TELEPHONE ENCOUNTER
Patient called back stating she does have a voicemail not sure but did confirm contact number as [phone]

## 2025-03-01 DIAGNOSIS — Z95.2 MECHANICAL HEART VALVE PRESENT: ICD-10-CM

## 2025-03-03 RX ORDER — WARFARIN SODIUM 5 MG/1
5 TABLET ORAL DAILY
Qty: 30 TABLET | Refills: 0 | Status: SHIPPED | OUTPATIENT
Start: 2025-03-03

## 2025-03-04 ENCOUNTER — APPOINTMENT (OUTPATIENT)
Dept: LAB | Facility: HOSPITAL | Age: 65
End: 2025-03-04
Attending: INTERNAL MEDICINE
Payer: COMMERCIAL

## 2025-03-04 DIAGNOSIS — Z95.2 MECHANICAL HEART VALVE PRESENT: ICD-10-CM

## 2025-03-04 DIAGNOSIS — M62.838 MUSCLE SPASMS OF BOTH LOWER EXTREMITIES: ICD-10-CM

## 2025-03-04 DIAGNOSIS — G47.00 INSOMNIA, UNSPECIFIED TYPE: ICD-10-CM

## 2025-03-04 LAB
INR PPP: 3.31 (ref 0.85–1.19)
PROTHROMBIN TIME: 33.2 SECONDS (ref 12.3–15)

## 2025-03-04 PROCEDURE — 36415 COLL VENOUS BLD VENIPUNCTURE: CPT

## 2025-03-04 PROCEDURE — 85610 PROTHROMBIN TIME: CPT

## 2025-03-05 RX ORDER — ZOLPIDEM TARTRATE 10 MG/1
10 TABLET ORAL
Qty: 30 TABLET | Refills: 0 | Status: SHIPPED | OUTPATIENT
Start: 2025-03-05

## 2025-03-05 RX ORDER — CYCLOBENZAPRINE HCL 10 MG
10 TABLET ORAL 2 TIMES DAILY PRN
Qty: 60 TABLET | Refills: 0 | Status: SHIPPED | OUTPATIENT
Start: 2025-03-05

## 2025-03-19 ENCOUNTER — TELEPHONE (OUTPATIENT)
Dept: FAMILY MEDICINE CLINIC | Facility: CLINIC | Age: 65
End: 2025-03-19

## 2025-03-25 DIAGNOSIS — Z95.2 MECHANICAL HEART VALVE PRESENT: ICD-10-CM

## 2025-03-26 RX ORDER — WARFARIN SODIUM 5 MG/1
5 TABLET ORAL DAILY
Qty: 30 TABLET | Refills: 0 | Status: SHIPPED | OUTPATIENT
Start: 2025-03-26

## 2025-04-25 DIAGNOSIS — R35.0 URINARY FREQUENCY: ICD-10-CM

## 2025-04-25 DIAGNOSIS — M79.7 FIBROMYALGIA: ICD-10-CM

## 2025-04-25 DIAGNOSIS — M54.42 CHRONIC BILATERAL LOW BACK PAIN WITH SCIATICA, SCIATICA LATERALITY UNSPECIFIED: ICD-10-CM

## 2025-04-25 DIAGNOSIS — G89.29 CHRONIC BILATERAL LOW BACK PAIN WITH SCIATICA, SCIATICA LATERALITY UNSPECIFIED: ICD-10-CM

## 2025-04-25 DIAGNOSIS — Z95.2 MECHANICAL HEART VALVE PRESENT: ICD-10-CM

## 2025-04-25 DIAGNOSIS — M54.41 CHRONIC BILATERAL LOW BACK PAIN WITH SCIATICA, SCIATICA LATERALITY UNSPECIFIED: ICD-10-CM

## 2025-04-25 DIAGNOSIS — G47.00 INSOMNIA, UNSPECIFIED TYPE: ICD-10-CM

## 2025-04-25 DIAGNOSIS — M62.838 MUSCLE SPASMS OF BOTH LOWER EXTREMITIES: ICD-10-CM

## 2025-04-25 DIAGNOSIS — R21 RASH: ICD-10-CM

## 2025-04-25 RX ORDER — DULOXETIN HYDROCHLORIDE 30 MG/1
30 CAPSULE, DELAYED RELEASE ORAL DAILY
Qty: 30 CAPSULE | Refills: 5 | Status: SHIPPED | OUTPATIENT
Start: 2025-04-25

## 2025-04-25 RX ORDER — WARFARIN SODIUM 5 MG/1
5 TABLET ORAL DAILY
Qty: 30 TABLET | Refills: 0 | Status: SHIPPED | OUTPATIENT
Start: 2025-04-25

## 2025-04-25 RX ORDER — DULOXETIN HYDROCHLORIDE 60 MG/1
60 CAPSULE, DELAYED RELEASE ORAL DAILY
Qty: 30 CAPSULE | Refills: 5 | Status: SHIPPED | OUTPATIENT
Start: 2025-04-25

## 2025-04-28 RX ORDER — ZOLPIDEM TARTRATE 10 MG/1
10 TABLET ORAL
Qty: 30 TABLET | Refills: 0 | Status: SHIPPED | OUTPATIENT
Start: 2025-04-28

## 2025-04-28 RX ORDER — NYSTATIN 100000 [USP'U]/G
POWDER TOPICAL 2 TIMES DAILY
Qty: 60 G | Refills: 0 | Status: SHIPPED | OUTPATIENT
Start: 2025-04-28

## 2025-04-28 RX ORDER — CYCLOBENZAPRINE HCL 10 MG
10 TABLET ORAL 2 TIMES DAILY PRN
Qty: 60 TABLET | Refills: 0 | Status: SHIPPED | OUTPATIENT
Start: 2025-04-28

## 2025-05-02 ENCOUNTER — DOCUMENTATION (OUTPATIENT)
Dept: ADMINISTRATIVE | Facility: OTHER | Age: 65
End: 2025-05-02

## 2025-05-02 NOTE — PROGRESS NOTES
05/02/25 1:30 PM    Osteoporosis Management Post Fracture outreach is not required; there is an active DEXA screening order on file.    Thank you.  Chelsea Smith  [facility]

## 2025-05-21 DIAGNOSIS — Z95.2 MECHANICAL HEART VALVE PRESENT: ICD-10-CM

## 2025-05-21 RX ORDER — WARFARIN SODIUM 5 MG/1
5 TABLET ORAL DAILY
Qty: 30 TABLET | Refills: 0 | Status: SHIPPED | OUTPATIENT
Start: 2025-05-21

## 2025-05-21 NOTE — TELEPHONE ENCOUNTER
Refill must be reviewed and completed by the office or provider. The refill is unable to be approved or denied by the medication management team.    Failed no Inr within 30 days

## 2025-06-11 ENCOUNTER — TELEPHONE (OUTPATIENT)
Dept: FAMILY MEDICINE CLINIC | Facility: CLINIC | Age: 65
End: 2025-06-11

## 2025-06-12 ENCOUNTER — TELEPHONE (OUTPATIENT)
Age: 65
End: 2025-06-12

## 2025-06-12 DIAGNOSIS — G47.00 INSOMNIA, UNSPECIFIED TYPE: ICD-10-CM

## 2025-06-12 RX ORDER — ZOLPIDEM TARTRATE 10 MG/1
10 TABLET ORAL
Qty: 30 TABLET | Refills: 0 | Status: SHIPPED | OUTPATIENT
Start: 2025-06-12

## 2025-06-12 RX ORDER — ZOLPIDEM TARTRATE 10 MG/1
10 TABLET ORAL
Qty: 30 TABLET | Refills: 0 | Status: CANCELLED | OUTPATIENT
Start: 2025-06-12

## 2025-06-13 DIAGNOSIS — E11.69 TYPE 2 DIABETES MELLITUS WITH OBESITY  (HCC): Primary | ICD-10-CM

## 2025-06-13 DIAGNOSIS — E78.5 HYPERLIPIDEMIA, UNSPECIFIED HYPERLIPIDEMIA TYPE: ICD-10-CM

## 2025-06-13 DIAGNOSIS — E03.9 HYPOTHYROIDISM, UNSPECIFIED TYPE: ICD-10-CM

## 2025-06-13 DIAGNOSIS — E66.9 TYPE 2 DIABETES MELLITUS WITH OBESITY  (HCC): Primary | ICD-10-CM

## 2025-06-13 DIAGNOSIS — E66.01 MORBID OBESITY (HCC): ICD-10-CM

## 2025-06-16 DIAGNOSIS — Z95.2 MECHANICAL HEART VALVE PRESENT: ICD-10-CM

## 2025-06-16 RX ORDER — WARFARIN SODIUM 5 MG/1
5 TABLET ORAL DAILY
Qty: 30 TABLET | Refills: 0 | Status: SHIPPED | OUTPATIENT
Start: 2025-06-16

## 2025-06-16 NOTE — TELEPHONE ENCOUNTER
Refill must be reviewed and completed by the office or provider. The refill is unable to be approved or denied by the medication management team.    No INR within 30 days

## 2025-06-29 DIAGNOSIS — Z95.2 MECHANICAL HEART VALVE PRESENT: ICD-10-CM

## 2025-07-01 RX ORDER — WARFARIN SODIUM 5 MG/1
5 TABLET ORAL DAILY
Qty: 30 TABLET | Refills: 0 | Status: SHIPPED | OUTPATIENT
Start: 2025-07-01